# Patient Record
Sex: MALE | Race: WHITE | Employment: OTHER | URBAN - METROPOLITAN AREA
[De-identification: names, ages, dates, MRNs, and addresses within clinical notes are randomized per-mention and may not be internally consistent; named-entity substitution may affect disease eponyms.]

---

## 2017-08-06 PROBLEM — J96.11 CHRONIC RESPIRATORY FAILURE WITH HYPOXIA AND HYPERCAPNIA (HCC): Status: ACTIVE | Noted: 2017-08-06

## 2017-08-06 PROBLEM — J96.12 CHRONIC RESPIRATORY FAILURE WITH HYPOXIA AND HYPERCAPNIA (HCC): Status: ACTIVE | Noted: 2017-08-06

## 2018-04-03 ENCOUNTER — HOSPITAL ENCOUNTER (OUTPATIENT)
Dept: CARDIAC REHAB | Age: 83
Setting detail: THERAPIES SERIES
Discharge: HOME | End: 2018-04-03
Payer: MEDICARE

## 2018-04-03 PROCEDURE — G0424 PULMONARY REHAB W EXER: HCPCS

## 2018-04-05 ENCOUNTER — HOSPITAL ENCOUNTER (OUTPATIENT)
Dept: CARDIAC REHAB | Age: 83
Setting detail: THERAPIES SERIES
Discharge: HOME | End: 2018-04-05
Payer: MEDICARE

## 2018-04-05 ENCOUNTER — APPOINTMENT (OUTPATIENT)
Dept: CARDIAC REHAB | Age: 83
End: 2018-04-05
Payer: MEDICARE

## 2018-04-05 PROCEDURE — G0424 PULMONARY REHAB W EXER: HCPCS

## 2018-04-10 ENCOUNTER — HOSPITAL ENCOUNTER (OUTPATIENT)
Dept: CARDIAC REHAB | Age: 83
Setting detail: THERAPIES SERIES
Discharge: HOME | End: 2018-04-10
Payer: MEDICARE

## 2018-04-10 ENCOUNTER — APPOINTMENT (OUTPATIENT)
Dept: CARDIAC REHAB | Age: 83
End: 2018-04-10
Payer: MEDICARE

## 2018-04-10 PROCEDURE — G0424 PULMONARY REHAB W EXER: HCPCS

## 2018-04-12 ENCOUNTER — APPOINTMENT (OUTPATIENT)
Dept: CARDIAC REHAB | Age: 83
End: 2018-04-12
Payer: MEDICARE

## 2018-04-17 ENCOUNTER — APPOINTMENT (OUTPATIENT)
Dept: CARDIAC REHAB | Age: 83
End: 2018-04-17
Payer: MEDICARE

## 2018-04-17 ENCOUNTER — HOSPITAL ENCOUNTER (OUTPATIENT)
Dept: CARDIAC REHAB | Age: 83
Setting detail: THERAPIES SERIES
Discharge: HOME | End: 2018-04-17
Payer: MEDICARE

## 2018-04-17 PROCEDURE — G0424 PULMONARY REHAB W EXER: HCPCS

## 2018-04-19 ENCOUNTER — APPOINTMENT (OUTPATIENT)
Dept: CARDIAC REHAB | Age: 83
End: 2018-04-19
Payer: MEDICARE

## 2018-04-19 ENCOUNTER — HOSPITAL ENCOUNTER (OUTPATIENT)
Dept: CARDIAC REHAB | Age: 83
Setting detail: THERAPIES SERIES
Discharge: HOME | End: 2018-04-19
Payer: MEDICARE

## 2018-04-19 PROCEDURE — G0424 PULMONARY REHAB W EXER: HCPCS

## 2018-04-24 ENCOUNTER — APPOINTMENT (OUTPATIENT)
Dept: CARDIAC REHAB | Age: 83
End: 2018-04-24
Payer: MEDICARE

## 2018-04-24 ENCOUNTER — HOSPITAL ENCOUNTER (OUTPATIENT)
Dept: CARDIAC REHAB | Age: 83
Setting detail: THERAPIES SERIES
Discharge: HOME | End: 2018-04-24
Payer: MEDICARE

## 2018-04-24 PROCEDURE — G0424 PULMONARY REHAB W EXER: HCPCS

## 2018-04-26 ENCOUNTER — APPOINTMENT (OUTPATIENT)
Dept: CARDIAC REHAB | Age: 83
End: 2018-04-26
Payer: MEDICARE

## 2018-05-01 ENCOUNTER — HOSPITAL ENCOUNTER (OUTPATIENT)
Dept: CARDIAC REHAB | Age: 83
Setting detail: THERAPIES SERIES
Discharge: HOME | End: 2018-05-01
Payer: MEDICARE

## 2018-05-01 PROCEDURE — G0424 PULMONARY REHAB W EXER: HCPCS

## 2018-05-03 ENCOUNTER — HOSPITAL ENCOUNTER (OUTPATIENT)
Dept: CARDIAC REHAB | Age: 83
Setting detail: THERAPIES SERIES
Discharge: HOME | End: 2018-05-03
Payer: MEDICARE

## 2018-05-03 PROCEDURE — G0424 PULMONARY REHAB W EXER: HCPCS

## 2018-05-08 ENCOUNTER — HOSPITAL ENCOUNTER (OUTPATIENT)
Dept: CARDIAC REHAB | Age: 83
Setting detail: THERAPIES SERIES
Discharge: HOME | End: 2018-05-08
Payer: MEDICARE

## 2018-05-08 PROCEDURE — G0424 PULMONARY REHAB W EXER: HCPCS

## 2018-05-10 ENCOUNTER — HOSPITAL ENCOUNTER (OUTPATIENT)
Dept: CARDIAC REHAB | Age: 83
Setting detail: THERAPIES SERIES
Discharge: HOME | End: 2018-05-10
Payer: MEDICARE

## 2018-05-10 PROCEDURE — G0424 PULMONARY REHAB W EXER: HCPCS

## 2018-05-15 ENCOUNTER — HOSPITAL ENCOUNTER (OUTPATIENT)
Dept: CARDIAC REHAB | Age: 83
Setting detail: THERAPIES SERIES
Discharge: HOME | End: 2018-05-15
Payer: MEDICARE

## 2018-05-15 PROCEDURE — G0424 PULMONARY REHAB W EXER: HCPCS

## 2018-05-17 ENCOUNTER — HOSPITAL ENCOUNTER (OUTPATIENT)
Dept: CARDIAC REHAB | Age: 83
Setting detail: THERAPIES SERIES
Discharge: HOME | End: 2018-05-17
Payer: MEDICARE

## 2018-05-17 PROCEDURE — G0424 PULMONARY REHAB W EXER: HCPCS

## 2018-05-22 ENCOUNTER — HOSPITAL ENCOUNTER (OUTPATIENT)
Dept: CARDIAC REHAB | Age: 83
Setting detail: THERAPIES SERIES
Discharge: HOME | End: 2018-05-22
Payer: MEDICARE

## 2018-05-22 PROCEDURE — G0424 PULMONARY REHAB W EXER: HCPCS

## 2018-05-30 ENCOUNTER — HOSPITAL ENCOUNTER (OUTPATIENT)
Dept: CT IMAGING | Age: 83
Discharge: HOME | End: 2018-05-30
Payer: MEDICARE

## 2018-05-30 DIAGNOSIS — J43.8 OTHER EMPHYSEMA (HCC): ICD-10-CM

## 2018-05-30 PROCEDURE — 71250 CT THORAX DX C-: CPT

## 2018-06-05 ENCOUNTER — HOSPITAL ENCOUNTER (OUTPATIENT)
Dept: CARDIAC REHAB | Age: 83
Setting detail: THERAPIES SERIES
Discharge: HOME | End: 2018-06-05
Payer: MEDICARE

## 2018-06-05 PROCEDURE — G0424 PULMONARY REHAB W EXER: HCPCS

## 2018-06-07 ENCOUNTER — HOSPITAL ENCOUNTER (OUTPATIENT)
Dept: CARDIAC REHAB | Age: 83
Setting detail: THERAPIES SERIES
Discharge: HOME | End: 2018-06-07
Payer: MEDICARE

## 2018-06-07 PROCEDURE — G0424 PULMONARY REHAB W EXER: HCPCS

## 2018-06-21 ENCOUNTER — HOSPITAL ENCOUNTER (OUTPATIENT)
Dept: CARDIAC REHAB | Age: 83
Setting detail: THERAPIES SERIES
Discharge: HOME | End: 2018-06-21
Payer: MEDICARE

## 2018-06-21 PROCEDURE — G0424 PULMONARY REHAB W EXER: HCPCS

## 2018-07-03 ENCOUNTER — APPOINTMENT (OUTPATIENT)
Dept: CARDIAC REHAB | Age: 83
End: 2018-07-03
Payer: MEDICARE

## 2018-07-05 ENCOUNTER — APPOINTMENT (OUTPATIENT)
Dept: CARDIAC REHAB | Age: 83
End: 2018-07-05
Payer: MEDICARE

## 2018-07-10 ENCOUNTER — APPOINTMENT (OUTPATIENT)
Dept: CARDIAC REHAB | Age: 83
End: 2018-07-10
Payer: MEDICARE

## 2018-07-12 ENCOUNTER — APPOINTMENT (OUTPATIENT)
Dept: CARDIAC REHAB | Age: 83
End: 2018-07-12
Payer: MEDICARE

## 2018-07-17 ENCOUNTER — HOSPITAL ENCOUNTER (OUTPATIENT)
Dept: CARDIAC REHAB | Age: 83
Setting detail: THERAPIES SERIES
Discharge: HOME | End: 2018-07-17
Payer: MEDICARE

## 2018-07-17 ENCOUNTER — APPOINTMENT (OUTPATIENT)
Dept: CARDIAC REHAB | Age: 83
End: 2018-07-17
Payer: MEDICARE

## 2018-07-19 ENCOUNTER — APPOINTMENT (OUTPATIENT)
Dept: CARDIAC REHAB | Age: 83
End: 2018-07-19
Payer: MEDICARE

## 2018-07-20 ENCOUNTER — HOSPITAL ENCOUNTER (OUTPATIENT)
Dept: CT IMAGING | Age: 83
Discharge: HOME | End: 2018-07-20
Payer: MEDICARE

## 2018-07-20 DIAGNOSIS — R07.9 CHEST PAIN, UNSPECIFIED TYPE: ICD-10-CM

## 2018-07-20 DIAGNOSIS — J44.9 CHRONIC OBSTRUCTIVE PULMONARY DISEASE, UNSPECIFIED COPD TYPE (HCC): ICD-10-CM

## 2018-07-20 PROCEDURE — 71250 CT THORAX DX C-: CPT

## 2018-07-24 ENCOUNTER — APPOINTMENT (OUTPATIENT)
Dept: CARDIAC REHAB | Age: 83
End: 2018-07-24
Payer: MEDICARE

## 2018-07-26 ENCOUNTER — APPOINTMENT (OUTPATIENT)
Dept: CARDIAC REHAB | Age: 83
End: 2018-07-26
Payer: MEDICARE

## 2018-07-26 ENCOUNTER — HOSPITAL ENCOUNTER (OUTPATIENT)
Age: 83
Discharge: HOME | End: 2018-07-28
Payer: MEDICARE

## 2018-07-26 LAB
ALBUMIN SERPL-MCNC: 4.4 G/DL (ref 3.5–5.2)
ALP BLD-CCNC: 68 U/L (ref 40–129)
ALT SERPL-CCNC: 10 U/L (ref 0–40)
ANION GAP SERPL CALCULATED.3IONS-SCNC: 17 MMOL/L (ref 7–16)
AST SERPL-CCNC: 17 U/L (ref 0–39)
BILIRUB SERPL-MCNC: 0.5 MG/DL (ref 0–1.2)
BUN BLDV-MCNC: 25 MG/DL (ref 8–23)
CALCIUM SERPL-MCNC: 9.7 MG/DL (ref 8.6–10.2)
CHLORIDE BLD-SCNC: 99 MMOL/L (ref 98–107)
CHOLESTEROL, TOTAL: 239 MG/DL (ref 0–199)
CO2: 31 MMOL/L (ref 22–29)
CREAT SERPL-MCNC: 1.1 MG/DL (ref 0.7–1.2)
GFR AFRICAN AMERICAN: >60
GFR NON-AFRICAN AMERICAN: >60 ML/MIN/1.73
GLUCOSE BLD-MCNC: 108 MG/DL (ref 74–109)
HCT VFR BLD CALC: 46.2 % (ref 37–54)
HDLC SERPL-MCNC: 79 MG/DL
HEMOGLOBIN: 14.2 G/DL (ref 12.5–16.5)
LDL CHOLESTEROL CALCULATED: 147 MG/DL (ref 0–99)
MCH RBC QN AUTO: 32.3 PG (ref 26–35)
MCHC RBC AUTO-ENTMCNC: 30.7 % (ref 32–34.5)
MCV RBC AUTO: 105.2 FL (ref 80–99.9)
MICROALBUMIN UR-MCNC: <12 MG/L
PDW BLD-RTO: 14.2 FL (ref 11.5–15)
PLATELET # BLD: 190 E9/L (ref 130–450)
PMV BLD AUTO: 11.2 FL (ref 7–12)
POTASSIUM SERPL-SCNC: 4.1 MMOL/L (ref 3.5–5)
RBC # BLD: 4.39 E12/L (ref 3.8–5.8)
SODIUM BLD-SCNC: 147 MMOL/L (ref 132–146)
TOTAL PROTEIN: 7.1 G/DL (ref 6.4–8.3)
TRIGL SERPL-MCNC: 66 MG/DL (ref 0–149)
TSH SERPL DL<=0.05 MIU/L-ACNC: 0.86 UIU/ML (ref 0.27–4.2)
VITAMIN D 25-HYDROXY: >120 NG/ML (ref 30–100)
VLDLC SERPL CALC-MCNC: 13 MG/DL
WBC # BLD: 9.2 E9/L (ref 4.5–11.5)

## 2018-07-26 PROCEDURE — 80053 COMPREHEN METABOLIC PANEL: CPT

## 2018-07-26 PROCEDURE — 82306 VITAMIN D 25 HYDROXY: CPT

## 2018-07-26 PROCEDURE — 80061 LIPID PANEL: CPT

## 2018-07-26 PROCEDURE — 84443 ASSAY THYROID STIM HORMONE: CPT

## 2018-07-26 PROCEDURE — 82044 UR ALBUMIN SEMIQUANTITATIVE: CPT

## 2018-07-26 PROCEDURE — 85027 COMPLETE CBC AUTOMATED: CPT

## 2018-07-31 ENCOUNTER — APPOINTMENT (OUTPATIENT)
Dept: CARDIAC REHAB | Age: 83
End: 2018-07-31
Payer: MEDICARE

## 2018-08-02 ENCOUNTER — APPOINTMENT (OUTPATIENT)
Dept: CARDIAC REHAB | Age: 83
End: 2018-08-02
Payer: MEDICARE

## 2018-08-07 ENCOUNTER — HOSPITAL ENCOUNTER (OUTPATIENT)
Dept: CARDIAC REHAB | Age: 83
Setting detail: THERAPIES SERIES
Discharge: HOME | End: 2018-08-07
Payer: MEDICARE

## 2018-08-09 ENCOUNTER — HOSPITAL ENCOUNTER (OUTPATIENT)
Dept: CARDIAC REHAB | Age: 83
Setting detail: THERAPIES SERIES
Discharge: HOME | End: 2018-08-09
Payer: MEDICARE

## 2018-08-14 ENCOUNTER — HOSPITAL ENCOUNTER (OUTPATIENT)
Dept: CARDIAC REHAB | Age: 83
Setting detail: THERAPIES SERIES
Discharge: HOME | End: 2018-08-14
Payer: MEDICARE

## 2018-08-16 ENCOUNTER — HOSPITAL ENCOUNTER (OUTPATIENT)
Dept: CARDIAC REHAB | Age: 83
Setting detail: THERAPIES SERIES
Discharge: HOME | End: 2018-08-16
Payer: MEDICARE

## 2018-08-21 ENCOUNTER — HOSPITAL ENCOUNTER (OUTPATIENT)
Dept: CARDIAC REHAB | Age: 83
Setting detail: THERAPIES SERIES
Discharge: HOME | End: 2018-08-21
Payer: MEDICARE

## 2018-08-23 ENCOUNTER — HOSPITAL ENCOUNTER (OUTPATIENT)
Dept: CARDIAC REHAB | Age: 83
Setting detail: THERAPIES SERIES
Discharge: HOME | End: 2018-08-23
Payer: MEDICARE

## 2018-08-28 ENCOUNTER — HOSPITAL ENCOUNTER (OUTPATIENT)
Dept: CARDIAC REHAB | Age: 83
Setting detail: THERAPIES SERIES
Discharge: HOME | End: 2018-08-28
Payer: MEDICARE

## 2018-08-30 ENCOUNTER — HOSPITAL ENCOUNTER (OUTPATIENT)
Dept: CARDIAC REHAB | Age: 83
Setting detail: THERAPIES SERIES
Discharge: HOME | End: 2018-08-30
Payer: MEDICARE

## 2018-09-02 PROBLEM — R09.02 SEVERE HYPOXEMIA: Status: ACTIVE | Noted: 2018-09-02

## 2018-09-04 ENCOUNTER — HOSPITAL ENCOUNTER (OUTPATIENT)
Dept: CARDIAC REHAB | Age: 83
Setting detail: THERAPIES SERIES
Discharge: HOME | End: 2018-09-04
Payer: MEDICARE

## 2018-09-06 ENCOUNTER — HOSPITAL ENCOUNTER (OUTPATIENT)
Dept: CARDIAC REHAB | Age: 83
Setting detail: THERAPIES SERIES
Discharge: HOME | End: 2018-09-06
Payer: MEDICARE

## 2019-01-01 ENCOUNTER — HOSPITAL ENCOUNTER (INPATIENT)
Age: 84
LOS: 6 days | Discharge: HOME | DRG: 199 | End: 2019-12-19
Attending: EMERGENCY MEDICINE | Admitting: INTERNAL MEDICINE
Payer: MEDICARE

## 2019-01-01 ENCOUNTER — APPOINTMENT (OUTPATIENT)
Dept: GENERAL RADIOLOGY | Age: 84
DRG: 208 | End: 2019-01-01
Payer: MEDICARE

## 2019-01-01 ENCOUNTER — APPOINTMENT (OUTPATIENT)
Dept: GENERAL RADIOLOGY | Age: 84
DRG: 199 | End: 2019-01-01
Payer: MEDICARE

## 2019-01-01 ENCOUNTER — APPOINTMENT (OUTPATIENT)
Dept: GENERAL RADIOLOGY | Age: 84
DRG: 308 | End: 2019-01-01
Payer: MEDICARE

## 2019-01-01 ENCOUNTER — OFFICE VISIT (OUTPATIENT)
Dept: ORTHOPEDIC SURGERY | Age: 84
End: 2019-01-01
Payer: MEDICARE

## 2019-01-01 ENCOUNTER — HOSPITAL ENCOUNTER (OUTPATIENT)
Age: 84
Discharge: HOME | End: 2019-07-27
Payer: MEDICARE

## 2019-01-01 ENCOUNTER — APPOINTMENT (OUTPATIENT)
Dept: CT IMAGING | Age: 84
DRG: 199 | End: 2019-01-01
Payer: MEDICARE

## 2019-01-01 ENCOUNTER — HOSPITAL ENCOUNTER (INPATIENT)
Age: 84
LOS: 10 days | Discharge: NURSING, REHAB OR LONG-TERM CARE | DRG: 208 | End: 2019-10-10
Attending: EMERGENCY MEDICINE | Admitting: INTERNAL MEDICINE
Payer: MEDICARE

## 2019-01-01 ENCOUNTER — HOSPITAL ENCOUNTER (INPATIENT)
Age: 84
LOS: 1 days | Discharge: HOME | DRG: 308 | End: 2019-03-29
Attending: EMERGENCY MEDICINE | Admitting: INTERNAL MEDICINE
Payer: MEDICARE

## 2019-01-01 ENCOUNTER — OFFICE VISIT (OUTPATIENT)
Dept: PHYSICAL MEDICINE AND REHAB | Age: 84
End: 2019-01-01
Payer: MEDICARE

## 2019-01-01 ENCOUNTER — HOSPITAL ENCOUNTER (OUTPATIENT)
Age: 84
Discharge: HOME | End: 2019-02-27
Payer: MEDICARE

## 2019-01-01 ENCOUNTER — TELEPHONE (OUTPATIENT)
Dept: PHYSICAL MEDICINE AND REHAB | Age: 84
End: 2019-01-01

## 2019-01-01 ENCOUNTER — APPOINTMENT (OUTPATIENT)
Dept: ULTRASOUND IMAGING | Age: 84
DRG: 208 | End: 2019-01-01
Payer: MEDICARE

## 2019-01-01 ENCOUNTER — HOSPITAL ENCOUNTER (OUTPATIENT)
Age: 84
Discharge: HOME | End: 2019-11-08
Payer: MEDICARE

## 2019-01-01 ENCOUNTER — CARE COORDINATION (OUTPATIENT)
Dept: CASE MANAGEMENT | Age: 84
End: 2019-01-01

## 2019-01-01 VITALS
HEART RATE: 64 BPM | DIASTOLIC BLOOD PRESSURE: 69 MMHG | TEMPERATURE: 98.1 F | HEIGHT: 68 IN | RESPIRATION RATE: 22 BRPM | SYSTOLIC BLOOD PRESSURE: 128 MMHG | OXYGEN SATURATION: 97 % | BODY MASS INDEX: 19.2 KG/M2 | WEIGHT: 126.7 LBS

## 2019-01-01 VITALS — WEIGHT: 150.13 LBS | BODY MASS INDEX: 24.13 KG/M2 | HEIGHT: 66 IN

## 2019-01-01 VITALS
HEIGHT: 66 IN | BODY MASS INDEX: 24.12 KG/M2 | HEART RATE: 48 BPM | RESPIRATION RATE: 23 BRPM | SYSTOLIC BLOOD PRESSURE: 122 MMHG | DIASTOLIC BLOOD PRESSURE: 65 MMHG | TEMPERATURE: 98.1 F | WEIGHT: 150.1 LBS | OXYGEN SATURATION: 97 %

## 2019-01-01 VITALS
BODY MASS INDEX: 20.41 KG/M2 | WEIGHT: 127 LBS | HEIGHT: 66 IN | DIASTOLIC BLOOD PRESSURE: 53 MMHG | RESPIRATION RATE: 16 BRPM | SYSTOLIC BLOOD PRESSURE: 102 MMHG | HEART RATE: 58 BPM | OXYGEN SATURATION: 99 % | TEMPERATURE: 98.4 F

## 2019-01-01 DIAGNOSIS — I95.89 OTHER SPECIFIED HYPOTENSION: ICD-10-CM

## 2019-01-01 DIAGNOSIS — J84.10 PULMONARY FIBROSIS (HCC): ICD-10-CM

## 2019-01-01 DIAGNOSIS — M53.3 SACROILIAC DYSFUNCTION: Primary | ICD-10-CM

## 2019-01-01 DIAGNOSIS — I47.21 TORSADES DE POINTES: Primary | ICD-10-CM

## 2019-01-01 DIAGNOSIS — R06.02 SHORTNESS OF BREATH: ICD-10-CM

## 2019-01-01 DIAGNOSIS — R00.1 BRADYCARDIA: ICD-10-CM

## 2019-01-01 DIAGNOSIS — J44.1 COPD EXACERBATION (HCC): ICD-10-CM

## 2019-01-01 DIAGNOSIS — M53.3 SACROILIAC JOINT DYSFUNCTION OF RIGHT SIDE: Primary | ICD-10-CM

## 2019-01-01 DIAGNOSIS — J96.01 ACUTE RESPIRATORY FAILURE WITH HYPOXIA (HCC): Primary | ICD-10-CM

## 2019-01-01 LAB
ACANTHOCYTES: ABNORMAL
ALBUMIN SERPL-MCNC: 2.9 G/DL (ref 3.5–5.2)
ALBUMIN SERPL-MCNC: 3 G/DL (ref 3.5–5.2)
ALBUMIN SERPL-MCNC: 3.2 G/DL (ref 3.5–5.2)
ALBUMIN SERPL-MCNC: 3.3 G/DL (ref 3.5–5.2)
ALBUMIN SERPL-MCNC: 3.6 G/DL (ref 3.5–5.2)
ALBUMIN SERPL-MCNC: 3.7 G/DL (ref 3.5–5.2)
ALBUMIN SERPL-MCNC: 3.8 G/DL (ref 3.5–5.2)
ALBUMIN SERPL-MCNC: 4 G/DL (ref 3.5–5.2)
ALBUMIN SERPL-MCNC: 4.3 G/DL (ref 3.5–5.2)
ALP BLD-CCNC: 63 U/L (ref 40–129)
ALP BLD-CCNC: 64 U/L (ref 40–129)
ALP BLD-CCNC: 65 U/L (ref 40–129)
ALP BLD-CCNC: 66 U/L (ref 40–129)
ALP BLD-CCNC: 72 U/L (ref 40–129)
ALP BLD-CCNC: 73 U/L (ref 40–129)
ALP BLD-CCNC: 73 U/L (ref 40–129)
ALP BLD-CCNC: 78 U/L (ref 40–129)
ALP BLD-CCNC: 87 U/L (ref 40–129)
ALT SERPL-CCNC: 10 U/L (ref 0–40)
ALT SERPL-CCNC: 12 U/L (ref 0–40)
ALT SERPL-CCNC: 15 U/L (ref 0–40)
ALT SERPL-CCNC: 21 U/L (ref 0–40)
ALT SERPL-CCNC: 21 U/L (ref 0–40)
ALT SERPL-CCNC: 26 U/L (ref 0–40)
ALT SERPL-CCNC: 36 U/L (ref 0–40)
ALT SERPL-CCNC: 53 U/L (ref 0–40)
ALT SERPL-CCNC: 65 U/L (ref 0–40)
ANION GAP SERPL CALCULATED.3IONS-SCNC: 10 MMOL/L (ref 7–16)
ANION GAP SERPL CALCULATED.3IONS-SCNC: 11 MMOL/L (ref 7–16)
ANION GAP SERPL CALCULATED.3IONS-SCNC: 11 MMOL/L (ref 7–16)
ANION GAP SERPL CALCULATED.3IONS-SCNC: 12 MMOL/L (ref 7–16)
ANION GAP SERPL CALCULATED.3IONS-SCNC: 13 MMOL/L (ref 7–16)
ANION GAP SERPL CALCULATED.3IONS-SCNC: 14 MMOL/L (ref 7–16)
ANION GAP SERPL CALCULATED.3IONS-SCNC: 14 MMOL/L (ref 7–16)
ANION GAP SERPL CALCULATED.3IONS-SCNC: 18 MMOL/L (ref 7–16)
ANION GAP SERPL CALCULATED.3IONS-SCNC: 7 MMOL/L (ref 7–16)
ANION GAP SERPL CALCULATED.3IONS-SCNC: 8 MMOL/L (ref 7–16)
ANION GAP SERPL CALCULATED.3IONS-SCNC: 9 MMOL/L (ref 7–16)
ANISOCYTOSIS: ABNORMAL
APTT: 24.6 SEC (ref 24.5–35.1)
AST SERPL-CCNC: 15 U/L (ref 0–39)
AST SERPL-CCNC: 15 U/L (ref 0–39)
AST SERPL-CCNC: 17 U/L (ref 0–39)
AST SERPL-CCNC: 18 U/L (ref 0–39)
AST SERPL-CCNC: 22 U/L (ref 0–39)
AST SERPL-CCNC: 24 U/L (ref 0–39)
AST SERPL-CCNC: 28 U/L (ref 0–39)
AST SERPL-CCNC: 34 U/L (ref 0–39)
AST SERPL-CCNC: 47 U/L (ref 0–39)
B.E.: 10.3 MMOL/L (ref -3–3)
B.E.: 2.8 MMOL/L (ref -3–3)
B.E.: 3 MMOL/L (ref -3–3)
B.E.: 4.2 MMOL/L (ref -3–3)
B.E.: 7.2 MMOL/L (ref -3–3)
B.E.: 7.4 MMOL/L (ref -3–3)
B.E.: 7.9 MMOL/L (ref -3–3)
B.E.: 8 MMOL/L (ref -3–3)
B.E.: 8.1 MMOL/L (ref -3–3)
B.E.: 8.6 MMOL/L (ref -3–3)
BACTERIA: ABNORMAL /HPF
BACTERIA: ABNORMAL /HPF
BASOPHILS ABSOLUTE: 0 E9/L (ref 0–0.2)
BASOPHILS ABSOLUTE: 0.02 E9/L (ref 0–0.2)
BASOPHILS ABSOLUTE: 0.02 E9/L (ref 0–0.2)
BASOPHILS ABSOLUTE: 0.05 E9/L (ref 0–0.2)
BASOPHILS ABSOLUTE: 0.06 E9/L (ref 0–0.2)
BASOPHILS ABSOLUTE: 0.07 E9/L (ref 0–0.2)
BASOPHILS RELATIVE PERCENT: 0 % (ref 0–2)
BASOPHILS RELATIVE PERCENT: 0.1 % (ref 0–2)
BASOPHILS RELATIVE PERCENT: 0.2 % (ref 0–2)
BASOPHILS RELATIVE PERCENT: 0.5 % (ref 0–2)
BASOPHILS RELATIVE PERCENT: 0.6 % (ref 0–2)
BASOPHILS RELATIVE PERCENT: 0.7 % (ref 0–2)
BASOPHILS RELATIVE PERCENT: 0.9 % (ref 0–2)
BILIRUB SERPL-MCNC: 0.2 MG/DL (ref 0–1.2)
BILIRUB SERPL-MCNC: 0.3 MG/DL (ref 0–1.2)
BILIRUB SERPL-MCNC: 0.4 MG/DL (ref 0–1.2)
BILIRUB SERPL-MCNC: 0.5 MG/DL (ref 0–1.2)
BILIRUB SERPL-MCNC: <0.2 MG/DL (ref 0–1.2)
BILIRUBIN URINE: NEGATIVE
BILIRUBIN URINE: NEGATIVE
BLOOD CULTURE, ROUTINE: NORMAL
BLOOD, URINE: ABNORMAL
BLOOD, URINE: ABNORMAL
BUN BLDV-MCNC: 18 MG/DL (ref 8–23)
BUN BLDV-MCNC: 19 MG/DL (ref 8–23)
BUN BLDV-MCNC: 20 MG/DL (ref 8–23)
BUN BLDV-MCNC: 23 MG/DL (ref 8–23)
BUN BLDV-MCNC: 27 MG/DL (ref 8–23)
BUN BLDV-MCNC: 28 MG/DL (ref 8–23)
BUN BLDV-MCNC: 28 MG/DL (ref 8–23)
BUN BLDV-MCNC: 29 MG/DL (ref 8–23)
BUN BLDV-MCNC: 32 MG/DL (ref 8–23)
BUN BLDV-MCNC: 34 MG/DL (ref 8–23)
BUN BLDV-MCNC: 35 MG/DL (ref 8–23)
BUN BLDV-MCNC: 40 MG/DL (ref 8–23)
BUN BLDV-MCNC: 41 MG/DL (ref 8–23)
BUN BLDV-MCNC: 43 MG/DL (ref 8–23)
BUN BLDV-MCNC: 43 MG/DL (ref 8–23)
BUN BLDV-MCNC: 45 MG/DL (ref 8–23)
BURR CELLS: ABNORMAL
CALCIUM SERPL-MCNC: 8.1 MG/DL (ref 8.6–10.2)
CALCIUM SERPL-MCNC: 8.1 MG/DL (ref 8.6–10.2)
CALCIUM SERPL-MCNC: 8.3 MG/DL (ref 8.6–10.2)
CALCIUM SERPL-MCNC: 8.4 MG/DL (ref 8.6–10.2)
CALCIUM SERPL-MCNC: 8.5 MG/DL (ref 8.6–10.2)
CALCIUM SERPL-MCNC: 8.7 MG/DL (ref 8.6–10.2)
CALCIUM SERPL-MCNC: 8.8 MG/DL (ref 8.6–10.2)
CALCIUM SERPL-MCNC: 8.9 MG/DL (ref 8.6–10.2)
CALCIUM SERPL-MCNC: 9.1 MG/DL (ref 8.6–10.2)
CALCIUM SERPL-MCNC: 9.2 MG/DL (ref 8.6–10.2)
CALCIUM SERPL-MCNC: 9.6 MG/DL (ref 8.6–10.2)
CHLORIDE BLD-SCNC: 101 MMOL/L (ref 98–107)
CHLORIDE BLD-SCNC: 102 MMOL/L (ref 98–107)
CHLORIDE BLD-SCNC: 103 MMOL/L (ref 98–107)
CHLORIDE BLD-SCNC: 103 MMOL/L (ref 98–107)
CHLORIDE BLD-SCNC: 104 MMOL/L (ref 98–107)
CHLORIDE BLD-SCNC: 105 MMOL/L (ref 98–107)
CHLORIDE BLD-SCNC: 106 MMOL/L (ref 98–107)
CHLORIDE BLD-SCNC: 97 MMOL/L (ref 98–107)
CHLORIDE BLD-SCNC: 98 MMOL/L (ref 98–107)
CHLORIDE BLD-SCNC: 99 MMOL/L (ref 98–107)
CHLORIDE BLD-SCNC: 99 MMOL/L (ref 98–107)
CLARITY: ABNORMAL
CLARITY: CLEAR
CO2: 27 MMOL/L (ref 22–29)
CO2: 28 MMOL/L (ref 22–29)
CO2: 29 MMOL/L (ref 22–29)
CO2: 30 MMOL/L (ref 22–29)
CO2: 30 MMOL/L (ref 22–29)
CO2: 31 MMOL/L (ref 22–29)
CO2: 32 MMOL/L (ref 22–29)
CO2: 33 MMOL/L (ref 22–29)
CO2: 33 MMOL/L (ref 22–29)
CO2: 34 MMOL/L (ref 22–29)
CO2: 35 MMOL/L (ref 22–29)
CO2: 35 MMOL/L (ref 22–29)
CO2: 37 MMOL/L (ref 22–29)
CO2: 38 MMOL/L (ref 22–29)
CO2: 38 MMOL/L (ref 22–29)
COHB: 0.1 % (ref 0–1.5)
COHB: 0.1 % (ref 0–1.5)
COHB: 0.3 % (ref 0–1.5)
COLOR: YELLOW
COLOR: YELLOW
CREAT SERPL-MCNC: 0.8 MG/DL (ref 0.7–1.2)
CREAT SERPL-MCNC: 0.9 MG/DL (ref 0.7–1.2)
CREAT SERPL-MCNC: 1 MG/DL (ref 0.7–1.2)
CREAT SERPL-MCNC: 1 MG/DL (ref 0.7–1.2)
CREAT SERPL-MCNC: 1.1 MG/DL (ref 0.7–1.2)
CREAT SERPL-MCNC: 1.2 MG/DL (ref 0.7–1.2)
CRITICAL NOTIFICATION: YES
CRITICAL NOTIFICATION: YES
CRITICAL: ABNORMAL
CRYSTALS, UA: ABNORMAL
CULTURE, BLOOD 2: NORMAL
DATE ANALYZED: ABNORMAL
DATE OF COLLECTION: ABNORMAL
DELIVERY SYSTEMS: ABNORMAL
DEVICE: ABNORMAL
EKG ATRIAL RATE: 108 BPM
EKG ATRIAL RATE: 278 BPM
EKG ATRIAL RATE: 54 BPM
EKG ATRIAL RATE: 59 BPM
EKG P AXIS: 111 DEGREES
EKG P AXIS: 58 DEGREES
EKG P AXIS: 69 DEGREES
EKG P-R INTERVAL: 128 MS
EKG P-R INTERVAL: 158 MS
EKG P-R INTERVAL: 158 MS
EKG P-R INTERVAL: 160 MS
EKG Q-T INTERVAL: 288 MS
EKG Q-T INTERVAL: 344 MS
EKG Q-T INTERVAL: 454 MS
EKG Q-T INTERVAL: 512 MS
EKG QRS DURATION: 132 MS
EKG QRS DURATION: 80 MS
EKG QRS DURATION: 94 MS
EKG QRS DURATION: 96 MS
EKG QTC CALCULATION (BAZETT): 430 MS
EKG QTC CALCULATION (BAZETT): 463 MS
EKG QTC CALCULATION (BAZETT): 464 MS
EKG QTC CALCULATION (BAZETT): 506 MS
EKG R AXIS: -14 DEGREES
EKG R AXIS: -38 DEGREES
EKG R AXIS: -47 DEGREES
EKG R AXIS: 52 DEGREES
EKG T AXIS: -2 DEGREES
EKG T AXIS: 1 DEGREES
EKG T AXIS: 50 DEGREES
EKG T AXIS: 69 DEGREES
EKG VENTRICULAR RATE: 109 BPM
EKG VENTRICULAR RATE: 156 BPM
EKG VENTRICULAR RATE: 54 BPM
EKG VENTRICULAR RATE: 59 BPM
EOSINOPHILS ABSOLUTE: 0 E9/L (ref 0.05–0.5)
EOSINOPHILS ABSOLUTE: 0.01 E9/L (ref 0.05–0.5)
EOSINOPHILS ABSOLUTE: 0.05 E9/L (ref 0.05–0.5)
EOSINOPHILS ABSOLUTE: 0.06 E9/L (ref 0.05–0.5)
EOSINOPHILS ABSOLUTE: 0.09 E9/L (ref 0.05–0.5)
EOSINOPHILS ABSOLUTE: 0.16 E9/L (ref 0.05–0.5)
EOSINOPHILS ABSOLUTE: 0.19 E9/L (ref 0.05–0.5)
EOSINOPHILS ABSOLUTE: 0.74 E9/L (ref 0.05–0.5)
EOSINOPHILS RELATIVE PERCENT: 0 % (ref 0–6)
EOSINOPHILS RELATIVE PERCENT: 0 % (ref 0–6)
EOSINOPHILS RELATIVE PERCENT: 0.1 % (ref 0–6)
EOSINOPHILS RELATIVE PERCENT: 0.5 % (ref 0–6)
EOSINOPHILS RELATIVE PERCENT: 0.9 % (ref 0–6)
EOSINOPHILS RELATIVE PERCENT: 1 % (ref 0–6)
EOSINOPHILS RELATIVE PERCENT: 1.1 % (ref 0–6)
EOSINOPHILS RELATIVE PERCENT: 1.7 % (ref 0–6)
EOSINOPHILS RELATIVE PERCENT: 2 % (ref 0–6)
EOSINOPHILS RELATIVE PERCENT: 7.1 % (ref 0–6)
EPITHELIAL CELLS, UA: ABNORMAL /HPF
FILM ARRAY ADENOVIRUS: NORMAL
FILM ARRAY BORDETELLA PERTUSSIS: NORMAL
FILM ARRAY CHLAMYDOPHILIA PNEUMONIAE: NORMAL
FILM ARRAY CORONAVIRUS 229E: NORMAL
FILM ARRAY CORONAVIRUS HKU1: NORMAL
FILM ARRAY CORONAVIRUS NL63: NORMAL
FILM ARRAY CORONAVIRUS OC43: NORMAL
FILM ARRAY INFLUENZA A VIRUS 09H1: NORMAL
FILM ARRAY INFLUENZA A VIRUS H1: NORMAL
FILM ARRAY INFLUENZA A VIRUS H3: NORMAL
FILM ARRAY INFLUENZA A VIRUS: NORMAL
FILM ARRAY INFLUENZA B: NORMAL
FILM ARRAY METAPNEUMOVIRUS: NORMAL
FILM ARRAY MYCOPLASMA PNEUMONIAE: NORMAL
FILM ARRAY PARAINFLUENZA VIRUS 1: NORMAL
FILM ARRAY PARAINFLUENZA VIRUS 2: NORMAL
FILM ARRAY PARAINFLUENZA VIRUS 3: NORMAL
FILM ARRAY PARAINFLUENZA VIRUS 4: NORMAL
FILM ARRAY RESPIRATORY SYNCITIAL VIRUS: NORMAL
FILM ARRAY RHINOVIRUS/ENTEROVIRUS: NORMAL
FIO2 ARTERIAL: 100
FIO2 ARTERIAL: 50
FIO2: 40 %
FIO2: 50 %
FOLATE: 18.8 NG/ML (ref 4.8–24.2)
GFR AFRICAN AMERICAN: >60
GFR NON-AFRICAN AMERICAN: 58 ML/MIN/1.73
GFR NON-AFRICAN AMERICAN: 58 ML/MIN/1.73
GFR NON-AFRICAN AMERICAN: >60 ML/MIN/1.73
GLUCOSE BLD-MCNC: 102 MG/DL (ref 74–99)
GLUCOSE BLD-MCNC: 103 MG/DL (ref 74–99)
GLUCOSE BLD-MCNC: 105 MG/DL (ref 74–99)
GLUCOSE BLD-MCNC: 106 MG/DL (ref 74–99)
GLUCOSE BLD-MCNC: 111 MG/DL (ref 74–99)
GLUCOSE BLD-MCNC: 119 MG/DL (ref 74–99)
GLUCOSE BLD-MCNC: 121 MG/DL (ref 74–99)
GLUCOSE BLD-MCNC: 128 MG/DL (ref 74–99)
GLUCOSE BLD-MCNC: 129 MG/DL (ref 74–99)
GLUCOSE BLD-MCNC: 133 MG/DL (ref 74–99)
GLUCOSE BLD-MCNC: 169 MG/DL (ref 74–99)
GLUCOSE BLD-MCNC: 192 MG/DL (ref 74–99)
GLUCOSE BLD-MCNC: 198 MG/DL (ref 74–99)
GLUCOSE BLD-MCNC: 207 MG/DL (ref 74–99)
GLUCOSE BLD-MCNC: 92 MG/DL (ref 74–99)
GLUCOSE BLD-MCNC: 94 MG/DL (ref 74–99)
GLUCOSE BLD-MCNC: 95 MG/DL (ref 74–99)
GLUCOSE BLD-MCNC: 96 MG/DL (ref 74–99)
GLUCOSE BLD-MCNC: 96 MG/DL (ref 74–99)
GLUCOSE BLD-MCNC: 98 MG/DL (ref 74–99)
GLUCOSE URINE: NEGATIVE MG/DL
GLUCOSE URINE: NEGATIVE MG/DL
HCO3 ARTERIAL: 31.1 MMOL/L (ref 22–26)
HCO3 ARTERIAL: 32.1 MMOL/L (ref 22–26)
HCO3 ARTERIAL: 34.5 MMOL/L (ref 22–26)
HCO3 ARTERIAL: 35.2 MMOL/L (ref 22–26)
HCO3 ARTERIAL: 37.1 MMOL/L (ref 22–26)
HCO3: 28.4 MMOL/L (ref 22–26)
HCO3: 30.5 MMOL/L (ref 22–26)
HCO3: 33.6 MMOL/L (ref 22–26)
HCO3: 34.4 MMOL/L (ref 22–26)
HCO3: 34.7 MMOL/L (ref 22–26)
HCT VFR BLD CALC: 27.1 % (ref 37–54)
HCT VFR BLD CALC: 27.2 % (ref 37–54)
HCT VFR BLD CALC: 28 % (ref 37–54)
HCT VFR BLD CALC: 30.1 % (ref 37–54)
HCT VFR BLD CALC: 31.5 % (ref 37–54)
HCT VFR BLD CALC: 32 % (ref 37–54)
HCT VFR BLD CALC: 34.6 % (ref 37–54)
HCT VFR BLD CALC: 35 % (ref 37–54)
HCT VFR BLD CALC: 35.7 % (ref 37–54)
HCT VFR BLD CALC: 36.7 % (ref 37–54)
HCT VFR BLD CALC: 37.1 % (ref 37–54)
HCT VFR BLD CALC: 37.2 % (ref 37–54)
HCT VFR BLD CALC: 37.3 % (ref 37–54)
HCT VFR BLD CALC: 37.7 % (ref 37–54)
HCT VFR BLD CALC: 41 % (ref 37–54)
HCT VFR BLD CALC: 41.1 % (ref 37–54)
HCT VFR BLD CALC: 43.2 % (ref 37–54)
HEMOGLOBIN: 10.3 G/DL (ref 12.5–16.5)
HEMOGLOBIN: 10.5 G/DL (ref 12.5–16.5)
HEMOGLOBIN: 11 G/DL (ref 12.5–16.5)
HEMOGLOBIN: 11.4 G/DL (ref 12.5–16.5)
HEMOGLOBIN: 11.4 G/DL (ref 12.5–16.5)
HEMOGLOBIN: 11.6 G/DL (ref 12.5–16.5)
HEMOGLOBIN: 11.9 G/DL (ref 12.5–16.5)
HEMOGLOBIN: 12 G/DL (ref 12.5–16.5)
HEMOGLOBIN: 12.1 G/DL (ref 12.5–16.5)
HEMOGLOBIN: 12.2 G/DL (ref 12.5–16.5)
HEMOGLOBIN: 12.7 G/DL (ref 12.5–16.5)
HEMOGLOBIN: 12.9 G/DL (ref 12.5–16.5)
HEMOGLOBIN: 14.2 G/DL (ref 12.5–16.5)
HEMOGLOBIN: 8.6 G/DL (ref 12.5–16.5)
HEMOGLOBIN: 8.9 G/DL (ref 12.5–16.5)
HEMOGLOBIN: 9.1 G/DL (ref 12.5–16.5)
HEMOGLOBIN: 9.6 G/DL (ref 12.5–16.5)
HHB: 1.7 % (ref 0–5)
HHB: 2.3 % (ref 0–5)
HHB: 2.3 % (ref 0–5)
HHB: 4.8 % (ref 0–5)
HHB: 5.1 % (ref 0–5)
IMMATURE GRANULOCYTES #: 0.06 E9/L
IMMATURE GRANULOCYTES #: 0.07 E9/L
IMMATURE GRANULOCYTES #: 0.1 E9/L
IMMATURE GRANULOCYTES #: 0.1 E9/L
IMMATURE GRANULOCYTES #: 0.11 E9/L
IMMATURE GRANULOCYTES #: 0.13 E9/L
IMMATURE GRANULOCYTES %: 0.7 % (ref 0–5)
IMMATURE GRANULOCYTES %: 0.8 % (ref 0–5)
IMMATURE GRANULOCYTES %: 0.9 % (ref 0–5)
IMMATURE GRANULOCYTES %: 1.1 % (ref 0–5)
IMMATURE GRANULOCYTES %: 1.3 % (ref 0–5)
IMMATURE GRANULOCYTES %: 1.6 % (ref 0–5)
INR BLD: 1.1
IRON SATURATION: 25 % (ref 20–55)
IRON: 47 MCG/DL (ref 59–158)
KETONES, URINE: NEGATIVE MG/DL
KETONES, URINE: NEGATIVE MG/DL
LAB: ABNORMAL
LACTIC ACID, SEPSIS: 2 MMOL/L (ref 0.5–1.9)
LACTIC ACID: 1.9 MMOL/L (ref 0.5–2.2)
LACTIC ACID: 2.4 MMOL/L (ref 0.5–2.2)
LEUKOCYTE ESTERASE, URINE: ABNORMAL
LEUKOCYTE ESTERASE, URINE: ABNORMAL
LV EF: 48 %
LVEF MODALITY: NORMAL
LYMPHOCYTES ABSOLUTE: 0 E9/L (ref 1.5–4)
LYMPHOCYTES ABSOLUTE: 0.16 E9/L (ref 1.5–4)
LYMPHOCYTES ABSOLUTE: 0.34 E9/L (ref 1.5–4)
LYMPHOCYTES ABSOLUTE: 0.34 E9/L (ref 1.5–4)
LYMPHOCYTES ABSOLUTE: 0.39 E9/L (ref 1.5–4)
LYMPHOCYTES ABSOLUTE: 0.53 E9/L (ref 1.5–4)
LYMPHOCYTES ABSOLUTE: 0.61 E9/L (ref 1.5–4)
LYMPHOCYTES ABSOLUTE: 0.63 E9/L (ref 1.5–4)
LYMPHOCYTES ABSOLUTE: 0.71 E9/L (ref 1.5–4)
LYMPHOCYTES ABSOLUTE: 1.17 E9/L (ref 1.5–4)
LYMPHOCYTES ABSOLUTE: 1.18 E9/L (ref 1.5–4)
LYMPHOCYTES ABSOLUTE: 3.02 E9/L (ref 1.5–4)
LYMPHOCYTES RELATIVE PERCENT: 1.8 % (ref 20–42)
LYMPHOCYTES RELATIVE PERCENT: 14.8 % (ref 20–42)
LYMPHOCYTES RELATIVE PERCENT: 14.9 % (ref 20–42)
LYMPHOCYTES RELATIVE PERCENT: 29 % (ref 20–42)
LYMPHOCYTES RELATIVE PERCENT: 3.5 % (ref 20–42)
LYMPHOCYTES RELATIVE PERCENT: 4.7 % (ref 20–42)
LYMPHOCYTES RELATIVE PERCENT: 5.2 % (ref 20–42)
LYMPHOCYTES RELATIVE PERCENT: 5.3 % (ref 20–42)
LYMPHOCYTES RELATIVE PERCENT: 5.6 % (ref 20–42)
LYMPHOCYTES RELATIVE PERCENT: 6 % (ref 20–42)
LYMPHOCYTES RELATIVE PERCENT: 7.6 % (ref 20–42)
LYMPHOCYTES RELATIVE PERCENT: 7.7 % (ref 20–42)
Lab: ABNORMAL
MAGNESIUM: 1.7 MG/DL (ref 1.6–2.6)
MAGNESIUM: 1.8 MG/DL (ref 1.6–2.6)
MAGNESIUM: 1.8 MG/DL (ref 1.6–2.6)
MAGNESIUM: 1.9 MG/DL (ref 1.6–2.6)
MAGNESIUM: 1.9 MG/DL (ref 1.6–2.6)
MAGNESIUM: 2 MG/DL (ref 1.6–2.6)
MAGNESIUM: 2.5 MG/DL (ref 1.6–2.6)
MCH RBC QN AUTO: 32.1 PG (ref 26–35)
MCH RBC QN AUTO: 32.2 PG (ref 26–35)
MCH RBC QN AUTO: 32.2 PG (ref 26–35)
MCH RBC QN AUTO: 32.3 PG (ref 26–35)
MCH RBC QN AUTO: 32.3 PG (ref 26–35)
MCH RBC QN AUTO: 32.4 PG (ref 26–35)
MCH RBC QN AUTO: 32.7 PG (ref 26–35)
MCH RBC QN AUTO: 32.7 PG (ref 26–35)
MCH RBC QN AUTO: 32.8 PG (ref 26–35)
MCH RBC QN AUTO: 32.9 PG (ref 26–35)
MCH RBC QN AUTO: 32.9 PG (ref 26–35)
MCH RBC QN AUTO: 33.1 PG (ref 26–35)
MCH RBC QN AUTO: 33.2 PG (ref 26–35)
MCH RBC QN AUTO: 34.1 PG (ref 26–35)
MCHC RBC AUTO-ENTMCNC: 30.6 % (ref 32–34.5)
MCHC RBC AUTO-ENTMCNC: 31 % (ref 32–34.5)
MCHC RBC AUTO-ENTMCNC: 31.4 % (ref 32–34.5)
MCHC RBC AUTO-ENTMCNC: 31.4 % (ref 32–34.5)
MCHC RBC AUTO-ENTMCNC: 31.7 % (ref 32–34.5)
MCHC RBC AUTO-ENTMCNC: 31.8 % (ref 32–34.5)
MCHC RBC AUTO-ENTMCNC: 31.9 % (ref 32–34.5)
MCHC RBC AUTO-ENTMCNC: 32.4 % (ref 32–34.5)
MCHC RBC AUTO-ENTMCNC: 32.5 % (ref 32–34.5)
MCHC RBC AUTO-ENTMCNC: 32.7 % (ref 32–34.5)
MCHC RBC AUTO-ENTMCNC: 32.7 % (ref 32–34.5)
MCHC RBC AUTO-ENTMCNC: 32.9 % (ref 32–34.5)
MCV RBC AUTO: 100 FL (ref 80–99.9)
MCV RBC AUTO: 100.8 FL (ref 80–99.9)
MCV RBC AUTO: 100.8 FL (ref 80–99.9)
MCV RBC AUTO: 100.9 FL (ref 80–99.9)
MCV RBC AUTO: 101.1 FL (ref 80–99.9)
MCV RBC AUTO: 102.2 FL (ref 80–99.9)
MCV RBC AUTO: 102.4 FL (ref 80–99.9)
MCV RBC AUTO: 103 FL (ref 80–99.9)
MCV RBC AUTO: 103.6 FL (ref 80–99.9)
MCV RBC AUTO: 104.3 FL (ref 80–99.9)
MCV RBC AUTO: 104.5 FL (ref 80–99.9)
MCV RBC AUTO: 105.4 FL (ref 80–99.9)
MCV RBC AUTO: 99.1 FL (ref 80–99.9)
MCV RBC AUTO: 99.7 FL (ref 80–99.9)
METHB: 0.1 % (ref 0–1.5)
METHB: 0.2 % (ref 0–1.5)
METHB: 0.4 % (ref 0–1.5)
METHB: 0.5 % (ref 0–1.5)
METHB: 0.5 % (ref 0–1.5)
MODE: ABNORMAL
MODE: AC
MONOCYTES ABSOLUTE: 0.14 E9/L (ref 0.1–0.95)
MONOCYTES ABSOLUTE: 0.18 E9/L (ref 0.1–0.95)
MONOCYTES ABSOLUTE: 0.27 E9/L (ref 0.1–0.95)
MONOCYTES ABSOLUTE: 0.48 E9/L (ref 0.1–0.95)
MONOCYTES ABSOLUTE: 0.5 E9/L (ref 0.1–0.95)
MONOCYTES ABSOLUTE: 0.53 E9/L (ref 0.1–0.95)
MONOCYTES ABSOLUTE: 0.66 E9/L (ref 0.1–0.95)
MONOCYTES ABSOLUTE: 0.78 E9/L (ref 0.1–0.95)
MONOCYTES ABSOLUTE: 0.84 E9/L (ref 0.1–0.95)
MONOCYTES ABSOLUTE: 0.96 E9/L (ref 0.1–0.95)
MONOCYTES ABSOLUTE: 1.11 E9/L (ref 0.1–0.95)
MONOCYTES ABSOLUTE: 1.11 E9/L (ref 0.1–0.95)
MONOCYTES RELATIVE PERCENT: 1.7 % (ref 2–12)
MONOCYTES RELATIVE PERCENT: 1.8 % (ref 2–12)
MONOCYTES RELATIVE PERCENT: 10.1 % (ref 2–12)
MONOCYTES RELATIVE PERCENT: 10.7 % (ref 2–12)
MONOCYTES RELATIVE PERCENT: 12.1 % (ref 2–12)
MONOCYTES RELATIVE PERCENT: 14 % (ref 2–12)
MONOCYTES RELATIVE PERCENT: 3.5 % (ref 2–12)
MONOCYTES RELATIVE PERCENT: 4.6 % (ref 2–12)
MONOCYTES RELATIVE PERCENT: 5.2 % (ref 2–12)
MONOCYTES RELATIVE PERCENT: 6 % (ref 2–12)
MONOCYTES RELATIVE PERCENT: 7.9 % (ref 2–12)
MONOCYTES RELATIVE PERCENT: 8.5 % (ref 2–12)
MRSA CULTURE ONLY: NORMAL
NEUTROPHILS ABSOLUTE: 5.35 E9/L (ref 1.8–7.3)
NEUTROPHILS ABSOLUTE: 5.41 E9/L (ref 1.8–7.3)
NEUTROPHILS ABSOLUTE: 5.46 E9/L (ref 1.8–7.3)
NEUTROPHILS ABSOLUTE: 6.12 E9/L (ref 1.8–7.3)
NEUTROPHILS ABSOLUTE: 6.32 E9/L (ref 1.8–7.3)
NEUTROPHILS ABSOLUTE: 6.77 E9/L (ref 1.8–7.3)
NEUTROPHILS ABSOLUTE: 7.38 E9/L (ref 1.8–7.3)
NEUTROPHILS ABSOLUTE: 7.61 E9/L (ref 1.8–7.3)
NEUTROPHILS ABSOLUTE: 7.74 E9/L (ref 1.8–7.3)
NEUTROPHILS ABSOLUTE: 8.83 E9/L (ref 1.8–7.3)
NEUTROPHILS ABSOLUTE: 9.02 E9/L (ref 1.8–7.3)
NEUTROPHILS ABSOLUTE: 9.41 E9/L (ref 1.8–7.3)
NEUTROPHILS RELATIVE PERCENT: 51.4 % (ref 43–80)
NEUTROPHILS RELATIVE PERCENT: 68.1 % (ref 43–80)
NEUTROPHILS RELATIVE PERCENT: 68.9 % (ref 43–80)
NEUTROPHILS RELATIVE PERCENT: 81.2 % (ref 43–80)
NEUTROPHILS RELATIVE PERCENT: 82.4 % (ref 43–80)
NEUTROPHILS RELATIVE PERCENT: 86.7 % (ref 43–80)
NEUTROPHILS RELATIVE PERCENT: 88 % (ref 43–80)
NEUTROPHILS RELATIVE PERCENT: 89.5 % (ref 43–80)
NEUTROPHILS RELATIVE PERCENT: 90.4 % (ref 43–80)
NEUTROPHILS RELATIVE PERCENT: 91.3 % (ref 43–80)
NEUTROPHILS RELATIVE PERCENT: 93 % (ref 43–80)
NEUTROPHILS RELATIVE PERCENT: 98.2 % (ref 43–80)
NITRITE, URINE: NEGATIVE
NITRITE, URINE: NEGATIVE
NUCLEATED RED BLOOD CELLS: 0.9 /100 WBC
O2 CONTENT: 13.6 ML/DL
O2 CONTENT: 17 ML/DL
O2 CONTENT: 17.4 ML/DL
O2 CONTENT: 17.6 ML/DL
O2 CONTENT: 18 ML/DL
O2 SATURATION: 100 % (ref 92–98.5)
O2 SATURATION: 74.4 % (ref 92–98.5)
O2 SATURATION: 94.9 % (ref 92–98.5)
O2 SATURATION: 95.2 % (ref 92–98.5)
O2 SATURATION: 97.2 % (ref 92–98.5)
O2 SATURATION: 97.7 % (ref 92–98.5)
O2 SATURATION: 97.7 % (ref 92–98.5)
O2 SATURATION: 98.3 % (ref 92–98.5)
O2 SATURATION: 98.3 % (ref 92–98.5)
O2 SATURATION: 98.6 % (ref 92–98.5)
O2HB: 94.5 % (ref 94–97)
O2HB: 94.9 % (ref 94–97)
O2HB: 96.9 % (ref 94–97)
O2HB: 97.2 % (ref 94–97)
O2HB: 97.5 % (ref 94–97)
OPERATOR ID: 208
OPERATOR ID: 30
OPERATOR ID: 40
OPERATOR ID: 901
OPERATOR ID: ABNORMAL
OPERATOR ID: ABNORMAL
OVALOCYTES: ABNORMAL
PATIENT TEMP: 37
PATIENT TEMP: 37 C
PATIENT TEMP: 37 C
PCO2 ARTERIAL: 134.5 MMHG (ref 35–45)
PCO2 ARTERIAL: 39.1 MMHG (ref 35–45)
PCO2 ARTERIAL: 39.2 MMHG (ref 35–45)
PCO2 ARTERIAL: 56.1 MMHG (ref 35–45)
PCO2 ARTERIAL: 68.2 MMHG (ref 35–45)
PCO2: 45.4 MMHG (ref 35–45)
PCO2: 47.8 MMHG (ref 35–45)
PCO2: 52.9 MMHG (ref 35–45)
PCO2: 53.2 MMHG (ref 35–45)
PCO2: 56.1 MMHG (ref 35–45)
PDW BLD-RTO: 13 FL (ref 11.5–15)
PDW BLD-RTO: 13 FL (ref 11.5–15)
PDW BLD-RTO: 13.1 FL (ref 11.5–15)
PDW BLD-RTO: 13.1 FL (ref 11.5–15)
PDW BLD-RTO: 13.2 FL (ref 11.5–15)
PDW BLD-RTO: 13.3 FL (ref 11.5–15)
PDW BLD-RTO: 13.4 FL (ref 11.5–15)
PDW BLD-RTO: 13.4 FL (ref 11.5–15)
PDW BLD-RTO: 13.5 FL (ref 11.5–15)
PDW BLD-RTO: 13.6 FL (ref 11.5–15)
PDW BLD-RTO: 13.6 FL (ref 11.5–15)
PDW BLD-RTO: 13.8 FL (ref 11.5–15)
PDW BLD-RTO: 13.8 FL (ref 11.5–15)
PDW BLD-RTO: 14.5 FL (ref 11.5–15)
PEEP/CPAP: 5 CMH2O
PEEP/CPAP: 5 CMH2O
PEEP/CPAP: 6 CMH2O
PFO2: 1.92 MMHG/%
PFO2: 2.06 MMHG/%
PFO2: 2.24 MMHG/%
PFO2: 2.83 MMHG/%
PH BLOOD GAS: 7.05 (ref 7.35–7.45)
PH BLOOD GAS: 7.32 (ref 7.35–7.45)
PH BLOOD GAS: 7.38 (ref 7.35–7.45)
PH BLOOD GAS: 7.39 (ref 7.35–7.45)
PH BLOOD GAS: 7.4 (ref 7.35–7.45)
PH BLOOD GAS: 7.41 (ref 7.35–7.45)
PH BLOOD GAS: 7.42 (ref 7.35–7.45)
PH BLOOD GAS: 7.5 (ref 7.35–7.45)
PH BLOOD GAS: 7.51 (ref 7.35–7.45)
PH BLOOD GAS: 7.52 (ref 7.35–7.45)
PH UA: 5 (ref 5–9)
PH UA: 5.5 (ref 5–9)
PHOSPHORUS: 2.5 MG/DL (ref 2.5–4.5)
PHOSPHORUS: 2.8 MG/DL (ref 2.5–4.5)
PHOSPHORUS: 3.7 MG/DL (ref 2.5–4.5)
PHOSPHORUS: 4.1 MG/DL (ref 2.5–4.5)
PHOSPHORUS: 4.1 MG/DL (ref 2.5–4.5)
PIP: 12 CMH2O
PLATELET # BLD: 112 E9/L (ref 130–450)
PLATELET # BLD: 126 E9/L (ref 130–450)
PLATELET # BLD: 132 E9/L (ref 130–450)
PLATELET # BLD: 132 E9/L (ref 130–450)
PLATELET # BLD: 136 E9/L (ref 130–450)
PLATELET # BLD: 138 E9/L (ref 130–450)
PLATELET # BLD: 146 E9/L (ref 130–450)
PLATELET # BLD: 150 E9/L (ref 130–450)
PLATELET # BLD: 157 E9/L (ref 130–450)
PLATELET # BLD: 157 E9/L (ref 130–450)
PLATELET # BLD: 158 E9/L (ref 130–450)
PLATELET # BLD: 179 E9/L (ref 130–450)
PLATELET # BLD: 189 E9/L (ref 130–450)
PLATELET # BLD: 194 E9/L (ref 130–450)
PLATELET # BLD: 196 E9/L (ref 130–450)
PLATELET # BLD: 201 E9/L (ref 130–450)
PMV BLD AUTO: 10.3 FL (ref 7–12)
PMV BLD AUTO: 10.3 FL (ref 7–12)
PMV BLD AUTO: 10.5 FL (ref 7–12)
PMV BLD AUTO: 10.6 FL (ref 7–12)
PMV BLD AUTO: 10.7 FL (ref 7–12)
PMV BLD AUTO: 10.7 FL (ref 7–12)
PMV BLD AUTO: 10.8 FL (ref 7–12)
PMV BLD AUTO: 10.9 FL (ref 7–12)
PMV BLD AUTO: 10.9 FL (ref 7–12)
PMV BLD AUTO: 11 FL (ref 7–12)
PMV BLD AUTO: 11.2 FL (ref 7–12)
PMV BLD AUTO: 11.4 FL (ref 7–12)
PMV BLD AUTO: 11.5 FL (ref 7–12)
PMV BLD AUTO: 11.8 FL (ref 7–12)
PO2 ARTERIAL: 105.4 MMHG (ref 60–80)
PO2 ARTERIAL: 503.1 MMHG (ref 60–80)
PO2 ARTERIAL: 60.5 MMHG (ref 60–80)
PO2 ARTERIAL: 96.1 MMHG (ref 60–80)
PO2 ARTERIAL: 98.5 MMHG (ref 60–80)
PO2: 103.2 MMHG (ref 60–100)
PO2: 112.2 MMHG (ref 60–100)
PO2: 141.3 MMHG (ref 60–100)
PO2: 76.9 MMHG (ref 60–100)
PO2: 85.5 MMHG (ref 60–100)
POC ANION GAP: 8 MMOL/L (ref 7–16)
POC BUN: 29 MG/DL (ref 8–23)
POC CHLORIDE: 96 MMOL/L (ref 100–108)
POC CREATININE: 1.2 MG/DL (ref 0.7–1.2)
POC POTASSIUM: 3.7 MMOL/L (ref 3.5–5)
POC SODIUM: 142 MMOL/L (ref 132–146)
POIKILOCYTES: ABNORMAL
POLYCHROMASIA: ABNORMAL
POSITIVE END EXP PRESS: 5 CMH2O
POSITIVE END EXP PRESS: 5 CMH2O
POSITIVE END EXP PRESS: 6 CMH2O
POTASSIUM REFLEX MAGNESIUM: 3.9 MMOL/L (ref 3.5–5)
POTASSIUM SERPL-SCNC: 3.6 MMOL/L (ref 3.5–5)
POTASSIUM SERPL-SCNC: 3.6 MMOL/L (ref 3.5–5)
POTASSIUM SERPL-SCNC: 3.8 MMOL/L (ref 3.5–5)
POTASSIUM SERPL-SCNC: 3.9 MMOL/L (ref 3.5–5)
POTASSIUM SERPL-SCNC: 4 MMOL/L (ref 3.5–5)
POTASSIUM SERPL-SCNC: 4 MMOL/L (ref 3.5–5)
POTASSIUM SERPL-SCNC: 4.1 MMOL/L (ref 3.5–5)
POTASSIUM SERPL-SCNC: 4.2 MMOL/L (ref 3.5–5)
POTASSIUM SERPL-SCNC: 4.2 MMOL/L (ref 3.5–5)
POTASSIUM SERPL-SCNC: 4.3 MMOL/L (ref 3.5–5)
POTASSIUM SERPL-SCNC: 4.4 MMOL/L (ref 3.5–5)
POTASSIUM SERPL-SCNC: 4.5 MMOL/L (ref 3.5–5)
POTASSIUM SERPL-SCNC: 4.7 MMOL/L (ref 3.5–5)
POTASSIUM SERPL-SCNC: 4.7 MMOL/L (ref 3.5–5)
POTASSIUM SERPL-SCNC: 4.8 MMOL/L (ref 3.5–5)
POTASSIUM SERPL-SCNC: 5 MMOL/L (ref 3.5–5)
PRESSURE SUPPORT: 18 CMH2O
PRESSURE SUPPORT: 22 CMH2O
PRESSURE SUPPORT: 22 CMH2O
PRO-BNP: 3751 PG/ML (ref 0–450)
PRO-BNP: 467 PG/ML (ref 0–450)
PRO-BNP: 550 PG/ML (ref 0–450)
PRO-BNP: 710 PG/ML (ref 0–450)
PROSTATE SPECIFIC ANTIGEN: 1.7 NG/ML (ref 0–4)
PROSTATE SPECIFIC ANTIGEN: 2.57 NG/ML (ref 0–4)
PROTEIN UA: NEGATIVE MG/DL
PROTEIN UA: NEGATIVE MG/DL
PROTHROMBIN TIME: 12.3 SEC (ref 9.3–12.4)
PS: 10 CMH20
PS: 6 CMH20
RBC # BLD: 2.63 E12/L (ref 3.8–5.8)
RBC # BLD: 2.69 E12/L (ref 3.8–5.8)
RBC # BLD: 2.77 E12/L (ref 3.8–5.8)
RBC # BLD: 2.94 E12/L (ref 3.8–5.8)
RBC # BLD: 3.18 E12/L (ref 3.8–5.8)
RBC # BLD: 3.35 E12/L (ref 3.8–5.8)
RBC # BLD: 3.43 E12/L (ref 3.8–5.8)
RBC # BLD: 3.54 E12/L (ref 3.8–5.8)
RBC # BLD: 3.54 E12/L (ref 3.8–5.8)
RBC # BLD: 3.68 E12/L (ref 3.8–5.8)
RBC # BLD: 3.69 E12/L (ref 3.8–5.8)
RBC # BLD: 3.71 E12/L (ref 3.8–5.8)
RBC # BLD: 3.73 E12/L (ref 3.8–5.8)
RBC # BLD: 3.93 E12/L (ref 3.8–5.8)
RBC # BLD: 4.02 E12/L (ref 3.8–5.8)
RBC # BLD: 4.17 E12/L (ref 3.8–5.8)
RBC UA: ABNORMAL /HPF (ref 0–2)
RBC UA: ABNORMAL /HPF (ref 0–2)
REASON FOR REJECTION: NORMAL
REJECTED TEST: NORMAL
RESPIRATORY RATE: 16 B/MIN
RESPIRATORY RATE: 18 B/MIN
RI(T): 1.54
RI(T): 1.84
RR MECHANICAL: 10 B/MIN
RR MECHANICAL: 20 B/MIN
SCHISTOCYTES: ABNORMAL
SCHISTOCYTES: ABNORMAL
SODIUM BLD-SCNC: 138 MMOL/L (ref 132–146)
SODIUM BLD-SCNC: 139 MMOL/L (ref 132–146)
SODIUM BLD-SCNC: 141 MMOL/L (ref 132–146)
SODIUM BLD-SCNC: 142 MMOL/L (ref 132–146)
SODIUM BLD-SCNC: 142 MMOL/L (ref 132–146)
SODIUM BLD-SCNC: 143 MMOL/L (ref 132–146)
SODIUM BLD-SCNC: 144 MMOL/L (ref 132–146)
SODIUM BLD-SCNC: 144 MMOL/L (ref 132–146)
SODIUM BLD-SCNC: 145 MMOL/L (ref 132–146)
SODIUM BLD-SCNC: 146 MMOL/L (ref 132–146)
SODIUM BLD-SCNC: 146 MMOL/L (ref 132–146)
SODIUM BLD-SCNC: 147 MMOL/L (ref 132–146)
SODIUM BLD-SCNC: 147 MMOL/L (ref 132–146)
SOURCE, BLOOD GAS: ABNORMAL
SPECIFIC GRAVITY UA: 1.01 (ref 1–1.03)
SPECIFIC GRAVITY UA: 1.02 (ref 1–1.03)
TARGET CELLS: ABNORMAL
TARGET CELLS: ABNORMAL
THB: 10.2 G/DL (ref 11.5–16.5)
THB: 12.5 G/DL (ref 11.5–16.5)
THB: 12.7 G/DL (ref 11.5–16.5)
THB: 12.8 G/DL (ref 11.5–16.5)
THB: 13.1 G/DL (ref 11.5–16.5)
THEOPHYLLINE LEVEL: 5 MCG/ML (ref 10–20)
THEOPHYLLINE LEVEL: 5.6 MCG/ML (ref 10–20)
THEOPHYLLINE LEVEL: 5.8 MCG/ML (ref 10–20)
THEOPHYLLINE LEVEL: 6.2 MCG/ML (ref 10–20)
THEOPHYLLINE LEVEL: <0.8 MCG/ML (ref 10–20)
TIDAL VOLUME: 400 ML
TIDAL VOLUME: 450 ML
TIME ANALYZED: 1235
TIME ANALYZED: 2143
TIME ANALYZED: 2307
TIME ANALYZED: 524
TIME ANALYZED: 542
TOTAL IRON BINDING CAPACITY: 190 MCG/DL (ref 250–450)
TOTAL PROTEIN: 5 G/DL (ref 6.4–8.3)
TOTAL PROTEIN: 5.1 G/DL (ref 6.4–8.3)
TOTAL PROTEIN: 5.3 G/DL (ref 6.4–8.3)
TOTAL PROTEIN: 5.6 G/DL (ref 6.4–8.3)
TOTAL PROTEIN: 5.7 G/DL (ref 6.4–8.3)
TOTAL PROTEIN: 5.9 G/DL (ref 6.4–8.3)
TOTAL PROTEIN: 6.1 G/DL (ref 6.4–8.3)
TOTAL PROTEIN: 6.3 G/DL (ref 6.4–8.3)
TOTAL PROTEIN: 6.6 G/DL (ref 6.4–8.3)
TROPONIN: 0.02 NG/ML (ref 0–0.03)
TROPONIN: <0.01 NG/ML (ref 0–0.03)
TSH SERPL DL<=0.05 MIU/L-ACNC: 0.33 UIU/ML (ref 0.27–4.2)
TSH SERPL DL<=0.05 MIU/L-ACNC: 0.53 UIU/ML (ref 0.27–4.2)
TSH SERPL DL<=0.05 MIU/L-ACNC: 1.06 UIU/ML (ref 0.27–4.2)
URINE CULTURE, ROUTINE: NORMAL
UROBILINOGEN, URINE: 0.2 E.U./DL
UROBILINOGEN, URINE: 0.2 E.U./DL
VITAMIN B-12: 856 PG/ML (ref 211–946)
VITAMIN D 25-HYDROXY: 93 NG/ML (ref 30–100)
VITAMIN D 25-HYDROXY: 94 NG/ML (ref 30–100)
VT MECHANICAL: 450 ML
WBC # BLD: 10.4 E9/L (ref 4.5–11.5)
WBC # BLD: 10.9 E9/L (ref 4.5–11.5)
WBC # BLD: 6.5 E9/L (ref 4.5–11.5)
WBC # BLD: 6.8 E9/L (ref 4.5–11.5)
WBC # BLD: 6.8 E9/L (ref 4.5–11.5)
WBC # BLD: 7.2 E9/L (ref 4.5–11.5)
WBC # BLD: 7.9 E9/L (ref 4.5–11.5)
WBC # BLD: 7.9 E9/L (ref 4.5–11.5)
WBC # BLD: 8.1 E9/L (ref 4.5–11.5)
WBC # BLD: 8.2 E9/L (ref 4.5–11.5)
WBC # BLD: 8.3 E9/L (ref 4.5–11.5)
WBC # BLD: 8.8 E9/L (ref 4.5–11.5)
WBC # BLD: 9.2 E9/L (ref 4.5–11.5)
WBC # BLD: 9.7 E9/L (ref 4.5–11.5)
WBC UA: >20 /HPF (ref 0–5)
WBC UA: ABNORMAL /HPF (ref 0–5)

## 2019-01-01 PROCEDURE — 6370000000 HC RX 637 (ALT 250 FOR IP): Performed by: INTERNAL MEDICINE

## 2019-01-01 PROCEDURE — 84484 ASSAY OF TROPONIN QUANT: CPT

## 2019-01-01 PROCEDURE — 6370000000 HC RX 637 (ALT 250 FOR IP): Performed by: FAMILY MEDICINE

## 2019-01-01 PROCEDURE — 51798 US URINE CAPACITY MEASURE: CPT

## 2019-01-01 PROCEDURE — 6360000002 HC RX W HCPCS: Performed by: INTERNAL MEDICINE

## 2019-01-01 PROCEDURE — 97530 THERAPEUTIC ACTIVITIES: CPT

## 2019-01-01 PROCEDURE — 84443 ASSAY THYROID STIM HORMONE: CPT

## 2019-01-01 PROCEDURE — 2580000003 HC RX 258: Performed by: INTERNAL MEDICINE

## 2019-01-01 PROCEDURE — 36415 COLL VENOUS BLD VENIPUNCTURE: CPT

## 2019-01-01 PROCEDURE — 2580000003 HC RX 258

## 2019-01-01 PROCEDURE — 85025 COMPLETE CBC W/AUTO DIFF WBC: CPT

## 2019-01-01 PROCEDURE — 93005 ELECTROCARDIOGRAM TRACING: CPT

## 2019-01-01 PROCEDURE — 2700000000 HC OXYGEN THERAPY PER DAY

## 2019-01-01 PROCEDURE — 2500000003 HC RX 250 WO HCPCS: Performed by: INTERNAL MEDICINE

## 2019-01-01 PROCEDURE — 94640 AIRWAY INHALATION TREATMENT: CPT

## 2019-01-01 PROCEDURE — 32557 INSERT CATH PLEURA W/ IMAGE: CPT | Performed by: RADIOLOGY

## 2019-01-01 PROCEDURE — 84153 ASSAY OF PSA TOTAL: CPT

## 2019-01-01 PROCEDURE — 71045 X-RAY EXAM CHEST 1 VIEW: CPT

## 2019-01-01 PROCEDURE — 6370000000 HC RX 637 (ALT 250 FOR IP): Performed by: EMERGENCY MEDICINE

## 2019-01-01 PROCEDURE — 2060000000 HC ICU INTERMEDIATE R&B

## 2019-01-01 PROCEDURE — 82803 BLOOD GASES ANY COMBINATION: CPT

## 2019-01-01 PROCEDURE — 0W9930Z DRAINAGE OF RIGHT PLEURAL CAVITY WITH DRAINAGE DEVICE, PERCUTANEOUS APPROACH: ICD-10-PCS | Performed by: EMERGENCY MEDICINE

## 2019-01-01 PROCEDURE — 80048 BASIC METABOLIC PNL TOTAL CA: CPT

## 2019-01-01 PROCEDURE — 94660 CPAP INITIATION&MGMT: CPT

## 2019-01-01 PROCEDURE — 36592 COLLECT BLOOD FROM PICC: CPT

## 2019-01-01 PROCEDURE — 5A1935Z RESPIRATORY VENTILATION, LESS THAN 24 CONSECUTIVE HOURS: ICD-10-PCS | Performed by: INTERNAL MEDICINE

## 2019-01-01 PROCEDURE — 94669 MECHANICAL CHEST WALL OSCILL: CPT

## 2019-01-01 PROCEDURE — G0103 PSA SCREENING: HCPCS

## 2019-01-01 PROCEDURE — 02HV33Z INSERTION OF INFUSION DEVICE INTO SUPERIOR VENA CAVA, PERCUTANEOUS APPROACH: ICD-10-PCS | Performed by: GENERAL PRACTICE

## 2019-01-01 PROCEDURE — 83735 ASSAY OF MAGNESIUM: CPT

## 2019-01-01 PROCEDURE — 97110 THERAPEUTIC EXERCISES: CPT

## 2019-01-01 PROCEDURE — 83605 ASSAY OF LACTIC ACID: CPT

## 2019-01-01 PROCEDURE — 51702 INSERT TEMP BLADDER CATH: CPT

## 2019-01-01 PROCEDURE — 88112 CYTOPATH CELL ENHANCE TECH: CPT

## 2019-01-01 PROCEDURE — 2500000003 HC RX 250 WO HCPCS: Performed by: EMERGENCY MEDICINE

## 2019-01-01 PROCEDURE — 85018 HEMOGLOBIN: CPT

## 2019-01-01 PROCEDURE — 80053 COMPREHEN METABOLIC PANEL: CPT

## 2019-01-01 PROCEDURE — 6360000002 HC RX W HCPCS: Performed by: EMERGENCY MEDICINE

## 2019-01-01 PROCEDURE — 99285 EMERGENCY DEPT VISIT HI MDM: CPT

## 2019-01-01 PROCEDURE — 94003 VENT MGMT INPAT SUBQ DAY: CPT

## 2019-01-01 PROCEDURE — 87088 URINE BACTERIA CULTURE: CPT

## 2019-01-01 PROCEDURE — 2709999900 CT GUIDED CHEST TUBE

## 2019-01-01 PROCEDURE — 82805 BLOOD GASES W/O2 SATURATION: CPT

## 2019-01-01 PROCEDURE — 71250 CT THORAX DX C-: CPT

## 2019-01-01 PROCEDURE — 83880 ASSAY OF NATRIURETIC PEPTIDE: CPT

## 2019-01-01 PROCEDURE — 81001 URINALYSIS AUTO W/SCOPE: CPT

## 2019-01-01 PROCEDURE — G0008 ADMIN INFLUENZA VIRUS VAC: HCPCS | Performed by: INTERNAL MEDICINE

## 2019-01-01 PROCEDURE — 97530 THERAPEUTIC ACTIVITIES: CPT | Performed by: PHYSICAL THERAPIST

## 2019-01-01 PROCEDURE — 96375 TX/PRO/DX INJ NEW DRUG ADDON: CPT

## 2019-01-01 PROCEDURE — 2000000000 HC ICU R&B

## 2019-01-01 PROCEDURE — 74018 RADEX ABDOMEN 1 VIEW: CPT

## 2019-01-01 PROCEDURE — 84100 ASSAY OF PHOSPHORUS: CPT

## 2019-01-01 PROCEDURE — 83550 IRON BINDING TEST: CPT

## 2019-01-01 PROCEDURE — 6360000004 HC RX CONTRAST MEDICATION: Performed by: INTERNAL MEDICINE

## 2019-01-01 PROCEDURE — 97116 GAIT TRAINING THERAPY: CPT

## 2019-01-01 PROCEDURE — 2580000003 HC RX 258: Performed by: GENERAL PRACTICE

## 2019-01-01 PROCEDURE — 6370000000 HC RX 637 (ALT 250 FOR IP): Performed by: NURSE PRACTITIONER

## 2019-01-01 PROCEDURE — 87040 BLOOD CULTURE FOR BACTERIA: CPT

## 2019-01-01 PROCEDURE — 80198 ASSAY OF THEOPHYLLINE: CPT

## 2019-01-01 PROCEDURE — 87081 CULTURE SCREEN ONLY: CPT

## 2019-01-01 PROCEDURE — 90653 IIV ADJUVANT VACCINE IM: CPT | Performed by: INTERNAL MEDICINE

## 2019-01-01 PROCEDURE — 99291 CRITICAL CARE FIRST HOUR: CPT

## 2019-01-01 PROCEDURE — 85027 COMPLETE CBC AUTOMATED: CPT

## 2019-01-01 PROCEDURE — C9113 INJ PANTOPRAZOLE SODIUM, VIA: HCPCS | Performed by: INTERNAL MEDICINE

## 2019-01-01 PROCEDURE — 83540 ASSAY OF IRON: CPT

## 2019-01-01 PROCEDURE — 93005 ELECTROCARDIOGRAM TRACING: CPT | Performed by: GENERAL PRACTICE

## 2019-01-01 PROCEDURE — 6370000000 HC RX 637 (ALT 250 FOR IP): Performed by: STUDENT IN AN ORGANIZED HEALTH CARE EDUCATION/TRAINING PROGRAM

## 2019-01-01 PROCEDURE — 82947 ASSAY GLUCOSE BLOOD QUANT: CPT

## 2019-01-01 PROCEDURE — 36556 INSERT NON-TUNNEL CV CATH: CPT

## 2019-01-01 PROCEDURE — 36600 WITHDRAWAL OF ARTERIAL BLOOD: CPT

## 2019-01-01 PROCEDURE — 85610 PROTHROMBIN TIME: CPT

## 2019-01-01 PROCEDURE — C8929 TTE W OR WO FOL WCON,DOPPLER: HCPCS

## 2019-01-01 PROCEDURE — 87633 RESP VIRUS 12-25 TARGETS: CPT

## 2019-01-01 PROCEDURE — 97165 OT EVAL LOW COMPLEX 30 MIN: CPT

## 2019-01-01 PROCEDURE — 77012 CT SCAN FOR NEEDLE BIOPSY: CPT

## 2019-01-01 PROCEDURE — 97166 OT EVAL MOD COMPLEX 45 MIN: CPT

## 2019-01-01 PROCEDURE — 82607 VITAMIN B-12: CPT

## 2019-01-01 PROCEDURE — 6360000002 HC RX W HCPCS

## 2019-01-01 PROCEDURE — 82306 VITAMIN D 25 HYDROXY: CPT

## 2019-01-01 PROCEDURE — 87581 M.PNEUMON DNA AMP PROBE: CPT

## 2019-01-01 PROCEDURE — 97162 PT EVAL MOD COMPLEX 30 MIN: CPT | Performed by: PHYSICAL THERAPIST

## 2019-01-01 PROCEDURE — 97535 SELF CARE MNGMENT TRAINING: CPT

## 2019-01-01 PROCEDURE — 94002 VENT MGMT INPAT INIT DAY: CPT

## 2019-01-01 PROCEDURE — 82565 ASSAY OF CREATININE: CPT

## 2019-01-01 PROCEDURE — 0BH17EZ INSERTION OF ENDOTRACHEAL AIRWAY INTO TRACHEA, VIA NATURAL OR ARTIFICIAL OPENING: ICD-10-PCS | Performed by: INTERNAL MEDICINE

## 2019-01-01 PROCEDURE — 76770 US EXAM ABDO BACK WALL COMP: CPT

## 2019-01-01 PROCEDURE — 80051 ELECTROLYTE PANEL: CPT

## 2019-01-01 PROCEDURE — 20552 NJX 1/MLT TRIGGER POINT 1/2: CPT | Performed by: PHYSICAL MEDICINE & REHABILITATION

## 2019-01-01 PROCEDURE — 99203 OFFICE O/P NEW LOW 30 MIN: CPT | Performed by: ORTHOPAEDIC SURGERY

## 2019-01-01 PROCEDURE — 2580000003 HC RX 258: Performed by: EMERGENCY MEDICINE

## 2019-01-01 PROCEDURE — 87798 DETECT AGENT NOS DNA AMP: CPT

## 2019-01-01 PROCEDURE — 32551 INSERTION OF CHEST TUBE: CPT

## 2019-01-01 PROCEDURE — 85730 THROMBOPLASTIN TIME PARTIAL: CPT

## 2019-01-01 PROCEDURE — 94644 CONT INHLJ TX 1ST HOUR: CPT

## 2019-01-01 PROCEDURE — 97161 PT EVAL LOW COMPLEX 20 MIN: CPT | Performed by: PHYSICAL THERAPIST

## 2019-01-01 PROCEDURE — 93010 ELECTROCARDIOGRAM REPORT: CPT | Performed by: INTERNAL MEDICINE

## 2019-01-01 PROCEDURE — 6360000002 HC RX W HCPCS: Performed by: STUDENT IN AN ORGANIZED HEALTH CARE EDUCATION/TRAINING PROGRAM

## 2019-01-01 PROCEDURE — 85014 HEMATOCRIT: CPT

## 2019-01-01 PROCEDURE — 97116 GAIT TRAINING THERAPY: CPT | Performed by: PHYSICAL THERAPIST

## 2019-01-01 PROCEDURE — 87486 CHLMYD PNEUM DNA AMP PROBE: CPT

## 2019-01-01 PROCEDURE — 93306 TTE W/DOPPLER COMPLETE: CPT

## 2019-01-01 PROCEDURE — 84520 ASSAY OF UREA NITROGEN: CPT

## 2019-01-01 PROCEDURE — 76942 ECHO GUIDE FOR BIOPSY: CPT | Performed by: PHYSICAL MEDICINE & REHABILITATION

## 2019-01-01 PROCEDURE — 93005 ELECTROCARDIOGRAM TRACING: CPT | Performed by: STUDENT IN AN ORGANIZED HEALTH CARE EDUCATION/TRAINING PROGRAM

## 2019-01-01 PROCEDURE — 96374 THER/PROPH/DIAG INJ IV PUSH: CPT

## 2019-01-01 PROCEDURE — 96365 THER/PROPH/DIAG IV INF INIT: CPT

## 2019-01-01 PROCEDURE — 82746 ASSAY OF FOLIC ACID SERUM: CPT

## 2019-01-01 RX ORDER — MONTELUKAST SODIUM 10 MG/1
10 TABLET ORAL DAILY
Status: DISCONTINUED | OUTPATIENT
Start: 2019-01-01 | End: 2019-01-01

## 2019-01-01 RX ORDER — KETOROLAC TROMETHAMINE 15 MG/ML
15 INJECTION, SOLUTION INTRAMUSCULAR; INTRAVENOUS EVERY 6 HOURS PRN
Status: DISCONTINUED | OUTPATIENT
Start: 2019-01-01 | End: 2019-01-01

## 2019-01-01 RX ORDER — BUDESONIDE 0.5 MG/2ML
0.5 INHALANT ORAL 2 TIMES DAILY
Status: DISCONTINUED | OUTPATIENT
Start: 2019-01-01 | End: 2019-01-01 | Stop reason: HOSPADM

## 2019-01-01 RX ORDER — ASPIRIN 81 MG/1
81 TABLET ORAL WEEKLY
Status: DISCONTINUED | OUTPATIENT
Start: 2019-01-01 | End: 2019-01-01

## 2019-01-01 RX ORDER — ATROPA BELLADONNA AND OPIUM 16.2; 6 MG/1; MG/1
60 SUPPOSITORY RECTAL EVERY 8 HOURS PRN
Status: DISCONTINUED | OUTPATIENT
Start: 2019-01-01 | End: 2019-01-01 | Stop reason: HOSPADM

## 2019-01-01 RX ORDER — ATROPINE SULFATE 0.1 MG/ML
INJECTION INTRAVENOUS DAILY PRN
Status: DISCONTINUED | OUTPATIENT
Start: 2019-01-01 | End: 2019-01-01 | Stop reason: ALTCHOICE

## 2019-01-01 RX ORDER — BRIMONIDINE TARTRATE 2 MG/ML
1 SOLUTION/ DROPS OPHTHALMIC 2 TIMES DAILY
Status: DISCONTINUED | OUTPATIENT
Start: 2019-01-01 | End: 2019-01-01 | Stop reason: HOSPADM

## 2019-01-01 RX ORDER — DOCUSATE SODIUM 100 MG/1
100 CAPSULE, LIQUID FILLED ORAL 2 TIMES DAILY
Status: DISCONTINUED | OUTPATIENT
Start: 2019-01-01 | End: 2019-01-01 | Stop reason: HOSPADM

## 2019-01-01 RX ORDER — LUTEIN 6 MG
1 TABLET ORAL DAILY
Status: DISCONTINUED | OUTPATIENT
Start: 2019-01-01 | End: 2019-01-01 | Stop reason: ALTCHOICE

## 2019-01-01 RX ORDER — PRIMIDONE 50 MG/1
200 TABLET ORAL 2 TIMES DAILY
Status: ON HOLD | COMMUNITY
End: 2019-01-01 | Stop reason: SDUPTHER

## 2019-01-01 RX ORDER — PRIMIDONE 50 MG/1
100 TABLET ORAL NIGHTLY
Status: DISCONTINUED | OUTPATIENT
Start: 2019-01-01 | End: 2019-01-01 | Stop reason: HOSPADM

## 2019-01-01 RX ORDER — MAGNESIUM SULFATE IN WATER 40 MG/ML
2 INJECTION, SOLUTION INTRAVENOUS ONCE
Status: COMPLETED | OUTPATIENT
Start: 2019-01-01 | End: 2019-01-01

## 2019-01-01 RX ORDER — ACETAMINOPHEN 650 MG/1
650 SUPPOSITORY RECTAL EVERY 4 HOURS PRN
Status: DISCONTINUED | OUTPATIENT
Start: 2019-01-01 | End: 2019-01-01

## 2019-01-01 RX ORDER — FUROSEMIDE 20 MG/1
20 TABLET ORAL DAILY
Status: DISCONTINUED | OUTPATIENT
Start: 2019-01-01 | End: 2019-01-01 | Stop reason: HOSPADM

## 2019-01-01 RX ORDER — MIDAZOLAM HYDROCHLORIDE 1 MG/ML
INJECTION INTRAMUSCULAR; INTRAVENOUS
Status: DISPENSED
Start: 2019-01-01 | End: 2019-01-01

## 2019-01-01 RX ORDER — SODIUM CHLORIDE 0.9 % (FLUSH) 0.9 %
10 SYRINGE (ML) INJECTION PRN
Status: DISCONTINUED | OUTPATIENT
Start: 2019-01-01 | End: 2019-01-01 | Stop reason: HOSPADM

## 2019-01-01 RX ORDER — FUROSEMIDE 10 MG/ML
40 INJECTION INTRAMUSCULAR; INTRAVENOUS 2 TIMES DAILY
Status: DISCONTINUED | OUTPATIENT
Start: 2019-01-01 | End: 2019-01-01

## 2019-01-01 RX ORDER — POLYETHYLENE GLYCOL 3350 17 G/17G
17 POWDER, FOR SOLUTION ORAL ONCE
Status: COMPLETED | OUTPATIENT
Start: 2019-01-01 | End: 2019-01-01

## 2019-01-01 RX ORDER — PREDNISONE 20 MG/1
20 TABLET ORAL ONCE
Status: COMPLETED | OUTPATIENT
Start: 2019-01-01 | End: 2019-01-01

## 2019-01-01 RX ORDER — FENTANYL CITRATE 50 UG/ML
25 INJECTION, SOLUTION INTRAMUSCULAR; INTRAVENOUS
Status: DISCONTINUED | OUTPATIENT
Start: 2019-01-01 | End: 2019-01-01

## 2019-01-01 RX ORDER — ONDANSETRON 2 MG/ML
4 INJECTION INTRAMUSCULAR; INTRAVENOUS EVERY 6 HOURS PRN
Status: DISCONTINUED | OUTPATIENT
Start: 2019-01-01 | End: 2019-01-01

## 2019-01-01 RX ORDER — METHYLPREDNISOLONE SODIUM SUCCINATE 40 MG/ML
40 INJECTION, POWDER, LYOPHILIZED, FOR SOLUTION INTRAMUSCULAR; INTRAVENOUS EVERY 8 HOURS
Status: DISCONTINUED | OUTPATIENT
Start: 2019-01-01 | End: 2019-01-01

## 2019-01-01 RX ORDER — METOPROLOL SUCCINATE 25 MG/1
12.5 TABLET, EXTENDED RELEASE ORAL DAILY
Status: DISCONTINUED | OUTPATIENT
Start: 2019-01-01 | End: 2019-01-01 | Stop reason: CLARIF

## 2019-01-01 RX ORDER — FUROSEMIDE 10 MG/ML
20 INJECTION INTRAMUSCULAR; INTRAVENOUS DAILY
Status: DISCONTINUED | OUTPATIENT
Start: 2019-01-01 | End: 2019-01-01

## 2019-01-01 RX ORDER — OXYCODONE HYDROCHLORIDE AND ACETAMINOPHEN 5; 325 MG/1; MG/1
1 TABLET ORAL EVERY 8 HOURS PRN
Status: DISCONTINUED | OUTPATIENT
Start: 2019-01-01 | End: 2019-01-01 | Stop reason: HOSPADM

## 2019-01-01 RX ORDER — PREDNISONE 20 MG/1
40 TABLET ORAL DAILY
Status: DISCONTINUED | OUTPATIENT
Start: 2019-01-01 | End: 2019-01-01

## 2019-01-01 RX ORDER — PREDNISONE 20 MG/1
40 TABLET ORAL DAILY
Status: DISCONTINUED | OUTPATIENT
Start: 2019-01-01 | End: 2019-01-01 | Stop reason: HOSPADM

## 2019-01-01 RX ORDER — PRIMIDONE 50 MG/1
75 TABLET ORAL DAILY
Status: DISCONTINUED | OUTPATIENT
Start: 2019-01-01 | End: 2019-01-01 | Stop reason: HOSPADM

## 2019-01-01 RX ORDER — 0.9 % SODIUM CHLORIDE 0.9 %
1000 INTRAVENOUS SOLUTION INTRAVENOUS ONCE
Status: COMPLETED | OUTPATIENT
Start: 2019-01-01 | End: 2019-01-01

## 2019-01-01 RX ORDER — DIGOXIN 0.25 MG/ML
125 INJECTION INTRAMUSCULAR; INTRAVENOUS ONCE
Status: COMPLETED | OUTPATIENT
Start: 2019-01-01 | End: 2019-01-01

## 2019-01-01 RX ORDER — BISACODYL 10 MG
10 SUPPOSITORY, RECTAL RECTAL DAILY
Status: DISCONTINUED | OUTPATIENT
Start: 2019-01-01 | End: 2019-01-01 | Stop reason: HOSPADM

## 2019-01-01 RX ORDER — NADOLOL 20 MG/1
10 TABLET ORAL ONCE
Status: DISCONTINUED | OUTPATIENT
Start: 2019-01-01 | End: 2019-01-01

## 2019-01-01 RX ORDER — TRAMADOL HYDROCHLORIDE 50 MG/1
50 TABLET ORAL EVERY 6 HOURS PRN
Status: DISCONTINUED | OUTPATIENT
Start: 2019-01-01 | End: 2019-01-01

## 2019-01-01 RX ORDER — FLUTICASONE PROPIONATE 110 UG/1
1 AEROSOL, METERED RESPIRATORY (INHALATION) 2 TIMES DAILY
Status: DISCONTINUED | OUTPATIENT
Start: 2019-01-01 | End: 2019-01-01 | Stop reason: HOSPADM

## 2019-01-01 RX ORDER — IPRATROPIUM BROMIDE AND ALBUTEROL SULFATE 2.5; .5 MG/3ML; MG/3ML
1 SOLUTION RESPIRATORY (INHALATION)
Status: DISCONTINUED | OUTPATIENT
Start: 2019-01-01 | End: 2019-01-01 | Stop reason: HOSPADM

## 2019-01-01 RX ORDER — BISACODYL 10 MG
10 SUPPOSITORY, RECTAL RECTAL DAILY
Qty: 30 SUPPOSITORY | Refills: 0 | COMMUNITY
Start: 2019-01-01 | End: 2019-01-01

## 2019-01-01 RX ORDER — PRIMIDONE 50 MG/1
50 TABLET ORAL 3 TIMES DAILY
Status: DISCONTINUED | OUTPATIENT
Start: 2019-01-01 | End: 2019-01-01

## 2019-01-01 RX ORDER — SODIUM CHLORIDE 0.9 % (FLUSH) 0.9 %
SYRINGE (ML) INJECTION
Status: DISPENSED
Start: 2019-01-01 | End: 2019-01-01

## 2019-01-01 RX ORDER — ALBUTEROL SULFATE 90 UG/1
2 AEROSOL, METERED RESPIRATORY (INHALATION) EVERY 4 HOURS PRN
Status: DISCONTINUED | OUTPATIENT
Start: 2019-01-01 | End: 2019-01-01 | Stop reason: HOSPADM

## 2019-01-01 RX ORDER — ACETAMINOPHEN 325 MG/1
650 TABLET ORAL EVERY 4 HOURS PRN
Status: DISCONTINUED | OUTPATIENT
Start: 2019-01-01 | End: 2019-01-01 | Stop reason: HOSPADM

## 2019-01-01 RX ORDER — DILTIAZEM HYDROCHLORIDE 120 MG/1
120 CAPSULE, COATED, EXTENDED RELEASE ORAL DAILY
Status: DISCONTINUED | OUTPATIENT
Start: 2019-01-01 | End: 2019-01-01 | Stop reason: HOSPADM

## 2019-01-01 RX ORDER — LIDOCAINE HYDROCHLORIDE ANHYDROUS AND DEXTROSE MONOHYDRATE .4; 5 G/100ML; G/100ML
1 INJECTION, SOLUTION INTRAVENOUS CONTINUOUS
Status: DISCONTINUED | OUTPATIENT
Start: 2019-01-01 | End: 2019-01-01 | Stop reason: ALTCHOICE

## 2019-01-01 RX ORDER — MIDAZOLAM HYDROCHLORIDE 1 MG/ML
4 INJECTION INTRAMUSCULAR; INTRAVENOUS ONCE
Status: COMPLETED | OUTPATIENT
Start: 2019-01-01 | End: 2019-01-01

## 2019-01-01 RX ORDER — OXYCODONE HYDROCHLORIDE 5 MG/1
10 TABLET ORAL EVERY 4 HOURS PRN
Status: DISCONTINUED | OUTPATIENT
Start: 2019-01-01 | End: 2019-01-01 | Stop reason: HOSPADM

## 2019-01-01 RX ORDER — FUROSEMIDE 10 MG/ML
40 INJECTION INTRAMUSCULAR; INTRAVENOUS ONCE
Status: COMPLETED | OUTPATIENT
Start: 2019-01-01 | End: 2019-01-01

## 2019-01-01 RX ORDER — SODIUM CHLORIDE 0.9 % (FLUSH) 0.9 %
SYRINGE (ML) INJECTION
Status: COMPLETED
Start: 2019-01-01 | End: 2019-01-01

## 2019-01-01 RX ORDER — DIGOXIN 0.25 MG/ML
250 INJECTION INTRAMUSCULAR; INTRAVENOUS ONCE
Status: COMPLETED | OUTPATIENT
Start: 2019-01-01 | End: 2019-01-01

## 2019-01-01 RX ORDER — IPRATROPIUM BROMIDE AND ALBUTEROL SULFATE 2.5; .5 MG/3ML; MG/3ML
3 SOLUTION RESPIRATORY (INHALATION) 4 TIMES DAILY
Qty: 360 ML | DISCHARGE
Start: 2019-01-01 | End: 2019-01-01 | Stop reason: SDUPTHER

## 2019-01-01 RX ORDER — THEOPHYLLINE 300 MG/1
300 TABLET, EXTENDED RELEASE ORAL DAILY
Status: DISCONTINUED | OUTPATIENT
Start: 2019-01-01 | End: 2019-01-01 | Stop reason: SDUPTHER

## 2019-01-01 RX ORDER — LATANOPROST 50 UG/ML
1 SOLUTION/ DROPS OPHTHALMIC NIGHTLY
Status: DISCONTINUED | OUTPATIENT
Start: 2019-01-01 | End: 2019-01-01 | Stop reason: HOSPADM

## 2019-01-01 RX ORDER — MONTELUKAST SODIUM 10 MG/1
10 TABLET ORAL DAILY
Status: DISCONTINUED | OUTPATIENT
Start: 2019-01-01 | End: 2019-01-01 | Stop reason: HOSPADM

## 2019-01-01 RX ORDER — NADOLOL 20 MG/1
10 TABLET ORAL ONCE
Status: COMPLETED | OUTPATIENT
Start: 2019-01-01 | End: 2019-01-01

## 2019-01-01 RX ORDER — SUCRALFATE 1 G/1
1 TABLET ORAL EVERY 6 HOURS SCHEDULED
Status: DISCONTINUED | OUTPATIENT
Start: 2019-01-01 | End: 2019-01-01 | Stop reason: HOSPADM

## 2019-01-01 RX ORDER — DILTIAZEM HYDROCHLORIDE 120 MG/1
120 CAPSULE, COATED, EXTENDED RELEASE ORAL DAILY
Status: DISCONTINUED | OUTPATIENT
Start: 2019-01-01 | End: 2019-01-01

## 2019-01-01 RX ORDER — PSEUDOEPHEDRINE HCL 30 MG
100 TABLET ORAL 2 TIMES DAILY
COMMUNITY
Start: 2019-01-01 | End: 2020-01-01 | Stop reason: ALTCHOICE

## 2019-01-01 RX ORDER — IPRATROPIUM BROMIDE AND ALBUTEROL SULFATE 2.5; .5 MG/3ML; MG/3ML
3 SOLUTION RESPIRATORY (INHALATION) ONCE
Status: COMPLETED | OUTPATIENT
Start: 2019-01-01 | End: 2019-01-01

## 2019-01-01 RX ORDER — VALACYCLOVIR HYDROCHLORIDE 500 MG/1
500 TABLET, FILM COATED ORAL DAILY
Status: DISCONTINUED | OUTPATIENT
Start: 2019-01-01 | End: 2019-01-01 | Stop reason: HOSPADM

## 2019-01-01 RX ORDER — POTASSIUM CHLORIDE AND SODIUM CHLORIDE 450; 150 MG/100ML; MG/100ML
INJECTION, SOLUTION INTRAVENOUS CONTINUOUS
Status: DISCONTINUED | OUTPATIENT
Start: 2019-01-01 | End: 2019-01-01

## 2019-01-01 RX ORDER — LIDOCAINE 4 G/G
1 PATCH TOPICAL DAILY
Status: DISCONTINUED | OUTPATIENT
Start: 2019-01-01 | End: 2019-01-01 | Stop reason: HOSPADM

## 2019-01-01 RX ORDER — POTASSIUM CHLORIDE 20 MEQ/1
20 TABLET, EXTENDED RELEASE ORAL 2 TIMES DAILY
Status: DISCONTINUED | OUTPATIENT
Start: 2019-01-01 | End: 2019-01-01 | Stop reason: HOSPADM

## 2019-01-01 RX ORDER — FORMOTEROL FUMARATE 20 UG/2ML
20 SOLUTION RESPIRATORY (INHALATION) 2 TIMES DAILY
Status: DISCONTINUED | OUTPATIENT
Start: 2019-01-01 | End: 2019-01-01 | Stop reason: HOSPADM

## 2019-01-01 RX ORDER — IBUPROFEN 400 MG/1
400 TABLET ORAL EVERY 6 HOURS PRN
Status: DISCONTINUED | OUTPATIENT
Start: 2019-01-01 | End: 2019-01-01 | Stop reason: HOSPADM

## 2019-01-01 RX ORDER — PREDNISONE 10 MG/1
40 TABLET ORAL DAILY
Status: ON HOLD | COMMUNITY
Start: 2019-01-01 | End: 2020-01-01 | Stop reason: SDUPTHER

## 2019-01-01 RX ORDER — ASCORBIC ACID 500 MG
500 TABLET ORAL DAILY
Status: DISCONTINUED | OUTPATIENT
Start: 2019-01-01 | End: 2019-01-01 | Stop reason: HOSPADM

## 2019-01-01 RX ORDER — FUROSEMIDE 10 MG/ML
40 INJECTION INTRAMUSCULAR; INTRAVENOUS 2 TIMES DAILY
Status: COMPLETED | OUTPATIENT
Start: 2019-01-01 | End: 2019-01-01

## 2019-01-01 RX ORDER — METOPROLOL SUCCINATE 25 MG/1
12.5 TABLET, EXTENDED RELEASE ORAL DAILY
Qty: 30 TABLET | Refills: 3 | Status: SHIPPED | OUTPATIENT
Start: 2019-01-01

## 2019-01-01 RX ORDER — PREDNISONE 10 MG/1
10 TABLET ORAL DAILY
Status: DISCONTINUED | OUTPATIENT
Start: 2019-01-01 | End: 2019-01-01 | Stop reason: HOSPADM

## 2019-01-01 RX ORDER — OXYCODONE HYDROCHLORIDE 5 MG/1
5 TABLET ORAL EVERY 4 HOURS PRN
Status: DISCONTINUED | OUTPATIENT
Start: 2019-01-01 | End: 2019-01-01 | Stop reason: HOSPADM

## 2019-01-01 RX ORDER — FINASTERIDE 5 MG/1
5 TABLET, FILM COATED ORAL DAILY
Status: DISCONTINUED | OUTPATIENT
Start: 2019-01-01 | End: 2019-01-01 | Stop reason: HOSPADM

## 2019-01-01 RX ORDER — ACETAMINOPHEN 500 MG
500 TABLET ORAL EVERY 6 HOURS PRN
Status: DISCONTINUED | OUTPATIENT
Start: 2019-01-01 | End: 2019-01-01 | Stop reason: HOSPADM

## 2019-01-01 RX ORDER — GUAIFENESIN 100 MG/5ML
200 SOLUTION ORAL 3 TIMES DAILY PRN
Status: DISCONTINUED | OUTPATIENT
Start: 2019-01-01 | End: 2019-01-01 | Stop reason: HOSPADM

## 2019-01-01 RX ORDER — ACETYLCYSTEINE 100 MG/ML
2 SOLUTION ORAL; RESPIRATORY (INHALATION) 2 TIMES DAILY
Status: DISCONTINUED | OUTPATIENT
Start: 2019-01-01 | End: 2019-01-01 | Stop reason: HOSPADM

## 2019-01-01 RX ORDER — SUCRALFATE 1 G/1
1 TABLET ORAL 4 TIMES DAILY
Qty: 120 TABLET | Refills: 3 | Status: ON HOLD | DISCHARGE
Start: 2019-01-01 | End: 2020-01-01 | Stop reason: ALTCHOICE

## 2019-01-01 RX ORDER — PRIMIDONE 250 MG/1
250 TABLET ORAL 2 TIMES DAILY
Status: DISCONTINUED | OUTPATIENT
Start: 2019-01-01 | End: 2019-01-01

## 2019-01-01 RX ORDER — POTASSIUM CHLORIDE 20 MEQ/1
20 TABLET, EXTENDED RELEASE ORAL DAILY
Status: DISCONTINUED | OUTPATIENT
Start: 2019-01-01 | End: 2019-01-01 | Stop reason: HOSPADM

## 2019-01-01 RX ORDER — PROPRANOLOL HYDROCHLORIDE 10 MG/1
10 TABLET ORAL 3 TIMES DAILY
Status: DISCONTINUED | OUTPATIENT
Start: 2019-01-01 | End: 2019-01-01 | Stop reason: HOSPADM

## 2019-01-01 RX ORDER — ACYCLOVIR 200 MG/1
400 CAPSULE ORAL 3 TIMES DAILY
Status: DISCONTINUED | OUTPATIENT
Start: 2019-01-01 | End: 2019-01-01 | Stop reason: HOSPADM

## 2019-01-01 RX ORDER — SUCRALFATE 1 G/1
1 TABLET ORAL 4 TIMES DAILY
Status: DISCONTINUED | OUTPATIENT
Start: 2019-01-01 | End: 2019-01-01

## 2019-01-01 RX ORDER — VALACYCLOVIR HYDROCHLORIDE 500 MG/1
500 TABLET, FILM COATED ORAL DAILY
Status: DISCONTINUED | OUTPATIENT
Start: 2019-01-01 | End: 2019-01-01

## 2019-01-01 RX ORDER — FINASTERIDE 5 MG/1
5 TABLET, FILM COATED ORAL DAILY
Qty: 30 TABLET | Refills: 3 | DISCHARGE
Start: 2019-01-01

## 2019-01-01 RX ORDER — ALBUTEROL SULFATE 90 UG/1
2 AEROSOL, METERED RESPIRATORY (INHALATION) EVERY 4 HOURS PRN
Qty: 1 INHALER | Refills: 3 | DISCHARGE
Start: 2019-01-01 | End: 2019-01-01 | Stop reason: ALTCHOICE

## 2019-01-01 RX ORDER — IPRATROPIUM BROMIDE AND ALBUTEROL SULFATE 2.5; .5 MG/3ML; MG/3ML
1 SOLUTION RESPIRATORY (INHALATION) 4 TIMES DAILY
Status: DISCONTINUED | OUTPATIENT
Start: 2019-01-01 | End: 2019-01-01

## 2019-01-01 RX ORDER — ASPIRIN 81 MG/1
81 TABLET ORAL DAILY
Status: DISCONTINUED | OUTPATIENT
Start: 2019-01-01 | End: 2019-01-01 | Stop reason: HOSPADM

## 2019-01-01 RX ORDER — LIDOCAINE HYDROCHLORIDE 20 MG/ML
INJECTION, SOLUTION INTRAVENOUS
Status: COMPLETED
Start: 2019-01-01 | End: 2019-01-01

## 2019-01-01 RX ORDER — DOCUSATE SODIUM 100 MG/1
100 CAPSULE, LIQUID FILLED ORAL DAILY
Status: DISCONTINUED | OUTPATIENT
Start: 2019-01-01 | End: 2019-01-01

## 2019-01-01 RX ORDER — PANTOPRAZOLE SODIUM 40 MG/1
40 TABLET, DELAYED RELEASE ORAL
Status: DISCONTINUED | OUTPATIENT
Start: 2019-01-01 | End: 2019-01-01 | Stop reason: HOSPADM

## 2019-01-01 RX ORDER — PREDNISONE 10 MG/1
10 TABLET ORAL DAILY
Status: DISCONTINUED | OUTPATIENT
Start: 2019-01-01 | End: 2019-01-01

## 2019-01-01 RX ORDER — ALBUTEROL SULFATE 2.5 MG/3ML
2.5 SOLUTION RESPIRATORY (INHALATION) EVERY 4 HOURS PRN
Status: DISCONTINUED | OUTPATIENT
Start: 2019-01-01 | End: 2019-01-01 | Stop reason: HOSPADM

## 2019-01-01 RX ORDER — TRIAMCINOLONE ACETONIDE 40 MG/ML
40 INJECTION, SUSPENSION INTRA-ARTICULAR; INTRAMUSCULAR ONCE
Status: COMPLETED | OUTPATIENT
Start: 2019-01-01 | End: 2019-01-01

## 2019-01-01 RX ORDER — DOXYCYCLINE HYCLATE 100 MG/1
100 CAPSULE ORAL EVERY 12 HOURS SCHEDULED
Qty: 20 CAPSULE | Refills: 0 | DISCHARGE
Start: 2019-01-01 | End: 2019-01-01

## 2019-01-01 RX ORDER — PRIMIDONE 50 MG/1
250 TABLET ORAL 2 TIMES DAILY
Qty: 90 TABLET | Refills: 3 | COMMUNITY
Start: 2019-01-01

## 2019-01-01 RX ORDER — SODIUM CHLORIDE 0.9 % (FLUSH) 0.9 %
10 SYRINGE (ML) INJECTION EVERY 12 HOURS SCHEDULED
Status: DISCONTINUED | OUTPATIENT
Start: 2019-01-01 | End: 2019-01-01 | Stop reason: HOSPADM

## 2019-01-01 RX ORDER — PRIMIDONE 250 MG/1
250 TABLET ORAL 2 TIMES DAILY
Status: DISCONTINUED | OUTPATIENT
Start: 2019-01-01 | End: 2019-01-01 | Stop reason: HOSPADM

## 2019-01-01 RX ORDER — PRIMIDONE 250 MG/1
250 TABLET ORAL 2 TIMES DAILY
Status: DISCONTINUED | OUTPATIENT
Start: 2019-01-01 | End: 2019-01-01 | Stop reason: SDUPTHER

## 2019-01-01 RX ORDER — FUROSEMIDE 40 MG/1
80 TABLET ORAL DAILY
Status: DISCONTINUED | OUTPATIENT
Start: 2019-01-01 | End: 2019-01-01

## 2019-01-01 RX ORDER — LIDOCAINE HYDROCHLORIDE 20 MG/ML
JELLY TOPICAL ONCE
Status: COMPLETED | OUTPATIENT
Start: 2019-01-01 | End: 2019-01-01

## 2019-01-01 RX ORDER — CHLORHEXIDINE GLUCONATE 0.12 MG/ML
15 RINSE ORAL 2 TIMES DAILY
Status: DISCONTINUED | OUTPATIENT
Start: 2019-01-01 | End: 2019-01-01

## 2019-01-01 RX ORDER — AZITHROMYCIN 250 MG/1
250 TABLET, FILM COATED ORAL DAILY
Status: DISCONTINUED | OUTPATIENT
Start: 2019-01-01 | End: 2019-01-01 | Stop reason: HOSPADM

## 2019-01-01 RX ORDER — PANTOPRAZOLE SODIUM 40 MG/1
40 TABLET, DELAYED RELEASE ORAL
Qty: 30 TABLET | Refills: 3 | DISCHARGE
Start: 2019-01-01

## 2019-01-01 RX ORDER — DILTIAZEM HYDROCHLORIDE 5 MG/ML
10 INJECTION INTRAVENOUS ONCE
Status: COMPLETED | OUTPATIENT
Start: 2019-01-01 | End: 2019-01-01

## 2019-01-01 RX ORDER — POTASSIUM CHLORIDE 20 MEQ/1
20 TABLET, EXTENDED RELEASE ORAL 2 TIMES DAILY WITH MEALS
Status: DISCONTINUED | OUTPATIENT
Start: 2019-01-01 | End: 2019-01-01

## 2019-01-01 RX ORDER — METHYLPREDNISOLONE SODIUM SUCCINATE 40 MG/ML
30 INJECTION, POWDER, LYOPHILIZED, FOR SOLUTION INTRAMUSCULAR; INTRAVENOUS EVERY 12 HOURS
Status: DISCONTINUED | OUTPATIENT
Start: 2019-01-01 | End: 2019-01-01

## 2019-01-01 RX ORDER — POTASSIUM CHLORIDE 20 MEQ/1
20 TABLET, EXTENDED RELEASE ORAL
Status: DISCONTINUED | OUTPATIENT
Start: 2019-01-01 | End: 2019-01-01 | Stop reason: HOSPADM

## 2019-01-01 RX ORDER — ALBUTEROL SULFATE 90 UG/1
2 AEROSOL, METERED RESPIRATORY (INHALATION) EVERY 6 HOURS PRN
Status: DISCONTINUED | OUTPATIENT
Start: 2019-01-01 | End: 2019-01-01 | Stop reason: HOSPADM

## 2019-01-01 RX ORDER — CALCIUM CARBONATE 200(500)MG
1000 TABLET,CHEWABLE ORAL 3 TIMES DAILY PRN
Status: DISCONTINUED | OUTPATIENT
Start: 2019-01-01 | End: 2019-01-01 | Stop reason: HOSPADM

## 2019-01-01 RX ORDER — PREDNISONE 20 MG/1
30 TABLET ORAL DAILY
Qty: 15 TABLET | Refills: 0 | DISCHARGE
Start: 2019-01-01 | End: 2019-01-01

## 2019-01-01 RX ORDER — ASPIRIN 81 MG/1
81 TABLET ORAL DAILY
Status: DISCONTINUED | OUTPATIENT
Start: 2019-01-01 | End: 2019-01-01

## 2019-01-01 RX ORDER — DOXYCYCLINE HYCLATE 100 MG/1
100 CAPSULE ORAL EVERY 12 HOURS SCHEDULED
Status: DISCONTINUED | OUTPATIENT
Start: 2019-01-01 | End: 2019-01-01 | Stop reason: HOSPADM

## 2019-01-01 RX ORDER — IPRATROPIUM BROMIDE AND ALBUTEROL SULFATE 2.5; .5 MG/3ML; MG/3ML
1 SOLUTION RESPIRATORY (INHALATION) EVERY 4 HOURS
Status: DISCONTINUED | OUTPATIENT
Start: 2019-01-01 | End: 2019-01-01 | Stop reason: HOSPADM

## 2019-01-01 RX ORDER — CALCIUM CARBONATE 200(500)MG
500 TABLET,CHEWABLE ORAL 3 TIMES DAILY PRN
Status: DISCONTINUED | OUTPATIENT
Start: 2019-01-01 | End: 2019-01-01

## 2019-01-01 RX ORDER — PANTOPRAZOLE SODIUM 40 MG/10ML
40 INJECTION, POWDER, LYOPHILIZED, FOR SOLUTION INTRAVENOUS DAILY
Status: DISCONTINUED | OUTPATIENT
Start: 2019-01-01 | End: 2019-01-01

## 2019-01-01 RX ORDER — LIDOCAINE HYDROCHLORIDE 10 MG/ML
1 INJECTION, SOLUTION INFILTRATION; PERINEURAL ONCE
Status: COMPLETED | OUTPATIENT
Start: 2019-01-01 | End: 2019-01-01

## 2019-01-01 RX ORDER — IBUPROFEN 800 MG/1
400 TABLET ORAL EVERY 6 HOURS PRN
Status: DISCONTINUED | OUTPATIENT
Start: 2019-01-01 | End: 2019-01-01

## 2019-01-01 RX ORDER — AMOXICILLIN 500 MG/1
1000 CAPSULE ORAL 2 TIMES DAILY
Status: DISCONTINUED | OUTPATIENT
Start: 2019-01-01 | End: 2019-01-01 | Stop reason: HOSPADM

## 2019-01-01 RX ORDER — METHYLPREDNISOLONE SODIUM SUCCINATE 40 MG/ML
40 INJECTION, POWDER, LYOPHILIZED, FOR SOLUTION INTRAMUSCULAR; INTRAVENOUS EVERY 12 HOURS
Status: COMPLETED | OUTPATIENT
Start: 2019-01-01 | End: 2019-01-01

## 2019-01-01 RX ORDER — PROMETHAZINE HYDROCHLORIDE 25 MG/ML
25 INJECTION, SOLUTION INTRAMUSCULAR; INTRAVENOUS EVERY 6 HOURS PRN
Status: DISCONTINUED | OUTPATIENT
Start: 2019-01-01 | End: 2019-01-01 | Stop reason: HOSPADM

## 2019-01-01 RX ORDER — IPRATROPIUM BROMIDE AND ALBUTEROL SULFATE 2.5; .5 MG/3ML; MG/3ML
3 SOLUTION RESPIRATORY (INHALATION) 4 TIMES DAILY
Qty: 360 ML | Refills: 1 | Status: SHIPPED | OUTPATIENT
Start: 2019-01-01 | End: 2019-01-01 | Stop reason: ALTCHOICE

## 2019-01-01 RX ORDER — METOPROLOL SUCCINATE 25 MG/1
25 TABLET, EXTENDED RELEASE ORAL DAILY
Status: DISCONTINUED | OUTPATIENT
Start: 2019-01-01 | End: 2019-01-01

## 2019-01-01 RX ORDER — KETOROLAC TROMETHAMINE 15 MG/ML
15 INJECTION, SOLUTION INTRAMUSCULAR; INTRAVENOUS ONCE
Status: COMPLETED | OUTPATIENT
Start: 2019-01-01 | End: 2019-01-01

## 2019-01-01 RX ORDER — METOPROLOL SUCCINATE 25 MG/1
12.5 TABLET, EXTENDED RELEASE ORAL DAILY
Status: DISCONTINUED | OUTPATIENT
Start: 2019-01-01 | End: 2019-01-01 | Stop reason: HOSPADM

## 2019-01-01 RX ORDER — POTASSIUM CHLORIDE 20 MEQ/1
40 TABLET, EXTENDED RELEASE ORAL ONCE
Status: COMPLETED | OUTPATIENT
Start: 2019-01-01 | End: 2019-01-01

## 2019-01-01 RX ORDER — FENTANYL CITRATE 50 UG/ML
25 INJECTION, SOLUTION INTRAMUSCULAR; INTRAVENOUS ONCE
Status: DISCONTINUED | OUTPATIENT
Start: 2019-01-01 | End: 2019-01-01

## 2019-01-01 RX ORDER — FUROSEMIDE 40 MG/1
40 TABLET ORAL DAILY
Status: DISCONTINUED | OUTPATIENT
Start: 2019-01-01 | End: 2019-01-01 | Stop reason: HOSPADM

## 2019-01-01 RX ORDER — AZITHROMYCIN 250 MG/1
250 TABLET, FILM COATED ORAL DAILY
Status: DISCONTINUED | OUTPATIENT
Start: 2019-01-01 | End: 2019-01-01

## 2019-01-01 RX ADMIN — IPRATROPIUM BROMIDE AND ALBUTEROL SULFATE 1 AMPULE: 2.5; .5 SOLUTION RESPIRATORY (INHALATION) at 15:11

## 2019-01-01 RX ADMIN — CEFEPIME HYDROCHLORIDE 1 G: 1 INJECTION, POWDER, FOR SOLUTION INTRAMUSCULAR; INTRAVENOUS at 06:21

## 2019-01-01 RX ADMIN — IPRATROPIUM BROMIDE AND ALBUTEROL SULFATE 1 AMPULE: 2.5; .5 SOLUTION RESPIRATORY (INHALATION) at 17:33

## 2019-01-01 RX ADMIN — METHYLPREDNISOLONE SODIUM SUCCINATE 30 MG: 40 INJECTION, POWDER, FOR SOLUTION INTRAMUSCULAR; INTRAVENOUS at 12:21

## 2019-01-01 RX ADMIN — FINASTERIDE 5 MG: 5 TABLET, FILM COATED ORAL at 08:04

## 2019-01-01 RX ADMIN — THEOPHYLLINE ANHYDROUS 200 MG: 200 CAPSULE, EXTENDED RELEASE ORAL at 08:03

## 2019-01-01 RX ADMIN — PANTOPRAZOLE SODIUM 40 MG: 40 TABLET, DELAYED RELEASE ORAL at 07:17

## 2019-01-01 RX ADMIN — BUDESONIDE 500 MCG: 0.5 INHALANT RESPIRATORY (INHALATION) at 06:05

## 2019-01-01 RX ADMIN — MONTELUKAST 10 MG: 10 TABLET, FILM COATED ORAL at 18:19

## 2019-01-01 RX ADMIN — METOPROLOL SUCCINATE 25 MG: 25 TABLET, EXTENDED RELEASE ORAL at 09:14

## 2019-01-01 RX ADMIN — Medication 10 ML: at 00:31

## 2019-01-01 RX ADMIN — THEOPHYLLINE ANHYDROUS 200 MG: 200 CAPSULE, EXTENDED RELEASE ORAL at 21:49

## 2019-01-01 RX ADMIN — FINASTERIDE 5 MG: 5 TABLET, FILM COATED ORAL at 09:18

## 2019-01-01 RX ADMIN — OXYCODONE HYDROCHLORIDE AND ACETAMINOPHEN 1 TABLET: 5; 325 TABLET ORAL at 18:33

## 2019-01-01 RX ADMIN — FUROSEMIDE 20 MG: 10 INJECTION, SOLUTION INTRAMUSCULAR; INTRAVENOUS at 08:25

## 2019-01-01 RX ADMIN — IPRATROPIUM BROMIDE AND ALBUTEROL SULFATE 1 AMPULE: .5; 3 SOLUTION RESPIRATORY (INHALATION) at 13:48

## 2019-01-01 RX ADMIN — POTASSIUM CHLORIDE AND SODIUM CHLORIDE: 450; 150 INJECTION, SOLUTION INTRAVENOUS at 08:48

## 2019-01-01 RX ADMIN — OXYCODONE HYDROCHLORIDE AND ACETAMINOPHEN 500 MG: 500 TABLET ORAL at 09:33

## 2019-01-01 RX ADMIN — ACYCLOVIR 400 MG: 200 CAPSULE ORAL at 16:36

## 2019-01-01 RX ADMIN — FUROSEMIDE 20 MG: 10 INJECTION, SOLUTION INTRAMUSCULAR; INTRAVENOUS at 09:17

## 2019-01-01 RX ADMIN — BUDESONIDE 500 MCG: 0.5 INHALANT RESPIRATORY (INHALATION) at 21:37

## 2019-01-01 RX ADMIN — FINASTERIDE 5 MG: 5 TABLET, FILM COATED ORAL at 09:03

## 2019-01-01 RX ADMIN — PANTOPRAZOLE SODIUM 40 MG: 40 TABLET, DELAYED RELEASE ORAL at 06:12

## 2019-01-01 RX ADMIN — IBUPROFEN 400 MG: 400 TABLET, FILM COATED ORAL at 21:07

## 2019-01-01 RX ADMIN — AZITHROMYCIN MONOHYDRATE 250 MG: 250 TABLET ORAL at 08:56

## 2019-01-01 RX ADMIN — ACYCLOVIR 400 MG: 200 CAPSULE ORAL at 20:25

## 2019-01-01 RX ADMIN — FINASTERIDE 5 MG: 5 TABLET, FILM COATED ORAL at 09:17

## 2019-01-01 RX ADMIN — FUROSEMIDE 20 MG: 10 INJECTION, SOLUTION INTRAMUSCULAR; INTRAVENOUS at 09:02

## 2019-01-01 RX ADMIN — ACYCLOVIR 400 MG: 200 CAPSULE ORAL at 20:38

## 2019-01-01 RX ADMIN — DILTIAZEM HYDROCHLORIDE 120 MG: 120 CAPSULE, COATED, EXTENDED RELEASE ORAL at 09:30

## 2019-01-01 RX ADMIN — METOPROLOL TARTRATE 6.25 MG: 25 TABLET ORAL at 20:51

## 2019-01-01 RX ADMIN — NADOLOL 10 MG: 20 TABLET ORAL at 17:55

## 2019-01-01 RX ADMIN — Medication 5000 UNITS: at 09:33

## 2019-01-01 RX ADMIN — SUCRALFATE 1 G: 1 TABLET ORAL at 18:19

## 2019-01-01 RX ADMIN — MONTELUKAST 10 MG: 10 TABLET, FILM COATED ORAL at 18:06

## 2019-01-01 RX ADMIN — IPRATROPIUM BROMIDE AND ALBUTEROL SULFATE 1 AMPULE: 2.5; .5 SOLUTION RESPIRATORY (INHALATION) at 22:37

## 2019-01-01 RX ADMIN — PANTOPRAZOLE SODIUM 40 MG: 40 TABLET, DELAYED RELEASE ORAL at 06:00

## 2019-01-01 RX ADMIN — IPRATROPIUM BROMIDE AND ALBUTEROL SULFATE 1 AMPULE: 2.5; .5 SOLUTION RESPIRATORY (INHALATION) at 18:00

## 2019-01-01 RX ADMIN — POTASSIUM CHLORIDE 20 MEQ: 20 TABLET, EXTENDED RELEASE ORAL at 08:26

## 2019-01-01 RX ADMIN — BISACODYL 10 MG: 10 SUPPOSITORY RECTAL at 14:02

## 2019-01-01 RX ADMIN — FUROSEMIDE 40 MG: 10 INJECTION, SOLUTION INTRAMUSCULAR; INTRAVENOUS at 18:06

## 2019-01-01 RX ADMIN — PANTOPRAZOLE SODIUM 40 MG: 40 TABLET, DELAYED RELEASE ORAL at 06:28

## 2019-01-01 RX ADMIN — METHYLPREDNISOLONE SODIUM SUCCINATE 40 MG: 40 INJECTION, POWDER, FOR SOLUTION INTRAMUSCULAR; INTRAVENOUS at 16:36

## 2019-01-01 RX ADMIN — POTASSIUM CHLORIDE 20 MEQ: 20 TABLET, EXTENDED RELEASE ORAL at 20:52

## 2019-01-01 RX ADMIN — IPRATROPIUM BROMIDE AND ALBUTEROL SULFATE 1 AMPULE: 2.5; .5 SOLUTION RESPIRATORY (INHALATION) at 06:55

## 2019-01-01 RX ADMIN — IPRATROPIUM BROMIDE AND ALBUTEROL SULFATE 1 AMPULE: 2.5; .5 SOLUTION RESPIRATORY (INHALATION) at 13:44

## 2019-01-01 RX ADMIN — Medication 10 ML: at 20:12

## 2019-01-01 RX ADMIN — APIXABAN 5 MG: 5 TABLET, FILM COATED ORAL at 22:30

## 2019-01-01 RX ADMIN — BUDESONIDE 500 MCG: 0.5 INHALANT RESPIRATORY (INHALATION) at 20:07

## 2019-01-01 RX ADMIN — WATER 2 G: 1 INJECTION INTRAMUSCULAR; INTRAVENOUS; SUBCUTANEOUS at 18:18

## 2019-01-01 RX ADMIN — POTASSIUM CHLORIDE 20 MEQ: 20 TABLET, EXTENDED RELEASE ORAL at 17:52

## 2019-01-01 RX ADMIN — DOCUSATE SODIUM 100 MG: 100 CAPSULE, LIQUID FILLED ORAL at 09:07

## 2019-01-01 RX ADMIN — METHYLPREDNISOLONE SODIUM SUCCINATE 40 MG: 40 INJECTION, POWDER, FOR SOLUTION INTRAMUSCULAR; INTRAVENOUS at 04:47

## 2019-01-01 RX ADMIN — DOCUSATE SODIUM 100 MG: 100 CAPSULE, LIQUID FILLED ORAL at 10:55

## 2019-01-01 RX ADMIN — FINASTERIDE 5 MG: 5 TABLET, FILM COATED ORAL at 10:20

## 2019-01-01 RX ADMIN — POTASSIUM CHLORIDE 20 MEQ: 20 TABLET, EXTENDED RELEASE ORAL at 20:37

## 2019-01-01 RX ADMIN — GUAIFENESIN 200 MG: 200 SOLUTION ORAL at 08:44

## 2019-01-01 RX ADMIN — THEOPHYLLINE ANHYDROUS 200 MG: 200 CAPSULE, EXTENDED RELEASE ORAL at 10:43

## 2019-01-01 RX ADMIN — CEFEPIME HYDROCHLORIDE 1 G: 1 INJECTION, POWDER, FOR SOLUTION INTRAMUSCULAR; INTRAVENOUS at 14:48

## 2019-01-01 RX ADMIN — MONTELUKAST 10 MG: 10 TABLET, FILM COATED ORAL at 17:56

## 2019-01-01 RX ADMIN — POTASSIUM CHLORIDE AND SODIUM CHLORIDE: 450; 150 INJECTION, SOLUTION INTRAVENOUS at 00:01

## 2019-01-01 RX ADMIN — METOPROLOL TARTRATE 6.25 MG: 25 TABLET ORAL at 20:30

## 2019-01-01 RX ADMIN — SUCRALFATE 1 G: 1 TABLET ORAL at 17:18

## 2019-01-01 RX ADMIN — IPRATROPIUM BROMIDE AND ALBUTEROL SULFATE 1 AMPULE: 2.5; .5 SOLUTION RESPIRATORY (INHALATION) at 18:14

## 2019-01-01 RX ADMIN — DOCUSATE SODIUM 100 MG: 100 CAPSULE, LIQUID FILLED ORAL at 08:26

## 2019-01-01 RX ADMIN — IPRATROPIUM BROMIDE AND ALBUTEROL SULFATE 1 AMPULE: .5; 3 SOLUTION RESPIRATORY (INHALATION) at 17:22

## 2019-01-01 RX ADMIN — SUCRALFATE 1 G: 1 TABLET ORAL at 06:36

## 2019-01-01 RX ADMIN — PRIMIDONE 100 MG: 50 TABLET ORAL at 20:10

## 2019-01-01 RX ADMIN — SUCRALFATE 1 G: 1 TABLET ORAL at 11:40

## 2019-01-01 RX ADMIN — DOCUSATE SODIUM 100 MG: 100 CAPSULE, LIQUID FILLED ORAL at 21:07

## 2019-01-01 RX ADMIN — Medication 10 ML: at 23:46

## 2019-01-01 RX ADMIN — DOXYCYCLINE HYCLATE 100 MG: 100 CAPSULE ORAL at 07:53

## 2019-01-01 RX ADMIN — APIXABAN 5 MG: 5 TABLET, FILM COATED ORAL at 08:04

## 2019-01-01 RX ADMIN — APIXABAN 2.5 MG: 5 TABLET, FILM COATED ORAL at 08:26

## 2019-01-01 RX ADMIN — Medication 10 ML: at 20:28

## 2019-01-01 RX ADMIN — ATROPINE SULFATE 0.5 MG: 0.1 INJECTION PARENTERAL at 15:15

## 2019-01-01 RX ADMIN — APIXABAN 2.5 MG: 2.5 TABLET, FILM COATED ORAL at 20:26

## 2019-01-01 RX ADMIN — DOXYCYCLINE HYCLATE 100 MG: 100 CAPSULE ORAL at 18:06

## 2019-01-01 RX ADMIN — CEFEPIME HYDROCHLORIDE 1 G: 1 INJECTION, POWDER, FOR SOLUTION INTRAMUSCULAR; INTRAVENOUS at 06:31

## 2019-01-01 RX ADMIN — PREDNISONE 10 MG: 10 TABLET ORAL at 09:00

## 2019-01-01 RX ADMIN — METOPROLOL TARTRATE 6.25 MG: 25 TABLET ORAL at 09:03

## 2019-01-01 RX ADMIN — APIXABAN 2.5 MG: 5 TABLET, FILM COATED ORAL at 20:52

## 2019-01-01 RX ADMIN — METHYLPREDNISOLONE SODIUM SUCCINATE 40 MG: 40 INJECTION, POWDER, FOR SOLUTION INTRAMUSCULAR; INTRAVENOUS at 09:03

## 2019-01-01 RX ADMIN — THEOPHYLLINE ANHYDROUS 200 MG: 200 CAPSULE, EXTENDED RELEASE ORAL at 10:20

## 2019-01-01 RX ADMIN — WATER 2 G: 1 INJECTION INTRAMUSCULAR; INTRAVENOUS; SUBCUTANEOUS at 18:06

## 2019-01-01 RX ADMIN — AMOXICILLIN 1000 MG: 500 CAPSULE ORAL at 22:04

## 2019-01-01 RX ADMIN — DILTIAZEM HYDROCHLORIDE 120 MG: 120 CAPSULE, COATED, EXTENDED RELEASE ORAL at 16:46

## 2019-01-01 RX ADMIN — IPRATROPIUM BROMIDE AND ALBUTEROL SULFATE 1 AMPULE: .5; 3 SOLUTION RESPIRATORY (INHALATION) at 06:14

## 2019-01-01 RX ADMIN — DOCUSATE SODIUM 100 MG: 100 CAPSULE, LIQUID FILLED ORAL at 09:19

## 2019-01-01 RX ADMIN — ACYCLOVIR 400 MG: 200 CAPSULE ORAL at 14:26

## 2019-01-01 RX ADMIN — IPRATROPIUM BROMIDE AND ALBUTEROL SULFATE 1 AMPULE: .5; 3 SOLUTION RESPIRATORY (INHALATION) at 05:11

## 2019-01-01 RX ADMIN — IPRATROPIUM BROMIDE AND ALBUTEROL SULFATE 1 AMPULE: 2.5; .5 SOLUTION RESPIRATORY (INHALATION) at 07:04

## 2019-01-01 RX ADMIN — DILTIAZEM HYDROCHLORIDE 120 MG: 120 CAPSULE, COATED, EXTENDED RELEASE ORAL at 09:15

## 2019-01-01 RX ADMIN — MAGNESIUM SULFATE HEPTAHYDRATE 2 G: 40 INJECTION, SOLUTION INTRAVENOUS at 12:24

## 2019-01-01 RX ADMIN — SUCRALFATE 1 G: 1 TABLET ORAL at 23:45

## 2019-01-01 RX ADMIN — THEOPHYLLINE ANHYDROUS 200 MG: 200 CAPSULE, EXTENDED RELEASE ORAL at 20:48

## 2019-01-01 RX ADMIN — FINASTERIDE 5 MG: 5 TABLET, FILM COATED ORAL at 16:36

## 2019-01-01 RX ADMIN — TRIAMCINOLONE ACETONIDE 40 MG: 40 INJECTION, SUSPENSION INTRA-ARTICULAR; INTRAMUSCULAR at 10:50

## 2019-01-01 RX ADMIN — SUCRALFATE 1 G: 1 TABLET ORAL at 06:00

## 2019-01-01 RX ADMIN — IPRATROPIUM BROMIDE AND ALBUTEROL SULFATE 1 AMPULE: .5; 3 SOLUTION RESPIRATORY (INHALATION) at 06:37

## 2019-01-01 RX ADMIN — ACYCLOVIR 400 MG: 200 CAPSULE ORAL at 15:03

## 2019-01-01 RX ADMIN — ALBUTEROL SULFATE 2 PUFF: 90 AEROSOL, METERED RESPIRATORY (INHALATION) at 08:02

## 2019-01-01 RX ADMIN — DOCUSATE SODIUM 100 MG: 100 CAPSULE, LIQUID FILLED ORAL at 21:02

## 2019-01-01 RX ADMIN — POTASSIUM CHLORIDE 20 MEQ: 20 TABLET, EXTENDED RELEASE ORAL at 08:12

## 2019-01-01 RX ADMIN — DOCUSATE SODIUM 100 MG: 100 CAPSULE, LIQUID FILLED ORAL at 14:53

## 2019-01-01 RX ADMIN — WATER 2 G: 1 INJECTION INTRAMUSCULAR; INTRAVENOUS; SUBCUTANEOUS at 18:42

## 2019-01-01 RX ADMIN — IPRATROPIUM BROMIDE AND ALBUTEROL SULFATE 1 AMPULE: 2.5; .5 SOLUTION RESPIRATORY (INHALATION) at 18:58

## 2019-01-01 RX ADMIN — ACYCLOVIR 400 MG: 200 CAPSULE ORAL at 14:48

## 2019-01-01 RX ADMIN — SUCRALFATE 1 G: 1 TABLET ORAL at 11:35

## 2019-01-01 RX ADMIN — FORMOTEROL FUMARATE DIHYDRATE 20 MCG: 20 SOLUTION RESPIRATORY (INHALATION) at 06:24

## 2019-01-01 RX ADMIN — Medication 10 ML: at 09:06

## 2019-01-01 RX ADMIN — APIXABAN 2.5 MG: 2.5 TABLET, FILM COATED ORAL at 10:20

## 2019-01-01 RX ADMIN — IPRATROPIUM BROMIDE AND ALBUTEROL SULFATE 1 AMPULE: 2.5; .5 SOLUTION RESPIRATORY (INHALATION) at 12:52

## 2019-01-01 RX ADMIN — POTASSIUM CHLORIDE 20 MEQ: 20 TABLET, EXTENDED RELEASE ORAL at 10:06

## 2019-01-01 RX ADMIN — PANTOPRAZOLE SODIUM 40 MG: 40 INJECTION, POWDER, LYOPHILIZED, FOR SOLUTION INTRAVENOUS at 10:25

## 2019-01-01 RX ADMIN — SUCRALFATE 1 G: 1 TABLET ORAL at 18:07

## 2019-01-01 RX ADMIN — VALACYCLOVIR HYDROCHLORIDE 500 MG: 500 TABLET, FILM COATED ORAL at 09:23

## 2019-01-01 RX ADMIN — PRIMIDONE 100 MG: 50 TABLET ORAL at 22:08

## 2019-01-01 RX ADMIN — APIXABAN 2.5 MG: 2.5 TABLET, FILM COATED ORAL at 21:34

## 2019-01-01 RX ADMIN — DILTIAZEM HYDROCHLORIDE 120 MG: 120 CAPSULE, COATED, EXTENDED RELEASE ORAL at 16:37

## 2019-01-01 RX ADMIN — MAGNESIUM SULFATE HEPTAHYDRATE 2 G: 40 INJECTION, SOLUTION INTRAVENOUS at 16:00

## 2019-01-01 RX ADMIN — APIXABAN 2.5 MG: 5 TABLET, FILM COATED ORAL at 09:29

## 2019-01-01 RX ADMIN — FORMOTEROL FUMARATE DIHYDRATE 20 MCG: 20 SOLUTION RESPIRATORY (INHALATION) at 18:39

## 2019-01-01 RX ADMIN — DOCUSATE SODIUM 100 MG: 100 CAPSULE, LIQUID FILLED ORAL at 10:21

## 2019-01-01 RX ADMIN — FUROSEMIDE 40 MG: 10 INJECTION, SOLUTION INTRAMUSCULAR; INTRAVENOUS at 14:02

## 2019-01-01 RX ADMIN — IPRATROPIUM BROMIDE AND ALBUTEROL SULFATE 1 AMPULE: 2.5; .5 SOLUTION RESPIRATORY (INHALATION) at 19:04

## 2019-01-01 RX ADMIN — METHYLPREDNISOLONE SODIUM SUCCINATE 40 MG: 40 INJECTION, POWDER, FOR SOLUTION INTRAMUSCULAR; INTRAVENOUS at 08:25

## 2019-01-01 RX ADMIN — THEOPHYLLINE ANHYDROUS 200 MG: 200 CAPSULE, EXTENDED RELEASE ORAL at 10:06

## 2019-01-01 RX ADMIN — OXYCODONE HYDROCHLORIDE AND ACETAMINOPHEN 1 TABLET: 5; 325 TABLET ORAL at 19:51

## 2019-01-01 RX ADMIN — ACYCLOVIR 400 MG: 200 CAPSULE ORAL at 20:31

## 2019-01-01 RX ADMIN — FUROSEMIDE 20 MG: 10 INJECTION, SOLUTION INTRAMUSCULAR; INTRAVENOUS at 09:05

## 2019-01-01 RX ADMIN — ACYCLOVIR 400 MG: 200 CAPSULE ORAL at 09:29

## 2019-01-01 RX ADMIN — CHLORHEXIDINE GLUCONATE 15 ML: 1.2 RINSE ORAL at 10:25

## 2019-01-01 RX ADMIN — IPRATROPIUM BROMIDE AND ALBUTEROL SULFATE 1 AMPULE: 2.5; .5 SOLUTION RESPIRATORY (INHALATION) at 13:12

## 2019-01-01 RX ADMIN — APIXABAN 2.5 MG: 2.5 TABLET, FILM COATED ORAL at 09:18

## 2019-01-01 RX ADMIN — VALACYCLOVIR HYDROCHLORIDE 500 MG: 500 TABLET, FILM COATED ORAL at 10:20

## 2019-01-01 RX ADMIN — Medication 10 ML: at 08:54

## 2019-01-01 RX ADMIN — IPRATROPIUM BROMIDE AND ALBUTEROL SULFATE 1 AMPULE: 2.5; .5 SOLUTION RESPIRATORY (INHALATION) at 09:09

## 2019-01-01 RX ADMIN — AMOXICILLIN 1000 MG: 500 CAPSULE ORAL at 08:55

## 2019-01-01 RX ADMIN — POTASSIUM CHLORIDE 20 MEQ: 20 TABLET, EXTENDED RELEASE ORAL at 20:20

## 2019-01-01 RX ADMIN — AZITHROMYCIN MONOHYDRATE 250 MG: 250 TABLET ORAL at 09:03

## 2019-01-01 RX ADMIN — IPRATROPIUM BROMIDE AND ALBUTEROL SULFATE 1 AMPULE: 2.5; .5 SOLUTION RESPIRATORY (INHALATION) at 06:30

## 2019-01-01 RX ADMIN — LATANOPROST 1 DROP: 50 SOLUTION OPHTHALMIC at 22:05

## 2019-01-01 RX ADMIN — METHYLPREDNISOLONE SODIUM SUCCINATE 40 MG: 40 INJECTION, POWDER, FOR SOLUTION INTRAMUSCULAR; INTRAVENOUS at 09:02

## 2019-01-01 RX ADMIN — POTASSIUM CHLORIDE 20 MEQ: 20 TABLET, EXTENDED RELEASE ORAL at 18:34

## 2019-01-01 RX ADMIN — IBUPROFEN 400 MG: 800 TABLET, FILM COATED ORAL at 20:51

## 2019-01-01 RX ADMIN — PANTOPRAZOLE SODIUM 40 MG: 40 TABLET, DELAYED RELEASE ORAL at 06:36

## 2019-01-01 RX ADMIN — DOCUSATE SODIUM 100 MG: 100 CAPSULE, LIQUID FILLED ORAL at 20:30

## 2019-01-01 RX ADMIN — DILTIAZEM HYDROCHLORIDE 120 MG: 120 CAPSULE, COATED, EXTENDED RELEASE ORAL at 09:14

## 2019-01-01 RX ADMIN — MONTELUKAST 10 MG: 10 TABLET, FILM COATED ORAL at 18:15

## 2019-01-01 RX ADMIN — SUCRALFATE 1 G: 1 TABLET ORAL at 07:53

## 2019-01-01 RX ADMIN — CALCIUM CARBONATE 1000 MG: 500 TABLET, CHEWABLE ORAL at 09:06

## 2019-01-01 RX ADMIN — PROPRANOLOL HYDROCHLORIDE 10 MG: 10 TABLET ORAL at 09:00

## 2019-01-01 RX ADMIN — IPRATROPIUM BROMIDE AND ALBUTEROL SULFATE 1 AMPULE: 2.5; .5 SOLUTION RESPIRATORY (INHALATION) at 16:51

## 2019-01-01 RX ADMIN — IPRATROPIUM BROMIDE AND ALBUTEROL SULFATE 1 AMPULE: .5; 3 SOLUTION RESPIRATORY (INHALATION) at 12:36

## 2019-01-01 RX ADMIN — PREDNISONE 40 MG: 20 TABLET ORAL at 09:18

## 2019-01-01 RX ADMIN — APIXABAN 2.5 MG: 5 TABLET, FILM COATED ORAL at 20:23

## 2019-01-01 RX ADMIN — CEFEPIME HYDROCHLORIDE 1 G: 1 INJECTION, POWDER, FOR SOLUTION INTRAMUSCULAR; INTRAVENOUS at 06:37

## 2019-01-01 RX ADMIN — MONTELUKAST 10 MG: 10 TABLET, FILM COATED ORAL at 09:18

## 2019-01-01 RX ADMIN — OXYCODONE HYDROCHLORIDE AND ACETAMINOPHEN 1 TABLET: 5; 325 TABLET ORAL at 14:38

## 2019-01-01 RX ADMIN — Medication 10 ML: at 20:51

## 2019-01-01 RX ADMIN — SUCRALFATE 1 G: 1 TABLET ORAL at 05:45

## 2019-01-01 RX ADMIN — IPRATROPIUM BROMIDE AND ALBUTEROL SULFATE 1 AMPULE: .5; 3 SOLUTION RESPIRATORY (INHALATION) at 06:05

## 2019-01-01 RX ADMIN — FUROSEMIDE 40 MG: 10 INJECTION, SOLUTION INTRAMUSCULAR; INTRAVENOUS at 17:55

## 2019-01-01 RX ADMIN — METHYLPREDNISOLONE SODIUM SUCCINATE 40 MG: 40 INJECTION, POWDER, FOR SOLUTION INTRAMUSCULAR; INTRAVENOUS at 13:48

## 2019-01-01 RX ADMIN — METHYLPREDNISOLONE SODIUM SUCCINATE 40 MG: 40 INJECTION, POWDER, FOR SOLUTION INTRAMUSCULAR; INTRAVENOUS at 15:30

## 2019-01-01 RX ADMIN — PRIMIDONE 100 MG: 50 TABLET ORAL at 20:27

## 2019-01-01 RX ADMIN — IPRATROPIUM BROMIDE AND ALBUTEROL SULFATE 1 AMPULE: 2.5; .5 SOLUTION RESPIRATORY (INHALATION) at 19:35

## 2019-01-01 RX ADMIN — FORMOTEROL FUMARATE DIHYDRATE 20 MCG: 20 SOLUTION RESPIRATORY (INHALATION) at 05:11

## 2019-01-01 RX ADMIN — CEFEPIME HYDROCHLORIDE 1 G: 1 INJECTION, POWDER, FOR SOLUTION INTRAMUSCULAR; INTRAVENOUS at 15:35

## 2019-01-01 RX ADMIN — IPRATROPIUM BROMIDE AND ALBUTEROL SULFATE 1 AMPULE: 2.5; .5 SOLUTION RESPIRATORY (INHALATION) at 02:30

## 2019-01-01 RX ADMIN — METHYLPREDNISOLONE SODIUM SUCCINATE 30 MG: 40 INJECTION, POWDER, FOR SOLUTION INTRAMUSCULAR; INTRAVENOUS at 00:53

## 2019-01-01 RX ADMIN — IPRATROPIUM BROMIDE AND ALBUTEROL SULFATE 1 AMPULE: 2.5; .5 SOLUTION RESPIRATORY (INHALATION) at 21:34

## 2019-01-01 RX ADMIN — IPRATROPIUM BROMIDE AND ALBUTEROL SULFATE 1 AMPULE: .5; 3 SOLUTION RESPIRATORY (INHALATION) at 20:07

## 2019-01-01 RX ADMIN — IBUPROFEN 400 MG: 400 TABLET, FILM COATED ORAL at 08:30

## 2019-01-01 RX ADMIN — IPRATROPIUM BROMIDE AND ALBUTEROL SULFATE 1 AMPULE: 2.5; .5 SOLUTION RESPIRATORY (INHALATION) at 22:54

## 2019-01-01 RX ADMIN — IPRATROPIUM BROMIDE AND ALBUTEROL SULFATE 1 AMPULE: 2.5; .5 SOLUTION RESPIRATORY (INHALATION) at 10:08

## 2019-01-01 RX ADMIN — PRIMIDONE 100 MG: 50 TABLET ORAL at 21:49

## 2019-01-01 RX ADMIN — PANTOPRAZOLE SODIUM 40 MG: 40 TABLET, DELAYED RELEASE ORAL at 06:21

## 2019-01-01 RX ADMIN — THEOPHYLLINE ANHYDROUS 200 MG: 200 CAPSULE, EXTENDED RELEASE ORAL at 10:55

## 2019-01-01 RX ADMIN — PRIMIDONE 100 MG: 50 TABLET ORAL at 22:30

## 2019-01-01 RX ADMIN — THEOPHYLLINE ANHYDROUS 200 MG: 200 CAPSULE, EXTENDED RELEASE ORAL at 20:27

## 2019-01-01 RX ADMIN — TRIMETHOBENZAMIDE HYDROCHLORIDE 200 MG: 100 INJECTION INTRAMUSCULAR at 05:49

## 2019-01-01 RX ADMIN — ASPIRIN 81 MG: 81 TABLET, COATED ORAL at 08:55

## 2019-01-01 RX ADMIN — METHYLPREDNISOLONE SODIUM SUCCINATE 30 MG: 40 INJECTION, POWDER, FOR SOLUTION INTRAMUSCULAR; INTRAVENOUS at 01:12

## 2019-01-01 RX ADMIN — IPRATROPIUM BROMIDE AND ALBUTEROL SULFATE 1 AMPULE: 2.5; .5 SOLUTION RESPIRATORY (INHALATION) at 09:37

## 2019-01-01 RX ADMIN — POTASSIUM CHLORIDE 20 MEQ: 20 TABLET, EXTENDED RELEASE ORAL at 17:55

## 2019-01-01 RX ADMIN — THEOPHYLLINE ANHYDROUS 200 MG: 200 CAPSULE, EXTENDED RELEASE ORAL at 20:11

## 2019-01-01 RX ADMIN — OXYCODONE HYDROCHLORIDE AND ACETAMINOPHEN 1 TABLET: 5; 325 TABLET ORAL at 21:48

## 2019-01-01 RX ADMIN — IPRATROPIUM BROMIDE AND ALBUTEROL SULFATE 1 AMPULE: .5; 3 SOLUTION RESPIRATORY (INHALATION) at 10:23

## 2019-01-01 RX ADMIN — SUCRALFATE 1 G: 1 TABLET ORAL at 00:50

## 2019-01-01 RX ADMIN — IPRATROPIUM BROMIDE AND ALBUTEROL SULFATE 1 AMPULE: .5; 3 SOLUTION RESPIRATORY (INHALATION) at 18:39

## 2019-01-01 RX ADMIN — CALCIUM CARBONATE 1000 MG: 500 TABLET, CHEWABLE ORAL at 12:51

## 2019-01-01 RX ADMIN — Medication 10 ML: at 10:25

## 2019-01-01 RX ADMIN — METHYLPREDNISOLONE SODIUM SUCCINATE 30 MG: 40 INJECTION, POWDER, FOR SOLUTION INTRAMUSCULAR; INTRAVENOUS at 13:32

## 2019-01-01 RX ADMIN — METHYLPREDNISOLONE SODIUM SUCCINATE 30 MG: 40 INJECTION, POWDER, FOR SOLUTION INTRAMUSCULAR; INTRAVENOUS at 13:14

## 2019-01-01 RX ADMIN — FORMOTEROL FUMARATE DIHYDRATE 20 MCG: 20 SOLUTION RESPIRATORY (INHALATION) at 19:22

## 2019-01-01 RX ADMIN — METOPROLOL TARTRATE 6.25 MG: 25 TABLET ORAL at 09:31

## 2019-01-01 RX ADMIN — POTASSIUM CHLORIDE 20 MEQ: 20 TABLET, EXTENDED RELEASE ORAL at 09:20

## 2019-01-01 RX ADMIN — IPRATROPIUM BROMIDE AND ALBUTEROL SULFATE 1 AMPULE: 2.5; .5 SOLUTION RESPIRATORY (INHALATION) at 02:11

## 2019-01-01 RX ADMIN — DOCUSATE SODIUM 100 MG: 100 CAPSULE, LIQUID FILLED ORAL at 09:16

## 2019-01-01 RX ADMIN — POTASSIUM CHLORIDE 20 MEQ: 20 TABLET, EXTENDED RELEASE ORAL at 08:03

## 2019-01-01 RX ADMIN — SUCRALFATE 1 G: 1 TABLET ORAL at 17:52

## 2019-01-01 RX ADMIN — VALACYCLOVIR HYDROCHLORIDE 500 MG: 500 TABLET, FILM COATED ORAL at 09:14

## 2019-01-01 RX ADMIN — KETOROLAC TROMETHAMINE 15 MG: 15 INJECTION, SOLUTION INTRAMUSCULAR; INTRAVENOUS at 14:21

## 2019-01-01 RX ADMIN — POTASSIUM CHLORIDE AND SODIUM CHLORIDE: 450; 150 INJECTION, SOLUTION INTRAVENOUS at 21:35

## 2019-01-01 RX ADMIN — SUCRALFATE 1 G: 1 TABLET ORAL at 21:03

## 2019-01-01 RX ADMIN — PANTOPRAZOLE SODIUM 40 MG: 40 TABLET, DELAYED RELEASE ORAL at 06:37

## 2019-01-01 RX ADMIN — ALBUTEROL SULFATE 2 PUFF: 90 AEROSOL, METERED RESPIRATORY (INHALATION) at 08:03

## 2019-01-01 RX ADMIN — FINASTERIDE 5 MG: 5 TABLET, FILM COATED ORAL at 09:14

## 2019-01-01 RX ADMIN — PREDNISONE 30 MG: 20 TABLET ORAL at 08:44

## 2019-01-01 RX ADMIN — SUCRALFATE 1 G: 1 TABLET ORAL at 06:12

## 2019-01-01 RX ADMIN — DILTIAZEM HYDROCHLORIDE 120 MG: 120 CAPSULE, COATED, EXTENDED RELEASE ORAL at 09:03

## 2019-01-01 RX ADMIN — ACYCLOVIR 400 MG: 200 CAPSULE ORAL at 15:35

## 2019-01-01 RX ADMIN — IPRATROPIUM BROMIDE AND ALBUTEROL SULFATE 1 AMPULE: 2.5; .5 SOLUTION RESPIRATORY (INHALATION) at 14:17

## 2019-01-01 RX ADMIN — OXYCODONE HYDROCHLORIDE AND ACETAMINOPHEN 500 MG: 500 TABLET ORAL at 16:36

## 2019-01-01 RX ADMIN — DOCUSATE SODIUM 100 MG: 100 CAPSULE, LIQUID FILLED ORAL at 08:44

## 2019-01-01 RX ADMIN — METHYLPREDNISOLONE SODIUM SUCCINATE 40 MG: 40 INJECTION, POWDER, FOR SOLUTION INTRAMUSCULAR; INTRAVENOUS at 20:31

## 2019-01-01 RX ADMIN — POTASSIUM CHLORIDE 20 MEQ: 20 TABLET, EXTENDED RELEASE ORAL at 18:06

## 2019-01-01 RX ADMIN — PRIMIDONE 250 MG: 250 TABLET ORAL at 09:32

## 2019-01-01 RX ADMIN — DOCUSATE SODIUM 100 MG: 100 CAPSULE, LIQUID FILLED ORAL at 09:14

## 2019-01-01 RX ADMIN — Medication 10 ML: at 21:51

## 2019-01-01 RX ADMIN — IPRATROPIUM BROMIDE AND ALBUTEROL SULFATE 1 AMPULE: 2.5; .5 SOLUTION RESPIRATORY (INHALATION) at 01:05

## 2019-01-01 RX ADMIN — THEOPHYLLINE ANHYDROUS 200 MG: 200 CAPSULE, EXTENDED RELEASE ORAL at 08:56

## 2019-01-01 RX ADMIN — MONTELUKAST 10 MG: 10 TABLET, FILM COATED ORAL at 08:27

## 2019-01-01 RX ADMIN — FORMOTEROL FUMARATE DIHYDRATE 20 MCG: 20 SOLUTION RESPIRATORY (INHALATION) at 06:37

## 2019-01-01 RX ADMIN — CEFEPIME HYDROCHLORIDE 1 G: 1 INJECTION, POWDER, FOR SOLUTION INTRAMUSCULAR; INTRAVENOUS at 15:04

## 2019-01-01 RX ADMIN — SODIUM CHLORIDE 1000 ML: 9 INJECTION, SOLUTION INTRAVENOUS at 15:07

## 2019-01-01 RX ADMIN — METHYLPREDNISOLONE SODIUM SUCCINATE 40 MG: 40 INJECTION, POWDER, FOR SOLUTION INTRAMUSCULAR; INTRAVENOUS at 12:47

## 2019-01-01 RX ADMIN — MONTELUKAST 10 MG: 10 TABLET, FILM COATED ORAL at 17:17

## 2019-01-01 RX ADMIN — Medication 100 MG: at 12:19

## 2019-01-01 RX ADMIN — POTASSIUM CHLORIDE 20 MEQ: 20 TABLET, EXTENDED RELEASE ORAL at 21:08

## 2019-01-01 RX ADMIN — METOPROLOL SUCCINATE 12.5 MG: 25 TABLET, EXTENDED RELEASE ORAL at 16:37

## 2019-01-01 RX ADMIN — CEFEPIME HYDROCHLORIDE 1 G: 1 INJECTION, POWDER, FOR SOLUTION INTRAMUSCULAR; INTRAVENOUS at 00:03

## 2019-01-01 RX ADMIN — ENOXAPARIN SODIUM 40 MG: 40 INJECTION SUBCUTANEOUS at 10:55

## 2019-01-01 RX ADMIN — METHYLPREDNISOLONE SODIUM SUCCINATE 40 MG: 40 INJECTION, POWDER, FOR SOLUTION INTRAMUSCULAR; INTRAVENOUS at 21:32

## 2019-01-01 RX ADMIN — METHYLPREDNISOLONE SODIUM SUCCINATE 40 MG: 40 INJECTION, POWDER, FOR SOLUTION INTRAMUSCULAR; INTRAVENOUS at 23:37

## 2019-01-01 RX ADMIN — METOPROLOL TARTRATE 6.25 MG: 25 TABLET ORAL at 08:45

## 2019-01-01 RX ADMIN — APIXABAN 2.5 MG: 2.5 TABLET, FILM COATED ORAL at 10:06

## 2019-01-01 RX ADMIN — DOCUSATE SODIUM 100 MG: 100 CAPSULE, LIQUID FILLED ORAL at 08:03

## 2019-01-01 RX ADMIN — FINASTERIDE 5 MG: 5 TABLET, FILM COATED ORAL at 10:07

## 2019-01-01 RX ADMIN — ACYCLOVIR 400 MG: 200 CAPSULE ORAL at 09:18

## 2019-01-01 RX ADMIN — SUCRALFATE 1 G: 1 TABLET ORAL at 02:11

## 2019-01-01 RX ADMIN — FUROSEMIDE 40 MG: 10 INJECTION, SOLUTION INTRAMUSCULAR; INTRAVENOUS at 09:07

## 2019-01-01 RX ADMIN — DOCUSATE SODIUM 100 MG: 100 CAPSULE, LIQUID FILLED ORAL at 09:30

## 2019-01-01 RX ADMIN — IPRATROPIUM BROMIDE AND ALBUTEROL SULFATE 1 AMPULE: .5; 3 SOLUTION RESPIRATORY (INHALATION) at 14:45

## 2019-01-01 RX ADMIN — PRIMIDONE 250 MG: 250 TABLET ORAL at 15:35

## 2019-01-01 RX ADMIN — INFLUENZA VACCINE, ADJUVANTED 0.5 ML: 15; 15; 15 INJECTION, SUSPENSION INTRAMUSCULAR at 08:50

## 2019-01-01 RX ADMIN — POTASSIUM CHLORIDE 20 MEQ: 20 TABLET, EXTENDED RELEASE ORAL at 20:31

## 2019-01-01 RX ADMIN — IPRATROPIUM BROMIDE AND ALBUTEROL SULFATE 1 AMPULE: .5; 3 SOLUTION RESPIRATORY (INHALATION) at 21:56

## 2019-01-01 RX ADMIN — MONTELUKAST 10 MG: 10 TABLET, FILM COATED ORAL at 09:32

## 2019-01-01 RX ADMIN — ACYCLOVIR 400 MG: 200 CAPSULE ORAL at 09:03

## 2019-01-01 RX ADMIN — APIXABAN 2.5 MG: 5 TABLET, FILM COATED ORAL at 21:03

## 2019-01-01 RX ADMIN — POTASSIUM CHLORIDE 20 MEQ: 20 TABLET, EXTENDED RELEASE ORAL at 09:51

## 2019-01-01 RX ADMIN — SUCRALFATE 1 G: 1 TABLET ORAL at 17:55

## 2019-01-01 RX ADMIN — IBUPROFEN 400 MG: 400 TABLET, FILM COATED ORAL at 20:36

## 2019-01-01 RX ADMIN — IPRATROPIUM BROMIDE AND ALBUTEROL SULFATE 1 AMPULE: .5; 3 SOLUTION RESPIRATORY (INHALATION) at 09:42

## 2019-01-01 RX ADMIN — IPRATROPIUM BROMIDE AND ALBUTEROL SULFATE 1 AMPULE: .5; 3 SOLUTION RESPIRATORY (INHALATION) at 21:28

## 2019-01-01 RX ADMIN — FUROSEMIDE 40 MG: 40 TABLET ORAL at 08:58

## 2019-01-01 RX ADMIN — DILTIAZEM HYDROCHLORIDE 120 MG: 120 CAPSULE, COATED, EXTENDED RELEASE ORAL at 09:19

## 2019-01-01 RX ADMIN — FORMOTEROL FUMARATE DIHYDRATE 20 MCG: 20 SOLUTION RESPIRATORY (INHALATION) at 21:36

## 2019-01-01 RX ADMIN — DOXYCYCLINE HYCLATE 100 MG: 100 CAPSULE ORAL at 18:18

## 2019-01-01 RX ADMIN — IPRATROPIUM BROMIDE AND ALBUTEROL SULFATE 1 AMPULE: 2.5; .5 SOLUTION RESPIRATORY (INHALATION) at 09:22

## 2019-01-01 RX ADMIN — BUDESONIDE 500 MCG: 0.5 INHALANT RESPIRATORY (INHALATION) at 06:24

## 2019-01-01 RX ADMIN — APIXABAN 2.5 MG: 2.5 TABLET, FILM COATED ORAL at 09:14

## 2019-01-01 RX ADMIN — OXYCODONE HYDROCHLORIDE 5 MG: 5 TABLET ORAL at 23:29

## 2019-01-01 RX ADMIN — BUDESONIDE 500 MCG: 0.5 INHALANT RESPIRATORY (INHALATION) at 18:39

## 2019-01-01 RX ADMIN — IPRATROPIUM BROMIDE AND ALBUTEROL SULFATE 1 AMPULE: 2.5; .5 SOLUTION RESPIRATORY (INHALATION) at 01:32

## 2019-01-01 RX ADMIN — OXYCODONE HYDROCHLORIDE AND ACETAMINOPHEN 1 TABLET: 5; 325 TABLET ORAL at 18:07

## 2019-01-01 RX ADMIN — APIXABAN 2.5 MG: 5 TABLET, FILM COATED ORAL at 09:03

## 2019-01-01 RX ADMIN — THEOPHYLLINE ANHYDROUS 200 MG: 200 CAPSULE, EXTENDED RELEASE ORAL at 22:30

## 2019-01-01 RX ADMIN — DOXYCYCLINE HYCLATE 100 MG: 100 CAPSULE ORAL at 06:12

## 2019-01-01 RX ADMIN — IPRATROPIUM BROMIDE AND ALBUTEROL SULFATE 1 AMPULE: .5; 3 SOLUTION RESPIRATORY (INHALATION) at 20:19

## 2019-01-01 RX ADMIN — DILTIAZEM HYDROCHLORIDE 120 MG: 120 CAPSULE, COATED, EXTENDED RELEASE ORAL at 08:44

## 2019-01-01 RX ADMIN — MONTELUKAST 10 MG: 10 TABLET, FILM COATED ORAL at 17:52

## 2019-01-01 RX ADMIN — PANTOPRAZOLE SODIUM 40 MG: 40 TABLET, DELAYED RELEASE ORAL at 05:56

## 2019-01-01 RX ADMIN — DILTIAZEM HYDROCHLORIDE 120 MG: 120 CAPSULE, COATED, EXTENDED RELEASE ORAL at 08:59

## 2019-01-01 RX ADMIN — PANTOPRAZOLE SODIUM 40 MG: 40 TABLET, DELAYED RELEASE ORAL at 06:53

## 2019-01-01 RX ADMIN — IPRATROPIUM BROMIDE AND ALBUTEROL SULFATE 1 AMPULE: 2.5; .5 SOLUTION RESPIRATORY (INHALATION) at 12:45

## 2019-01-01 RX ADMIN — MONTELUKAST SODIUM 10 MG: 10 TABLET, FILM COATED ORAL at 08:58

## 2019-01-01 RX ADMIN — IPRATROPIUM BROMIDE AND ALBUTEROL SULFATE 1 AMPULE: 2.5; .5 SOLUTION RESPIRATORY (INHALATION) at 09:23

## 2019-01-01 RX ADMIN — PERFLUTREN 0.32 MG: 6.52 INJECTION, SUSPENSION INTRAVENOUS at 12:03

## 2019-01-01 RX ADMIN — LIDOCAINE HYDROCHLORIDE: 20 JELLY TOPICAL at 14:53

## 2019-01-01 RX ADMIN — DOXYCYCLINE HYCLATE 100 MG: 100 CAPSULE ORAL at 17:58

## 2019-01-01 RX ADMIN — FORMOTEROL FUMARATE DIHYDRATE 20 MCG: 20 SOLUTION RESPIRATORY (INHALATION) at 20:07

## 2019-01-01 RX ADMIN — MONTELUKAST 10 MG: 10 TABLET, FILM COATED ORAL at 09:50

## 2019-01-01 RX ADMIN — POTASSIUM CHLORIDE 20 MEQ: 20 TABLET, EXTENDED RELEASE ORAL at 17:19

## 2019-01-01 RX ADMIN — SUCRALFATE 1 G: 1 TABLET ORAL at 13:32

## 2019-01-01 RX ADMIN — SUCRALFATE 1 G: 1 TABLET ORAL at 00:53

## 2019-01-01 RX ADMIN — DILTIAZEM HYDROCHLORIDE 5 MG/HR: 5 INJECTION INTRAVENOUS at 15:58

## 2019-01-01 RX ADMIN — CEFEPIME HYDROCHLORIDE 1 G: 1 INJECTION, POWDER, FOR SOLUTION INTRAMUSCULAR; INTRAVENOUS at 07:00

## 2019-01-01 RX ADMIN — METHYLPREDNISOLONE SODIUM SUCCINATE 40 MG: 40 INJECTION, POWDER, FOR SOLUTION INTRAMUSCULAR; INTRAVENOUS at 23:23

## 2019-01-01 RX ADMIN — FUROSEMIDE 40 MG: 10 INJECTION, SOLUTION INTRAMUSCULAR; INTRAVENOUS at 09:24

## 2019-01-01 RX ADMIN — OXYCODONE HYDROCHLORIDE AND ACETAMINOPHEN 1 TABLET: 5; 325 TABLET ORAL at 22:08

## 2019-01-01 RX ADMIN — METHYLPREDNISOLONE SODIUM SUCCINATE 40 MG: 40 INJECTION, POWDER, FOR SOLUTION INTRAMUSCULAR; INTRAVENOUS at 20:37

## 2019-01-01 RX ADMIN — OXYCODONE HYDROCHLORIDE 5 MG: 5 TABLET ORAL at 23:15

## 2019-01-01 RX ADMIN — FUROSEMIDE 20 MG: 20 TABLET ORAL at 08:44

## 2019-01-01 RX ADMIN — DOXYCYCLINE HYCLATE 100 MG: 100 CAPSULE ORAL at 17:55

## 2019-01-01 RX ADMIN — ACYCLOVIR 400 MG: 200 CAPSULE ORAL at 08:44

## 2019-01-01 RX ADMIN — IPRATROPIUM BROMIDE AND ALBUTEROL SULFATE 1 AMPULE: 2.5; .5 SOLUTION RESPIRATORY (INHALATION) at 17:40

## 2019-01-01 RX ADMIN — THEOPHYLLINE ANHYDROUS 200 MG: 200 CAPSULE, EXTENDED RELEASE ORAL at 09:06

## 2019-01-01 RX ADMIN — TRAMADOL HYDROCHLORIDE 50 MG: 50 TABLET, FILM COATED ORAL at 13:34

## 2019-01-01 RX ADMIN — METHYLPREDNISOLONE SODIUM SUCCINATE 40 MG: 40 INJECTION, POWDER, FOR SOLUTION INTRAMUSCULAR; INTRAVENOUS at 22:12

## 2019-01-01 RX ADMIN — APIXABAN 2.5 MG: 5 TABLET, FILM COATED ORAL at 20:31

## 2019-01-01 RX ADMIN — DOCUSATE SODIUM 100 MG: 100 CAPSULE, LIQUID FILLED ORAL at 09:18

## 2019-01-01 RX ADMIN — DOXYCYCLINE HYCLATE 100 MG: 100 CAPSULE ORAL at 06:36

## 2019-01-01 RX ADMIN — DOXYCYCLINE 100 MG: 100 INJECTION, POWDER, LYOPHILIZED, FOR SOLUTION INTRAVENOUS at 06:46

## 2019-01-01 RX ADMIN — OXYCODONE HYDROCHLORIDE AND ACETAMINOPHEN 500 MG: 500 TABLET ORAL at 09:19

## 2019-01-01 RX ADMIN — POTASSIUM CHLORIDE AND SODIUM CHLORIDE: 450; 150 INJECTION, SOLUTION INTRAVENOUS at 10:02

## 2019-01-01 RX ADMIN — IPRATROPIUM BROMIDE AND ALBUTEROL SULFATE 1 AMPULE: .5; 3 SOLUTION RESPIRATORY (INHALATION) at 06:27

## 2019-01-01 RX ADMIN — APIXABAN 2.5 MG: 2.5 TABLET, FILM COATED ORAL at 21:48

## 2019-01-01 RX ADMIN — IPRATROPIUM BROMIDE AND ALBUTEROL SULFATE 1 AMPULE: 2.5; .5 SOLUTION RESPIRATORY (INHALATION) at 05:38

## 2019-01-01 RX ADMIN — DOXYCYCLINE HYCLATE 100 MG: 100 CAPSULE ORAL at 18:48

## 2019-01-01 RX ADMIN — CEFEPIME HYDROCHLORIDE 1 G: 1 INJECTION, POWDER, FOR SOLUTION INTRAMUSCULAR; INTRAVENOUS at 05:56

## 2019-01-01 RX ADMIN — POTASSIUM CHLORIDE 40 MEQ: 20 TABLET, EXTENDED RELEASE ORAL at 17:12

## 2019-01-01 RX ADMIN — IPRATROPIUM BROMIDE AND ALBUTEROL SULFATE 1 AMPULE: .5; 3 SOLUTION RESPIRATORY (INHALATION) at 06:24

## 2019-01-01 RX ADMIN — MONTELUKAST 10 MG: 10 TABLET, FILM COATED ORAL at 17:58

## 2019-01-01 RX ADMIN — THEOPHYLLINE ANHYDROUS 200 MG: 200 CAPSULE, EXTENDED RELEASE ORAL at 21:22

## 2019-01-01 RX ADMIN — THEOPHYLLINE ANHYDROUS 200 MG: 200 CAPSULE, EXTENDED RELEASE ORAL at 22:09

## 2019-01-01 RX ADMIN — FENTANYL CITRATE 25 MCG/HR: 50 INJECTION INTRAVENOUS at 19:03

## 2019-01-01 RX ADMIN — METOPROLOL TARTRATE 6.25 MG: 25 TABLET ORAL at 21:08

## 2019-01-01 RX ADMIN — VALACYCLOVIR HYDROCHLORIDE 500 MG: 500 TABLET, FILM COATED ORAL at 09:06

## 2019-01-01 RX ADMIN — IPRATROPIUM BROMIDE AND ALBUTEROL SULFATE 1 AMPULE: .5; 3 SOLUTION RESPIRATORY (INHALATION) at 16:52

## 2019-01-01 RX ADMIN — APIXABAN 2.5 MG: 5 TABLET, FILM COATED ORAL at 21:08

## 2019-01-01 RX ADMIN — ACETYLCYSTEINE 200 MG: 100 SOLUTION ORAL; RESPIRATORY (INHALATION) at 18:41

## 2019-01-01 RX ADMIN — WATER 2 G: 1 INJECTION INTRAMUSCULAR; INTRAVENOUS; SUBCUTANEOUS at 18:00

## 2019-01-01 RX ADMIN — DOCUSATE SODIUM 100 MG: 100 CAPSULE, LIQUID FILLED ORAL at 09:22

## 2019-01-01 RX ADMIN — SUCRALFATE 1 G: 1 TABLET ORAL at 12:22

## 2019-01-01 RX ADMIN — METHYLPREDNISOLONE SODIUM SUCCINATE 30 MG: 40 INJECTION, POWDER, FOR SOLUTION INTRAMUSCULAR; INTRAVENOUS at 01:16

## 2019-01-01 RX ADMIN — IPRATROPIUM BROMIDE AND ALBUTEROL SULFATE 1 AMPULE: 2.5; .5 SOLUTION RESPIRATORY (INHALATION) at 22:25

## 2019-01-01 RX ADMIN — SUCRALFATE 1 G: 1 TABLET ORAL at 18:34

## 2019-01-01 RX ADMIN — POTASSIUM CHLORIDE AND SODIUM CHLORIDE: 450; 150 INJECTION, SOLUTION INTRAVENOUS at 21:20

## 2019-01-01 RX ADMIN — VALACYCLOVIR HYDROCHLORIDE 500 MG: 500 TABLET, FILM COATED ORAL at 08:03

## 2019-01-01 RX ADMIN — IPRATROPIUM BROMIDE AND ALBUTEROL SULFATE 1 AMPULE: 2.5; .5 SOLUTION RESPIRATORY (INHALATION) at 05:24

## 2019-01-01 RX ADMIN — IBUPROFEN 400 MG: 800 TABLET, FILM COATED ORAL at 16:37

## 2019-01-01 RX ADMIN — IPRATROPIUM BROMIDE AND ALBUTEROL SULFATE 1 AMPULE: 2.5; .5 SOLUTION RESPIRATORY (INHALATION) at 13:29

## 2019-01-01 RX ADMIN — CHLORHEXIDINE GLUCONATE 15 ML: 1.2 RINSE ORAL at 22:12

## 2019-01-01 RX ADMIN — SUCRALFATE 1 G: 1 TABLET ORAL at 16:37

## 2019-01-01 RX ADMIN — IPRATROPIUM BROMIDE AND ALBUTEROL SULFATE 1 AMPULE: 2.5; .5 SOLUTION RESPIRATORY (INHALATION) at 18:20

## 2019-01-01 RX ADMIN — IPRATROPIUM BROMIDE AND ALBUTEROL SULFATE 1 AMPULE: 2.5; .5 SOLUTION RESPIRATORY (INHALATION) at 06:02

## 2019-01-01 RX ADMIN — OXYCODONE HYDROCHLORIDE AND ACETAMINOPHEN 1 TABLET: 5; 325 TABLET ORAL at 22:42

## 2019-01-01 RX ADMIN — PRIMIDONE 250 MG: 250 TABLET ORAL at 08:45

## 2019-01-01 RX ADMIN — IPRATROPIUM BROMIDE AND ALBUTEROL SULFATE 1 AMPULE: 2.5; .5 SOLUTION RESPIRATORY (INHALATION) at 06:08

## 2019-01-01 RX ADMIN — MONTELUKAST 10 MG: 10 TABLET, FILM COATED ORAL at 18:34

## 2019-01-01 RX ADMIN — MONTELUKAST 10 MG: 10 TABLET, FILM COATED ORAL at 18:39

## 2019-01-01 RX ADMIN — Medication 10 ML: at 21:37

## 2019-01-01 RX ADMIN — METHYLPREDNISOLONE SODIUM SUCCINATE 40 MG: 40 INJECTION, POWDER, FOR SOLUTION INTRAMUSCULAR; INTRAVENOUS at 23:46

## 2019-01-01 RX ADMIN — Medication 10 ML: at 10:07

## 2019-01-01 RX ADMIN — SUCRALFATE 1 G: 1 TABLET ORAL at 14:02

## 2019-01-01 RX ADMIN — ACYCLOVIR 400 MG: 200 CAPSULE ORAL at 21:03

## 2019-01-01 RX ADMIN — BUDESONIDE 500 MCG: 0.5 INHALANT RESPIRATORY (INHALATION) at 19:22

## 2019-01-01 RX ADMIN — METHYLPREDNISOLONE SODIUM SUCCINATE 30 MG: 40 INJECTION, POWDER, FOR SOLUTION INTRAMUSCULAR; INTRAVENOUS at 00:00

## 2019-01-01 RX ADMIN — FORMOTEROL FUMARATE DIHYDRATE 20 MCG: 20 SOLUTION RESPIRATORY (INHALATION) at 20:18

## 2019-01-01 RX ADMIN — PRIMIDONE 250 MG: 250 TABLET ORAL at 09:50

## 2019-01-01 RX ADMIN — IPRATROPIUM BROMIDE AND ALBUTEROL SULFATE 1 AMPULE: 2.5; .5 SOLUTION RESPIRATORY (INHALATION) at 02:28

## 2019-01-01 RX ADMIN — FINASTERIDE 5 MG: 5 TABLET, FILM COATED ORAL at 14:13

## 2019-01-01 RX ADMIN — DILTIAZEM HYDROCHLORIDE 10 MG: 5 INJECTION INTRAVENOUS at 16:05

## 2019-01-01 RX ADMIN — TRAMADOL HYDROCHLORIDE 50 MG: 50 TABLET, FILM COATED ORAL at 22:17

## 2019-01-01 RX ADMIN — BUDESONIDE 500 MCG: 0.5 INHALANT RESPIRATORY (INHALATION) at 06:14

## 2019-01-01 RX ADMIN — BUDESONIDE 500 MCG: 0.5 INHALANT RESPIRATORY (INHALATION) at 06:37

## 2019-01-01 RX ADMIN — DOXYCYCLINE HYCLATE 100 MG: 100 CAPSULE ORAL at 05:45

## 2019-01-01 RX ADMIN — AZITHROMYCIN DIHYDRATE 250 MG: 500 INJECTION, POWDER, LYOPHILIZED, FOR SOLUTION INTRAVENOUS at 17:46

## 2019-01-01 RX ADMIN — MIDAZOLAM 4 MG: 1 INJECTION INTRAMUSCULAR; INTRAVENOUS at 15:24

## 2019-01-01 RX ADMIN — DOCUSATE SODIUM 100 MG: 100 CAPSULE, LIQUID FILLED ORAL at 20:52

## 2019-01-01 RX ADMIN — DOXYCYCLINE HYCLATE 100 MG: 100 CAPSULE ORAL at 07:17

## 2019-01-01 RX ADMIN — IPRATROPIUM BROMIDE AND ALBUTEROL SULFATE 1 AMPULE: 2.5; .5 SOLUTION RESPIRATORY (INHALATION) at 14:26

## 2019-01-01 RX ADMIN — IPRATROPIUM BROMIDE AND ALBUTEROL SULFATE 1 AMPULE: .5; 3 SOLUTION RESPIRATORY (INHALATION) at 13:19

## 2019-01-01 RX ADMIN — ENOXAPARIN SODIUM 40 MG: 40 INJECTION SUBCUTANEOUS at 10:25

## 2019-01-01 RX ADMIN — PROPRANOLOL HYDROCHLORIDE 10 MG: 10 TABLET ORAL at 13:09

## 2019-01-01 RX ADMIN — METHYLPREDNISOLONE SODIUM SUCCINATE 30 MG: 40 INJECTION, POWDER, FOR SOLUTION INTRAMUSCULAR; INTRAVENOUS at 14:02

## 2019-01-01 RX ADMIN — CALCIUM CARBONATE 1000 MG: 500 TABLET, CHEWABLE ORAL at 04:23

## 2019-01-01 RX ADMIN — BRIMONIDINE TARTRATE 1 DROP: 2 SOLUTION OPHTHALMIC at 22:05

## 2019-01-01 RX ADMIN — KETOROLAC TROMETHAMINE 15 MG: 15 INJECTION, SOLUTION INTRAMUSCULAR; INTRAVENOUS at 12:00

## 2019-01-01 RX ADMIN — DIGOXIN 250 MCG: 250 INJECTION, SOLUTION INTRAMUSCULAR; INTRAVENOUS; PARENTERAL at 17:13

## 2019-01-01 RX ADMIN — AZITHROMYCIN DIHYDRATE 250 MG: 500 INJECTION, POWDER, LYOPHILIZED, FOR SOLUTION INTRAVENOUS at 18:42

## 2019-01-01 RX ADMIN — Medication 10 ML: at 09:25

## 2019-01-01 RX ADMIN — IPRATROPIUM BROMIDE AND ALBUTEROL SULFATE 1 AMPULE: .5; 3 SOLUTION RESPIRATORY (INHALATION) at 09:53

## 2019-01-01 RX ADMIN — OXYCODONE HYDROCHLORIDE AND ACETAMINOPHEN 500 MG: 500 TABLET ORAL at 08:27

## 2019-01-01 RX ADMIN — APIXABAN 2.5 MG: 5 TABLET, FILM COATED ORAL at 09:19

## 2019-01-01 RX ADMIN — SUCRALFATE 1 G: 1 TABLET ORAL at 06:28

## 2019-01-01 RX ADMIN — ACYCLOVIR 400 MG: 200 CAPSULE ORAL at 20:54

## 2019-01-01 RX ADMIN — Medication 10 ML: at 18:18

## 2019-01-01 RX ADMIN — METOPROLOL TARTRATE 6.25 MG: 25 TABLET ORAL at 09:18

## 2019-01-01 RX ADMIN — LIDOCAINE HYDROCHLORIDE 100 MG: 20 INJECTION, SOLUTION INTRAVENOUS at 12:19

## 2019-01-01 RX ADMIN — IPRATROPIUM BROMIDE AND ALBUTEROL SULFATE 1 AMPULE: 2.5; .5 SOLUTION RESPIRATORY (INHALATION) at 13:10

## 2019-01-01 RX ADMIN — POTASSIUM CHLORIDE AND SODIUM CHLORIDE: 450; 150 INJECTION, SOLUTION INTRAVENOUS at 22:22

## 2019-01-01 RX ADMIN — IPRATROPIUM BROMIDE AND ALBUTEROL SULFATE 1 AMPULE: 2.5; .5 SOLUTION RESPIRATORY (INHALATION) at 21:52

## 2019-01-01 RX ADMIN — APIXABAN 2.5 MG: 5 TABLET, FILM COATED ORAL at 20:46

## 2019-01-01 RX ADMIN — THEOPHYLLINE ANHYDROUS 200 MG: 200 CAPSULE, EXTENDED RELEASE ORAL at 09:22

## 2019-01-01 RX ADMIN — FUROSEMIDE 20 MG: 10 INJECTION, SOLUTION INTRAMUSCULAR; INTRAVENOUS at 16:36

## 2019-01-01 RX ADMIN — PANTOPRAZOLE SODIUM 40 MG: 40 TABLET, DELAYED RELEASE ORAL at 06:31

## 2019-01-01 RX ADMIN — METHYLPREDNISOLONE SODIUM SUCCINATE 40 MG: 40 INJECTION, POWDER, FOR SOLUTION INTRAMUSCULAR; INTRAVENOUS at 09:17

## 2019-01-01 RX ADMIN — DOXYCYCLINE HYCLATE 100 MG: 100 CAPSULE ORAL at 18:34

## 2019-01-01 RX ADMIN — PRIMIDONE 75 MG: 50 TABLET ORAL at 09:51

## 2019-01-01 RX ADMIN — FINASTERIDE 5 MG: 5 TABLET, FILM COATED ORAL at 09:30

## 2019-01-01 RX ADMIN — METHYLPREDNISOLONE SODIUM SUCCINATE 30 MG: 40 INJECTION, POWDER, FOR SOLUTION INTRAMUSCULAR; INTRAVENOUS at 14:34

## 2019-01-01 RX ADMIN — OXYCODONE HYDROCHLORIDE AND ACETAMINOPHEN 500 MG: 500 TABLET ORAL at 08:44

## 2019-01-01 RX ADMIN — METHYLPREDNISOLONE SODIUM SUCCINATE 40 MG: 40 INJECTION, POWDER, FOR SOLUTION INTRAMUSCULAR; INTRAVENOUS at 21:07

## 2019-01-01 RX ADMIN — METHYLPREDNISOLONE SODIUM SUCCINATE 40 MG: 40 INJECTION, POWDER, FOR SOLUTION INTRAMUSCULAR; INTRAVENOUS at 16:12

## 2019-01-01 RX ADMIN — FLUTICASONE PROPIONATE 1 PUFF: 110 AEROSOL, METERED RESPIRATORY (INHALATION) at 06:27

## 2019-01-01 RX ADMIN — POTASSIUM CHLORIDE 20 MEQ: 20 TABLET, EXTENDED RELEASE ORAL at 09:32

## 2019-01-01 RX ADMIN — IPRATROPIUM BROMIDE AND ALBUTEROL SULFATE 1 AMPULE: 2.5; .5 SOLUTION RESPIRATORY (INHALATION) at 16:49

## 2019-01-01 RX ADMIN — PANTOPRAZOLE SODIUM 40 MG: 40 INJECTION, POWDER, LYOPHILIZED, FOR SOLUTION INTRAVENOUS at 08:53

## 2019-01-01 RX ADMIN — METOPROLOL TARTRATE 6.25 MG: 25 TABLET ORAL at 20:46

## 2019-01-01 RX ADMIN — METOPROLOL TARTRATE 6.25 MG: 25 TABLET ORAL at 20:23

## 2019-01-01 RX ADMIN — METHYLPREDNISOLONE SODIUM SUCCINATE 30 MG: 40 INJECTION, POWDER, FOR SOLUTION INTRAMUSCULAR; INTRAVENOUS at 00:30

## 2019-01-01 RX ADMIN — DOXYCYCLINE HYCLATE 100 MG: 100 CAPSULE ORAL at 18:03

## 2019-01-01 RX ADMIN — OXYCODONE HYDROCHLORIDE AND ACETAMINOPHEN 500 MG: 500 TABLET ORAL at 09:22

## 2019-01-01 RX ADMIN — BUDESONIDE 500 MCG: 0.5 INHALANT RESPIRATORY (INHALATION) at 17:22

## 2019-01-01 RX ADMIN — DOXYCYCLINE HYCLATE 100 MG: 100 CAPSULE ORAL at 06:28

## 2019-01-01 RX ADMIN — IPRATROPIUM BROMIDE AND ALBUTEROL SULFATE 1 AMPULE: 2.5; .5 SOLUTION RESPIRATORY (INHALATION) at 22:09

## 2019-01-01 RX ADMIN — DOXYCYCLINE HYCLATE 100 MG: 100 CAPSULE ORAL at 17:52

## 2019-01-01 RX ADMIN — FUROSEMIDE 40 MG: 10 INJECTION, SOLUTION INTRAMUSCULAR; INTRAVENOUS at 10:19

## 2019-01-01 RX ADMIN — IPRATROPIUM BROMIDE AND ALBUTEROL SULFATE 1 AMPULE: 2.5; .5 SOLUTION RESPIRATORY (INHALATION) at 22:22

## 2019-01-01 RX ADMIN — AZITHROMYCIN MONOHYDRATE 250 MG: 250 TABLET ORAL at 08:44

## 2019-01-01 RX ADMIN — POTASSIUM CHLORIDE 20 MEQ: 20 TABLET, EXTENDED RELEASE ORAL at 09:19

## 2019-01-01 RX ADMIN — SUCRALFATE 1 G: 1 TABLET ORAL at 00:30

## 2019-01-01 RX ADMIN — IPRATROPIUM BROMIDE AND ALBUTEROL SULFATE 1 AMPULE: .5; 3 SOLUTION RESPIRATORY (INHALATION) at 18:18

## 2019-01-01 RX ADMIN — PANTOPRAZOLE SODIUM 40 MG: 40 TABLET, DELAYED RELEASE ORAL at 07:00

## 2019-01-01 RX ADMIN — OXYCODONE HYDROCHLORIDE AND ACETAMINOPHEN 1 TABLET: 5; 325 TABLET ORAL at 22:06

## 2019-01-01 RX ADMIN — BUDESONIDE 500 MCG: 0.5 INHALANT RESPIRATORY (INHALATION) at 06:20

## 2019-01-01 RX ADMIN — PANTOPRAZOLE SODIUM 40 MG: 40 TABLET, DELAYED RELEASE ORAL at 07:53

## 2019-01-01 RX ADMIN — FORMOTEROL FUMARATE DIHYDRATE 20 MCG: 20 SOLUTION RESPIRATORY (INHALATION) at 06:26

## 2019-01-01 RX ADMIN — IPRATROPIUM BROMIDE AND ALBUTEROL SULFATE 1 AMPULE: 2.5; .5 SOLUTION RESPIRATORY (INHALATION) at 10:39

## 2019-01-01 RX ADMIN — FUROSEMIDE 40 MG: 10 INJECTION, SOLUTION INTRAMUSCULAR; INTRAVENOUS at 08:03

## 2019-01-01 RX ADMIN — DILTIAZEM HYDROCHLORIDE 5 MG/HR: 5 INJECTION INTRAVENOUS at 16:05

## 2019-01-01 RX ADMIN — POTASSIUM CHLORIDE 20 MEQ: 20 TABLET, EXTENDED RELEASE ORAL at 10:22

## 2019-01-01 RX ADMIN — IPRATROPIUM BROMIDE AND ALBUTEROL SULFATE 3 AMPULE: .5; 3 SOLUTION RESPIRATORY (INHALATION) at 15:10

## 2019-01-01 RX ADMIN — BUDESONIDE 500 MCG: 0.5 INHALANT RESPIRATORY (INHALATION) at 20:18

## 2019-01-01 RX ADMIN — THEOPHYLLINE ANHYDROUS 200 MG: 200 CAPSULE, EXTENDED RELEASE ORAL at 21:35

## 2019-01-01 RX ADMIN — THEOPHYLLINE ANHYDROUS 200 MG: 200 CAPSULE, EXTENDED RELEASE ORAL at 09:14

## 2019-01-01 RX ADMIN — SODIUM CHLORIDE 0.5 MG/KG/HR: 9 INJECTION, SOLUTION INTRAVENOUS at 16:15

## 2019-01-01 RX ADMIN — FINASTERIDE 5 MG: 5 TABLET, FILM COATED ORAL at 08:44

## 2019-01-01 RX ADMIN — PRIMIDONE 250 MG: 250 TABLET ORAL at 08:26

## 2019-01-01 RX ADMIN — METHYLPREDNISOLONE SODIUM SUCCINATE 30 MG: 40 INJECTION, POWDER, FOR SOLUTION INTRAMUSCULAR; INTRAVENOUS at 00:50

## 2019-01-01 RX ADMIN — CEFEPIME HYDROCHLORIDE 1 G: 1 INJECTION, POWDER, FOR SOLUTION INTRAMUSCULAR; INTRAVENOUS at 23:23

## 2019-01-01 RX ADMIN — PREDNISONE 20 MG: 20 TABLET ORAL at 17:18

## 2019-01-01 RX ADMIN — IPRATROPIUM BROMIDE AND ALBUTEROL SULFATE 1 AMPULE: .5; 3 SOLUTION RESPIRATORY (INHALATION) at 09:40

## 2019-01-01 RX ADMIN — SUCRALFATE 1 G: 1 TABLET ORAL at 17:58

## 2019-01-01 RX ADMIN — METHYLPREDNISOLONE SODIUM SUCCINATE 30 MG: 40 INJECTION, POWDER, FOR SOLUTION INTRAMUSCULAR; INTRAVENOUS at 12:22

## 2019-01-01 RX ADMIN — Medication 10 ML: at 17:56

## 2019-01-01 RX ADMIN — DIGOXIN 125 MCG: 250 INJECTION, SOLUTION INTRAMUSCULAR; INTRAVENOUS; PARENTERAL at 06:30

## 2019-01-01 RX ADMIN — OXYCODONE HYDROCHLORIDE AND ACETAMINOPHEN 1 TABLET: 5; 325 TABLET ORAL at 10:59

## 2019-01-01 RX ADMIN — IPRATROPIUM BROMIDE AND ALBUTEROL SULFATE 1 AMPULE: 2.5; .5 SOLUTION RESPIRATORY (INHALATION) at 05:47

## 2019-01-01 RX ADMIN — APIXABAN 2.5 MG: 5 TABLET, FILM COATED ORAL at 09:14

## 2019-01-01 RX ADMIN — POTASSIUM CHLORIDE 20 MEQ: 20 TABLET, EXTENDED RELEASE ORAL at 09:03

## 2019-01-01 RX ADMIN — Medication 10 ML: at 18:47

## 2019-01-01 RX ADMIN — METOPROLOL TARTRATE 6.25 MG: 25 TABLET ORAL at 09:45

## 2019-01-01 RX ADMIN — IBUPROFEN 400 MG: 400 TABLET, FILM COATED ORAL at 21:25

## 2019-01-01 RX ADMIN — IPRATROPIUM BROMIDE AND ALBUTEROL SULFATE 1 AMPULE: .5; 3 SOLUTION RESPIRATORY (INHALATION) at 13:22

## 2019-01-01 RX ADMIN — ACYCLOVIR 400 MG: 200 CAPSULE ORAL at 08:26

## 2019-01-01 RX ADMIN — APIXABAN 2.5 MG: 5 TABLET, FILM COATED ORAL at 08:44

## 2019-01-01 RX ADMIN — DOXYCYCLINE 100 MG: 100 INJECTION, POWDER, LYOPHILIZED, FOR SOLUTION INTRAVENOUS at 18:58

## 2019-01-01 RX ADMIN — PANTOPRAZOLE SODIUM 40 MG: 40 TABLET, DELAYED RELEASE ORAL at 05:45

## 2019-01-01 RX ADMIN — CEFEPIME HYDROCHLORIDE 1 G: 1 INJECTION, POWDER, FOR SOLUTION INTRAMUSCULAR; INTRAVENOUS at 23:36

## 2019-01-01 RX ADMIN — CEFEPIME HYDROCHLORIDE 1 G: 1 INJECTION, POWDER, FOR SOLUTION INTRAMUSCULAR; INTRAVENOUS at 15:28

## 2019-01-01 RX ADMIN — FINASTERIDE 5 MG: 5 TABLET, FILM COATED ORAL at 08:26

## 2019-01-01 RX ADMIN — METHYLPREDNISOLONE SODIUM SUCCINATE 40 MG: 40 INJECTION, POWDER, FOR SOLUTION INTRAMUSCULAR; INTRAVENOUS at 09:28

## 2019-01-01 RX ADMIN — POLYETHYLENE GLYCOL 3350 17 G: 17 POWDER, FOR SOLUTION ORAL at 14:02

## 2019-01-01 RX ADMIN — SUCRALFATE 1 G: 1 TABLET ORAL at 10:56

## 2019-01-01 RX ADMIN — ENOXAPARIN SODIUM 40 MG: 40 INJECTION SUBCUTANEOUS at 09:23

## 2019-01-01 RX ADMIN — MONTELUKAST 10 MG: 10 TABLET, FILM COATED ORAL at 18:08

## 2019-01-01 RX ADMIN — DOXYCYCLINE HYCLATE 100 MG: 100 CAPSULE ORAL at 06:00

## 2019-01-01 RX ADMIN — ENOXAPARIN SODIUM 40 MG: 40 INJECTION SUBCUTANEOUS at 09:06

## 2019-01-01 RX ADMIN — ACYCLOVIR 400 MG: 200 CAPSULE ORAL at 09:11

## 2019-01-01 RX ADMIN — Medication 10 ML: at 08:03

## 2019-01-01 RX ADMIN — IPRATROPIUM BROMIDE AND ALBUTEROL SULFATE 1 AMPULE: 2.5; .5 SOLUTION RESPIRATORY (INHALATION) at 07:14

## 2019-01-01 RX ADMIN — BRIMONIDINE TARTRATE 1 DROP: 2 SOLUTION OPHTHALMIC at 09:09

## 2019-01-01 RX ADMIN — WATER 2 G: 1 INJECTION INTRAMUSCULAR; INTRAVENOUS; SUBCUTANEOUS at 17:58

## 2019-01-01 RX ADMIN — Medication 10 ML: at 22:23

## 2019-01-01 RX ADMIN — IPRATROPIUM BROMIDE AND ALBUTEROL SULFATE 1 AMPULE: 2.5; .5 SOLUTION RESPIRATORY (INHALATION) at 18:52

## 2019-01-01 RX ADMIN — PRIMIDONE 250 MG: 250 TABLET ORAL at 09:19

## 2019-01-01 RX ADMIN — CEFEPIME HYDROCHLORIDE 1 G: 1 INJECTION, POWDER, FOR SOLUTION INTRAMUSCULAR; INTRAVENOUS at 16:12

## 2019-01-01 RX ADMIN — CALCIUM CARBONATE 500 MG: 500 TABLET, CHEWABLE ORAL at 19:55

## 2019-01-01 RX ADMIN — DOCUSATE SODIUM 100 MG: 100 CAPSULE, LIQUID FILLED ORAL at 20:46

## 2019-01-01 RX ADMIN — OXYCODONE HYDROCHLORIDE AND ACETAMINOPHEN 1 TABLET: 5; 325 TABLET ORAL at 11:36

## 2019-01-01 RX ADMIN — POTASSIUM CHLORIDE AND SODIUM CHLORIDE: 450; 150 INJECTION, SOLUTION INTRAVENOUS at 10:35

## 2019-01-01 RX ADMIN — PRIMIDONE 250 MG: 250 TABLET ORAL at 14:17

## 2019-01-01 RX ADMIN — FORMOTEROL FUMARATE DIHYDRATE 20 MCG: 20 SOLUTION RESPIRATORY (INHALATION) at 06:05

## 2019-01-01 RX ADMIN — POTASSIUM CHLORIDE AND SODIUM CHLORIDE: 450; 150 INJECTION, SOLUTION INTRAVENOUS at 11:46

## 2019-01-01 RX ADMIN — WATER 1 G: 1 INJECTION, SOLUTION INTRAVENOUS at 16:36

## 2019-01-01 RX ADMIN — DOCUSATE SODIUM 100 MG: 100 CAPSULE, LIQUID FILLED ORAL at 20:23

## 2019-01-01 RX ADMIN — CEFEPIME HYDROCHLORIDE 1 G: 1 INJECTION, POWDER, FOR SOLUTION INTRAMUSCULAR; INTRAVENOUS at 23:15

## 2019-01-01 RX ADMIN — Medication 10 ML: at 09:23

## 2019-01-01 RX ADMIN — MONTELUKAST 10 MG: 10 TABLET, FILM COATED ORAL at 16:42

## 2019-01-01 RX ADMIN — AZITHROMYCIN DIHYDRATE 250 MG: 500 INJECTION, POWDER, LYOPHILIZED, FOR SOLUTION INTRAVENOUS at 16:29

## 2019-01-01 RX ADMIN — IPRATROPIUM BROMIDE AND ALBUTEROL SULFATE 1 AMPULE: 2.5; .5 SOLUTION RESPIRATORY (INHALATION) at 22:53

## 2019-01-01 RX ADMIN — IPRATROPIUM BROMIDE AND ALBUTEROL SULFATE 1 AMPULE: 2.5; .5 SOLUTION RESPIRATORY (INHALATION) at 02:27

## 2019-01-01 RX ADMIN — AZITHROMYCIN DIHYDRATE 250 MG: 500 INJECTION, POWDER, LYOPHILIZED, FOR SOLUTION INTRAVENOUS at 16:12

## 2019-01-01 RX ADMIN — IPRATROPIUM BROMIDE AND ALBUTEROL SULFATE 1 AMPULE: .5; 3 SOLUTION RESPIRATORY (INHALATION) at 18:41

## 2019-01-01 RX ADMIN — IPRATROPIUM BROMIDE AND ALBUTEROL SULFATE 1 AMPULE: 2.5; .5 SOLUTION RESPIRATORY (INHALATION) at 21:32

## 2019-01-01 RX ADMIN — MAGNESIUM SULFATE HEPTAHYDRATE 2 G: 40 INJECTION, SOLUTION INTRAVENOUS at 22:05

## 2019-01-01 RX ADMIN — IPRATROPIUM BROMIDE AND ALBUTEROL SULFATE 1 AMPULE: .5; 3 SOLUTION RESPIRATORY (INHALATION) at 10:10

## 2019-01-01 RX ADMIN — WATER 2 G: 1 INJECTION INTRAMUSCULAR; INTRAVENOUS; SUBCUTANEOUS at 18:39

## 2019-01-01 RX ADMIN — OXYCODONE HYDROCHLORIDE AND ACETAMINOPHEN 1 TABLET: 5; 325 TABLET ORAL at 10:26

## 2019-01-01 RX ADMIN — DOXYCYCLINE 100 MG: 100 INJECTION, POWDER, LYOPHILIZED, FOR SOLUTION INTRAVENOUS at 06:43

## 2019-01-01 RX ADMIN — VALACYCLOVIR HYDROCHLORIDE 500 MG: 500 TABLET, FILM COATED ORAL at 10:05

## 2019-01-01 RX ADMIN — IPRATROPIUM BROMIDE AND ALBUTEROL SULFATE 1 AMPULE: 2.5; .5 SOLUTION RESPIRATORY (INHALATION) at 02:20

## 2019-01-01 RX ADMIN — PRIMIDONE 100 MG: 50 TABLET ORAL at 21:34

## 2019-01-01 RX ADMIN — FUROSEMIDE 40 MG: 10 INJECTION, SOLUTION INTRAMUSCULAR; INTRAVENOUS at 18:47

## 2019-01-01 RX ADMIN — FORMOTEROL FUMARATE DIHYDRATE 20 MCG: 20 SOLUTION RESPIRATORY (INHALATION) at 17:22

## 2019-01-01 RX ADMIN — METHYLPREDNISOLONE SODIUM SUCCINATE 40 MG: 40 INJECTION, POWDER, FOR SOLUTION INTRAMUSCULAR; INTRAVENOUS at 05:25

## 2019-01-01 RX ADMIN — FUROSEMIDE 20 MG: 10 INJECTION, SOLUTION INTRAMUSCULAR; INTRAVENOUS at 09:31

## 2019-01-01 RX ADMIN — Medication 10 ML: at 10:21

## 2019-01-01 RX ADMIN — ACYCLOVIR 400 MG: 200 CAPSULE ORAL at 21:08

## 2019-01-01 RX ADMIN — POTASSIUM CHLORIDE AND SODIUM CHLORIDE: 450; 150 INJECTION, SOLUTION INTRAVENOUS at 21:32

## 2019-01-01 RX ADMIN — SUCRALFATE 1 G: 1 TABLET ORAL at 12:17

## 2019-01-01 RX ADMIN — SODIUM CHLORIDE, PRESERVATIVE FREE 10 ML: 5 INJECTION INTRAVENOUS at 16:41

## 2019-01-01 RX ADMIN — IPRATROPIUM BROMIDE AND ALBUTEROL SULFATE 1 AMPULE: .5; 3 SOLUTION RESPIRATORY (INHALATION) at 10:41

## 2019-01-01 RX ADMIN — POTASSIUM CHLORIDE 20 MEQ: 20 TABLET, EXTENDED RELEASE ORAL at 09:14

## 2019-01-01 RX ADMIN — Medication 10 ML: at 21:34

## 2019-01-01 RX ADMIN — PRIMIDONE 250 MG: 250 TABLET ORAL at 15:27

## 2019-01-01 RX ADMIN — PRIMIDONE 250 MG: 250 TABLET ORAL at 16:37

## 2019-01-01 RX ADMIN — ACETYLCYSTEINE 200 MG: 100 SOLUTION ORAL; RESPIRATORY (INHALATION) at 06:24

## 2019-01-01 RX ADMIN — IPRATROPIUM BROMIDE AND ALBUTEROL SULFATE 1 AMPULE: 2.5; .5 SOLUTION RESPIRATORY (INHALATION) at 02:24

## 2019-01-01 RX ADMIN — THEOPHYLLINE ANHYDROUS 200 MG: 200 CAPSULE, EXTENDED RELEASE ORAL at 21:34

## 2019-01-01 RX ADMIN — IPRATROPIUM BROMIDE AND ALBUTEROL SULFATE 3 AMPULE: .5; 3 SOLUTION RESPIRATORY (INHALATION) at 10:14

## 2019-01-01 RX ADMIN — VALACYCLOVIR HYDROCHLORIDE 500 MG: 500 TABLET, FILM COATED ORAL at 08:56

## 2019-01-01 RX ADMIN — FUROSEMIDE 40 MG: 10 INJECTION, SOLUTION INTRAMUSCULAR; INTRAVENOUS at 15:22

## 2019-01-01 RX ADMIN — FORMOTEROL FUMARATE DIHYDRATE 20 MCG: 20 SOLUTION RESPIRATORY (INHALATION) at 06:15

## 2019-01-01 RX ADMIN — VALACYCLOVIR HYDROCHLORIDE 500 MG: 500 TABLET, FILM COATED ORAL at 10:55

## 2019-01-01 RX ADMIN — ENOXAPARIN SODIUM 40 MG: 40 INJECTION SUBCUTANEOUS at 22:12

## 2019-01-01 RX ADMIN — OXYCODONE HYDROCHLORIDE AND ACETAMINOPHEN 500 MG: 500 TABLET ORAL at 09:03

## 2019-01-01 RX ADMIN — FINASTERIDE 5 MG: 5 TABLET, FILM COATED ORAL at 09:19

## 2019-01-01 RX ADMIN — IPRATROPIUM BROMIDE AND ALBUTEROL SULFATE 1 AMPULE: 2.5; .5 SOLUTION RESPIRATORY (INHALATION) at 13:50

## 2019-01-01 RX ADMIN — METHYLPREDNISOLONE SODIUM SUCCINATE 30 MG: 40 INJECTION, POWDER, FOR SOLUTION INTRAMUSCULAR; INTRAVENOUS at 13:17

## 2019-01-01 RX ADMIN — POTASSIUM CHLORIDE 20 MEQ: 20 TABLET, EXTENDED RELEASE ORAL at 21:03

## 2019-01-01 RX ADMIN — LIDOCAINE HYDROCHLORIDE 1 ML: 10 INJECTION, SOLUTION INFILTRATION; PERINEURAL at 10:50

## 2019-01-01 RX ADMIN — IPRATROPIUM BROMIDE AND ALBUTEROL SULFATE 1 AMPULE: .5; 3 SOLUTION RESPIRATORY (INHALATION) at 06:25

## 2019-01-01 RX ADMIN — IPRATROPIUM BROMIDE AND ALBUTEROL SULFATE 1 AMPULE: 2.5; .5 SOLUTION RESPIRATORY (INHALATION) at 01:31

## 2019-01-01 RX ADMIN — ENOXAPARIN SODIUM 40 MG: 40 INJECTION SUBCUTANEOUS at 08:54

## 2019-01-01 RX ADMIN — IPRATROPIUM BROMIDE AND ALBUTEROL SULFATE 1 AMPULE: 2.5; .5 SOLUTION RESPIRATORY (INHALATION) at 09:43

## 2019-01-01 RX ADMIN — DOXYCYCLINE HYCLATE 100 MG: 100 CAPSULE ORAL at 18:19

## 2019-01-01 RX ADMIN — CEFEPIME HYDROCHLORIDE 1 G: 1 INJECTION, POWDER, FOR SOLUTION INTRAMUSCULAR; INTRAVENOUS at 23:29

## 2019-01-01 RX ADMIN — LIDOCAINE HYDROCHLORIDE 1 MG/MIN: 4 INJECTION, SOLUTION INTRAVENOUS at 12:36

## 2019-01-01 RX ADMIN — MONTELUKAST 10 MG: 10 TABLET, FILM COATED ORAL at 22:12

## 2019-01-01 RX ADMIN — BUDESONIDE 500 MCG: 0.5 INHALANT RESPIRATORY (INHALATION) at 05:11

## 2019-01-01 RX ADMIN — PRIMIDONE 250 MG: 250 TABLET ORAL at 14:48

## 2019-01-01 RX ADMIN — IPRATROPIUM BROMIDE AND ALBUTEROL SULFATE 1 AMPULE: 2.5; .5 SOLUTION RESPIRATORY (INHALATION) at 22:03

## 2019-01-01 RX ADMIN — ACYCLOVIR 400 MG: 200 CAPSULE ORAL at 14:16

## 2019-01-01 RX ADMIN — VALACYCLOVIR HYDROCHLORIDE 500 MG: 500 TABLET, FILM COATED ORAL at 09:18

## 2019-01-01 RX ADMIN — DOCUSATE SODIUM 100 MG: 100 CAPSULE, LIQUID FILLED ORAL at 10:06

## 2019-01-01 RX ADMIN — Medication 10 ML: at 22:09

## 2019-01-01 RX ADMIN — METHYLPREDNISOLONE SODIUM SUCCINATE 30 MG: 40 INJECTION, POWDER, FOR SOLUTION INTRAMUSCULAR; INTRAVENOUS at 23:46

## 2019-01-01 RX ADMIN — IPRATROPIUM BROMIDE AND ALBUTEROL SULFATE 1 AMPULE: 2.5; .5 SOLUTION RESPIRATORY (INHALATION) at 10:26

## 2019-01-01 RX ADMIN — METOPROLOL SUCCINATE 25 MG: 25 TABLET, EXTENDED RELEASE ORAL at 17:52

## 2019-01-01 RX ADMIN — PANTOPRAZOLE SODIUM 40 MG: 40 INJECTION, POWDER, LYOPHILIZED, FOR SOLUTION INTRAVENOUS at 22:12

## 2019-01-01 RX ADMIN — IPRATROPIUM BROMIDE AND ALBUTEROL SULFATE 1 AMPULE: 2.5; .5 SOLUTION RESPIRATORY (INHALATION) at 05:08

## 2019-01-01 RX ADMIN — DILTIAZEM HYDROCHLORIDE 120 MG: 120 CAPSULE, COATED, EXTENDED RELEASE ORAL at 08:26

## 2019-01-01 RX ADMIN — POTASSIUM CHLORIDE 20 MEQ: 20 TABLET, EXTENDED RELEASE ORAL at 08:44

## 2019-01-01 RX ADMIN — DILTIAZEM HYDROCHLORIDE 2.5 MG/HR: 5 INJECTION INTRAVENOUS at 21:03

## 2019-01-01 RX ADMIN — APIXABAN 2.5 MG: 2.5 TABLET, FILM COATED ORAL at 20:11

## 2019-01-01 RX ADMIN — PRIMIDONE 250 MG: 250 TABLET ORAL at 15:03

## 2019-01-01 RX ADMIN — WATER 2 G: 1 INJECTION INTRAMUSCULAR; INTRAVENOUS; SUBCUTANEOUS at 18:08

## 2019-01-01 RX ADMIN — VALACYCLOVIR HYDROCHLORIDE 500 MG: 500 TABLET, FILM COATED ORAL at 09:01

## 2019-01-01 RX ADMIN — DOXYCYCLINE 100 MG: 100 INJECTION, POWDER, LYOPHILIZED, FOR SOLUTION INTRAVENOUS at 18:08

## 2019-01-01 RX ADMIN — IPRATROPIUM BROMIDE AND ALBUTEROL SULFATE 1 AMPULE: 2.5; .5 SOLUTION RESPIRATORY (INHALATION) at 17:23

## 2019-01-01 RX ADMIN — Medication 5000 UNITS: at 11:23

## 2019-01-01 RX ADMIN — IPRATROPIUM BROMIDE AND ALBUTEROL SULFATE 1 AMPULE: 2.5; .5 SOLUTION RESPIRATORY (INHALATION) at 09:12

## 2019-01-01 RX ADMIN — SUCRALFATE 1 G: 1 TABLET ORAL at 10:59

## 2019-01-01 ASSESSMENT — ENCOUNTER SYMPTOMS
SHORTNESS OF BREATH: 1
COUGH: 1
VOMITING: 0
EYES NEGATIVE: 1
ALLERGIC/IMMUNOLOGIC NEGATIVE: 1
SHORTNESS OF BREATH: 1
SHORTNESS OF BREATH: 1
GASTROINTESTINAL NEGATIVE: 1
BACK PAIN: 0
ABDOMINAL PAIN: 0
CHEST TIGHTNESS: 1
CHEST TIGHTNESS: 0
VOMITING: 0
SHORTNESS OF BREATH: 1
DIARRHEA: 0
COUGH: 1
RHINORRHEA: 0
CONSTIPATION: 1
NAUSEA: 0
EYES NEGATIVE: 1
SINUS PRESSURE: 0
ALLERGIC/IMMUNOLOGIC NEGATIVE: 1
EYES NEGATIVE: 1
STRIDOR: 0
COUGH: 1
ALLERGIC/IMMUNOLOGIC NEGATIVE: 1
NAUSEA: 0
PHOTOPHOBIA: 0
VOMITING: 0
NAUSEA: 0
CHEST TIGHTNESS: 1
ALLERGIC/IMMUNOLOGIC NEGATIVE: 1
DIARRHEA: 0
GASTROINTESTINAL NEGATIVE: 1
WHEEZING: 0
EYES NEGATIVE: 1
SHORTNESS OF BREATH: 1
ABDOMINAL PAIN: 0
ABDOMINAL PAIN: 0
GASTROINTESTINAL NEGATIVE: 1
SHORTNESS OF BREATH: 1
SHORTNESS OF BREATH: 1
CONSTIPATION: 0
COUGH: 0
SINUS PAIN: 0
GASTROINTESTINAL NEGATIVE: 1
SORE THROAT: 0
CONSTIPATION: 0

## 2019-01-01 ASSESSMENT — PAIN SCALES - GENERAL
PAINLEVEL_OUTOF10: 0
PAINLEVEL_OUTOF10: 0
PAINLEVEL_OUTOF10: 6
PAINLEVEL_OUTOF10: 0
PAINLEVEL_OUTOF10: 3
PAINLEVEL_OUTOF10: 0
PAINLEVEL_OUTOF10: 0
PAINLEVEL_OUTOF10: 4
PAINLEVEL_OUTOF10: 0
PAINLEVEL_OUTOF10: 8
PAINLEVEL_OUTOF10: 4
PAINLEVEL_OUTOF10: 4
PAINLEVEL_OUTOF10: 0
PAINLEVEL_OUTOF10: 6
PAINLEVEL_OUTOF10: 6
PAINLEVEL_OUTOF10: 0
PAINLEVEL_OUTOF10: 3
PAINLEVEL_OUTOF10: 0
PAINLEVEL_OUTOF10: 3
PAINLEVEL_OUTOF10: 3
PAINLEVEL_OUTOF10: 0
PAINLEVEL_OUTOF10: 8
PAINLEVEL_OUTOF10: 3
PAINLEVEL_OUTOF10: 0
PAINLEVEL_OUTOF10: 0
PAINLEVEL_OUTOF10: 7
PAINLEVEL_OUTOF10: 0
PAINLEVEL_OUTOF10: 0
PAINLEVEL_OUTOF10: 6
PAINLEVEL_OUTOF10: 0
PAINLEVEL_OUTOF10: 0
PAINLEVEL_OUTOF10: 7
PAINLEVEL_OUTOF10: 4
PAINLEVEL_OUTOF10: 0
PAINLEVEL_OUTOF10: 7
PAINLEVEL_OUTOF10: 0
PAINLEVEL_OUTOF10: 8
PAINLEVEL_OUTOF10: 2
PAINLEVEL_OUTOF10: 0
PAINLEVEL_OUTOF10: 8
PAINLEVEL_OUTOF10: 3
PAINLEVEL_OUTOF10: 4
PAINLEVEL_OUTOF10: 7
PAINLEVEL_OUTOF10: 0
PAINLEVEL_OUTOF10: 8
PAINLEVEL_OUTOF10: 0
PAINLEVEL_OUTOF10: 0
PAINLEVEL_OUTOF10: 5
PAINLEVEL_OUTOF10: 0
PAINLEVEL_OUTOF10: 7
PAINLEVEL_OUTOF10: 0
PAINLEVEL_OUTOF10: 8
PAINLEVEL_OUTOF10: 4
PAINLEVEL_OUTOF10: 4
PAINLEVEL_OUTOF10: 0
PAINLEVEL_OUTOF10: 0
PAINLEVEL_OUTOF10: 7
PAINLEVEL_OUTOF10: 8
PAINLEVEL_OUTOF10: 7
PAINLEVEL_OUTOF10: 0
PAINLEVEL_OUTOF10: 7
PAINLEVEL_OUTOF10: 5
PAINLEVEL_OUTOF10: 6
PAINLEVEL_OUTOF10: 0
PAINLEVEL_OUTOF10: 4

## 2019-01-01 ASSESSMENT — PAIN DESCRIPTION - FREQUENCY
FREQUENCY: CONTINUOUS

## 2019-01-01 ASSESSMENT — PAIN DESCRIPTION - PAIN TYPE
TYPE: CHRONIC PAIN
TYPE: ACUTE PAIN
TYPE: CHRONIC PAIN
TYPE: ACUTE PAIN;CHRONIC PAIN
TYPE: CHRONIC PAIN
TYPE: ACUTE PAIN
TYPE: CHRONIC PAIN

## 2019-01-01 ASSESSMENT — PAIN DESCRIPTION - PROGRESSION
CLINICAL_PROGRESSION: GRADUALLY IMPROVING
CLINICAL_PROGRESSION: NOT CHANGED
CLINICAL_PROGRESSION: GRADUALLY IMPROVING
CLINICAL_PROGRESSION: GRADUALLY IMPROVING
CLINICAL_PROGRESSION: NOT CHANGED

## 2019-01-01 ASSESSMENT — PULMONARY FUNCTION TESTS
PIF_VALUE: 24
PIF_VALUE: 23
PIF_VALUE: 0
PIF_VALUE: 30
PIF_VALUE: 28
PIF_VALUE: 15
PEFR_L/MIN: 15

## 2019-01-01 ASSESSMENT — PAIN DESCRIPTION - DESCRIPTORS
DESCRIPTORS: ACHING;DISCOMFORT;DULL
DESCRIPTORS: ACHING
DESCRIPTORS: ACHING;CONSTANT;DISCOMFORT
DESCRIPTORS: ACHING;DISCOMFORT;SORE
DESCRIPTORS: ACHING;DISCOMFORT
DESCRIPTORS: ACHING
DESCRIPTORS: SHARP
DESCRIPTORS: SHARP
DESCRIPTORS: DISCOMFORT
DESCRIPTORS: ACHING;CONSTANT;DISCOMFORT
DESCRIPTORS: ACHING;CONSTANT;DISCOMFORT
DESCRIPTORS: DISCOMFORT
DESCRIPTORS: ACHING
DESCRIPTORS: ACHING;DISCOMFORT

## 2019-01-01 ASSESSMENT — PAIN DESCRIPTION - ORIENTATION
ORIENTATION: OTHER (COMMENT)
ORIENTATION: LEFT;MID
ORIENTATION: MID
ORIENTATION: LEFT
ORIENTATION: MID
ORIENTATION: LEFT
ORIENTATION: MID
ORIENTATION: LEFT;MID
ORIENTATION: UPPER;LOWER

## 2019-01-01 ASSESSMENT — PAIN DESCRIPTION - LOCATION
LOCATION: BACK
LOCATION: BACK
LOCATION: BACK;RIB CAGE
LOCATION: BACK

## 2019-01-01 ASSESSMENT — PAIN - FUNCTIONAL ASSESSMENT
PAIN_FUNCTIONAL_ASSESSMENT: ACTIVITIES ARE NOT PREVENTED
PAIN_FUNCTIONAL_ASSESSMENT: ACTIVITIES ARE NOT PREVENTED
PAIN_FUNCTIONAL_ASSESSMENT: PREVENTS OR INTERFERES SOME ACTIVE ACTIVITIES AND ADLS
PAIN_FUNCTIONAL_ASSESSMENT: ACTIVITIES ARE NOT PREVENTED
PAIN_FUNCTIONAL_ASSESSMENT: PREVENTS OR INTERFERES SOME ACTIVE ACTIVITIES AND ADLS
PAIN_FUNCTIONAL_ASSESSMENT: ACTIVITIES ARE NOT PREVENTED

## 2019-01-01 ASSESSMENT — PAIN DESCRIPTION - ONSET
ONSET: ON-GOING

## 2019-03-28 PROBLEM — I47.20 VENTRICULAR TACHYARRHYTHMIA: Status: ACTIVE | Noted: 2019-01-01

## 2019-03-28 NOTE — ED PROVIDER NOTES
Patient is an 81-year-old male who presents via EMS from home with a chief complaint of shortness of breath. The patient states that he has a history of COPD, and he wears between 5-7 liters of nasal cannula oxygen. The patient states that just prior to arrival he was eating and then became short of breath. The patient states that he felt as if he was going to pass out. He had his wife called the ambulance. The patient states that in route to the hospital he had resolution of his symptoms. He is currently asymptomatic. He denies any recent illnesses. He denies any fevers, chills, chest pain, abdominal pain, nausea, vomiting. No other recent changes in medications. The history is provided by the patient. No  was used. Review of Systems   Constitutional: Negative for chills, fatigue and fever. HENT: Negative for congestion, rhinorrhea, sinus pressure, sinus pain, sneezing and sore throat. Eyes: Negative for photophobia and visual disturbance. Respiratory: Positive for shortness of breath. Negative for cough and wheezing. Cardiovascular: Negative for chest pain and palpitations. Gastrointestinal: Negative for abdominal pain, constipation, diarrhea, nausea and vomiting. Genitourinary: Negative for dysuria, frequency and hematuria. Musculoskeletal: Negative for back pain, neck pain and neck stiffness. Skin: Negative for rash and wound. Neurological: Negative for dizziness, light-headedness and headaches. Psychiatric/Behavioral: Negative for agitation, behavioral problems and confusion. Physical Exam   Constitutional: He is oriented to person, place, and time. He appears well-developed and well-nourished. No distress. HENT:   Head: Normocephalic and atraumatic. Eyes: Conjunctivae are normal. Right eye exhibits no discharge. Left eye exhibits no discharge. No scleral icterus. Neck: Neck supple. Cardiovascular: Normal rate and regular rhythm.    No murmur heard.  Pulmonary/Chest: Effort normal and breath sounds normal. No stridor. No respiratory distress. Clear breath sounds in all lung fields. No acute respiratory distress. Pulse ox 97% on 5 L. The patient is wearing nasal cannula oxygen. Abdominal: Soft. Bowel sounds are normal. He exhibits no distension. There is no tenderness. Musculoskeletal: Normal range of motion. He exhibits no edema or tenderness. Lymphadenopathy:     He has no cervical adenopathy. Neurological: He is alert and oriented to person, place, and time. No cranial nerve deficit. Coordination normal.   Patient is awake, alert, and oriented ×3. No focal neurological deficits. Skin: Skin is warm. Capillary refill takes less than 2 seconds. He is not diaphoretic. No erythema. No pallor. Psychiatric: He has a normal mood and affect. His behavior is normal.       Procedures    MDM         --------------------------------------------- PAST HISTORY ---------------------------------------------  Past Medical History:  has a past medical history of COPD (chronic obstructive pulmonary disease) ([facility] [address] History of cardiovascular stress test, Hyperlipidemia, Hypertension, Kidney stone, and Thyroid disease. Past Surgical History:  has a past surgical history that includes Inguinal hernia repair (1970s bilateral); Inguinal hernia repair (1990s bilateral); Cystocopy (nov 2012); Cataract removal with implant; eye surgery; Inguinal hernia repair (Right, sept 2014); Abdominal aortic aneurysm repair (09/26/2016); and Balloon angioplasty, artery (Left). Social History:  reports that he quit smoking about 16 years ago. His smoking use included cigarettes. He has a 84.00 pack-year smoking history. He has never used smokeless tobacco. He reports that he drinks about 0.6 oz of alcohol per week. He reports that he does not use drugs. Family History: family history includes Heart Disease in his father; Kidney Disease in his mother.      The patients home medications have been reviewed.     Allergies: Flomax [tamsulosin hcl]    -------------------------------------------------- RESULTS -------------------------------------------------    LABS:  Results for orders placed or performed during the hospital encounter of 03/28/19   CBC auto differential   Result Value Ref Range    WBC 6.8 4.5 - 11.5 E9/L    RBC 3.73 (L) 3.80 - 5.80 E12/L    Hemoglobin 12.0 (L) 12.5 - 16.5 g/dL    Hematocrit 37.7 37.0 - 54.0 %    .1 (H) 80.0 - 99.9 fL    MCH 32.2 26.0 - 35.0 pg    MCHC 31.8 (L) 32.0 - 34.5 %    RDW 13.0 11.5 - 15.0 fL    Platelets 974 200 - 986 E9/L    MPV 10.5 7.0 - 12.0 fL    Neutrophils % 93.0 (H) 43.0 - 80.0 %    Lymphocytes % 5.2 (L) 20.0 - 42.0 %    Monocytes % 1.7 (L) 2.0 - 12.0 %    Eosinophils % 1.0 0.0 - 6.0 %    Basophils % 0.6 0.0 - 2.0 %    Neutrophils # 6.32 1.80 - 7.30 E9/L    Lymphocytes # 0.34 (L) 1.50 - 4.00 E9/L    Monocytes # 0.14 0.10 - 0.95 E9/L    Eosinophils # 0.00 (L) 0.05 - 0.50 E9/L    Basophils # 0.00 0.00 - 0.20 E9/L    Poikilocytes 1+     Acanthocytes 1+     [place] Cells 1+     Ovalocytes 1+     Target Cells 1+    Comprehensive Metabolic Panel   Result Value Ref Range    Sodium 145 132 - 146 mmol/L    Potassium 3.8 3.5 - 5.0 mmol/L    Chloride 98 98 - 107 mmol/L    CO2 37 (H) 22 - 29 mmol/L    Anion Gap 10 7 - 16 mmol/L    Glucose 192 (H) 74 - 99 mg/dL    BUN 28 (H) 8 - 23 mg/dL    CREATININE 1.1 0.7 - 1.2 mg/dL    GFR Non-African American >60 >=60 mL/min/1.73    GFR African American >60     Calcium 8.9 8.6 - 10.2 mg/dL    Total Protein 6.1 (L) 6.4 - 8.3 g/dL    Alb 3.7 3.5 - 5.2 g/dL    Total Bilirubin 0.3 0.0 - 1.2 mg/dL    Alkaline Phosphatase 66 40 - 129 U/L    ALT 10 0 - 40 U/L    AST 15 0 - 39 U/L   Troponin   Result Value Ref Range    Troponin <0.01 0.00 - 0.03 ng/mL   Brain Natriuretic Peptide   Result Value Ref Range    Pro- (H) 0 - 450 pg/mL   Magnesium   Result Value Ref Range    Magnesium 1.9 1.6 - 2.6 mg/dL   EKG 12 Lead   Result Value Ref Range    Ventricular Rate 54 BPM    Atrial Rate 54 BPM    P-R Interval 160 ms    QRS Duration 94 ms    Q-T Interval 454 ms    QTc Calculation (Bazett) 430 ms    P Axis 58 degrees    R Axis -14 degrees    T Axis -2 degrees       RADIOLOGY:  Xr Chest Portable    Result Date: 3/28/2019  Reading location: 200 INDICATION: Dyspnea FINDINGS: Portal AP upright view the chest compared 5/2/2017. Stable heart and mediastinum with prominent pulmonary arteries. Atherosclerosis. Emphysema. Normal caliber pulmonary vessels without large pulmonary parenchymal consolidation. Chronic blunting left costophrenic angle. No acute finding      EKG: This EKG is signed and interpreted by me. Rate: 56  Rhythm: Sinus  Interpretation: Sinus bradycardia, no acute ischemic changes. Comparison: No acute ischemic changes from comparison EKG on 5/2/17.      ------------------------- NURSING NOTES AND VITALS REVIEWED ---------------------------  The nursing notes within the ED encounter and vital signs as below have been reviewed. Patient Vitals for the past 24 hrs:   BP Temp Temp src Pulse Resp SpO2 Height Weight   03/28/19 1903 (!) 157/90 -- -- 69 25 91 % -- --   03/28/19 1804 (!) 155/75 -- -- 62 19 94 % 5' 6\" (1.676 m) 150 lb 1.6 oz (68.1 kg)   03/28/19 1714 -- -- -- 67 24 -- -- --   03/28/19 1615 -- -- -- 147 25 -- -- --   03/28/19 1530 -- -- -- (!) 47 19 -- -- --   03/28/19 1527 -- -- -- 50 22 -- -- --   03/28/19 1523 -- -- -- 53 25 -- -- --   03/28/19 1421 118/61 98.5 °F (36.9 °C) Oral 57 18 95 % 5' 6\" (1.676 m) 157 lb (71.2 kg)       Oxygen Saturation Interpretation: Normal    ------------------------------------------ PROGRESS NOTES ------------------------------------------  Re-evaluation(s):  Time: 15:56. Patient had 2 episodes of Torsades. Magnesium ordered, patient is awake and alert. No complaints at this time.      Counseling:  I have spoken with the patient and discussed todays results, in addition

## 2019-03-28 NOTE — H&P
Department of Internal Medicine        CHIEF COMPLAINT:  Paroxysmal atrial flutter along with torsades which is asymptomatic    Reason for Admission:  As above    HISTORY OF PRESENT ILLNESS:      The patient is a 81 y.o. male who presents with increased shortness of breath but it improved and the ER nurses noticing intermittent runs of atrial flutter and also apparently runs of nonsustained torsades. The patient was having frequent episodes and Dr. Justice Butler his cardiologist wanted the patient to be admitted. Stat magnesium level was ordered. The patient was basically asymptomatic with the above problem. Past Medical History:    Past Medical History:   Diagnosis Date    COPD (chronic obstructive pulmonary disease) ([facility] [address]     stable per pt    History of cardiovascular stress test 4/11/2012    Lexiscan    Hyperlipidemia     Hypertension 9-8-2014    lexiscan stress test    Kidney stone     Thyroid disease      Past Surgical History:    Past Surgical History:   Procedure Laterality Date    ABDOMINAL AORTIC ANEURYSM REPAIR  09/26/2016    BALLOON ANGIOPLASTY, ARTERY Left     Cerebral artery stenosis with angioplasty and stent    CATARACT REMOVAL WITH IMPLANT      right, left    CYSTOSCOPY  nov 2012    retro ureteroscopy stone manipulation and insertion of j stent    EYE SURGERY      cataracts-bilat    INGUINAL HERNIA REPAIR  1970s bilateral    INGUINAL HERNIA REPAIR  1990s bilateral    INGUINAL HERNIA REPAIR Right sept 2014    laparoscopic       Medications Prior to Admission:    @  Prior to Admission medications    Medication Sig Start Date End Date Taking?  Authorizing Provider   glycopyrrolate-formoterol (Vernard Meals) 9-4.8 MCG/ACT AERO Inhale 2 puffs into the lungs 2 times daily 3/21/19   Danisha Gage, DO   NONFORMULARY Portable NIV to assist Chronic Ventilatory Failure    Historical Provider, MD   azithromycin (ZITHROMAX) 250 MG tablet Take once a day 8/29/18   Danisha Gage, DO   Multiple Vitamins-Minerals (ICAPS AREDS 2 PO) Take by mouth    Historical Provider, MD   amoxicillin (AMOXIL) 500 MG capsule Take 1,000 mg by mouth 2 times daily     Historical Provider, MD   predniSONE (DELTASONE) 10 MG tablet Take 10 mg by mouth daily Goal is to reduce by 2.5 mg until 5 mg daily established    Historical Provider, MD   NONFORMULARY Chest Vest 2x/day    Historical Provider, MD   valACYclovir (VALTREX) 500 MG tablet Take 500 mg by mouth daily    Historical Provider, MD   montelukast (SINGULAIR) 10 MG tablet Take 10 mg by mouth daily    Historical Provider, MD   propranolol (INDERAL) 10 MG tablet Take 10 mg by mouth 3 times daily    Historical Provider, MD   furosemide (LASIX) 40 MG tablet Take 40 mg by mouth daily    Historical Provider, MD   fluticasone (ARNUITY ELLIPTA) 100 MCG/ACT AEPB Inhale 100 mg into the lungs daily    Historical Provider, MD   aspirin 81 MG EC tablet Take 81 mg by mouth daily    Historical Provider, MD   diltiazem (CARDIZEM CD) 120 MG ER capsule Take 1 capsule by mouth daily 3/25/16   Ulanda Days, DO   theophylline CR, 12 hour, (THEODUR) 200 MG CR tablet Take 300 mg by mouth daily     Historical Provider, MD   NONFORMULARY Neo40, nitric oxide lozenge twice a day. Historical Provider, MD   NONFORMULARY Oxygen 5 lpm to maintain saturations above 90%. Provided by [facility]. Historical Provider, MD   albuterol (PROVENTIL HFA;VENTOLIN HFA) 108 (90 BASE) MCG/ACT inhaler Inhale 2 puffs into the lungs every 6 hours as needed. For relief of shortness of breath. Historical Provider, MD   Cholecalciferol (VITAMIN D-3) 5000 UNITS TABS Take 1 tablet by mouth once a week     Historical Provider, MD   Lutein 20 MG TABS Take 1 tablet by mouth daily. Historical Provider, MD   Nutritional Supplements (JUICE PLUS FIBRE PO) Take 1 capsule by mouth 2 times daily.       Historical Provider, MD       Allergies:  Flomax [tamsulosin hcl]    Social History:   Social History     Socioeconomic palpitations. GI: No nausea, vomiting, diarrhea or constipation. No melena or hematochezia. : No urinary complaints, dysuria, hematuria or frequency. MSK: No extremity weakness, paralysis or paresthesias. PHYSICAL EXAM:    Vitals:  /61   Pulse 67   Temp 98.5 °F (36.9 °C) (Oral)   Resp 24   Ht 5' 6\" (1.676 m)   Wt 157 lb (71.2 kg)   SpO2 95%   BMI 25.34 kg/m²     General:  This is a 81 y.o. yo male who is alert and oriented in NAD  HEENT:  Head is normocephalic and atraumatic, PERRLA, EOMI, mucus membranes moist with no pharyngeal erythema or exudate. Neck:  Supple with no carotid bruits, JVD or thyromegaly.   No cervical adenopathy  CV:  Regular rate and rhythm, no murmurs  Lungs:  Coarse decreased breath sounds to auscultation bilaterally with no wheezes, rales or rhonchi  Abdomen:  Soft, nontender, nondistended, bowel sounds present  Extremities:  + trace Lower leg edema, peripheral pulses intact bilaterally  Neuro:  Cranial nerves II-XII grossly intact; motor and sensory function intact with no focal deficits  Skin:  No rashes, lesions or wounds    DATA:  CBC with Differential:    Lab Results   Component Value Date    WBC 6.8 03/28/2019    RBC 3.73 03/28/2019    HGB 12.0 03/28/2019    HCT 37.7 03/28/2019     03/28/2019    .1 03/28/2019    MCH 32.2 03/28/2019    MCHC 31.8 03/28/2019    RDW 13.0 03/28/2019    SEGSPCT 59 12/30/2013    LYMPHOPCT 5.2 03/28/2019    MONOPCT 1.7 03/28/2019    BASOPCT 0.6 03/28/2019    MONOSABS 0.14 03/28/2019    LYMPHSABS 0.34 03/28/2019    EOSABS 0.00 03/28/2019    BASOSABS 0.00 03/28/2019     CMP:    Lab Results   Component Value Date     03/28/2019    K 3.8 03/28/2019    CL 98 03/28/2019    CO2 37 03/28/2019    BUN 28 03/28/2019    CREATININE 1.1 03/28/2019    GFRAA >60 03/28/2019    LABGLOM >60 03/28/2019    GLUCOSE 192 03/28/2019    GLUCOSE 110 02/15/2012    PROT 6.1 03/28/2019    LABALBU 3.7 03/28/2019    LABALBU 4.1 02/15/2012 CALCIUM 8.9 03/28/2019    BILITOT 0.3 03/28/2019    ALKPHOS 66 03/28/2019    AST 15 03/28/2019    ALT 10 03/28/2019     Magnesium:    Lab Results   Component Value Date    MG 1.9 03/28/2019     Phosphorus:    Lab Results   Component Value Date    PHOS 3.8 05/03/2017     PT/INR:    Lab Results   Component Value Date    PROTIME 10.8 05/02/2017    INR 1.0 05/02/2017     Troponin:    Lab Results   Component Value Date    TROPONINI <0.01 03/28/2019     U/A:    Lab Results   Component Value Date    COLORU YELLOW 11/28/2012    PROTEINU NEGATIVE 11/28/2012    PHUR 5.5 11/28/2012    WBCUA 2-5 11/28/2012    RBCUA 10-20 11/28/2012    BACTERIA FEW 11/28/2012    CLARITYU SLCLOUDY 11/28/2012    SPECGRAV 1.025 11/28/2012    LEUKOCYTESUR NEGATIVE 11/28/2012    UROBILINOGEN 0.2 11/28/2012    BILIRUBINUR NEGATIVE 11/28/2012    BLOODU MODERATE 11/28/2012    GLUCOSEU NEGATIVE 11/28/2012     ABG:    Lab Results   Component Value Date    PH 7.386 05/02/2017    PCO2 51.0 05/02/2017    PO2 90.4 05/02/2017    HCO3 29.9 05/02/2017    BE 3.7 05/02/2017    O2SAT 96.5 05/02/2017     HgBA1c:    Lab Results   Component Value Date    LABA1C 6.2 03/25/2016     FLP:    Lab Results   Component Value Date    TRIG 66 07/26/2018    HDL 79 07/26/2018    LDLCALC 147 07/26/2018    LABVLDL 13 07/26/2018     TSH:    Lab Results   Component Value Date    TSH 1.060 02/25/2019     IRON:  No results found for: IRON  LIPASE:  No results found for: LIPASE    ASSESSMENT AND PLAN:      Patient Active Problem List    Diagnosis Date Noted    Severe hypoxemia 09/02/2018     Priority: High     Class: Chronic    COPD, severe ([facility] [address] 07/29/2016     Priority: High     Class: Chronic    Acquired bronchiectasis ([facility] [address] 07/29/2016     Priority: Medium     Class: Chronic    Secondary adrenal insufficiency ([facility] [address] 03/11/2016     Priority: Medium     Class: Chronic    Hyperlipidemia      Priority: Medium     Class: Chronic    Multiple thyroid nodules 07/06/2014     Priority: Low

## 2019-03-28 NOTE — PLAN OF CARE
Problem: Risk for Impaired Skin Integrity  Goal: Tissue integrity - skin and mucous membranes  Description  Structural intactness and normal physiological function of skin and  mucous membranes.   Outcome: Met This Shift     Problem: Falls - Risk of:  Goal: Will remain free from falls  Description  Will remain free from falls  Outcome: Met This Shift     Problem: Falls - Risk of:  Goal: Absence of physical injury  Description  Absence of physical injury  Outcome: Met This Shift     Problem: HEMODYNAMIC STATUS  Goal: Hemodynamic status meets discharge criteria  Outcome: Met This Shift

## 2019-03-28 NOTE — LETTER
suppliers that help patients recover after discharge from the hospital, including skilled nursing facilities, home health agencies, inpatient rehabilitation facilities, and long term care hospitals. [employer] is working closely with the doctors and other health care providers that care for you during and following your hospital stay and for a period of time after you leave the hospital. By working together, the health care providers are trying to more efficiently provide well-managed, high quality, patient-centered care as you undergo treatment. Hospitals, doctors, and other health care providers that care for you following a hospital stay may receive an additional payment for providing better, more coordinated health care. Medicare will monitor your care to make sure you and others get high quality care. Your feedback is important     Medicare may also ask you to answer a survey about the services and care you received from [facility] will be mailed to you. Your feedback will improve care for all people with Medicare who receive care from [facility]. Completion of this survey is optional.     Get more information     For more information about the Bundled Payments for [address] [place] [address], you can:    · Visit the CMS BPCI Advanced Website at [email or web address]-[email or web address]/ initiatives/bpci-advanced   · Call the [facility] BPCI-A team at [phone]. · Call 1-800-MEDICARE (4-158.491.8533). TTY users can call 1-957.267.6439     If you have concerns or complaints about your care, talk to your health care provider, or contact your Beneficiary and Family Centered Quality Improvement Organization [facility]). To get your CC-QIO's phone number, visit Medicare.gov/contacts or call 1-800-MEDICARE. · To find a different hospital, visit www. hospitalcompare.[facility].gov or call 1-800- MEDICARE (7-688.669.5511). TTY users should call [phone]. · To find a different doctor, visit Medicare's Physician Compare website, HDTapes.co.nz, or call 1-800-MEDICARE (409 3244). TTY users should call [phone]. · To find a different skilled nursing facility, visit [employer] website, https://www.Ads-Fi/, or call 1-800-MEDICARE (1- 596.197.1714). TTY users should call [phone]. · To find a different long term care hospital, visit [facility] [address] Compare website, [employer]flick[employer]arDish.[employer], or call 1-800- MEDICARE (635 5661). TTY users should call [phone]. · To find a different inpatient rehabilitation facility, visit [address] [facility] [address] Compare website, www.medicare.gov/ inpatientrehabilitation facilitycompare, or call 1-800-MEDICARE (9-686.201.9039). TTY users should call 9- [phone]. · To find a different home health agency, visit [address] website, www.medicare.gov/homehealthcompare, or call 1-800-MEDICARE (5-730- 180-2442). TTY users should call [phone].

## 2019-03-28 NOTE — PROGRESS NOTES
Pharmacy Note    Cruz Aguilar was ordered lutein 20 mg tablet. As per the [address] [place] [address], herbals and certain dietary supplements will be discontinued.   The herbal or dietary supplement may be continued after discharge from the hospital.
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
## 2019-03-28 NOTE — ED NOTES
Spoke with Dr Kat Amaya regarding upgrade to ICU. Transfer order to be placed d/t patients cardiac rhythms.       Daja Abreu RN  03/28/19 3440

## 2019-03-29 NOTE — CONSULTS
14.2 12.5 - 16.5 g/dL Final     Hematocrit   Date Value Ref Range Status   03/28/2019 37.7 37.0 - 54.0 % Final   02/25/2019 41.1 37.0 - 54.0 % Final   07/26/2018 46.2 37.0 - 54.0 % Final     MCV   Date Value Ref Range Status   03/28/2019 101.1 (H) 80.0 - 99.9 fL Final   02/25/2019 102.2 (H) 80.0 - 99.9 fL Final   07/26/2018 105.2 (H) 80.0 - 99.9 fL Final     Platelets   Date Value Ref Range Status   03/28/2019 150 130 - 450 E9/L Final   02/25/2019 179 130 - 450 E9/L Final   07/26/2018 190 130 - 450 E9/L Final     Sodium   Date Value Ref Range Status   03/29/2019 145 132 - 146 mmol/L Final   03/28/2019 145 132 - 146 mmol/L Final   02/25/2019 146 132 - 146 mmol/L Final     Potassium   Date Value Ref Range Status   03/29/2019 3.6 3.5 - 5.0 mmol/L Final   03/28/2019 3.8 3.5 - 5.0 mmol/L Final   02/25/2019 4.2 3.5 - 5.0 mmol/L Final     Chloride   Date Value Ref Range Status   03/29/2019 102 98 - 107 mmol/L Final   03/28/2019 98 98 - 107 mmol/L Final   02/25/2019 101 98 - 107 mmol/L Final     CO2   Date Value Ref Range Status   03/29/2019 35 (H) 22 - 29 mmol/L Final   03/28/2019 37 (H) 22 - 29 mmol/L Final   02/25/2019 32 (H) 22 - 29 mmol/L Final     BUN   Date Value Ref Range Status   03/29/2019 23 8 - 23 mg/dL Final   03/28/2019 28 (H) 8 - 23 mg/dL Final   02/25/2019 19 8 - 23 mg/dL Final     CREATININE   Date Value Ref Range Status   03/29/2019 0.9 0.7 - 1.2 mg/dL Final   03/28/2019 1.1 0.7 - 1.2 mg/dL Final   02/25/2019 0.9 0.7 - 1.2 mg/dL Final     Glucose   Date Value Ref Range Status   03/29/2019 133 (H) 74 - 99 mg/dL Final   03/28/2019 192 (H) 74 - 99 mg/dL Final   02/25/2019 102 (H) 74 - 99 mg/dL Final   02/15/2012 110 70 - 110 mg/dL Final   08/31/2011 102 70 - 110 mg/dL Final   11/13/2010 110 70 - 110 mg/dL Final     Calcium   Date Value Ref Range Status   03/29/2019 8.8 8.6 - 10.2 mg/dL Final   03/28/2019 8.9 8.6 - 10.2 mg/dL Final   02/25/2019 8.8 8.6 - 10.2 mg/dL Final     Total Protein   Date Value Ref Range Status   03/28/2019 6.1 (L) 6.4 - 8.3 g/dL Final   02/25/2019 6.3 (L) 6.4 - 8.3 g/dL Final   07/26/2018 7.1 6.4 - 8.3 g/dL Final     Albumin   Date Value Ref Range Status   02/15/2012 4.1 3.2 - 4.8 g/dL Final   08/31/2011 4.1 3.2 - 4.8 g/dL Final   11/13/2010 4.1 3.2 - 4.8 g/dL Final     Alb   Date Value Ref Range Status   03/28/2019 3.7 3.5 - 5.2 g/dL Final   02/25/2019 4.0 3.5 - 5.2 g/dL Final   07/26/2018 4.4 3.5 - 5.2 g/dL Final     Total Bilirubin   Date Value Ref Range Status   03/28/2019 0.3 0.0 - 1.2 mg/dL Final   02/25/2019 0.5 0.0 - 1.2 mg/dL Final   07/26/2018 0.5 0.0 - 1.2 mg/dL Final     Alkaline Phosphatase   Date Value Ref Range Status   03/28/2019 66 40 - 129 U/L Final   02/25/2019 64 40 - 129 U/L Final   07/26/2018 68 40 - 129 U/L Final     AST   Date Value Ref Range Status   03/28/2019 15 0 - 39 U/L Final   02/25/2019 15 0 - 39 U/L Final   07/26/2018 17 0 - 39 U/L Final     ALT   Date Value Ref Range Status   03/28/2019 10 0 - 40 U/L Final   02/25/2019 12 0 - 40 U/L Final   07/26/2018 10 0 - 40 U/L Final     GFR Non-   Date Value Ref Range Status   03/29/2019 >60 >=60 mL/min/1.73 Final     Comment:     Chronic Kidney Disease: less than 60 ml/min/1.73 sq.m. Kidney Failure: less than 15 ml/min/1.73 sq.m. Results valid for patients 18 years and older. 03/28/2019 >60 >=60 mL/min/1.73 Final     Comment:     Chronic Kidney Disease: less than 60 ml/min/1.73 sq.m. Kidney Failure: less than 15 ml/min/1.73 sq.m. Results valid for patients 18 years and older. 02/25/2019 >60 >=60 mL/min/1.73 Final     Comment:     Chronic Kidney Disease: less than 60 ml/min/1.73 sq.m. Kidney Failure: less than 15 ml/min/1.73 sq.m. Results valid for patients 18 years and older.        GFR    Date Value Ref Range Status   03/29/2019 >60  Final   03/28/2019 >60  Final   02/25/2019 >60  Final     Magnesium   Date Value Ref Range Status   03/29/2019 2.5 1.6

## 2019-03-29 NOTE — CONSULTS
assist Chronic Ventilatory Failure       Historical Provider, MD   azithromycin (ZITHROMAX) 250 MG tablet Take once a day 8/29/18     Erich Mills, DO   Multiple Vitamins-Minerals (ICAPS AREDS 2 PO) Take by mouth       Historical Provider, MD   amoxicillin (AMOXIL) 500 MG capsule Take 1,000 mg by mouth 2 times daily        Historical Provider, MD   predniSONE (DELTASONE) 10 MG tablet Take 10 mg by mouth daily Goal is to reduce by 2.5 mg until 5 mg daily established       Historical Provider, MD   NONFORMULARY Chest Vest 2x/day       Historical Provider, MD   valACYclovir (VALTREX) 500 MG tablet Take 500 mg by mouth daily       Historical Provider, MD   montelukast (SINGULAIR) 10 MG tablet Take 10 mg by mouth daily       Historical Provider, MD   propranolol (INDERAL) 10 MG tablet Take 10 mg by mouth 3 times daily       Historical Provider, MD   furosemide (LASIX) 40 MG tablet Take 40 mg by mouth daily       Historical Provider, MD   fluticasone (ARNUITY ELLIPTA) 100 MCG/ACT AEPB Inhale 100 mg into the lungs daily       Historical Provider, MD   aspirin 81 MG EC tablet Take 81 mg by mouth daily       Historical Provider, MD   diltiazem (CARDIZEM CD) 120 MG ER capsule Take 1 capsule by mouth daily 3/25/16     Almeta Kim, DO   theophylline CR, 12 hour, (THEODUR) 200 MG CR tablet Take 300 mg by mouth daily        Historical Provider, MD   NONFORMULARY Neo40, nitric oxide lozenge twice a day.       Historical Provider, MD   NONFORMULARY Oxygen 5 lpm to maintain saturations above 90%. Provided by [facility].       Historical Provider, MD   albuterol (PROVENTIL HFA;VENTOLIN HFA) 108 (90 BASE) MCG/ACT inhaler Inhale 2 puffs into the lungs every 6 hours as needed.  For relief of shortness of breath.       Historical Provider, MD   Cholecalciferol (VITAMIN D-3) 5000 UNITS TABS Take 1 tablet by mouth once a week        Historical Provider, MD   Lutein 20 MG TABS Take 1 tablet by mouth daily.         Historical Provider, MD Nutritional Supplements (JUICE PLUS FIBRE PO) Take 1 capsule by mouth 2 times daily.         Historical Provider, MD            Allergies:  Flomax [tamsulosin hcl]     Social History:   Social History               Socioeconomic History    Marital status:        Spouse name: Not on file    Number of children: Not on file    Years of education: Not on file    Highest education level: Not on file   Occupational History    Not on file   Social Needs    Financial resource strain: Not on file    Food insecurity:       Worry: Not on file       Inability: Not on file    Transportation needs:       Medical: Not on file       Non-medical: Not on file   Tobacco Use    Smoking status: Former Smoker       Packs/day: 2.00       Years: 42.00       Pack years: 84.00       Types: Cigarettes       Last attempt to quit: 2003       Years since quittin.1    Smokeless tobacco: Never Used   Substance and Sexual Activity    Alcohol use:  Yes       Alcohol/week: 0.6 oz       Types: 1 Shots of liquor per week       Comment: daily    Drug use: No    Sexual activity: Not on file   Lifestyle    Physical activity:       Days per week: Not on file       Minutes per session: Not on file    Stress: Not on file   Relationships    Social connections:       Talks on phone: Not on file       Gets together: Not on file       Attends Orthodox service: Not on file       Active member of club or organization: Not on file       Attends meetings of clubs or organizations: Not on file       Relationship status: Not on file    Intimate partner violence:       Fear of current or ex partner: Not on file       Emotionally abused: Not on file       Physically abused: Not on file       Forced sexual activity: Not on file   Other Topics Concern    Not on file   Social History Narrative    Not on file            Family History:   Family History         Family History   Problem Relation Age of Onset    Heart Disease Father     Brittany Waddell Kidney Disease Mother              REVIEW OF SYSTEMS:     Gen: Patient denies any lightheadedness or dizziness. No LOC or syncope. No fevers or chills. HEENT: No earache, sore throat or nasal congestion. Resp: Denies cough, hemoptysis or sputum production. Cardiac: Denies chest pain, + SOB, diaphoresis or palpitations. GI: No nausea, vomiting, diarrhea or constipation. No melena or hematochezia. : No urinary complaints, dysuria, hematuria or frequency. MSK: No extremity weakness, paralysis or paresthesias.      PHYSICAL EXAM:     Vitals:  /61   Pulse 67   Temp 98.5 °F (36.9 °C) (Oral)   Resp 24   Ht 5' 6\" (1.676 m)   Wt 157 lb (71.2 kg)   SpO2 95%   BMI 25.34 kg/m²      General:  This is a 81 y.o. yo male who is alert and oriented in NAD  HEENT:  Head is normocephalic and atraumatic, PERRLA, EOMI, mucus membranes moist with no pharyngeal erythema or exudate. Neck:  Supple with no carotid bruits, JVD or thyromegaly.   No cervical adenopathy  CV:  Regular rate and rhythm, no murmurs  Lungs:  Coarse decreased breath sounds to auscultation bilaterally with no wheezes, rales or rhonchi  Abdomen:  Soft, nontender, nondistended, bowel sounds present  Extremities:  + trace Lower leg edema, peripheral pulses intact bilaterally  Neuro:  Cranial nerves II-XII grossly intact; motor and sensory function intact with no focal deficits  Skin:  No rashes, lesions or wounds     DATA:  CBC with Differential:          Lab Results   Component Value Date     WBC 6.8 03/28/2019     RBC 3.73 03/28/2019     HGB 12.0 03/28/2019     HCT 37.7 03/28/2019      03/28/2019     .1 03/28/2019     MCH 32.2 03/28/2019     MCHC 31.8 03/28/2019     RDW 13.0 03/28/2019     SEGSPCT 59 12/30/2013     LYMPHOPCT 5.2 03/28/2019     MONOPCT 1.7 03/28/2019     BASOPCT 0.6 03/28/2019     MONOSABS 0.14 03/28/2019     LYMPHSABS 0.34 03/28/2019     EOSABS 0.00 03/28/2019     BASOSABS 0.00 03/28/2019      CMP: Problem List     Diagnosis Date Noted    Severe hypoxemia 09/02/2018       Priority: High       Class: Chronic    COPD, severe ([facility] 07/29/2016       Priority: High       Class: Chronic    Acquired bronchiectasis (HCC) 07/29/2016       Priority: Medium       Class: Chronic    Secondary adrenal insufficiency (HCC) 03/11/2016       Priority: Medium       Class: Chronic    Hyperlipidemia         Priority: Medium       Class: Chronic    Multiple thyroid nodules 07/06/2014       Priority: Low       Class: Chronic    Bradycardia, sinus 01/11/2014       Priority: Low       Class: Chronic    Hypertension         Priority: Low    [facility] 03/28/2019    Chronic respiratory failure with hypoxia and hypercapnia (HCC) 08/06/2017       Class: Chronic    Status post repair of abdominal aortic aneurysm (AAA) using straight graft 11/30/2016       Class: Acute    Nephrolithiasis 11/29/2012      Imp/Plan:    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 Monitor closely  Echocardiogram in a.m.   Currently no further arrythmia Rx indicated  Discuss with Dr Mesa Has

## 2019-03-29 NOTE — CARE COORDINATION
[address] Transitions Interview     3/29/2019    Patient: Eddie Gusman Patient : 1935   MRN: [id number]  Reason for Admission: Cardiac Arrythmia  RARS: Readmission Risk Score: 15         Spoke with: Dr. Maryan Bay) Sneha Face      Readmission Risk  Patient Active Problem List   Diagnosis    Hypertension    Hyperlipidemia    Nephrolithiasis    Bradycardia, sinus    Multiple thyroid nodules    Secondary adrenal insufficiency (HCC)    [facility]    COPD, severe ([facility]    Status post repair of abdominal aortic aneurysm (AAA) using straight graft    Chronic respiratory failure with hypoxia and hypercapnia (HCC)    Severe hypoxemia    Ventricular tachyarrhythmia [facility])       Inpatient Assessment  Care Transitions Summary    Care Transitions Inpatient Review  Medication Review  Are you able to afford your medications?:  Yes  How often do you have difficulty taking your medications?:  I always take them as prescribed. Housing Review  Who do you live with?:  Partner/Spouse/SO  Are you an active caregiver in your home?:  No  Social Support  Do you have a ?:  No  Do you have a [address] [place] [address]?:  No  Durable Medical Equipment  Patient Home Equipment:  Nebulizer, Oxygen  Functional Review  Ability to seek help/take action for Emergent/Urgent situations i.e. fire, crime, inclement weather or health crisis. :  Independent  Ability handle personal hygiene needs (bathing/dressing/grooming): Independent  Ability to manage medications: Independent  Ability to prepare food:  Needs Assistance  Ability to maintain home (clean home, laundry):  Needs Assistance  Ability to drive and/or has transportation:  Dependent  Ability to do shopping:  Needs Assistance  Ability to manage finances:   Independent  Is patient able to live independently?:  Yes  Hearing and Vision  Visual Impairment:  Visual impairment (Glasses/contacts)  Hearing Impairment:  Hard of hearing  Care Transitions Interventions  No Identified Needs       Met with patient today 3/29/19 at bedside with no family present. 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 Explained role and reason for visit. Provided and reviewed BPCI-A letter and [facility] brochure to patient. Patient is receptive to be followed by [facility] for BPCI bundle program if applicable. Confirmed patient is followed by Dr. Athena Hatchet for primary care, Dr. Blossom Sims for pulmonology and Dr. Jimbo Alba for cardiology. Patient reports becoming short of breath yesterday around lunch time causing him to seek treatment in ED which lead to this admission. Patient admitted to ICU-8 for Torsades de pointes. Patient states he lives with wife Judah Estevez) in two level home. States having 13 inside steps with handrail and 2 outside steps without handrail. [facility] bathroom is equipped with walk-in shower. States having a nebulizer and home oxygen as he is oxygen dependent. States he wears 5 lpm per nc continuous and oxygen supplier is [employer]. States he is independent in managing home medications and in ADL's but does not drive as wife provides transportation. States having family support including son/dil, daughter/amado and wife. Denies any HHC, [facility] or SNF in past. States he is active with  pulmonary rehab. States plan is to return home after discharge. Denies any discharge needs at this time. [facility] will remain available for any needed assistance. Follow Up  Future Appointments   Date Time Provider Robert Jo   5/22/2019  1:45 PM Jose A Halsted, DO [facility] None   9/18/2019  1:45 PM Jose A Halsted, DO [facility] None   1/29/2020  1:45 PM Jose A Halsted, DO [facility] None       Health Maintenance  There are no preventive care reminders to display for this patient.     MAYRA Jolley

## 2019-03-29 NOTE — PLAN OF CARE
Problem: Risk for Impaired Skin Integrity  Goal: Tissue integrity - skin and mucous membranes  Description  Structural intactness and normal physiological function of skin and  mucous membranes.   3/29/2019 1629 by Lisa Maier RN  Outcome: Completed     Problem: Falls - Risk of:  Goal: Will remain free from falls  Description  Will remain free from falls  3/29/2019 1629 by Lisa Maier RN  Outcome: Completed     Problem: Falls - Risk of:  Goal: Absence of physical injury  Description  Absence of physical injury  3/29/2019 1629 by Lisa Maier RN  Outcome: Completed     Problem: HEMODYNAMIC STATUS  Goal: Hemodynamic status meets discharge criteria  3/29/2019 1629 by Lisa Maier RN  Outcome: Completed     Problem: Discharge Planning:  Goal: Participates in care planning  Description  Participates in care planning  Outcome: Completed     Problem: Discharge Planning:  Goal: Discharged to appropriate level of care  Description  Discharged to appropriate level of care  Outcome: Completed

## 2019-03-29 NOTE — PROGRESS NOTES
Pulmonary note     BP (!) 148/65   Pulse 75   Temp 98.7 °F (37.1 °C) (Oral)   Resp 24   Ht 5' 6\" (1.676 m)   Wt 150 lb 1.6 oz (68.1 kg)   SpO2 98%   BMI 24.23 kg/m²     Reviewed presentation and presently stable but appreciated A/C Hypercapnea as etiology of Respiratory Failure and will check ABG in am.Baseline PaCO2 55 mmHg. Agree with present care and will see tomorrow.

## 2019-03-29 NOTE — PROGRESS NOTES
Patient seen and examined. Patient states he feels little bit better today. Palate is been no more evidence of any possible [place] do point V. tach. Again most likely be atrial flutter that was noted on the rhythm strips and the questionable torsade de pointes was most likely his tremor activity from Parkinson's. No complaints of unusual SOB, nausea, vomiting, chest pain,abd. pain, dizziness, diarrhea or constipation. /89   Pulse 60   Temp 97.4 °F (36.3 °C) (Oral)   Resp 17   Ht 5' 6\" (1.676 m)   Wt 150 lb 1.6 oz (68.1 kg)   SpO2 (!) 83% Comment: pt tremors - pt is A&O no S&S of distress  BMI 24.23 kg/m²     HEENT: negative to gross visual examination  Heart: regular rate and rhythm  Lungs: Coarse decreased breath sounds with mild scattered crackle  Abdomen: soft, positive bowel sounds, no hepatosplenomegaly, no guarding, rebound, rigidity  Ext: no clubbing, cyanosis, erythema, edema  Neuro: alert and oriented.  No gross deficits    CBC with Differential:    Lab Results   Component Value Date    WBC 6.8 03/28/2019    RBC 3.73 03/28/2019    HGB 12.0 03/28/2019    HCT 37.7 03/28/2019     03/28/2019    .1 03/28/2019    MCH 32.2 03/28/2019    MCHC 31.8 03/28/2019    RDW 13.0 03/28/2019    SEGSPCT 59 12/30/2013    LYMPHOPCT 5.2 03/28/2019    MONOPCT 1.7 03/28/2019    BASOPCT 0.6 03/28/2019    MONOSABS 0.14 03/28/2019    LYMPHSABS 0.34 03/28/2019    EOSABS 0.00 03/28/2019    BASOSABS 0.00 03/28/2019     CMP:    Lab Results   Component Value Date     03/29/2019    K 3.6 03/29/2019     03/29/2019    CO2 35 03/29/2019    BUN 23 03/29/2019    CREATININE 0.9 03/29/2019    GFRAA >60 03/29/2019    LABGLOM >60 03/29/2019    GLUCOSE 133 03/29/2019    GLUCOSE 110 02/15/2012    PROT 6.1 03/28/2019    LABALBU 3.7 03/28/2019    LABALBU 4.1 02/15/2012    CALCIUM 8.8 03/29/2019    BILITOT 0.3 03/28/2019    ALKPHOS 66 03/28/2019    AST 15 03/28/2019    ALT 10 03/28/2019     Magnesium:    Lab Results   Component Value Date    MG 2.5 03/29/2019     Phosphorus:    Lab Results   Component Value Date    PHOS 2.8 03/29/2019     PT/INR:    Lab Results   Component Value Date    PROTIME 10.8 05/02/2017    INR 1.0 05/02/2017     Troponin:    Lab Results   Component Value Date    TROPONINI <0.01 03/28/2019     U/A:    Lab Results   Component Value Date    COLORU YELLOW 11/28/2012    PROTEINU NEGATIVE 11/28/2012    PHUR 5.5 11/28/2012    WBCUA 2-5 11/28/2012    RBCUA 10-20 11/28/2012    BACTERIA FEW 11/28/2012    CLARITYU SLCLOUDY 11/28/2012    SPECGRAV 1.025 11/28/2012    LEUKOCYTESUR NEGATIVE 11/28/2012    UROBILINOGEN 0.2 11/28/2012    BILIRUBINUR NEGATIVE 11/28/2012    BLOODU MODERATE 11/28/2012    GLUCOSEU NEGATIVE 11/28/2012     HgBA1c:    Lab Results   Component Value Date    LABA1C 6.2 03/25/2016     FLP:    Lab Results   Component Value Date    TRIG 66 07/26/2018    HDL 79 07/26/2018    LDLCALC 147 07/26/2018    LABVLDL 13 07/26/2018     TSH:    Lab Results   Component Value Date    TSH 0.333 03/29/2019     LIPASE:  No results found for: LIPASE    No results for input(s): GLUMET in the last 72 hours. XR CHEST PORTABLE   Final Result   No acute finding           potassium chloride  20 mEq Oral Daily    primidone  75 mg Oral Daily    [START ON 3/31/2019] vitamin D3  5,000 Units Oral Once per day on Sun Wed    diltiazem  120 mg Oral Daily    aspirin  81 mg Oral Daily    furosemide  40 mg Oral Daily    valACYclovir  500 mg Oral Daily    montelukast  10 mg Oral Daily    propranolol  10 mg Oral TID    predniSONE  10 mg Oral Daily    amoxicillin  1,000 mg Oral BID    azithromycin  250 mg Oral Daily    glycopyrrolate-formoterol  2 puff Inhalation BID    fluticasone  1 puff Inhalation BID    brimonidine  1 drop Right Eye BID    latanoprost  1 drop Left Eye Nightly        Assessment; Active Problems:    Acute on chronic hypoxic respiratory failure    ? ? Ventricular tachyarrhythmia with atrial flutter-probably related to artifact    Thyroid disease    Hypertension    Hyperlipidemia    History of severe COPD       Plan:   Cardiology notes reviewed and agree  Case discussed with pulmonology and from his standpoint okay to be discharged home  Possible discharge home today after discussion with Dr. Bro Goodwin  See orders.         Constanza Bennett DO  12:10 PM  3/29/2019

## 2019-03-29 NOTE — DISCHARGE SUMMARY
Results   Component Value Date    PROTIME 10.8 05/02/2017    INR 1.0 05/02/2017     Troponin:    Lab Results   Component Value Date    TROPONINI <0.01 03/28/2019     U/A:    Lab Results   Component Value Date    COLORU YELLOW 11/28/2012    PROTEINU NEGATIVE 11/28/2012    PHUR 5.5 11/28/2012    WBCUA 2-5 11/28/2012    RBCUA 10-20 11/28/2012    BACTERIA FEW 11/28/2012    CLARITYU SLCLOUDY 11/28/2012    SPECGRAV 1.025 11/28/2012    LEUKOCYTESUR NEGATIVE 11/28/2012    UROBILINOGEN 0.2 11/28/2012    BILIRUBINUR NEGATIVE 11/28/2012    BLOODU MODERATE 11/28/2012    GLUCOSEU NEGATIVE 11/28/2012     HgBA1c:    Lab Results   Component Value Date    LABA1C 6.2 03/25/2016     FLP:    Lab Results   Component Value Date    TRIG 66 07/26/2018    HDL 79 07/26/2018    LDLCALC 147 07/26/2018    LABVLDL 13 07/26/2018     TSH:    Lab Results   Component Value Date    TSH 0.333 03/29/2019     LIPASE:  No results found for: LIPASE    No results for input(s): GLUMET in the last 72 hours. XR CHEST PORTABLE   Final Result   No acute finding           potassium chloride  20 mEq Oral Daily    primidone  75 mg Oral Daily    [START ON 3/31/2019] vitamin D3  5,000 Units Oral Once per day on Sun Wed    diltiazem  120 mg Oral Daily    aspirin  81 mg Oral Daily    furosemide  40 mg Oral Daily    valACYclovir  500 mg Oral Daily    montelukast  10 mg Oral Daily    propranolol  10 mg Oral TID    predniSONE  10 mg Oral Daily    amoxicillin  1,000 mg Oral BID    azithromycin  250 mg Oral Daily    glycopyrrolate-formoterol  2 puff Inhalation BID    fluticasone  1 puff Inhalation BID    brimonidine  1 drop Right Eye BID    latanoprost  1 drop Left Eye Nightly        Assessment;   Active Problems:    Acute on chronic hypoxic respiratory failure    Suspected Ventricular tachyarrhythmia with atrial flutter-probably related to artifact    Sinus bradycardia    Thyroid disease    Hypertension    Hyperlipidemia    History of severe COPD PLAN:          Constanza Bennett DO  3:29 PM  3/29/2019

## 2019-03-29 NOTE — PROGRESS NOTES
Pulmonary Visit:    /65   Pulse 64   Temp 97.5 °F (36.4 °C) (Oral)   Resp 30   Ht 5' 6\" (1.676 m)   Wt 150 lb 1.6 oz (68.1 kg)   SpO2 (!) 89%   BMI 24.23 kg/m²     S:Dillon overseen in ICU has improved with ABG at baseline when performed this am on 4 LPM flow via NC. Reviewed NIV at [facility] and with nasal plungets low pressure alarm was alarming indicating leak and my suspicion and discussion with Kevin Coulter is that he may require different interface at nite with FFM. O:This may have aggravated his pulmonary hypertension and predisposed to subtle RVH strain and admission with BNP mildly elevated. Echo performed by Dr. De Lucero may shed better lite on this is RVSP elevated with RVH. Lungs clear but diminished and noting subtle pedal edema nonpitting. Dr. Saima Campos noted asymptomatic bradycardia with rates as low as mid 30's. A: Exacerbation of Hypercapnia associated Respiratory failure with recovery to baseline   P: Maybe discharged at discretion of Dr. De Lucero        [facility] to consider modifying interface for control of hypercapnia at .

## 2019-03-29 NOTE — PLAN OF CARE
Intensive Care Daily Quality Rounding Checklist      ICU Team Members: Bedside Nurse, Nursing Unit Leadership, Respiratory Therapy and Pharmacist    ICU Day #: NUMBER: 1    Intubation Date: NA    Ventilator Day #: NA    Central Line Insertion Date: NA        Day #: NA     Arterial Line Insertion Date: NA    Day #: NA    DVT Prophylaxis: Aspirin    GI Prophylaxis: none    [place]aines Catheter Insertion Date: NA       Day #: NA      Continued need (if yes, reason documented and discussed with physician): NA    Skin Issues/ Wounds and ordered treatment discussed on rounds: N    Goals/ Plans for the Day: Echo. Consider transfer out of ICU or discharge after Echo.

## 2019-03-29 NOTE — PROGRESS NOTES
Patient discharged wearing clothes that were inventoried - coats, undershirt, shirt, underwear, pants, socks, shoes, coat and cell phone. Transferred to [address] via wheelchair on 4L NC. Pt's wife brought vehicle to entrance and pt ambulated to vehicle. Pt switched to his home O2 tank.     King Jackson  3/29/2019

## 2019-03-29 NOTE — PROGRESS NOTES
Physician aware of bradycardia. VS stable. No s/s of distress. Patient has been up and ambulated several times. No needs voiced at this time.

## 2019-03-29 NOTE — PROGRESS NOTES
P/c to Dr. Rola Bella office, spoke with Brissa Price - Updated that echocardiogram is being done at this time and should be available shortly for him to read.     Ginna Mustafa  3/29/2019

## 2019-03-29 NOTE — PROGRESS NOTES
P/c from Dr. Chey Zepeda - spoke to Dr. Basilio Rodriguez and pt may be discharged. See new order.     Sara Chi  3/29/2019

## 2019-03-30 NOTE — CONSULTS
experiencing  worsening hypercapnia. It then brings us up to today. He apparently was admitted to [facility] with acute-on-chronic hypercapnia. That was really unexplained. There was no evidence of infection. There  was no evidence of any decomposition in his cardiovascular disposition  from a left side nature, but two major factors are seen to be important  in possibly predisposing him to this disruption and hospital admission. 1.  His alarm at night. The low pressure alarm was going off when he  fell asleep and my suggestion on that is that he was leaking because he  had nasal pillows or pledgets and these secured his nasal passage, but  with him sound sleepy, opened his mouth and his pressures are lost,  resulting in a low pressure alarm awakening him. Now the respiratory  therapist from [address] did lower the alarm  pressure, but I told the patient today that he may require a full face  mask at night and still used the pledgets during the day with  activities, and so he is aware and accepting the full face mask at night  with his noninvasive ventilation as trouble shooting that low pressure  alarm and ensuring that his noninvasive ventilatory assist is actually  helping him control his hypercapnia with his respiratory failure at  night, and I think that should lower and control his pulmonary pressures  and right heart function much better.   2.  Now the second issue was with regards to bradycardia and we knew  that the patient had bradycardia, but what I also noted was Dr. Bowen Araiza  mentioning that during this 24-hour period of stay here, his heart rate  dropped to as low as the mid 30s and so Dr. Savannah Nix is on board with an  echo and his suggestions on what we should do with regards to that  bradycardia, and I am interested in the echocardiogram with regards to  right ventricular systolic pressures and right ventricular chamber  enlargement, so that is pending at this time. So, the patient is improved today with his examination. I evaluated him  today and I noted on his visit that the patient's blood pressure was  113/65. Now his pulse was 64 and nice, sinus rhythm. He was afebrile  at 97.5 degrees Fahrenheit. Now his respirations were reported as 30,  but he was only breathing 14 to 16 breaths per minute. He is 5 feet 6  and weight of 150 pounds. His BMI of 24 is ideal and his oxygen  saturation was 97% on 4 liters. 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 His hair was well groomed. His eyes, he had focused  with his corrective lenses and able to see me immediately. Nasal  airways were accommodated by the prongs of the nasal cannula at 4 liters  of flow with his oxygen saturation at 97%. His speech is clear. His  lungs were diminished, but clear, diminished at +1/4 in the upper lung  fields and +2/4 in the bases. No wheezes. No end-inspiratory crackles. His heart was a sinus rhythm, borderline bradycardic, but asymptomatic. I noted his belly is soft. He ate well, but we recommended that he eat  small meals more frequently because he does get breathless while eating  and I believe that is related to his vasculature supporting his  respiratory muscles of his chest cage capacity to ventilate, stealing  that vascular supply temporarily while eating, and we see that a lot  with COPD patients.   The legs had a little pedal edema, but that was not  pitting and he felt extremely good and back to his baseline, but I told  the patient that he can be discharged back to home and he needs to  monitor his alarm if it goes off with the low alarm being reestablished  by [facility] and it is still going off at the  lower alarm pressure setting that he needs to transition from the nasal  pledgets to a full face mask because I believe he is leaking by sound  sleep, opening his mouth, and pressure alarms are alarming low pressure  because he is leaking out of his mouth. So with regards to today, I see that there is no real concerning issues  with regards to an infection. No signs of any decompensating issues  with regards to his heart or other end organs. He seems to be stable  with his arterial blood gases at baseline and he feels good, but the two  areas that we need to trouble shoot are pulmonary wise, the interface  during the hour sleep with his noninvasive ventilation and with  cardiology, whether or not the patient will require pacemaker. So those  are the decisions we need to discuss with the patient, but I told the  patient that from my standpoint, [facility] will be  monitoring him and if the low pressure alarms continued to alarm with  the adjustments made, that he will require an interface with a full face  mask and he is to tell the respiratory therapist at [facility] that Dr. Georgi Rainey wishes to transition from nasal pledgets to  the full face mask at the hour sleep. I thank you doctor for allowing me to co-participate with the patient  here today and as I mentioned pending on Dr. Alice Quiñones intentions, from  the pulmonary standpoint, he is stable and may be discharged.         Ryley Hathaway DO    D: 03/29/2019 15:01:06       T: 03/29/2019 17:20:33     [email or web address]  Job#: [id number]     Doc#: [id number]    CC:

## 2019-03-30 NOTE — PROCEDURES
[address]                                 ECHOCARDIOGRAM    PATIENT NAME: Washington Norton                      :        1935  MED REC NO:   [id number]                            ROOM:       [facility]  ACCOUNT NO:   [de-identified]                           ADMIT DATE: 2019  PROVIDER:     Akin Rangel MD    DATE OF STUDY:  2019    ECHOCARDIOGRAPHER:  Jenaro Lopez. INDICATIONS:  Worsening dyspnea, ischemic cardiomyopathy, coronary  artery disease, mitral regurgitation, tricuspid regurgitation, pulmonary  hypertension. 2-D MEASUREMENTS:  Left ventricular outflow tract 2.0 cm. Right  ventricle diastole 2.9. Left ventricle diastole 5.5, systole 4.5. Septal and posterior wall thickness of the left ventricle 1.2 cm, left  atrial dimension 5.2 cm, left atrial area 25 sq.cm. Right atrial area  25 sq.cm. Aortic root diameter 3.1 cm. Aortic valve cusp separation  2.0 cm. IMPRESSION:  1. The left atrium is dilated at 5.2 cm and 25 sq.cm. Right atrium  dilated at 25 sq.cm. Left ventricle and right ventricle top normal.   Aortic root within normal limits. 2.  Left ventricle shows mild concentric left ventricular hypertrophy at  1.2 cm. There is evidence of ischemic cardiomyopathy with akinesia  involving distal interventricular septum, apex and a portion of the  anterior wall. This represents a nonsurgical left ventricular aneurysm. Overall ejection fraction is still estimated at 42%. There is diastolic  filling dysfunction stage I.  3.  The mitral valve shows mild mitral annular calcification with a  maximal gradient of 3.1 mm. Mitral valve area 1.8 sq.cm. This appears  to be an under measurement. There is no convincing evidence of  significant mitral stenosis. There is 1+ mitral regurgitation. 4.  The aortic valve is sclerotic but the leaflets open well. Maximal  gradient 8.2 mm.

## 2019-04-05 NOTE — PROGRESS NOTES
Tonja Hernández D.O. [facility]  [address] [facility]  Phone: [phone]  Fax: [phone]    4/5/2019    Chief Complaint   Patient presents with    Back Pain       Last injection: n/a  Taking anticoagulants/antiplatelets: Yes ASA  Diabetic: No  Febrile/active infection: No    After explaining the indications, risks, benefits and alternatives of a right sacroiliac joint injection, the patient agreed to proceed. A permit was signed and scanned into the chart. The patient was placed in the prone position and draped for modesty. The skin on the Right upper buttock was prepared with Chloraprep. Using aseptic no touch technique, a 22 gauge, 3\" needle with 1 cc of Kenalog 40mg/cc and 1 cc of 1% lidocaine was directed to the joint using ultrasound guidance. After negative aspiration, the medication was injected. Adequate hemostasis was achieved and a bandage applied. The patient tolerated the procedure well and was educated in post injection care. The patient was clinically monitored after the injection and left the office without incident. There was post injection reduction in pain. Ultrasound images are scanned in the electronic medical record separtely. Tonja Hernández D.O., P.T.   Board Certified Physical Medicine and Rehabilitation  Board Certified Electrodiagnostic Medicine    Administrations This Visit     lidocaine 1 % injection 1 mL     Admin Date  04/05/2019  10:50 Action  Given Dose  1 mL Route  Other Site   Administered By  Ella Little MA    Ordering Provider:  Claude Kennedy DO NDC:  4046-4577-53    Lot#:  [id number]    :  Dave Myrick    Patient Supplied?:  No    Comments:  EXP: 08/2020          triamcinolone acetonide (KENALOG-40) injection 40 mg     Admin Date  04/05/2019  10:50 Action  Given Dose  40 mg Route  Intra-articular Site   Administered By  Ella Little MA    Ordering Provider:  Claude Kennedy DO    NDC: 5803-4348-98    Lot#:  [id number]    :  [facility] (PRIMARY [facility])    Patient Supplied?:  No    Comments:  EXP: APR 2020

## 2019-04-08 NOTE — TELEPHONE ENCOUNTER
Patient's wife Clarence Saritha called and stated that on Friday you did an SI joint injection on the patient. The patient has not has much relief yet from this injection. They would like to know if you do osteopathic manipulation such as muscle energy and counter strain. Please advise.

## 2019-04-19 NOTE — PROGRESS NOTES
Eddie Gusman is a 81 y.o. male, who presents   Chief Complaint   Patient presents with    New Patient     New patient for Right hip pain . Patient states this has been over a week, he can not stand. HPI[de-identified] Patient has been present for some time and has been getting somewhat worse. It has been unrelenting lately. It is described as hip pain but on further questioning seems to be located in the root right buttock and sacroiliac area. There's been no reported numbness of the lower extremity. There is ambulatory impairment requiring ambulatory aids over short distances. Longer distances are prohibited. Motion is decreased as well. Dr. Melissa Pizarro requesting some OMT    Allergies; medications; past medical, surgical, family, and social history; and problem list have been reviewed today and updated as indicated in this encounter - see below following Ortho specifics. Musculoskeletal: Exam of the lower extremities revealed that little discomfort with mobilization of the ankles and feet. His knees move well with some crepitus. There is no significant instability and no pain to suggest that this is an etiology for his low back pain. Hip motion is good with points and some discomfort referred once again to the posterior low back with range of motion of the right hip. There are no signs of focal neurologic deficits. Stance and gait were not checked because of the pain and difficulty with arising and standing. Examination was done in a seated position and there was distinct pain on palpation in the posterior aspect of the right sacroiliac joint. Radiologic Studies: Old imaging CT of the pelvis and low back area some degenerative disc disease, some compression of the L3 vertebral body. There is a slight variation in the right S1 vertebra breasts perhaps suggesting a variant of transverse process. Aortic calcification was noted as reported. The central canal was surprisingly patent.     ASSESSMENT:  Maryan Riley was seen today for new patient. Diagnoses and all orders for this visit:    Sacroiliac joint dysfunction of right side     Treatment alternatives were reviewed including medical and physical therapies, injections, and surgical options, expected risks benefits and likely outcome of each were discussed in detail, questions asked and answered and understood. PLAN: Recommendation is for injection of the sacroiliac joint in an attempt to alleviate the acute pain. Manual manipulative treatment muscle energy was not attempted. I did contact Dr. Angelique Barron who has skills in the ultrasound-guided injection who was kind enough to come to the department and perform the service for Dr. Kylie Hansen.         Patient Active Problem List   Diagnosis    Hypertension    Hyperlipidemia    Nephrolithiasis    Bradycardia, sinus    Multiple thyroid nodules    Secondary adrenal insufficiency (HCC)    Acquired bronchiectasis (HCC)    COPD, severe (HCC)    Status post repair of abdominal aortic aneurysm (AAA) using straight graft    Chronic respiratory failure with hypoxia and hypercapnia (HCC)    Severe hypoxemia    Ventricular tachyarrhythmia (HCC)    Thyroid disease    Hypertension    Hyperlipidemia    COPD (chronic obstructive pulmonary disease) (HCC)       Past Medical History:   Diagnosis Date    COPD (chronic obstructive pulmonary disease) (HCC)     stable per pt    History of cardiovascular stress test 4/11/2012    Lexiscan    Hyperlipidemia     Hypertension 9-8-2014    lexiscan stress test    Kidney stone     Thyroid disease        Past Surgical History:   Procedure Laterality Date    ABDOMINAL AORTIC ANEURYSM REPAIR  09/26/2016    BALLOON ANGIOPLASTY, ARTERY Left     Cerebral artery stenosis with angioplasty and stent    CATARACT REMOVAL WITH IMPLANT      right, left    CYSTOSCOPY  nov 2012    retro ureteroscopy stone manipulation and insertion of j stent    EYE SURGERY      cataracts-bilat    INGUINAL HERNIA REPAIR 1970s bilateral    INGUINAL HERNIA REPAIR  1990s bilateral    INGUINAL HERNIA REPAIR Right sept 2014    laparoscopic       Current Outpatient Medications   Medication Sig Dispense Refill    glycopyrrolate-formoterol (BEVESPI AEROSPHERE) 9-4.8 MCG/ACT AERO Inhale 2 puffs into the lungs 2 times daily 1 Inhaler 6    NONFORMULARY Portable NIV to assist Chronic Ventilatory Failure      azithromycin (ZITHROMAX) 250 MG tablet Take once a day 90 tablet 3    Multiple Vitamins-Minerals (ICAPS AREDS 2 PO) Take by mouth      amoxicillin (AMOXIL) 500 MG capsule Take 1,000 mg by mouth 2 times daily       predniSONE (DELTASONE) 10 MG tablet Take 10 mg by mouth daily Goal is to reduce by 2.5 mg until 5 mg daily established      NONFORMULARY Chest Vest 2x/day      valACYclovir (VALTREX) 500 MG tablet Take 500 mg by mouth daily      montelukast (SINGULAIR) 10 MG tablet Take 10 mg by mouth daily      propranolol (INDERAL) 10 MG tablet Take 10 mg by mouth 3 times daily      furosemide (LASIX) 40 MG tablet Take 40 mg by mouth daily      fluticasone (ARNUITY ELLIPTA) 100 MCG/ACT AEPB Inhale 100 mg into the lungs daily      aspirin 81 MG EC tablet Take 81 mg by mouth daily      diltiazem (CARDIZEM CD) 120 MG ER capsule Take 1 capsule by mouth daily 30 capsule 3    theophylline CR, 12 hour, (THEODUR) 200 MG CR tablet Take 300 mg by mouth daily       NONFORMULARY Neo40, nitric oxide lozenge twice a day.  NONFORMULARY Oxygen 5 lpm to maintain saturations above 90%. Provided by [id number].  albuterol (PROVENTIL HFA;VENTOLIN HFA) 108 (90 BASE) MCG/ACT inhaler Inhale 2 puffs into the lungs every 6 hours as needed. For relief of shortness of breath.  Cholecalciferol (VITAMIN D-3) 5000 UNITS TABS Take 1 tablet by mouth once a week       Lutein 20 MG TABS Take 1 tablet by mouth daily.  Nutritional Supplements (JUICE PLUS FIBRE PO) Take 1 capsule by mouth 2 times daily.          No current facility-administered medications for this visit. Allergies   Allergen Reactions    Flomax [Tamsulosin Hcl] Other (See Comments)     Low blood pressure       Social History     Socioeconomic History    Marital status:      Spouse name: None    Number of children: None    Years of education: None    Highest education level: None   Occupational History    None   Social Needs    Financial resource strain: None    Food insecurity:     Worry: None     Inability: None    Transportation needs:     Medical: None     Non-medical: None   Tobacco Use    Smoking status: Former Smoker     Packs/day: 2.00     Years: 42.00     Pack years: 84.00     Types: Cigarettes     Last attempt to quit: 2003     Years since quittin.1    Smokeless tobacco: Never Used   Substance and Sexual Activity    Alcohol use: Yes     Alcohol/week: 0.6 oz     Types: 1 Shots of liquor per week     Comment: daily    Drug use: No    Sexual activity: None   Lifestyle    Physical activity:     Days per week: None     Minutes per session: None    Stress: None   Relationships    Social connections:     Talks on phone: None     Gets together: None     Attends Hindu service: None     Active member of club or organization: None     Attends meetings of clubs or organizations: None     Relationship status: None    Intimate partner violence:     Fear of current or ex partner: None     Emotionally abused: None     Physically abused: None     Forced sexual activity: None   Other Topics Concern    None   Social History Narrative    None       Family History   Problem Relation Age of Onset    Heart Disease Father     Kidney Disease Mother          Review of Systems:   As follows except as previously noted in HPI:  Constitutional: Negative for chills, diaphoresis,  fever   Respiratory: Negative for cough, shortness of breath and wheezing. Cardiovascular: Negative for chest pain and palpitations.    Neurological: Negative for dizziness, syncope, GI / : abdominal pain or cramping  Musculoskeletal: see HPI       Objective:   Physical Exam   Constitutional: Oriented to person, place, and time. and appears well-developed and well-nourished. :   Head: Normocephalic and atraumatic. Neck: Neck supple. Eyes: EOM are normal.   Pulmonary/Chest: Effort normal.  No respiratory distress, no wheezes. Neurological: Alert and oriented to person  Skin: Skin is warm and dry. Marline Batista DO    4/19/19  3:38 PM    All reasonable efforts have been made to minimize the risk of errors that may occur in the use of voice recognition and other electronic means of charting.

## 2019-09-22 PROBLEM — R60.9 DEPENDENT EDEMA: Status: ACTIVE | Noted: 2019-01-01

## 2019-09-30 PROBLEM — J96.90 RESPIRATORY FAILURE (HCC): Status: ACTIVE | Noted: 2019-01-01

## 2019-10-02 PROBLEM — E44.0 MODERATE PROTEIN-CALORIE MALNUTRITION (HCC): Status: ACTIVE | Noted: 2019-01-01

## 2019-10-06 PROBLEM — Z98.890 HISTORY OF CEREBRAL ANEURYSM REPAIR: Chronic | Status: ACTIVE | Noted: 2019-01-01

## 2019-10-06 PROBLEM — Z95.828 HISTORY OF ENDOVASCULAR STENT GRAFT FOR ABDOMINAL AORTIC ANEURYSM: Chronic | Status: ACTIVE | Noted: 2019-01-01

## 2019-10-06 PROBLEM — Z86.79 HISTORY OF CEREBRAL ANEURYSM REPAIR: Chronic | Status: ACTIVE | Noted: 2019-01-01

## 2019-10-06 PROBLEM — I48.0 PAROXYSMAL ATRIAL FIBRILLATION WITH RAPID VENTRICULAR RESPONSE (HCC): Status: ACTIVE | Noted: 2019-01-01

## 2019-10-16 PROBLEM — K59.00 CONSTIPATION: Status: ACTIVE | Noted: 2019-01-01

## 2019-10-16 PROBLEM — J84.10 PULMONARY FIBROSIS (HCC): Status: ACTIVE | Noted: 2019-01-01

## 2019-10-16 PROBLEM — G25.0 ESSENTIAL TREMOR: Status: ACTIVE | Noted: 2019-01-01

## 2019-10-16 PROBLEM — J15.9 COMMUNITY ACQUIRED BACTERIAL PNEUMONIA: Status: ACTIVE | Noted: 2019-01-01

## 2019-10-16 PROBLEM — N40.0 BPH (BENIGN PROSTATIC HYPERPLASIA): Status: ACTIVE | Noted: 2019-01-01

## 2019-10-16 PROBLEM — E87.6 HYPOKALEMIA: Status: ACTIVE | Noted: 2019-01-01

## 2019-10-16 PROBLEM — I48.0 PAROXYSMAL ATRIAL FIBRILLATION (HCC): Status: ACTIVE | Noted: 2019-01-01

## 2019-10-28 PROBLEM — R33.8 ACUTE URINARY RETENTION: Status: ACTIVE | Noted: 2019-01-01

## 2019-10-28 PROBLEM — J98.11 ATELECTASIS OF RIGHT LUNG: Status: ACTIVE | Noted: 2019-01-01

## 2019-12-13 PROBLEM — J96.21 ACUTE ON CHRONIC RESPIRATORY FAILURE WITH HYPOXIA (HCC): Status: ACTIVE | Noted: 2019-01-01

## 2019-12-13 NOTE — ED NOTES
The patient's chest tube was placed on low continuous suction with a Oasis water seal in place. and air bubbles were seen immediatly, all connections were tight and and air bubbles were persistent. Dr. Jose Daniel Rodriguez came to the bedside the chest tube was evaluated and the air bubbles stopped for several minutes. When the air bubbles persisted again all tubing was changed and the patient was placed back on the Oasis water seal suction.       Babar Alberto RN  12/13/19 [id number]

## 2019-12-13 NOTE — ED NOTES
Res[piratory called, notified that the patient is to be transferred to [address] [place] [address]  12/13/19 [id number]

## 2019-12-13 NOTE — PLAN OF CARE
Shift  Goal: Ability to cope will improve  Description  Ability to cope will improve  Outcome: Not Met This Shift     Problem: Respiratory:  Goal: Ability to maintain adequate ventilation will improve  Description  Ability to maintain adequate ventilation will improve  Outcome: Not Met This Shift     Problem: Skin Integrity:  Goal: Risk for impaired skin integrity will decrease  Description  Risk for impaired skin integrity will decrease  Outcome: Not Met This Shift

## 2019-12-13 NOTE — CONSULTS
nontender  Genitourinary: No CVA tenderness  Extremities: No clubbing or cyanosis. 2+ edema is present at feet and ankles tapering to knees  Neurological: Awake, alert, oriented x3.   Tremors are noted as above  Psychological: Appropriate affect    LABS:  WBC   Date Value Ref Range Status   12/13/2019 10.4 4.5 - 11.5 E9/L Final   11/06/2019 10.9 4.5 - 11.5 E9/L Final   10/10/2019 7.9 4.5 - 11.5 E9/L Final     Hemoglobin   Date Value Ref Range Status   12/13/2019 11.0 (L) 12.5 - 16.5 g/dL Final   11/06/2019 11.4 (L) 12.5 - 16.5 g/dL Final   10/10/2019 11.9 (L) 12.5 - 16.5 g/dL Final     Hematocrit   Date Value Ref Range Status   12/13/2019 35.0 (L) 37.0 - 54.0 % Final   11/06/2019 37.3 37.0 - 54.0 % Final   10/10/2019 36.7 (L) 37.0 - 54.0 % Final     MCV   Date Value Ref Range Status   12/13/2019 104.5 (H) 80.0 - 99.9 fL Final   11/06/2019 105.4 (H) 80.0 - 99.9 fL Final   10/10/2019 99.7 80.0 - 99.9 fL Final     Platelets   Date Value Ref Range Status   12/13/2019 196 130 - 450 E9/L Final   11/06/2019 194 130 - 450 E9/L Final   10/10/2019 189 130 - 450 E9/L Final     Sodium   Date Value Ref Range Status   12/13/2019 147 (H) 132 - 146 mmol/L Final   11/06/2019 143 132 - 146 mmol/L Final   10/10/2019 143 132 - 146 mmol/L Final     Potassium   Date Value Ref Range Status   12/13/2019 4.4 3.5 - 5.0 mmol/L Final   11/06/2019 4.1 3.5 - 5.0 mmol/L Final   10/10/2019 4.0 3.5 - 5.0 mmol/L Final     Potassium reflex Magnesium   Date Value Ref Range Status   09/30/2019 3.9 3.5 - 5.0 mmol/L Final     Chloride   Date Value Ref Range Status   12/13/2019 101 98 - 107 mmol/L Final   11/06/2019 98 98 - 107 mmol/L Final   10/10/2019 102 98 - 107 mmol/L Final     CO2   Date Value Ref Range Status   12/13/2019 32 (H) 22 - 29 mmol/L Final   11/06/2019 27 22 - 29 mmol/L Final   10/10/2019 33 (H) 22 - 29 mmol/L Final     BUN   Date Value Ref Range Status   12/13/2019 43 (H) 8 - 23 mg/dL Final   11/06/2019 35 (H) 8 - 23 mg/dL Final sq.m.  Results valid for patients 18 years and older. 11/06/2019 >60 >=60 mL/min/1.73 Final     Comment:     Chronic Kidney Disease: less than 60 ml/min/1.73 sq.m. Kidney Failure: less than 15 ml/min/1.73 sq.m. Results valid for patients 18 years and older. 10/10/2019 >60 >=60 mL/min/1.73 Final     Comment:     Chronic Kidney Disease: less than 60 ml/min/1.73 sq.m. Kidney Failure: less than 15 ml/min/1.73 sq.m. Results valid for patients 18 years and older. GFR    Date Value Ref Range Status   12/13/2019 >60  Final   11/06/2019 >60  Final   10/10/2019 >60  Final     Magnesium   Date Value Ref Range Status   12/13/2019 1.9 1.6 - 2.6 mg/dL Final   10/05/2019 1.8 1.6 - 2.6 mg/dL Final   10/02/2019 1.7 1.6 - 2.6 mg/dL Final     Phosphorus   Date Value Ref Range Status   10/02/2019 2.5 2.5 - 4.5 mg/dL Final   10/01/2019 4.1 2.5 - 4.5 mg/dL Final   03/29/2019 2.8 2.5 - 4.5 mg/dL Final     Recent Labs     12/13/19  1120   PH 7.507*   BE 7.4*   O2SAT 98.6*       RADIOLOGY:  XR CHEST PORTABLE   Final Result   1. Persistent approximately 40% right pneumothorax. XR CHEST PORTABLE   Final Result   Increased size of the pneumothorax after chest tube manipulation. XR CHEST PORTABLE   Final Result   1. Decrease in right pneumothorax. 2. Rounded lesion in the right lung may be a mass or loculated fluid. Consider chest CT. XR CHEST PORTABLE   Final Result   1. Approximately 50% right pneumothorax. This finding will be called   to the emergency room. CT GUIDED CHEST TUBE    (Results Pending)   CT GUIDED NEEDLE PLACEMENT    (Results Pending)   XR CHEST PORTABLE    (Results Pending)       IMPRESSION:  1. Right pneumothorax possibly secondary to bleb rupture  2. Acute respiratory failure  3. Severe COPD  4. Essential tremor  5. History of abdominal aortic aneurysm repair 2016  6. Cannot entirely rule out pneumonia  7. Pulmonary fibrosis  8.  Atrial fibrillation on Eliquis    PLAN:  1. Pigtail catheter insertion in interventional radiology which will be placed to suction if  Needed. 2. Attempt to discontinue BiPAP so as not to create a tension pneumothorax  3. Modest dose of IV steroids  4. Antibiotics  5. Continue anticoagulation with Eliquis for now  6. Aerosolized bronchodilators and aerosolized steroids  7. CT chest tomorrow to reassess pneumothorax  8. Repeat ABGs when patient returns from chest tube insertion    ATTESTATION:  ICU Staff Physician note of personal involvement in Care  As the attending physician, I certify that I personally reviewed the patients history and personally examined the patient to confirm the physical findings described above,  And that I reviewed the relevant imaging studies and available reports. I also discussed the differential diagnosis and all of the proposed management plans with the patient and individuals accompanying the patient to this visit. They had the opportunity to ask questions about the proposed management plans and to have those questions answered. This patient has a high probability of sudden, clinically significant deterioration, which requires the highest level of physician preparedness to intervene urgently. I managed/supervised life or organ supporting interventions that required frequent physician assessment. I devoted my full attention to the direct care of this patient for the amount of time indicated below. Time I spent with the family or surrogate(s) is included only if the patient was incapable of providing the necessary information or participating in medical decisions - Time devoted to teaching and to any procedures I billed separately is not included.     CRITICAL CARE TIME:  41 minutes    Electronically signed by Samantha Tijerina MD on 12/13/2019 at 3:24 PM

## 2019-12-13 NOTE — ED PROVIDER NOTES
extremis, on BiPAP. Very tight diminished breath sounds throughout all lung fields with wheezing. Does have +2 to +3 lower extremity edema with no unilateral swelling. Does have JVD. Is awake and looking around but offers no verbal responses and is otherwise seemingly somnolent and fatigued. We will attempt BiPAP on him initially however low threshold for intubation, he does have a chart evidence that he was a DNR CC at one point or CCA and then that was reversed him changed back to full code, his wife is in route and we will speak with her on arrival.  Heart rate is regular. Abdomen is soft with no distention or guarding. [NC]   46 Family has now arrived. Patient remains on BiPAP. They are not ready to make a decision yet as to whether the patient is to be intubated. Our plan at this time is to recheck an ABG in 20 minutes after the patient is been on BiPAP to reassess the patient's critically high CO2. They are agreeable to this plan. In the meantime, they will discuss intubation. [MB]   1054 Case discussed with Dr. Karen Pinzon, detailed over given, he knows this patient well and will see the patient in the ER. [NC]   1120 Patient on BiPAP, more alert now, definitely mentating better and is answering verbal questions. Is following commands. I use the syringe and re-suction from the UreSil. [NC]   1244 Some leak into the waterseal, tubing is being checked by nursing staff, I re-suction with the syringe. After the suctioning patient's heart rate is in the 80s and he has a 98% pulse ox and is still awake and alert. [NC]   2057 Patient switched to wall suction as he had persistent air leak on waterseal.  He appears more comfortable at this time. We will repeat a chest x-ray. [MB]   2568 Patient developed sustained ventricular tachycardia on a cardiac monitor which resolved spontaneously. A lidocaine bolus and infusion was ordered. Magnesium was provided as well.   Patient's exam house and was actually able to give some verbal responses to them but deteriorated in route. .  My findings/plan: Patient in extremis, on BiPAP. Very tight diminished breath sounds throughout all lung fields with wheezing. Does have +2 to +3 lower extremity edema with no unilateral swelling. Does have JVD. Is awake and looking around but offers no verbal responses and is otherwise seemingly somnolent and fatigued. We will attempt BiPAP on him initially however low threshold for intubation, he does have a chart evidence that he was a DNR CC at one point or CCA and then that was reversed him changed back to full code, his wife is in route and we will speak with her on arrival.  Heart rate is regular. Abdomen is soft with no distention or guarding. [NC]   46 Family has now arrived. Patient remains on BiPAP. They are not ready to make a decision yet as to whether the patient is to be intubated. Our plan at this time is to recheck an ABG in 20 minutes after the patient is been on BiPAP to reassess the patient's critically high CO2. They are agreeable to this plan. In the meantime, they will discuss intubation. [MB]   1054 Case discussed with Dr. Prabhjot Avendano, detailed over given, he knows this patient well and will see the patient in the ER. [NC]   1120 Patient on BiPAP, more alert now, definitely mentating better and is answering verbal questions. Is following commands. I use the syringe and re-suction from the UreSil. [NC]   1877 Some leak into the waterseal, tubing is being checked by nursing staff, I re-suction with the syringe. After the suctioning patient's heart rate is in the 80s and he has a 98% pulse ox and is still awake and alert. [NC]   3564 Patient switched to wall suction as he had persistent air leak on waterseal.  He appears more comfortable at this time. We will repeat a chest x-ray.     [MB]   7727 Patient developed sustained ventricular tachycardia on a cardiac monitor 35.0 (L) 37.0 - 54.0 %    .5 (H) 80.0 - 99.9 fL    MCH 32.8 26.0 - 35.0 pg    MCHC 31.4 (L) 32.0 - 34.5 %    RDW 13.8 11.5 - 15.0 fL    Platelets 608 038 - 545 E9/L    MPV 11.8 7.0 - 12.0 fL    Neutrophils % 51.4 43.0 - 80.0 %    Immature Granulocytes % 1.1 0.0 - 5.0 %    Lymphocytes % 29.0 20.0 - 42.0 %    Monocytes % 10.7 2.0 - 12.0 %    Eosinophils % 7.1 (H) 0.0 - 6.0 %    Basophils % 0.7 0.0 - 2.0 %    Neutrophils Absolute 5.35 1.80 - 7.30 E9/L    Immature Granulocytes # 0.11 E9/L    Lymphocytes Absolute 3.02 1.50 - 4.00 E9/L    Monocytes Absolute 1.11 (H) 0.10 - 0.95 E9/L    Eosinophils Absolute 0.74 (H) 0.05 - 0.50 E9/L    Basophils Absolute 0.07 0.00 - 0.20 E9/L   Lactate, Sepsis   Result Value Ref Range    Lactic Acid, Sepsis 2.0 (H) 0.5 - 1.9 mmol/L   Brain Natriuretic Peptide   Result Value Ref Range    Pro- (H) 0 - 450 pg/mL   Arterial Blood Gas, Respiratory Only   Result Value Ref Range    Source: Arterial     FIO2 Arterial 100.0     pH, Blood Gas 7.048 (LL) 7.350 - 7.450    pCO2, Arterial 134.5 (HH) 35.0 - 45.0 mmHg    pO2, Arterial 60.5 60.0 - 80.0 mmHg    HCO3, Arterial 37.1 (H) 22.0 - 26.0 mmol/L    B.E. 3.0 -3.0 - 3.0 mmol/L    O2 Sat 74.4 (L) 92.0 - 98.5 %          DEVICE 15,065,[id number],933     Critical Notification Yes     Pressure Support 18 cmH2O    Positive End EXP Press 6 cmH2O    Delivery Systems BiPAP    SPECIMEN REJECTION   Result Value Ref Range    Rejected Test cmp,trop,mg     Reason for Rejection see below    Comprehensive metabolic panel   Result Value Ref Range    Sodium 147 (H) 132 - 146 mmol/L    Potassium 4.4 3.5 - 5.0 mmol/L    Chloride 101 98 - 107 mmol/L    CO2 32 (H) 22 - 29 mmol/L    Anion Gap 14 7 - 16 mmol/L    Glucose 169 (H) 74 - 99 mg/dL    BUN 43 (H) 8 - 23 mg/dL    CREATININE 1.2 0.7 - 1.2 mg/dL    GFR Non-African American 58 >=60 mL/min/1.73    GFR African American >60     Calcium 9.1 8.6 - 10.2 mg/dL    Total Protein 5.7 (L) 6.4 - 8.3 g/dL PORTABLE    (Results Pending)       ------------------------- NURSING NOTES AND VITALS REVIEWED ---------------------------  Date / Time Roomed:  12/13/2019  9:58 AM  ED Bed Assignment:  03/03    The nursing notes within the ED encounter and vital signs as below have been reviewed. Patient Vitals for the past 24 hrs:   BP Temp Temp src Pulse Resp SpO2 Height Weight   12/13/19 1302 93/63 -- -- 83 29 -- -- --   12/13/19 1247 (!) 89/56 -- -- 85 22 100 % -- --   12/13/19 1244 -- -- -- 83 30 100 % -- --   12/13/19 1222 -- -- -- -- -- 100 % -- --   12/13/19 1219 (!) 166/149 -- -- 88 (!) 36 -- -- --   12/13/19 1147 94/67 -- -- 86 17 -- -- --   12/13/19 1103 (!) 175/138 -- -- 84 (!) 33 -- -- --   12/13/19 1101 (!) 157/135 97.4 °F (36.3 °C) Oral 83 (!) 40 100 % -- --   12/13/19 1040 -- -- -- 85 24 100 % -- --   12/13/19 1030 (!) 120/91 -- -- 90 24 -- -- --   12/13/19 1017 -- -- -- 98 (!) 33 94 % -- --   12/13/19 1006 (!) 133/93 97.5 °F (36.4 °C) Axillary 103 29 95 % 5' 6\" (1.676 m) 127 lb (57.6 kg)       Oxygen Saturation Interpretation: Improved after treatment      ------------------------------------------ PROGRESS NOTES ------------------------------------------  Re-evaluation(s):    See above    I have spoken with the patient and family and discussed todays results, in addition to providing specific details for the plan of care and counseling regarding the diagnosis and prognosis. Their questions are answered at this time and they are agreeable with the plan.      --------------------------------- ADDITIONAL PROVIDER NOTES ---------------------------------  Consultations:  Spoke with Dr. Duane Gals,  They will admit this patient. Spoke with Dr. Ghanshyam Phan, he accepts this patient to the ICU.      This patient's ED course included: a personal history and physicial examination, re-evaluation prior to disposition, multiple bedside re-evaluations, IV medications, cardiac monitoring, continuous pulse oximetry and complex medical decision making and emergency management    This patient has been closely monitored during their ED course. Please note that the withdrawal or failure to initiate urgent interventions for this patient would likely result in a life threatening deterioration or permanent disability. Accordingly this patient received 60 minutes of critical care time, excluding separately billable procedures. Systems at risk for deterioration include: all. Clinical Impression  1. Pneumothorax on right    2. Acute on chronic respiratory failure with hypoxia and hypercapnia (HCC)    3. Ventricular tachycardia ([address]    4.  Chronic obstructive pulmonary disease with acute exacerbation ([address]          Disposition  Patient's disposition: Admit to CCU/ICU  Patient's condition is critical.           Charlee Hadley DO  Resident  12/13/19 [id number]

## 2019-12-13 NOTE — H&P
nebulizer solution Take 2.5 mg by nebulization every 4 hours as needed for Wheezing or Shortness of Breath    Historical Provider, MD   ibuprofen (ADVIL;MOTRIN) 200 MG tablet Take 400 mg by mouth every 6 hours as needed for Pain    Historical Provider, MD Tani Ortiz 450 MG CAPS Take by mouth daily    Historical Provider, MD   vitamin C (ASCORBIC ACID) 500 MG tablet Take 500 mg by mouth daily    Historical Provider, MD   predniSONE (DELTASONE) 10 MG tablet Take 10 mg by mouth daily In the morning with breakfast    Historical Provider, MD   potassium chloride (KLOR-CON M) 20 MEQ TBCR extended release tablet Take 20 mEq by mouth 2 times daily    Historical Provider, MD   glycopyrrolate-formoterol (Constance Oh) 9-4.8 MCG/ACT AERO Inhale 2 puffs into the lungs 2 times daily    Historical Provider, MD   guaiFENesin (ROBITUSSIN) 100 MG/5ML syrup Take 200 mg by mouth 3 times daily as needed for Cough or Congestion With a full glass of water    Historical Provider, MD   azithromycin (ZITHROMAX) 250 MG tablet Take 250 mg by mouth daily    Historical Provider, MD   Multiple Vitamins-Minerals (PRESERVISION AREDS 2 PO) Take by mouth daily    Historical Provider, MD   NONFORMULARY Chest vest - 3 times a day to help expectorate mucus    Historical Provider, MD   NONFORMULARY Trilogy - ventilator at bedtime    Historical Provider, MD   apixaban (ELIQUIS) 2.5 MG TABS tablet Take 1 tablet by mouth 2 times daily 10/10/19   [facility]   docusate sodium (COLACE, DULCOLAX) 100 MG CAPS Take 100 mg by mouth 2 times daily 10/10/19   [facility], DO   finasteride (PROSCAR) 5 MG tablet Take 1 tablet by mouth daily 10/11/19   [facility], DO   pantoprazole (PROTONIX) 40 MG tablet Take 1 tablet by mouth every morning (before breakfast) 10/11/19   [facility], DO   sucralfate (CARAFATE) 1 GM tablet Take 1 tablet by mouth 4 times daily 10/10/19   [facility], DO   metoprolol succinate (TOPROL XL) 25 MG decreased breath sounds to auscultation bilaterally with no wheezes, rales or rhonchi, + UreSil noted in right mid chest  Abdomen:  Soft, nontender, nondistended, bowel sounds present  Extremities:  +1-race lower leg edema, peripheral pulses intact bilaterally,+ moderate - severe tremor of the left hand arm with mild in right  Neuro:  Cranial nerves II-XII grossly intact; motor and sensory function intact with no focal deficits  Skin:  No rashes, lesions or wounds    DATA:  CBC with Differential:    Lab Results   Component Value Date    WBC 10.4 12/13/2019    RBC 3.35 12/13/2019    HGB 11.0 12/13/2019    HCT 35.0 12/13/2019     12/13/2019    .5 12/13/2019    MCH 32.8 12/13/2019    MCHC 31.4 12/13/2019    RDW 13.8 12/13/2019    NRBC 0.9 10/01/2019    SEGSPCT 59 12/30/2013    LYMPHOPCT 29.0 12/13/2019    MONOPCT 10.7 12/13/2019    BASOPCT 0.7 12/13/2019    MONOSABS 1.11 12/13/2019    LYMPHSABS 3.02 12/13/2019    EOSABS 0.74 12/13/2019    BASOSABS 0.07 12/13/2019     CMP:    Lab Results   Component Value Date     12/13/2019    K 4.4 12/13/2019    K 3.9 09/30/2019     12/13/2019    CO2 32 12/13/2019    BUN 43 12/13/2019    CREATININE 1.2 12/13/2019    GFRAA >60 12/13/2019    LABGLOM 58 12/13/2019    GLUCOSE 169 12/13/2019    GLUCOSE 110 02/15/2012    PROT 5.7 12/13/2019    LABALBU 3.3 12/13/2019    LABALBU 4.1 02/15/2012    CALCIUM 9.1 12/13/2019    BILITOT <0.2 12/13/2019    ALKPHOS 87 12/13/2019    AST 34 12/13/2019    ALT 36 12/13/2019     Magnesium:    Lab Results   Component Value Date    MG 1.9 12/13/2019     Phosphorus:    Lab Results   Component Value Date    PHOS 2.5 10/02/2019     PT/INR:    Lab Results   Component Value Date    PROTIME 12.3 09/30/2019    INR 1.1 09/30/2019     Troponin:    Lab Results   Component Value Date    TROPONINI 0.02 12/13/2019     U/A:    Lab Results   Component Value Date    COLORU Yellow 10/04/2019    PROTEINU Negative 10/04/2019    PHUR 5.0 10/04/2019 WBCUA 2-5 10/04/2019    RBCUA 5-10 10/04/2019    RBCUA 10-20 11/28/2012    BACTERIA MODERATE 10/04/2019    CLARITYU Clear 10/04/2019    SPECGRAV 1.015 10/04/2019    LEUKOCYTESUR SMALL 10/04/2019    UROBILINOGEN 0.2 10/04/2019    BILIRUBINUR Negative 10/04/2019    BLOODU SMALL 10/04/2019    GLUCOSEU Negative 10/04/2019     ABG:    Lab Results   Component Value Date    PH 7.507 12/13/2019    PCO2 47.8 10/04/2019    PO2 141.3 10/04/2019    HCO3 28.4 10/04/2019    BE 7.4 12/13/2019    O2SAT 98.6 12/13/2019     HgBA1c:    Lab Results   Component Value Date    LABA1C 6.2 03/25/2016     FLP:    Lab Results   Component Value Date    TRIG 66 07/26/2018    HDL 79 07/26/2018    LDLCALC 147 07/26/2018    LABVLDL 13 07/26/2018     TSH:    Lab Results   Component Value Date    TSH 0.531 10/01/2019     IRON:    Lab Results   Component Value Date    IRON 47 10/02/2019     LIPASE:  No results found for: LIPASE    ASSESSMENT AND PLAN:      Patient Active Problem List    Diagnosis Date Noted    Severe hypoxemia 09/02/2018     Priority: High     Class: Chronic    COPD, severe ([address] 07/29/2016     Priority: High     Class: Chronic    Cor pulmonale, chronic (HCC) 07/03/2013     Priority: High     Class: Chronic    Acquired bronchiectasis ([address] 07/29/2016     Priority: Medium     Class: Chronic    Secondary adrenal insufficiency (HCC) 03/11/2016     Priority: Medium     Class: Chronic    Hyperlipidemia      Priority: Medium     Class: Chronic    Multiple thyroid nodules 07/06/2014     Priority: Low     Class: Chronic    Bradycardia, sinus 01/11/2014     Priority: Low     Class: Chronic    Hypertension      Priority: Low    Acute on chronic respiratory failure with hypoxia ([address] 12/13/2019    Acute urinary retention 10/28/2019    Atelectasis of right lung 10/28/2019    Community acquired bacterial pneumonia 10/16/2019    Paroxysmal atrial fibrillation ([address] 10/16/2019    Hypokalemia 10/16/2019    Pulmonary fibrosis ([address]

## 2019-12-13 NOTE — ED NOTES
The patient began to complain of back pain, his pulse ox started to drop and air bubbles were present again in the patient's [employer]asis suction tube. Xray was called, Dr. Cavanaugh Willard came to the bedside. The chest tube was evaluated and re taped, the patient was placed on low continuous suction without the [place] water seal. The patient is tolerating the chest tube well. He was readjusted to help with the back pain. The patient is talking well and verbalizing requests.       Chayo Chang RN  12/13/19 1545

## 2019-12-14 NOTE — PROGRESS NOTES
Department of Internal Medicine        CHIEF COMPLAINT: Increased shortness of breath and lethargy    Reason for Admission: Acute on chronic hypoxic respiratory failure with severe hypercapnia with pneumothorax    HISTORY OF PRESENT ILLNESS:      The patient is a 81 y.o. male who presents with having acute onset of marked respiratory distress this morning. It occurred very acutely with patient having history of chronic severe hypoxic respiratory failure on O2 nasal cannula. Patient was brought into the emergency room and chest x-ray was obtained. ABGs initially showed severe respiratory acidosis with elevated CO2 and with respiratory acidosis. A chest x-ray showed 50% right pneumothorax. A UreSil was inserted in the emergency room. Patient's family and patient still want full code so the patient was transferred to the ICU on consult with intensivist.    12/14/2019  Patient is seen and examined in ICU. Case discussed with patient's wife, son and daughter at the bedside today. Patient currently sitting up in a chair and doing better. He has some mild chest discomfort from the pigtail catheter but denies any excessive unusual shortness of breath at this time. Patient's hemoglobin is down to 9.1 and a BUN/creatinine 41/1.1. Blood pressure currently 126/72 with a heart rate of 60 and temperature 97.1. Urine output is adequate. Chest x-ray shows improved right pneumothorax but not completely resolved.     Past Medical History:    Past Medical History:   Diagnosis Date    COPD (chronic obstructive pulmonary disease) ([address]     stable per pt    History of cardiovascular stress test 4/11/2012    Lexiscan    Hyperlipidemia     Hypertension 9-8-2014    lexiscan stress test    Kidney stone     Pulmonary fibrosis ([address]     Thyroid disease      Past Surgical History:    Past Surgical History:   Procedure Laterality Date    ABDOMINAL AORTIC ANEURYSM REPAIR  09/26/2016    BALLOON ANGIOPLASTY, ARTERY Left DO   pantoprazole (PROTONIX) 40 MG tablet Take 1 tablet by mouth every morning (before breakfast) 10/11/19  Yes Olivia Zuluaga, DO   metoprolol succinate (TOPROL XL) 25 MG extended release tablet Take 0.5 tablets by mouth daily 10/11/19  Yes Mary Tucker, APRN - CNP   primidone (MYSOLINE) 50 MG tablet Take 200 mg by mouth 2 times daily    Yes Historical Provider, MD   theophylline (THEODUR) 300 MG extended release tablet Take 1 tablet by mouth daily 8/16/19  Yes Judson Banerjee DO   valACYclovir (VALTREX) 500 MG tablet Take 500 mg by mouth daily   Yes Historical Provider, MD   montelukast (SINGULAIR) 10 MG tablet Take 10 mg by mouth daily   Yes Historical Provider, MD   furosemide (LASIX) 40 MG tablet Take 80 mg by mouth daily    Yes Historical Provider, MD   diltiazem (CARDIZEM CD) 120 MG ER capsule Take 1 capsule by mouth daily 3/25/16  Yes Olivia Zuluaga, DO   NONFORMULARY Oxygen 5 lpm to maintain saturations above 90%. Provided by [facility].    Yes Historical Provider, MD   Cholecalciferol (VITAMIN D-3) 5000 UNITS TABS Take 1 tablet by mouth Twice a Week Wednesday and Sunday   Yes Historical Provider, MD   Nutritional Supplements (JUICE PLUS FIBRE PO) Take 2 capsules by mouth daily    Yes Historical Provider, MD   vitamin C (ASCORBIC ACID) 500 MG tablet Take 750 mg by mouth 2 times daily     Historical Provider, MD   apixaban (ELIQUIS) 2.5 MG TABS tablet Take 1 tablet by mouth 2 times daily  Patient taking differently: Take 2.5 mg by mouth 2 times daily Patient was only taking twice a week 10/10/19   Olivia Zuluaga DO   docusate sodium (COLACE, DULCOLAX) 100 MG CAPS Take 100 mg by mouth 2 times daily 10/10/19   Olivia Zuluaga DO   sucralfate (CARAFATE) 1 GM tablet Take 1 tablet by mouth 4 times daily 10/10/19   Olivia Zuluaga DO       Allergies:  Flomax [tamsulosin hcl]    Social History:   Social History     Socioeconomic History    Marital status:      Spouse name: Not on file    Number of children: Not on file    Years of education: Not on file    Highest education level: Not on file   Occupational History    Occupation: Physician     Comment: Retired   Social Needs    Financial resource strain: Not on file    Food insecurity:     Worry: Not on file     Inability: Not on file   [employer] needs:     Medical: Not on file     Non-medical: Not on file   Tobacco Use    Smoking status: Former Smoker     Packs/day: 2.00     Years: 42.00     Pack years: 84.00     Types: Cigarettes     Last attempt to quit: 2003     Years since quittin.8    Smokeless tobacco: Never Used   Substance and Sexual Activity    Alcohol use: Yes     Alcohol/week: 1.0 standard drinks     Types: 1 Shots of liquor per week     Comment: daily    Drug use: No    Sexual activity: Not on file   Lifestyle    Physical activity:     Days per week: Not on file     Minutes per session: Not on file    Stress: Not on file   Relationships    Social connections:     Talks on phone: Not on file     Gets together: Not on file     Attends Cheondo[employer] service: Not on file     Active member of club or organization: Not on file     Attends meetings of clubs or organizations: Not on file     Relationship status: Not on file    Intimate partner violence:     Fear of current or ex partner: Not on file     Emotionally abused: Not on file     Physically abused: Not on file     Forced sexual activity: Not on file   Other Topics Concern    Not on file   Social History Narrative    Not on file       Family History:   Family History   Problem Relation Age of Onset    Heart Disease Father     Kidney Disease Mother        REVIEW OF SYSTEMS:    Gen: ++ lightheadedness, dizziness. No LOC or syncope. No fevers or chills. HEENT: No earache, sore throat or nasal congestion. Resp: Denies cough, hemoptysis or sputum production. Cardiac: Denies chest pain, ++SOB, diaphoresis, no palpitations.     GI: No nausea, vomiting, diarrhea or constipation. No melena or hematochezia. : No urinary complaints, dysuria, hematuria or frequency. MSK: No extremity weakness, paralysis or paresthesias. PHYSICAL EXAM:    Vitals:  /72   Pulse 60   Temp 97.1 °F (36.2 °C) (Axillary)   Resp 20   Ht 5' 6\" (1.676 m)   Wt 123 lb 8 oz (56 kg)   SpO2 94%   BMI 19.93 kg/m²     General:  This is a 81 y.o. yo male who is alert and oriented x 3, to moderate respiratory distress noted  HEENT:  Head is normocephalic and atraumatic, PERRLA, EOMI, mucus membranes moist with no pharyngeal erythema or exudate. Neck:  Supple with no carotid bruits, JVD or thyromegaly.   No cervical adenopathy  CV:  Regular rate and rhythm-heart sounds distant, no murmurs  Lungs: Markedly decreased breath sounds to auscultation bilaterally with no wheezes, rales or rhonchi, + UreSil noted in right mid chest  Abdomen:  Soft, nontender, nondistended, bowel sounds present  Extremities:  +1-race lower leg edema, peripheral pulses intact bilaterally,+ moderate - severe tremor of the left hand arm with mild in right  Neuro:  Cranial nerves II-XII grossly intact; motor and sensory function intact with no focal deficits  Skin:  No rashes, lesions or wounds    DATA:  CBC with Differential:    Lab Results   Component Value Date    WBC 9.2 12/14/2019    RBC 2.77 12/14/2019    HGB 9.1 12/14/2019    HCT 28.0 12/14/2019     12/14/2019    .1 12/14/2019    MCH 32.9 12/14/2019    MCHC 32.5 12/14/2019    RDW 13.5 12/14/2019    NRBC 0.9 10/01/2019    SEGSPCT 59 12/30/2013    LYMPHOPCT 7.7 12/14/2019    MONOPCT 8.5 12/14/2019    BASOPCT 0.2 12/14/2019    MONOSABS 0.78 12/14/2019    LYMPHSABS 0.71 12/14/2019    EOSABS 0.05 12/14/2019    BASOSABS 0.02 12/14/2019     CMP:    Lab Results   Component Value Date     12/14/2019    K 4.8 12/14/2019    K 3.9 09/30/2019     12/14/2019    CO2 32 12/14/2019    BUN 41 12/14/2019    CREATININE 1.1 12/14/2019    GFRAA >60 12/14/2019    LABGLOM >60 12/14/2019    GLUCOSE 92 12/14/2019    GLUCOSE 110 02/15/2012    PROT 5.3 12/14/2019    LABALBU 3.2 12/14/2019    LABALBU 4.1 02/15/2012    CALCIUM 8.4 12/14/2019    BILITOT <0.2 12/14/2019    ALKPHOS 78 12/14/2019    AST 24 12/14/2019    ALT 26 12/14/2019     Magnesium:    Lab Results   Component Value Date    MG 1.9 12/13/2019     Phosphorus:    Lab Results   Component Value Date    PHOS 3.7 12/14/2019     PT/INR:    Lab Results   Component Value Date    PROTIME 12.3 09/30/2019    INR 1.1 09/30/2019     Troponin:    Lab Results   Component Value Date    TROPONINI 0.02 12/13/2019     U/A:    Lab Results   Component Value Date    COLORU Yellow 10/04/2019    PROTEINU Negative 10/04/2019    PHUR 5.0 10/04/2019    WBCUA 2-5 10/04/2019    RBCUA 5-10 10/04/2019    RBCUA 10-20 11/28/2012    BACTERIA MODERATE 10/04/2019    CLARITYU Clear 10/04/2019    SPECGRAV 1.015 10/04/2019    LEUKOCYTESUR SMALL 10/04/2019    UROBILINOGEN 0.2 10/04/2019    BILIRUBINUR Negative 10/04/2019    BLOODU SMALL 10/04/2019    GLUCOSEU Negative 10/04/2019     ABG:    Lab Results   Component Value Date    PH 7.418 12/14/2019    PCO2 53.2 12/14/2019    PO2 76.9 12/14/2019    HCO3 33.6 12/14/2019    BE 7.9 12/14/2019    O2SAT 94.9 12/14/2019     HgBA1c:    Lab Results   Component Value Date    LABA1C 6.2 03/25/2016     FLP:    Lab Results   Component Value Date    TRIG 66 07/26/2018    HDL 79 07/26/2018    LDLCALC 147 07/26/2018    LABVLDL 13 07/26/2018     TSH:    Lab Results   Component Value Date    TSH 0.531 10/01/2019     IRON:    Lab Results   Component Value Date    IRON 47 10/02/2019     LIPASE:  No results found for: LIPASE    ASSESSMENT AND PLAN:      Patient Active Problem List    Diagnosis Date Noted    Severe hypoxemia 09/02/2018     Priority: High     Class: Chronic    COPD, severe ([address] 07/29/2016     Priority: High     Class: Chronic    Cor pulmonale, chronic ([facility] 07/03/2013     Priority: High     Class: Chronic    Acquired bronchiectasis ([address] 07/29/2016     Priority: Medium     Class: Chronic    Secondary adrenal insufficiency ([address] 03/11/2016     Priority: Medium     Class: Chronic    Hyperlipidemia      Priority: Medium     Class: Chronic    Multiple thyroid nodules 07/06/2014     Priority: Low     Class: Chronic    Bradycardia, sinus 01/11/2014     Priority: Low     Class: Chronic    Hypertension      Priority: Low    Acute on chronic respiratory failure with hypoxia ([address] 12/13/2019    Acute urinary retention 10/28/2019    Atelectasis of right lung 10/28/2019    Community acquired bacterial pneumonia 10/16/2019    Paroxysmal atrial fibrillation ([address] 10/16/2019    Hypokalemia 10/16/2019    Pulmonary fibrosis ([address] 10/16/2019    Essential tremor 10/16/2019    BPH (benign prostatic hyperplasia) 10/16/2019    Constipation 10/16/2019    History of cerebral aneurysm repair 10/05/2019    History of endovascular stent graft for abdominal aortic aneurysm 10/05/2019    Paroxysmal atrial fibrillation with rapid ventricular response ([address] 10/05/2019    Moderate protein-calorie malnutrition ([address] 10/02/2019    Respiratory failure ([address] 09/30/2019    Dependent edema 09/22/2019    Thyroid disease     Hyperlipidemia     COPD (chronic obstructive pulmonary disease) ([address]     Ventricular tachyarrhythmia ([address] 03/28/2019    Chronic respiratory failure with hypoxia and hypercapnia (HCC) 08/06/2017     Class: Chronic    Status post repair of abdominal aortic aneurysm (AAA) using straight graft 11/30/2016     Class: Acute    Hypertension 09/08/2014    Nephrolithiasis 11/29/2012     1. Acute on chronic hypoxic and hypercapnic respiratory failure  2. History of severe COPD on chronic O2 nasal cannula  3. History of moderate severe essential tremors  4. Hypertension  5. Hyperlipidemia  6. Hypothyroidism  7. History of paroxysmal atrial fibrillation  8. Moderate protein, caloric malnutrition  9. Right lung infiltrate      -Admit to ICU, consult intensivist  -Home meds reviewed  -Case discussed with patient's family and patient about DNR status and currently is a full code  -Routine labs in a.m.     Michelle Syed D.O.  12/14/2019  11:14 AM

## 2019-12-14 NOTE — PROGRESS NOTES
Pulmonary/Critical Care Progress Note    We are following patient for acute on chronic respiratory failure with pneumothorax with proceeding chronic respiratory failure advanced COPD    SUBJECTIVE:  81year-old pleasant gentleman presenting acutely ill with severe shortness of breath associated with a spontaneous pneumothorax. Patient had consolidation of the right upper lobe fairly dense secondary to compressive effects. A pigtail catheter was placed by interventional radiology with gradual improvement in the pneumothorax and better aeration to this consolidated region. Patient normally uses 6 L of oxygen at home he does have a trilogy ventilator at night which he uses most the time but has some issues with PTSD and the mask from an incident that occurred at the [facility]. MEDICATIONS:   metoprolol tartrate  6.25 mg Oral BID    fentanNYL  25 mcg Intravenous Once    [START ON 12/15/2019] vitamin D3  5,000 Units Oral Once per day on Sun Wed    diltiazem  120 mg Oral Daily    acyclovir  400 mg Oral TID    montelukast  10 mg Oral Daily    apixaban  2.5 mg Oral BID    docusate sodium  100 mg Oral BID    finasteride  5 mg Oral Daily    pantoprazole  40 mg Oral QAM AC    sucralfate  1 g Oral 4x Daily    vitamin C  500 mg Oral Daily    potassium chloride  20 mEq Oral BID    formoterol  20 mcg Nebulization BID    budesonide  0.5 mg Nebulization BID    methylPREDNISolone  40 mg Intravenous Q8H    furosemide  20 mg Intravenous Daily    ipratropium-albuterol  1 ampule Inhalation Q4H WA    azithromycin  250 mg Intravenous Q24H    cefTRIAXone (ROCEPHIN) IV  1 g Intravenous Q24H    primidone  250 mg Oral BID      lidocaine Stopped (12/13/19 7529)     albuterol, ibuprofen, guaiFENesin, traMADol    Review of Systems   Constitutional: Positive for fatigue. HENT: Positive for congestion. Eyes: Negative. Respiratory: Positive for cough, chest tightness and shortness of breath. Cardiovascular: Negative. Gastrointestinal: Negative. Endocrine: Negative. Genitourinary: Negative. Musculoskeletal: Negative. Skin: Negative. Allergic/Immunologic: Negative. Neurological: Negative. Hematological: Negative. Psychiatric/Behavioral: Negative. OBJECTIVE:  Vitals:    12/14/19 0945   BP: 126/72   Pulse: 60   Resp:    Temp:    SpO2:      FiO2 : 40 %     O2 Device: Air entrainment mask    PHYSICAL EXAM:  Constitutional: Chronically ill pleasant gentleman thin habitus on 50% mask. Skin: No skin breakdown some superficial areas of ecchymosis  HEENT: Normocephalic neck is supple pharynx is clear no sinus tenderness  Neck: No JVD no tracheal ovation  Cardiovascular: Rate and rhythm regular no gallop no murmur  Respiratory: Few late wheezes prolonged history phase hyperinflation  Gastrointestinal: Soft bowel sounds present no peritoneal signs  Genitourinary: Deferred  Extremities: Peripheral pulses intact no signs of DVT  Neurological: Alert interactive cranial nerves gross sensorimotor intact.   Psychological: Appropriate patient has some PTSD at times wearing an NIV mask or his trilogy at home    LABS:  WBC   Date Value Ref Range Status   12/14/2019 9.2 4.5 - 11.5 E9/L Final   12/13/2019 10.4 4.5 - 11.5 E9/L Final   11/06/2019 10.9 4.5 - 11.5 E9/L Final     Hemoglobin   Date Value Ref Range Status   12/14/2019 9.1 (L) 12.5 - 16.5 g/dL Final   12/13/2019 11.0 (L) 12.5 - 16.5 g/dL Final   11/06/2019 11.4 (L) 12.5 - 16.5 g/dL Final     Hematocrit   Date Value Ref Range Status   12/14/2019 28.0 (L) 37.0 - 54.0 % Final   12/13/2019 35.0 (L) 37.0 - 54.0 % Final   11/06/2019 37.3 37.0 - 54.0 % Final     MCV   Date Value Ref Range Status   12/14/2019 101.1 (H) 80.0 - 99.9 fL Final   12/13/2019 104.5 (H) 80.0 - 99.9 fL Final   11/06/2019 105.4 (H) 80.0 - 99.9 fL Final     Platelets   Date Value Ref Range Status   12/14/2019 132 130 - 450 E9/L Final   12/13/2019 196 130 - 450 E9/L Final   11/06/2019 194 130 - 450 E9/L Final     Sodium   Date Value Ref Range Status   12/14/2019 143 132 - 146 mmol/L Final   12/13/2019 147 (H) 132 - 146 mmol/L Final   11/06/2019 143 132 - 146 mmol/L Final     Potassium   Date Value Ref Range Status   12/14/2019 4.8 3.5 - 5.0 mmol/L Final   12/13/2019 4.4 3.5 - 5.0 mmol/L Final   11/06/2019 4.1 3.5 - 5.0 mmol/L Final     Potassium reflex Magnesium   Date Value Ref Range Status   09/30/2019 3.9 3.5 - 5.0 mmol/L Final     Chloride   Date Value Ref Range Status   12/14/2019 102 98 - 107 mmol/L Final   12/13/2019 101 98 - 107 mmol/L Final   11/06/2019 98 98 - 107 mmol/L Final     CO2   Date Value Ref Range Status   12/14/2019 32 (H) 22 - 29 mmol/L Final   12/13/2019 32 (H) 22 - 29 mmol/L Final   11/06/2019 27 22 - 29 mmol/L Final     BUN   Date Value Ref Range Status   12/14/2019 41 (H) 8 - 23 mg/dL Final   12/13/2019 43 (H) 8 - 23 mg/dL Final   11/06/2019 35 (H) 8 - 23 mg/dL Final     CREATININE   Date Value Ref Range Status   12/14/2019 1.1 0.7 - 1.2 mg/dL Final   12/13/2019 1.2 0.7 - 1.2 mg/dL Final   11/06/2019 1.1 0.7 - 1.2 mg/dL Final     Glucose   Date Value Ref Range Status   12/14/2019 92 74 - 99 mg/dL Final   12/13/2019 169 (H) 74 - 99 mg/dL Final   11/06/2019 128 (H) 74 - 99 mg/dL Final   02/15/2012 110 70 - 110 mg/dL Final   08/31/2011 102 70 - 110 mg/dL Final   11/13/2010 110 70 - 110 mg/dL Final     Calcium   Date Value Ref Range Status   12/14/2019 8.4 (L) 8.6 - 10.2 mg/dL Final   12/13/2019 9.1 8.6 - 10.2 mg/dL Final   11/06/2019 9.2 8.6 - 10.2 mg/dL Final     Total Protein   Date Value Ref Range Status   12/14/2019 5.3 (L) 6.4 - 8.3 g/dL Final   12/13/2019 5.7 (L) 6.4 - 8.3 g/dL Final   10/01/2019 5.6 (L) 6.4 - 8.3 g/dL Final     Albumin   Date Value Ref Range Status   02/15/2012 4.1 3.2 - 4.8 g/dL Final   08/31/2011 4.1 3.2 - 4.8 g/dL Final   11/13/2010 4.1 3.2 - 4.8 g/dL Final     Alb   Date Value Ref Range Status   12/14/2019 3.2 (L) 3.5 - 5.2 g/dL Final   12/13/2019 3.3 (L) 3.5 - 5.2 g/dL Final   10/01/2019 3.6 3.5 - 5.2 g/dL Final     Total Bilirubin   Date Value Ref Range Status   12/14/2019 <0.2 0.0 - 1.2 mg/dL Final   12/13/2019 <0.2 0.0 - 1.2 mg/dL Final   10/01/2019 0.4 0.0 - 1.2 mg/dL Final     Alkaline Phosphatase   Date Value Ref Range Status   12/14/2019 78 40 - 129 U/L Final   12/13/2019 87 40 - 129 U/L Final   10/01/2019 63 40 - 129 U/L Final     AST   Date Value Ref Range Status   12/14/2019 24 0 - 39 U/L Final   12/13/2019 34 0 - 39 U/L Final   10/01/2019 18 0 - 39 U/L Final     ALT   Date Value Ref Range Status   12/14/2019 26 0 - 40 U/L Final   12/13/2019 36 0 - 40 U/L Final   10/01/2019 21 0 - 40 U/L Final     GFR Non-   Date Value Ref Range Status   12/14/2019 >60 >=60 mL/min/1.73 Final     Comment:     Chronic Kidney Disease: less than 60 ml/min/1.73 sq.m. Kidney Failure: less than 15 ml/min/1.73 sq.m. Results valid for patients 18 years and older. 12/13/2019 58 >=60 mL/min/1.73 Final     Comment:     Chronic Kidney Disease: less than 60 ml/min/1.73 sq.m. Kidney Failure: less than 15 ml/min/1.73 sq.m. Results valid for patients 18 years and older. 11/06/2019 >60 >=60 mL/min/1.73 Final     Comment:     Chronic Kidney Disease: less than 60 ml/min/1.73 sq.m. Kidney Failure: less than 15 ml/min/1.73 sq.m. Results valid for patients 18 years and older.        GFR    Date Value Ref Range Status   12/14/2019 >60  Final   12/13/2019 >60  Final   11/06/2019 >60  Final     Magnesium   Date Value Ref Range Status   12/13/2019 1.9 1.6 - 2.6 mg/dL Final   10/05/2019 1.8 1.6 - 2.6 mg/dL Final   10/02/2019 1.7 1.6 - 2.6 mg/dL Final     Phosphorus   Date Value Ref Range Status   12/14/2019 3.7 2.5 - 4.5 mg/dL Final   10/02/2019 2.5 2.5 - 4.5 mg/dL Final   10/01/2019 4.1 2.5 - 4.5 mg/dL Final     Recent Labs     12/14/19  0542   PH 7.418   PO2 76.9   PCO2 53.2*   HCO3 33.6*   BE 7.9* CHEST PORTABLE    (Results Pending)           PROBLEM LIST:  Active Problems:    Acute on chronic respiratory failure with hypoxia (HCC)  Resolved Problems:    * No resolved hospital problems. *    Chest x-rays were reviewed in series during this hospital stay    CT scans of the chest were noted and also reviewed from this day and this past June. Patient had bullous emphysema specifically the right upper lobe was densely consolidated on the CT scan but this had been adjacent to the large pneumothorax with compressive  Phenomena. Current radiographs show improvement in the perihilar infiltrate with small chest tube still in place. Pneumothorax is also improved. IMPRESSION:  1. Exacerbation of COPD with pneumonia associated with large pneumothorax  2. Acute on chronic respiratory failure    PLAN:  1. Continue current respiratory measures of treatment steroids antibiotics and chest tube  2. Serial chest x-ray  3. Lengthy discussion with patient's wife and family regarding these findings. ATTESTATION:  ICU Staff Physician note of personal involvement in Care  As the attending physician, I certify that I personally reviewed the patients history and personnally examined the patient to confirm the physical findings described above,  And that I reviewed the relevant imaging studies and available reports. I also discussed the differential diagnosis and all of the proposed management plans with the patient and individuals accompanying the patient to this visit. They had the opportunity to ask questions about the proposed management plans and to have those questions answered. This patient has a high probability of sudden, clinically significant deterioration, which requires the highest level of physician preparedness to intervene urgently. I managed/supervised life or organ supporting interventions that required frequent physician assessment.    I devoted my full attention to the direct care of this patient for

## 2019-12-14 NOTE — PROGRESS NOTES
P/c to Dr. Huynh [facility]'s answering service through [address] [place] [address] [phone]. Waiting for reply. 5 - p/c from Yoshi Hernandes NP for Dr. Valentina Ojeda. Gave information concerning pt's gudino placement in Dr. Bryan Jackson office. There is an appt for gudino removal on Monday, 12/16. No new orders. 1611 - p/c from Dr. Marlon Chowdary - updated that Hanane Cunningham had already called.      Dmitri Horner  12/14/2019

## 2019-12-15 NOTE — PROGRESS NOTES
(ROBITUSSIN) 100 MG/5ML syrup Take 200 mg by mouth 3 times daily as needed for Cough or Congestion With a full glass of water   Yes Historical Provider, MD   azithromycin (ZITHROMAX) 250 MG tablet Take 250 mg by mouth daily   Yes Historical Provider, MD   Multiple Vitamins-Minerals (PRESERVISION AREDS 2 PO) Take by mouth daily   Yes Historical Provider, MD   NONFORMULARY Chest vest - 3 times a day to help expectorate mucus   Yes Historical Provider, MD   NONFORMULARY Trilogy - ventilator at bedtime   Yes Historical Provider, MD   finasteride (PROSCAR) 5 MG tablet Take 1 tablet by mouth daily 10/11/19  Yes Clarisse London, DO   pantoprazole (PROTONIX) 40 MG tablet Take 1 tablet by mouth every morning (before breakfast) 10/11/19  Yes Clarisse London, DO   metoprolol succinate (TOPROL XL) 25 MG extended release tablet Take 0.5 tablets by mouth daily 10/11/19  Yes Mary Tucker, APRN - CNP   primidone (MYSOLINE) 50 MG tablet Take 200 mg by mouth 2 times daily    Yes Historical Provider, MD   theophylline (THEODUR) 300 MG extended release tablet Take 1 tablet by mouth daily 8/16/19  Yes Jose Vigil,    valACYclovir (VALTREX) 500 MG tablet Take 500 mg by mouth daily   Yes Historical Provider, MD   montelukast (SINGULAIR) 10 MG tablet Take 10 mg by mouth daily   Yes Historical Provider, MD   furosemide (LASIX) 40 MG tablet Take 80 mg by mouth daily    Yes Historical Provider, MD   diltiazem (CARDIZEM CD) 120 MG ER capsule Take 1 capsule by mouth daily 3/25/16  Yes Clarisse London, DO   NONFORMULARY Oxygen 5 lpm to maintain saturations above 90%. Provided by [facility]    Yes Historical Provider, MD   Cholecalciferol (VITAMIN D-3) 5000 UNITS TABS Take 1 tablet by mouth Twice a Week Wednesday and Sunday   Yes Historical Provider, MD   Nutritional Supplements (JUICE PLUS FIBRE PO) Take 2 capsules by mouth daily    Yes Historical Provider, MD   vitamin C (ASCORBIC ACID) 500 MG tablet Take 750 mg by mouth 2 times daily FLP:    Lab Results   Component Value Date    TRIG 66 07/26/2018    HDL 79 07/26/2018    LDLCALC 147 07/26/2018    LABVLDL 13 07/26/2018     TSH:    Lab Results   Component Value Date    TSH 0.531 10/01/2019     IRON:    Lab Results   Component Value Date    IRON 47 10/02/2019     LIPASE:  No results found for: LIPASE    ASSESSMENT AND PLAN:      Patient Active Problem List    Diagnosis Date Noted    Severe hypoxemia 09/02/2018     Priority: High     Class: Chronic    COPD, severe ([address] 07/29/2016     Priority: High     Class: Chronic    Cor pulmonale, chronic (HCC) 07/03/2013     Priority: High     Class: Chronic    Acquired bronchiectasis ([address] 07/29/2016     Priority: Medium     Class: Chronic    Secondary adrenal insufficiency (HCC) 03/11/2016     Priority: Medium     Class: Chronic    Hyperlipidemia      Priority: Medium     Class: Chronic    Multiple thyroid nodules 07/06/2014     Priority: Low     Class: Chronic    Bradycardia, sinus 01/11/2014     Priority: Low     Class: Chronic    Hypertension      Priority: Low    Acute on chronic respiratory failure with hypoxia ([address] 12/13/2019    Acute urinary retention 10/28/2019    Atelectasis of right lung 10/28/2019    Community acquired bacterial pneumonia 10/16/2019    Paroxysmal atrial fibrillation ([address] 10/16/2019    Hypokalemia 10/16/2019    Pulmonary fibrosis ([address] 10/16/2019    Essential tremor 10/16/2019    BPH (benign prostatic hyperplasia) 10/16/2019    Constipation 10/16/2019    History of cerebral aneurysm repair 10/05/2019    History of endovascular stent graft for abdominal aortic aneurysm 10/05/2019    Paroxysmal atrial fibrillation with rapid ventricular response ([address] 10/05/2019    Moderate protein-calorie malnutrition ([address] 10/02/2019    Respiratory failure ([address] 09/30/2019    Dependent edema 09/22/2019    Thyroid disease     Hyperlipidemia     COPD [facility]chronic obstructive pulmonary disease) ([facility])     Ventricular

## 2019-12-15 NOTE — PROGRESS NOTES
Chest tube clamped per Dr. Lucy Castro order. X-ray will be obtained in 3 hours per order.     Madelin Rowland  12/15/2019

## 2019-12-15 NOTE — CONSULTS
(ADVIL;MOTRIN) 200 MG tablet, Take 400 mg by mouth every 6 hours as needed for Pain  Saw [place] 450 MG CAPS, Take by mouth daily  predniSONE (DELTASONE) 10 MG tablet, Take 40 mg by mouth daily In the morning with breakfast  potassium chloride (KLOR-CON M) 20 MEQ TBCR extended release tablet, Take 20 mEq by mouth 2 times daily  glycopyrrolate-formoterol (BEVESPI AEROSPHERE) 9-4.8 MCG/ACT AERO, Inhale 2 puffs into the lungs 2 times daily  guaiFENesin (ROBITUSSIN) 100 MG/5ML syrup, Take 200 mg by mouth 3 times daily as needed for Cough or Congestion With a full glass of water  azithromycin (ZITHROMAX) 250 MG tablet, Take 250 mg by mouth daily  Multiple Vitamins-Minerals (PRESERVISION AREDS 2 PO), Take by mouth daily  NONFORMULARY, Chest vest - 3 times a day to help expectorate mucus  NONFORMULARY, Trilogy - ventilator at bedtime  finasteride (PROSCAR) 5 MG tablet, Take 1 tablet by mouth daily  pantoprazole (PROTONIX) 40 MG tablet, Take 1 tablet by mouth every morning (before breakfast)  metoprolol succinate (TOPROL XL) 25 MG extended release tablet, Take 0.5 tablets by mouth daily  primidone (MYSOLINE) 50 MG tablet, Take 200 mg by mouth 2 times daily   theophylline (THEODUR) 300 MG extended release tablet, Take 1 tablet by mouth daily  valACYclovir (VALTREX) 500 MG tablet, Take 500 mg by mouth daily  montelukast (SINGULAIR) 10 MG tablet, Take 10 mg by mouth daily  furosemide (LASIX) 40 MG tablet, Take 80 mg by mouth daily   diltiazem (CARDIZEM CD) 120 MG ER capsule, Take 1 capsule by mouth daily  NONFORMULARY, Oxygen 5 lpm to maintain saturations above 90%. Provided by [employer].   Cholecalciferol (VITAMIN D-3) 5000 UNITS TABS, Take 1 tablet by mouth Twice a Week Wednesday and Sunday  Nutritional Supplements (JUICE PLUS FIBRE PO), Take 2 capsules by mouth daily   vitamin C (ASCORBIC ACID) 500 MG tablet, Take 750 mg by mouth 2 times daily   apixaban (ELIQUIS) 2.5 MG TABS tablet, Take 1 tablet by mouth 2 times daily (Patient 9.2 6.8   RBC 3.35* 2.77* 2.69*   HGB 11.0* 9.1* 8.9*   HCT 35.0* 28.0* 27.2*   .5* 101.1* 101.1*   MCH 32.8 32.9 33.1   MCHC 31.4* 32.5 32.7   RDW 13.8 13.5 13.4    132 138   MPV 11.8 10.8 10.9         Recent Labs     12/13/19  1130 12/14/19  0535 12/15/19  0507   CREATININE 1.2 1.1 1.1     Results for Rhonda Conley (MRN [id number]) as of 12/15/2019 09:36   Ref. Range 12/14/2019 12:46   Color, UA Latest Ref Range: Straw/Yellow  Yellow   Clarity, UA Latest Ref Range: Clear  CLOUDY (A)   Glucose, UA Latest Ref Range: Negative mg/dL Negative   Bilirubin, Urine Latest Ref Range: Negative  Negative   Ketones, Urine Latest Ref Range: Negative mg/dL Negative   Specific Gravity, UA Latest Ref Range: 1.005 - 1.030  1.020   Blood, Urine Latest Ref Range: Negative  TRACE (A)   pH, UA Latest Ref Range: 5.0 - 9.0  5.5   Protein, UA Latest Ref Range: Negative mg/dL Negative   Urobilinogen, Urine Latest Ref Range: <2.0 E.U./dL 0.2   Nitrite, Urine Latest Ref Range: Negative  Negative   Leukocyte Esterase, Urine Latest Ref Range: Negative  LARGE (A)   WBC, UA Latest Ref Range: 0 - 5 /HPF >20   RBC, UA Latest Ref Range: 0 - 2 /HPF 0-1   Bacteria, UA Latest Units: /HPF FEW (A)       Assessment/plan:  BPH  Urinary Retention   Microscopic Hematuria   History of nephrolithiasis      Continue gudino   Orders placed to change this during this admission as this was last changed approximately 4 weeks ago  B&O suppositories PRN bladder spasms   Continue Proscar   Gudino will need to be left indwelling upon discharge  We will follow him as an outpatient and give him trial of voiding once he is more ambulatory and medically stable. His wife does have [facility] and they are able to change his gudino if needed after discharge. We will facilitate change while admitted here to alleviate her of this task.    No further  interventions planned at this time         Electronically signed by MAYRA Queen CNP on 12/15/2019 at 9:32 AM     The patient was seen and examined by me today. I agree with the assessment and plan of TYREL Cleary CNP.

## 2019-12-15 NOTE — PROGRESS NOTES
limbs spontaneously, distal pulses present  Neurology: Awake and alert, follows commands, moves 4 limbs on command and spontaneously, equal sensation, no dysmetria, neck is supple, no meningitic signs present. A/P:  1) Acute hypoxemic respiratory failure secondary to pneumothorax and pneumonia in a patient with COPD and pulmonary fibrosis  --Oxygen supplementation to maintain sats > 89%  --Antibiotic regimen: Cefepime/vancomycin and azithromycin   --Cultures reviewed  --Steroids: Solumedrol 40 mg q8h  --Bronchodilators:Budesonide + formoterol + albuterol and ipratropium q 4 hrs    2) Hypertension   --Continue metoprolol      DVT prophylaxis: Anticoagulated with apixaban  Nutrition:PO  Vascular catheters: Peripheral lines  Urinary catheter needed for management of urinary retention   GI prophylaxis with PPI  Goals of care: Limited code    ATTESTATION:  ICU Staff Physician note of personal involvement in Care  As the attending physician, I certify that I personally reviewed the patients history and personnally examined the patient to confirm the physical findings described above,  And that I reviewed the relevant imaging studies and available reports. I also discussed the differential diagnosis and all of the proposed management plans with the patient and individuals accompanying the patient to this visit. They had the opportunity to ask questions about the proposed management plans and to have those questions answered. This patient has a high probability of sudden, clinically significant deterioration, which requires the highest level of physician preparedness to intervene urgently. I managed/supervised life or organ supporting interventions that required frequent physician assessment. I devoted my full attention to the direct care of this patient for the amount of time indicated below.   Time I spent with the family or surrogate(s) is included only if the patient was incapable of providing the necessary information or participating in medical decisions - Time devoted to teaching and to any procedures I billed separately is not included.      CRITICAL CARE TIME:  30 minutes    Staci Thomas MD  Pulmonary and Critical Care Medicine

## 2019-12-15 NOTE — PLAN OF CARE
Problem: Risk for Impaired Skin Integrity  Goal: Tissue integrity - skin and mucous membranes  Description  Structural intactness and normal physiological function of skin and  mucous membranes.   Outcome: Met This Shift     Problem: Pain:  Goal: Pain level will decrease  Description  Pain level will decrease  Outcome: Met This Shift  Goal: Control of acute pain  Description  Control of acute pain  Outcome: Met This Shift     Problem: Cardiac:  Goal: Hemodynamic stability will improve  Description  Hemodynamic stability will improve  Outcome: Met This Shift     Problem: Coping:  Goal: Level of anxiety will decrease  Description  Level of anxiety will decrease  Outcome: Met This Shift  Goal: Ability to establish a method of communication will improve  Description  Ability to establish a method of communication will improve  Outcome: Met This Shift

## 2019-12-16 PROBLEM — E44.0 MODERATE PROTEIN-CALORIE MALNUTRITION (HCC): Status: ACTIVE | Noted: 2019-01-01

## 2019-12-16 NOTE — PROGRESS NOTES
discharge  Patient already has [facility], they will maintain Gaines after discharge if needed  Gaines catheter to be changed while patient is an inpatient  No further  interventions planned at this time      Jocelyne Pickerel, APRN - CNP   [facility]  Urology    The patient was seen today and examined by me. I reviewed the history, physical, assessment and plan of PHONG Fernández. I agree with the above management plan. Trial of voiding in office.

## 2019-12-16 NOTE — PLAN OF CARE
minimized  12/16/2019 0854 by Julio César Christianson RN  Outcome: Met This Shift  12/16/2019 0110 by Fortino Singh RN  Outcome: Met This Shift  12/15/2019 1908 by Meagan Mclaughlin RN  Outcome: Met This Shift     Problem: Skin Integrity:  Goal: Risk for impaired skin integrity will decrease  Description  Risk for impaired skin integrity will decrease  12/16/2019 0854 by Julio César Christianson RN  Outcome: Met This Shift  12/15/2019 1908 by Meagan Mclaughlin RN  Outcome: Met This Shift

## 2019-12-16 NOTE — PROGRESS NOTES
Department of Internal Medicine        CHIEF COMPLAINT: Increased shortness of breath and lethargy    Reason for Admission: Acute on chronic hypoxic respiratory failure with severe hypercapnia with pneumothorax    HISTORY OF PRESENT ILLNESS:      The patient is a 81 y.o. male who presents with having acute onset of marked respiratory distress this morning. It occurred very acutely with patient having history of chronic severe hypoxic respiratory failure on O2 nasal cannula. Patient was brought into the emergency room and chest x-ray was obtained. ABGs initially showed severe respiratory acidosis with elevated CO2 and with respiratory acidosis. A chest x-ray showed 50% right pneumothorax. A UreSil was inserted in the emergency room. Patient's family and patient still want full code so the patient was transferred to the ICU on consult with intensivist.    12/14/2019  Patient is seen and examined in ICU. Case discussed with patient's wife, son and daughter at the bedside today. Patient currently sitting up in a chair and doing better. He has some mild chest discomfort from the pigtail catheter but denies any excessive unusual shortness of breath at this time. Patient's hemoglobin is down to 9.1 and a BUN/creatinine 41/1.1. Blood pressure currently 126/72 with a heart rate of 60 and temperature 97.1. Urine output is adequate. Chest x-ray shows improved right pneumothorax but not completely resolved. 12/15/2019  Patient seen and examined in ICU. Patient feels little bit better today but complains of midthoracic back pain which is chronic. He denies any significant chest pain, abdominal pain, nausea/vomiting. Case discussed with patient's wife and oldest son at the bedside. Temperature is 98.4 with a heart rate of 16 blood pressure ranging from 88//57. Patient currently on 6 L nasal cannula with O2 sat 99% at rest.  Oral intake is poor-fair. Urine output is adequate.   Patient did have one bowel movement yesterday. BUN/creatinine is 45/1.1 with mild elevation transaminase. Hemoglobin is 8.9 with a WBC of 6.8.    12/16/2019  Patient seen and examined in ICU. Patient denies any significant chest pain, abdominal pain, nausea or vomiting. Patient still has some midthoracic back pain which is chronic for him. Case discussed with pulmonology and will probably remove the UreSil today and monitor and if no pneumothorax transferred to [facility]. Lab work reviewed with patient as noted below. Kidney function liver enzymes are very stable with hemoglobin 9.6. Temperature 98.1 with heart rate ranging from 46-68. Patient in sinus rhythm. Blood pressure currently 115/57 with O2 saturation 100% on 6 L. Urine output is adequate with patient having 2 bowel movements yesterday morning. Past Medical History:    Past Medical History:   Diagnosis Date    COPD (chronic obstructive pulmonary disease) ([address]     stable per pt    History of cardiovascular stress test 4/11/2012    Lexiscan    Hyperlipidemia     Hypertension 9-8-2014    lexiscan stress test    Kidney stone     Pulmonary fibrosis [facility]     Thyroid disease      Past Surgical History:    Past Surgical History:   Procedure Laterality Date    ABDOMINAL AORTIC ANEURYSM REPAIR  09/26/2016    BALLOON ANGIOPLASTY, ARTERY Left     Cerebral artery stenosis with angioplasty and stent    CATARACT REMOVAL WITH IMPLANT      right, left    CYSTOSCOPY  nov 2012    retro ureteroscopy stone manipulation and insertion of j stent    EYE SURGERY      cataracts-bilat    INGUINAL HERNIA REPAIR  1970s bilateral    INGUINAL HERNIA REPAIR  1990s bilateral    INGUINAL HERNIA REPAIR Right sept 2014    laparoscopic       Medications Prior to Admission:    @  Prior to Admission medications    Medication Sig Start Date End Date Taking?  Authorizing Provider   albuterol (PROVENTIL) (2.5 MG/3ML) 0.083% nebulizer solution Take 2.5 mg by nebulization every 4 hours as needed for Activity    Alcohol use: Yes     Alcohol/week: 1.0 standard drinks     Types: 1 Shots of liquor per week     Comment: daily    Drug use: No    Sexual activity: Not on file   Lifestyle    Physical activity:     Days per week: Not on file     Minutes per session: Not on file    Stress: Not on file   Relationships    Social connections:     Talks on phone: Not on file     Gets together: Not on file     Attends Nondenominational service: Not on file     Active member of club or organization: Not on file     Attends meetings of clubs or organizations: Not on file     Relationship status: Not on file    Intimate partner violence:     Fear of current or ex partner: Not on file     Emotionally abused: Not on file     Physically abused: Not on file     Forced sexual activity: Not on file   Other Topics Concern    Not on file   Social History Narrative    Not on file       Family History:   Family History   Problem Relation Age of Onset    Heart Disease Father     Kidney Disease Mother        REVIEW OF SYSTEMS:    Gen: ++ lightheadedness, dizziness. No LOC or syncope. No fevers or chills. HEENT: No earache, sore throat or nasal congestion. Resp: Denies cough, hemoptysis or sputum production. Cardiac: Denies chest pain, ++SOB, diaphoresis, no palpitations. GI: No nausea, vomiting, diarrhea or constipation. No melena or hematochezia. : No urinary complaints, dysuria, hematuria or frequency. MSK: No extremity weakness, paralysis or paresthesias. PHYSICAL EXAM:    Vitals:  BP (!) 115/57   Pulse (!) 46   Temp 98.1 °F (36.7 °C) (Oral)   Resp (!) 7   Ht 5' 6\" (1.676 m)   Wt 127 lb (57.6 kg)   SpO2 100%   BMI 20.50 kg/m²     General:  This is a 81 y.o. yo male who is alert and oriented x 3, to moderate respiratory distress noted  HEENT:  Head is normocephalic and atraumatic, PERRLA, EOMI, mucus membranes moist with no pharyngeal erythema or exudate.   Neck:  Supple with no carotid bruits, JVD or thyromegaly.   No cervical adenopathy  CV:  Regular rate and rhythm-heart sounds distant, no murmurs  Lungs: Markedly decreased breath sounds to auscultation bilaterally with no wheezes, rales or rhonchi, + UreSil noted in right mid chest  Abdomen:  Soft, nontender, nondistended, bowel sounds present  Extremities:  +1-race lower leg edema, peripheral pulses intact bilaterally,+ moderate - severe tremor of the left hand arm with mild in right  Neuro:  Cranial nerves II-XII grossly intact; motor and sensory function intact with no focal deficits  Skin:  No rashes, lesions or wounds    DATA:  CBC with Differential:    Lab Results   Component Value Date    WBC 8.3 12/16/2019    RBC 2.94 12/16/2019    HGB 9.6 12/16/2019    HCT 30.1 12/16/2019     12/16/2019    .4 12/16/2019    MCH 32.7 12/16/2019    MCHC 31.9 12/16/2019    RDW 13.3 12/16/2019    NRBC 0.9 10/01/2019    SEGSPCT 59 12/30/2013    LYMPHOPCT 7.6 12/16/2019    MONOPCT 10.1 12/16/2019    BASOPCT 0.2 12/16/2019    MONOSABS 0.84 12/16/2019    LYMPHSABS 0.63 12/16/2019    EOSABS 0.01 12/16/2019    BASOSABS 0.02 12/16/2019     CMP:    Lab Results   Component Value Date     12/16/2019    K 5.0 12/16/2019    K 3.9 09/30/2019     12/16/2019    CO2 31 12/16/2019    BUN 43 12/16/2019    CREATININE 1.0 12/16/2019    GFRAA >60 12/16/2019    LABGLOM >60 12/16/2019    GLUCOSE 121 12/16/2019    GLUCOSE 110 02/15/2012    PROT 5.1 12/16/2019    LABALBU 3.0 12/16/2019    LABALBU 4.1 02/15/2012    CALCIUM 8.4 12/16/2019    BILITOT <0.2 12/16/2019    ALKPHOS 73 12/16/2019    AST 28 12/16/2019    ALT 53 12/16/2019     Magnesium:    Lab Results   Component Value Date    MG 2.0 12/15/2019     Phosphorus:    Lab Results   Component Value Date    PHOS 4.1 12/15/2019     PT/INR:    Lab Results   Component Value Date    PROTIME 12.3 09/30/2019    INR 1.1 09/30/2019     Troponin:    Lab Results   Component Value Date    TROPONINI 0.02 12/13/2019     U/A:    Lab Results   Component Value Date    COLORU Yellow 12/14/2019    PROTEINU Negative 12/14/2019    PHUR 5.5 12/14/2019    WBCUA >20 12/14/2019    RBCUA 0-1 12/14/2019    RBCUA 10-20 11/28/2012    BACTERIA FEW 12/14/2019    CLARITYU CLOUDY 12/14/2019    SPECGRAV 1.020 12/14/2019    LEUKOCYTESUR LARGE 12/14/2019    UROBILINOGEN 0.2 12/14/2019    BILIRUBINUR Negative 12/14/2019    BLOODU TRACE 12/14/2019    GLUCOSEU Negative 12/14/2019     ABG:    Lab Results   Component Value Date    PH 7.418 12/14/2019    PCO2 53.2 12/14/2019    PO2 76.9 12/14/2019    HCO3 33.6 12/14/2019    BE 7.9 12/14/2019    O2SAT 94.9 12/14/2019     HgBA1c:    Lab Results   Component Value Date    LABA1C 6.2 03/25/2016     FLP:    Lab Results   Component Value Date    TRIG 66 07/26/2018    HDL 79 07/26/2018    LDLCALC 147 07/26/2018    LABVLDL 13 07/26/2018     TSH:    Lab Results   Component Value Date    TSH 0.531 10/01/2019     IRON:    Lab Results   Component Value Date    IRON 47 10/02/2019     LIPASE:  No results found for: LIPASE    ASSESSMENT AND PLAN:      Patient Active Problem List    Diagnosis Date Noted    Severe hypoxemia 09/02/2018     Priority: High     Class: Chronic    COPD, severe ([facility] 07/29/2016     Priority: High     Class: Chronic    Cor pulmonale, chronic (HCC) 07/03/2013     Priority: High     Class: Chronic    Acquired bronchiectasis ([address] 07/29/2016     Priority: Medium     Class: Chronic    Secondary adrenal insufficiency (HCC) 03/11/2016     Priority: Medium     Class: Chronic    Hyperlipidemia      Priority: Medium     Class: Chronic    Multiple thyroid nodules 07/06/2014     Priority: Low     Class: Chronic    Bradycardia, sinus 01/11/2014     Priority: Low     Class: Chronic    Hypertension      Priority: Low    Acute on chronic respiratory failure with hypoxia ([facility] 12/13/2019    Acute urinary retention 10/28/2019    Atelectasis of right lung 10/28/2019    Community acquired bacterial pneumonia 10/16/2019

## 2019-12-16 NOTE — PROGRESS NOTES
CRITICAL CARE PROGRESS NOTE    The patient's case was discussed in multidisciplinary rounds including critical care specialist, nursing, RT and pharmacy. His evaluation is as follows:     81year old man with PMH of chronic respiratory failure (6L at home and [employer] at [employer]), COPD, pulmonary fibrosis, hypertension, hyperlipidemia, essential tremor, COPD, thyroid disease, admitted to ICU for management of acute hypoxemic respiratory failure due spontaneous pneumothorax and pneumonia. --Chest tube has been clamped sice yesterday 10 AM, CXR with no pneumothorax, will remove chst tube. --On 6L NC    Recent Labs     12/16/19  0535      K 5.0      CO2 31*   BUN 43*   CREATININE 1.0   GLUCOSE 121*   CALCIUM 8.4*     Recent Labs     12/16/19  0535   WBC 8.3   RBC 2.94*   HGB 9.6*   HCT 30.1*   .4*   MCH 32.7   MCHC 31.9*   RDW 13.3      MPV 10.9       No results for input(s): BC in the last 72 hours. No results for input(s): Ardella Rehana in the last 72 hours.     24 HR INTAKE/OUTPUT:      Intake/Output Summary (Last 24 hours) at 12/16/2019 1119  Last data filed at 12/16/2019 0645  Gross per 24 hour   Intake 650 ml   Output 1728 ml   Net -1078 ml   /65   Pulse 61   Temp 98.1 °F (36.7 °C) (Oral)   Resp 19   Ht 5' 6\" (1.676 m)   Wt 127 lb (57.6 kg)   SpO2 100%   BMI 20.50 kg/m²   General: Awake, oriented to place, time and person, with generalized tremor  HEENT: No head lesions, PERRL, EOMI, mouth without lesions, no nasal lesions, no cervical adenopathy palpated  Respiratory: Lungs with decreased breath sounds bilaterally, no adventitious sounds auscultated, no accessory muscle use  CV: Regular rate, no murmurs, no JVD, no leg edema  Abdomen: Soft, non tender, + bowel sounds, no lesions  Skin: Hydrated, adequate turgor, no rash, capillary refill <2 seconds  Extremities: Muscular strength 5/5 in 4 limbs, moves 4 limbs spontaneously, distal pulses present  Neurology: Awake and alert, follows commands, moves 4 limbs on command and spontaneously, equal sensation, no dysmetria, neck is supple, no meningitic signs present. A/P:  1) Acute hypoxemic respiratory failure secondary to pneumothorax and pneumonia in a patient with COPD and pulmonary fibrosis  --Oxygen supplementation to maintain sats > 89%  --Antibiotic regimen: Cefepime/vancomycin and azithromycin   --Cultures reviewed  --Steroids: Solumedrol 40 mg q12h  --Bronchodilators:Budesonide + formoterol + albuterol and ipratropium q 4 hrs  --Will remove chest tube today     2) Hypertension   --Continue metoprolol      DVT prophylaxis: Anticoagulated with apixaban  Nutrition:PO  Vascular catheters: Peripheral lines  Urinary catheter needed for management of urinary retention   GI prophylaxis with PPI  Goals of care: Limited code    ATTESTATION:  ICU Staff Physician note of personal involvement in Care  As the attending physician, I certify that I personally reviewed the patients history and personnally examined the patient to confirm the physical findings described above,  And that I reviewed the relevant imaging studies and available reports. I also discussed the differential diagnosis and all of the proposed management plans with the patient and individuals accompanying the patient to this visit. They had the opportunity to ask questions about the proposed management plans and to have those questions answered. This patient has a high probability of sudden, clinically significant deterioration, which requires the highest level of physician preparedness to intervene urgently. I managed/supervised life or organ supporting interventions that required frequent physician assessment. I devoted my full attention to the direct care of this patient for the amount of time indicated below.   Time I spent with the family or surrogate(s) is included only if the patient was incapable of providing the necessary information or participating in medical decisions - Time devoted to teaching and to any procedures I billed separately is not included.      CRITICAL CARE TIME:  30 minutes    Cristine Sumner MD  Pulmonary and Critical Care Medicine

## 2019-12-16 NOTE — PROGRESS NOTES
CRITICAL CARE PROGRESS NOTE    Chest tube removed without complications, CXR ordered and pending      Anai Antonio MD  Pulmonary and Critical Care Medicine

## 2019-12-17 NOTE — PLAN OF CARE
Problem: Risk for Impaired Skin Integrity  Goal: Tissue integrity - skin and mucous membranes  Description  Structural intactness and normal physiological function of skin and  mucous membranes. 12/17/2019 1630 by Jennifer Fang RN  Outcome: Ongoing  12/17/2019 0517 by Iglesia Silveira RN  Outcome: Met This Shift  12/17/2019 0430 by Iglesia Silveira RN  Outcome: Met This Shift     Problem: Falls - Risk of:  Goal: Will remain free from falls  Description  Will remain free from falls  12/17/2019 1630 by Jennifer Fang RN  Outcome: Ongoing  12/17/2019 0517 by Iglesia Silveira RN  Outcome: Met This Shift  12/17/2019 0430 by Iglesia Silveira RN  Outcome: Met This Shift  Goal: Absence of physical injury  Description  Absence of physical injury  12/17/2019 1630 by Jennifer Fang RN  Outcome: Ongoing  12/17/2019 0517 by Iglesia Silveira RN  Outcome: Met This Shift  12/17/2019 0430 by Iglesia Silveira RN  Outcome: Met This Shift     Problem: Pain:  Goal: Pain level will decrease  Description  Pain level will decrease  12/17/2019 1630 by Jennifer Fang RN  Outcome: Ongoing  12/17/2019 0517 by Iglesia Silveira RN  Outcome: Met This Shift  12/17/2019 0430 by Iglesia Silveira RN  Outcome: Met This Shift  Goal: Control of acute pain  Description  Control of acute pain  12/17/2019 1630 by Jennifer Fang RN  Outcome: Ongoing  12/17/2019 0517 by Iglesia Silveira RN  Outcome: Met This Shift  12/17/2019 0430 by Iglesia Silveira RN  Outcome: Met This Shift  Goal: Control of chronic pain  Description  Control of chronic pain  12/17/2019 1630 by Jennifer Fang RN  Outcome: Ongoing  12/17/2019 0517 by Iglesia Silveira RN  Outcome: Met This Shift  12/17/2019 0430 by Iglesia Silveira RN  Outcome: Met This Shift     Problem: Activity:  Goal: Fatigue will decrease  Description  Fatigue will decrease  12/17/2019 1630 by Jennifer Fang RN  Outcome: Ongoing  12/17/2019 0517 by Iglesia Silveira RN  Outcome: Met This Shift  12/17/2019 0430 by Iglesia Silveira RN  Outcome:  Met maintain adequate ventilation will improve  12/17/2019 1630 by Bienvenido Cain RN  Outcome: Ongoing  12/17/2019 0517 by Yamilka Wei RN  Outcome: Met This Shift  12/17/2019 0430 by Yamilka Wei RN  Outcome: Met This Shift  Goal: Complications related to the disease process, condition or treatment will be avoided or minimized  Description  Complications related to the disease process, condition or treatment will be avoided or minimized  12/17/2019 1630 by Bienvenido Cain RN  Outcome: Ongoing  12/17/2019 0517 by Yamilka Wei RN  Outcome: Met This Shift  12/17/2019 0430 by Yamilka Wei RN  Outcome: Met This Shift     Problem: Skin Integrity:  Goal: Risk for impaired skin integrity will decrease  Description  Risk for impaired skin integrity will decrease  12/17/2019 1630 by Bienvenido Cain RN  Outcome: Ongoing  12/17/2019 0517 by Yamilka Wei RN  Outcome: Met This Shift  12/17/2019 0430 by Yamilka Wei RN  Outcome: Met This Shift

## 2019-12-17 NOTE — PROGRESS NOTES
Pulmonary Consult Dictated:    /74   Pulse 66   Temp 98.2 °F (36.8 °C)   Resp 14   Ht 5' 6\" (1.676 m)   Wt 127 lb (57.6 kg)   SpO2 96%   BMI 20.50 kg/m²     S:Dillon is alert and speech is clear and voice strong. Walked in halls and ambulated to bathroom with assistance and performed well with supplemental oxygen at 6 LPM flow. Recovering from A/C Respiratory Failure with A/C Hypercapnia with Acidemia(PH 7. 0. PaCO2 134mmHg) secondary to spontaneous secondary pneumothorax of right lung related to bullous emphysema. O:Reviewed admission History and HCP care from ED to present visit and right chest tube removed 12/16/19 and CXR reflecting right lung volume loss probably consequences in incomplete reexpansion of right lower lobe but will need better definition with Chest CT in am.  No illness from respiratory infection noted by Ruslan Morales and blood cultures negative and sputum scant and clear. A: Very advanced COPD with Chronic Respiratory Failure with Chronic Respiratory Failure recovering from spontaneous secondary right pneumothorax predisposing to hospital admission with ICU care x 3 days and stable in [address]  P:Reassess right lung status after removal of right CT and encourage deep breathing followed by coughing to reexpand right lung partial atelectasis.       7 days of antibiotics sufficient(End 12/19/19) and if stable may consider discharge planning with respiratory program outlined by myself on discharge

## 2019-12-17 NOTE — PLAN OF CARE
Problem: Risk for Impaired Skin Integrity  Goal: Tissue integrity - skin and mucous membranes  Description  Structural intactness and normal physiological function of skin and  mucous membranes. Outcome: Met This Shift     Problem: Falls - Risk of:  Goal: Will remain free from falls  Description  Will remain free from falls  Outcome: Met This Shift     Problem: Falls - Risk of:  Goal: Absence of physical injury  Description  Absence of physical injury  Outcome: Met This Shift     Problem: Pain:  Goal: Pain level will decrease  Description  Pain level will decrease  Outcome: Met This Shift     Problem: Pain:  Goal: Control of acute pain  Description  Control of acute pain  Outcome: Met This Shift     Problem: Pain:  Goal: Control of chronic pain  Description  Control of chronic pain  Outcome: Met This Shift     Problem:  Activity:  Goal: Fatigue will decrease  Description  Fatigue will decrease  Outcome: Met This Shift     Problem: Cardiac:  Goal: Hemodynamic stability will improve  Description  Hemodynamic stability will improve  Outcome: Met This Shift     Problem: Coping:  Goal: Level of anxiety will decrease  Description  Level of anxiety will decrease  Outcome: Met This Shift     Problem: Coping:  Goal: Ability to cope will improve  Description  Ability to cope will improve  Outcome: Met This Shift     Problem: Coping:  Goal: Ability to establish a method of communication will improve  Description  Ability to establish a method of communication will improve  Outcome: Met This Shift     Problem: Nutritional:  Goal: Consumption of the prescribed amount of daily calories will improve  Description  Consumption of the prescribed amount of daily calories will improve  Outcome: Met This Shift     Problem: Respiratory:  Goal: Ability to maintain a clear airway will improve  Description  Ability to maintain a clear airway will improve  Outcome: Met This Shift     Problem: Respiratory:  Goal: Ability to maintain adequate ventilation will improve  Description  Ability to maintain adequate ventilation will improve  Outcome: Met This Shift     Problem: Respiratory:  Goal: Complications related to the disease process, condition or treatment will be avoided or minimized  Description  Complications related to the disease process, condition or treatment will be avoided or minimized  Outcome: Met This Shift     Problem: Skin Integrity:  Goal: Risk for impaired skin integrity will decrease  Description  Risk for impaired skin integrity will decrease  Outcome: Met This Shift

## 2019-12-17 NOTE — CARE COORDINATION
SOCIAL WORK / DISCHARGE PLANNING:  Plan dc home next date with wife. Resume [facility] order noted. Sw notified [facility] rep. Pt has home O2 and Trilogy BMS no dme needs noted.                Electronically signed by SIN Mendez on 12/17/2019 at 12:35 PM

## 2019-12-17 NOTE — PROGRESS NOTES
by mouth daily 10/11/19  Yes Margaret Cerrato, APRN - CNP   primidone (MYSOLINE) 50 MG tablet Take 200 mg by mouth 2 times daily    Yes Historical Provider, MD   theophylline (THEODUR) 300 MG extended release tablet Take 1 tablet by mouth daily 8/16/19  Yes Henna Moreno,    valACYclovir (VALTREX) 500 MG tablet Take 500 mg by mouth daily   Yes Historical Provider, MD   montelukast (SINGULAIR) 10 MG tablet Take 10 mg by mouth daily   Yes Historical Provider, MD   furosemide (LASIX) 40 MG tablet Take 80 mg by mouth daily    Yes Historical Provider, MD   diltiazem (CARDIZEM CD) 120 MG ER capsule Take 1 capsule by mouth daily 3/25/16  Yes Monique Doan, DO   NONFORMULARY Oxygen 5 lpm to maintain saturations above 90%. Provided by [facility].    Yes Historical Provider, MD   Cholecalciferol (VITAMIN D-3) 5000 UNITS TABS Take 1 tablet by mouth Twice a Week Wednesday and Sunday   Yes Historical Provider, MD   Nutritional Supplements (JUICE PLUS FIBRE PO) Take 2 capsules by mouth daily    Yes Historical Provider, MD   vitamin C (ASCORBIC ACID) 500 MG tablet Take 750 mg by mouth 2 times daily     Historical Provider, MD   apixaban (ELIQUIS) 2.5 MG TABS tablet Take 1 tablet by mouth 2 times daily  Patient taking differently: Take 2.5 mg by mouth 2 times daily Patient was only taking twice a week 10/10/19   Monique Doan, DO   docusate sodium (COLACE, DULCOLAX) 100 MG CAPS Take 100 mg by mouth 2 times daily 10/10/19   Moniqueazalia Doan,    sucralfate (CARAFATE) 1 GM tablet Take 1 tablet by mouth 4 times daily 10/10/19   Moniqueazalia Doan, DO       Allergies:  Flomax [tamsulosin hcl]    Social History:   Social History     Socioeconomic History    Marital status:      Spouse name: Not on file    Number of children: Not on file    Years of education: Not on file    Highest education level: Not on file   Occupational History    Occupation: Physician     Comment: Retired   Social Needs    Financial resource strain: Not on file    Food insecurity:     Worry: Not on file     Inability: Not on file    Transportation needs:     Medical: Not on file     Non-medical: Not on file   Tobacco Use    Smoking status: Former Smoker     Packs/day: 2.00     Years: 42.00     Pack years: 84.00     Types: Cigarettes     Last attempt to quit: 2003     Years since quittin.8    Smokeless tobacco: Never Used   Substance and Sexual Activity    Alcohol use: Yes     Alcohol/week: 1.0 standard drinks     Types: 1 Shots of liquor per week     Comment: daily    Drug use: No    Sexual activity: Not on file   Lifestyle    Physical activity:     Days per week: Not on file     Minutes per session: Not on file    Stress: Not on file   Relationships    Social connections:     Talks on phone: Not on file     Gets together: Not on file     Attends Sabianism service: Not on file     Active member of club or organization: Not on file     Attends meetings of clubs or organizations: Not on file     Relationship status: Not on file    Intimate partner violence:     Fear of current or ex partner: Not on file     Emotionally abused: Not on file     Physically abused: Not on file     Forced sexual activity: Not on file   Other Topics Concern    Not on file   Social History Narrative    Not on file       Family History:   Family History   Problem Relation Age of Onset    Heart Disease Father     Kidney Disease Mother        REVIEW OF SYSTEMS:    Gen: ++ lightheadedness, dizziness. No LOC or syncope. No fevers or chills. HEENT: No earache, sore throat or nasal congestion. Resp: Denies cough, hemoptysis or sputum production. Cardiac: Denies chest pain, ++SOB, diaphoresis, no palpitations. GI: No nausea, vomiting, diarrhea or constipation. No melena or hematochezia. : No urinary complaints, dysuria, hematuria or frequency. MSK: No extremity weakness, paralysis or paresthesias.      PHYSICAL EXAM:    Vitals:  BP (!) 106/50   Pulse (!) 48   Temp 98.3 °F (36.8 °C)   Resp 12   Ht 5' 6\" (1.676 m)   Wt 127 lb (57.6 kg)   SpO2 99%   BMI 20.50 kg/m²     General:  This is a 81 y.o. yo male who is alert and oriented x 3, to moderate respiratory distress noted  HEENT:  Head is normocephalic and atraumatic, PERRLA, EOMI, mucus membranes moist with no pharyngeal erythema or exudate. Neck:  Supple with no carotid bruits, JVD or thyromegaly.   No cervical adenopathy  CV:  Regular rate and rhythm-heart sounds distant, no murmurs  Lungs: Markedly decreased breath sounds to auscultation bilaterally with no wheezes, rales or rhonchi, + UreSil noted in right mid chest  Abdomen:  Soft, nontender, nondistended, bowel sounds present  Extremities:  +1-race lower leg edema, peripheral pulses intact bilaterally,+ moderate - severe tremor of the left hand arm with mild in right  Neuro:  Cranial nerves II-XII grossly intact; motor and sensory function intact with no focal deficits  Skin:  No rashes, lesions or wounds    DATA:  CBC with Differential:    Lab Results   Component Value Date    WBC 8.3 12/16/2019    RBC 2.94 12/16/2019    HGB 9.6 12/16/2019    HCT 30.1 12/16/2019     12/16/2019    .4 12/16/2019    MCH 32.7 12/16/2019    MCHC 31.9 12/16/2019    RDW 13.3 12/16/2019    NRBC 0.9 10/01/2019    SEGSPCT 59 12/30/2013    LYMPHOPCT 7.6 12/16/2019    MONOPCT 10.1 12/16/2019    BASOPCT 0.2 12/16/2019    MONOSABS 0.84 12/16/2019    LYMPHSABS 0.63 12/16/2019    EOSABS 0.01 12/16/2019    BASOSABS 0.02 12/16/2019     CMP:    Lab Results   Component Value Date     12/16/2019    K 5.0 12/16/2019    K 3.9 09/30/2019     12/16/2019    CO2 31 12/16/2019    BUN 43 12/16/2019    CREATININE 1.0 12/16/2019    GFRAA >60 12/16/2019    LABGLOM >60 12/16/2019    GLUCOSE 121 12/16/2019    GLUCOSE 110 02/15/2012    PROT 5.1 12/16/2019    LABALBU 3.0 12/16/2019    LABALBU 4.1 02/15/2012    CALCIUM 8.4 12/16/2019    BILITOT <0.2 12/16/2019 ALKPHOS 73 12/16/2019    AST 28 12/16/2019    ALT 53 12/16/2019     Magnesium:    Lab Results   Component Value Date    MG 2.0 12/15/2019     Phosphorus:    Lab Results   Component Value Date    PHOS 4.1 12/15/2019     PT/INR:    Lab Results   Component Value Date    PROTIME 12.3 09/30/2019    INR 1.1 09/30/2019     Troponin:    Lab Results   Component Value Date    TROPONINI 0.02 12/13/2019     U/A:    Lab Results   Component Value Date    COLORU Yellow 12/14/2019    PROTEINU Negative 12/14/2019    PHUR 5.5 12/14/2019    WBCUA >20 12/14/2019    RBCUA 0-1 12/14/2019    RBCUA 10-20 11/28/2012    BACTERIA FEW 12/14/2019    CLARITYU CLOUDY 12/14/2019    SPECGRAV 1.020 12/14/2019    LEUKOCYTESUR LARGE 12/14/2019    UROBILINOGEN 0.2 12/14/2019    BILIRUBINUR Negative 12/14/2019    BLOODU TRACE 12/14/2019    GLUCOSEU Negative 12/14/2019     ABG:    Lab Results   Component Value Date    PH 7.418 12/14/2019    PCO2 53.2 12/14/2019    PO2 76.9 12/14/2019    HCO3 33.6 12/14/2019    BE 7.9 12/14/2019    O2SAT 94.9 12/14/2019     HgBA1c:    Lab Results   Component Value Date    LABA1C 6.2 03/25/2016     FLP:    Lab Results   Component Value Date    TRIG 66 07/26/2018    HDL 79 07/26/2018    LDLCALC 147 07/26/2018    LABVLDL 13 07/26/2018     TSH:    Lab Results   Component Value Date    TSH 0.531 10/01/2019     IRON:    Lab Results   Component Value Date    IRON 47 10/02/2019     LIPASE:  No results found for: LIPASE    ASSESSMENT AND PLAN:      Patient Active Problem List    Diagnosis Date Noted    Severe hypoxemia 09/02/2018     Priority: High     Class: Chronic    COPD, severe ([facility] 07/29/2016     Priority: High     Class: Chronic    Cor pulmonale, chronic (HCC) 07/03/2013     Priority: High     Class: Chronic    Acquired bronchiectasis ([facility] 07/29/2016     Priority: Medium     Class: Chronic    Secondary adrenal insufficiency ([facility] 03/11/2016     Priority: Medium     Class: Chronic    Hyperlipidemia      Priority: Medium     Class: Chronic    Multiple thyroid nodules 07/06/2014     Priority: Low     Class: Chronic    Bradycardia, sinus 01/11/2014     Priority: Low     Class: Chronic    Hypertension      Priority: Low    Moderate protein-calorie malnutrition ([address] 12/16/2019    Acute on chronic respiratory failure with hypoxia ([address] 12/13/2019    Acute urinary retention 10/28/2019    Atelectasis of right lung 10/28/2019    Community acquired bacterial pneumonia 10/16/2019    Paroxysmal atrial fibrillation ([address] 10/16/2019    Hypokalemia 10/16/2019    Pulmonary fibrosis ([address] 10/16/2019    Essential tremor 10/16/2019    BPH (benign prostatic hyperplasia) 10/16/2019    Constipation 10/16/2019    History of cerebral aneurysm repair 10/05/2019    History of endovascular stent graft for abdominal aortic aneurysm 10/05/2019    Paroxysmal atrial fibrillation with rapid ventricular response ([address] 10/05/2019    Respiratory failure ([address] 09/30/2019    Dependent edema 09/22/2019    Thyroid disease     Hyperlipidemia     COPD (chronic obstructive pulmonary disease) (HCC)     Ventricular tachyarrhythmia ([address] 03/28/2019    Chronic respiratory failure with hypoxia and hypercapnia (HCC) 08/06/2017     Class: Chronic    Status post repair of abdominal aortic aneurysm (AAA) using straight graft 11/30/2016     Class: Acute    Hypertension 09/08/2014    Nephrolithiasis 11/29/2012     1. Acute on chronic hypoxic and hypercapnic respiratory failure-spontaneous right pneumothorax  2. History of severe COPD on chronic O2 nasal cannula  3. History of moderate severe essential tremors  4. Hypertension  5. Hyperlipidemia  6. Hypothyroidism  7. History of paroxysmal atrial fibrillation  8. Moderate protein, caloric malnutrition  9. Right lung infiltrate  10. Bacteriuria  11. 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   Sinus bradycardia      -Admit to [address], consult Dr Aliza Ford  -[facility], [facility] in

## 2019-12-17 NOTE — PROGRESS NOTES
Occupational Therapy  OCCUPATIONAL THERAPY INITIAL EVALUATION      Date:2019  Patient Name: Shant Payne  MRN: [id number]  : 1935  Room: [address] [place] [address]    Evaluating OT: Florencia Ng OTR/L #[id number]     AM-PAC Daily Activity Raw Score:     Recommended Adaptive Equipment:  TBD     Diagnosis: acute on chronic resp failure   Patient presented to ED for SOB, lethargy and tremors      Pertinent Medical History:   Past Medical History:   Diagnosis Date    COPD (chronic obstructive pulmonary disease) ([facility] [address]     stable per pt    History of cardiovascular stress test 2012    Lexiscan    Hyperlipidemia     Hypertension 2014    lexiscan stress test    Kidney stone     [facility]     Thyroid disease       Precautions:  Falls, 6L O2 chronically, legally blind      Home Living: Pt lives with wife in a 2 story home with 1 JOSE and no hand rails   Bathroom setup: walk-in shower   Equipment owned: w/w, shower chair    Prior Level of Function: mod I with ADLs , mod I with IADLs; ambulated with w/w  Driving: no  Occupation: retired doctor     Pain Level: Pt denies pain this session    Cognition: A&O: 4/4; Follows 2 step directions   Memory:  good   Sequencing:  fair   Problem solving:  fair   Judgement/safety:  fair     Functional Assessment:   Initial Eval Status  Date: 19 Treatment Status  Date: Short Term Goals  Treatment frequency: PRN   Feeding Independent      Grooming Stand by Assist   (standing at sink)  Modified [place]    UB Dressing Stand by Assist   Modified [place]    LB Dressing Minimal Assist   Modified [place]    Bathing Minimal Assist  Modified [place]    Toileting Stand by Assist   Modified [place]    Bed Mobility  Supine to sit: Minimal Assist   Sit to supine: NT   Supine to sit: Modified [place]   Sit to supine: Modified [place]    Functional Transfers Minimal Assist   Modified [place]    Functional Mobility Contact Guard Assist  Ambulated in room and hallway with w/w - cuing on posture, w/w management and safety (O2 greater than 95% throughout session)   Modified [place]    Balance Sitting:     Static:  Supervision     Dynamic: supervision   Standing: SBA-CGA     Activity Tolerance F  F+   Visual/  Perceptual Legally blind                  Hand dominance: right      Strength ROM Additional Info:    RUE   4/5 wfl good  and wfl FMC/dexterity noted during ADL tasks     LUE 4/5 wfl good  and wfl FMC/dexterity noted during ADL tasks     Hearing: wfl  Sensation: denies numbness and tingling   Tone: wfl  Edema:none noted                             Comments/Treatment: Upon arrival, patient supine in bed and agreeable to OT Session. Therapist facilitated bed mobility, sitting balance at EOB, functional transfers (various surfaces: bed, toilet, chair), standing tolerance tasks (addressing posture, balance and activity tolerance while incorporating light functional reaching) and functional ambulation task with w/w in room and hallway (cuing on posture, w/w management and safety) - skilled cuing on hand placement, posture, body mechanics and safety. Therapist facilitated self-care retraining: UB/LB self-care tasks (gown, socks), toileting task and standing grooming task while educating pt on modified techniques, posture, safety and energy conservation techniques. Skilled monitoring of HR, O2 sats and pts response to treatment. Pt demonstrating fair understanding of education/techniques, requiring additional training / education. At end of session, patient seated in chair with call light and phone within reach, all lines and tubes intact. Pt would benefit from continued skilled OT to increase functional independence and quality of life.     Eval Complexity: Low    (Evaluation time includes thorough review of current medical information, gathering information on past medical history/social history and prior level of function, completion of standardized testing/informal observation of tasks, assessment of data, and development of POC/Goals.)      Assessment of current deficits   Functional mobility [x]  ADLs [x] Strength [x]  Cognition []  Functional transfers  [x] IADLs [x] Safety Awareness [x]  Endurance [x]  Fine Motor Coordination [] Balance [x] Vision/perception [] Sensation []   Gross Motor Coordination [] ROM [] Delirium []                  Motor Control []    Plan of Care:   ADL retraining [x]   Equipment needs [x]   Neuromuscular re-education [x] Energy Conservation Techniques [x]  Functional Transfer training [x] Patient and/or Family Education [x]  Functional Mobility training [x]  Environmental Modifications [x]  Cognitive re-training []   Compensatory techniques for ADLs [x]  Splinting Needs []   Positioning to improve overall function [x]   Therapeutic Activity [x]  Therapeutic Exercise  [x]  Visual/Perceptual: []    Delirium prevention/treatment  []   Other:  []    Rehab Potential: Good for established goals     Patient / Family Goal:  Not stated     Patient and/or family were instructed diagnosis, prognosis/goals and plan of care. Demonstrated fair understanding. [] Malnutrition indicators have been identified and nursing has been notified to ensure a dietitian consult is ordered.        AM-PAC Daily Activity Inpatient   How much help for putting on and taking off regular lower body clothing?: A Little  How much help for Bathing?: A Little  How much help for Toileting?: A Little  How much help for putting on and taking off regular upper body clothing?: A Little  How much help for taking care of personal grooming?: A Little  How much help for eating meals?: None  AM-[facility] Inpatient Daily Activity Raw Score: 19  AM-PAC Inpatient ADL T-Scale Score : 40.22  ADL Inpatient CMS 0-100% Score: 42.8  ADL Inpatient CMS G-Code Modifier : CK       low Evaluation + 24 timed treatment minutes  Tx Time in: 816  Tx Time out: 840

## 2019-12-17 NOTE — PROGRESS NOTES
safety, sequencing Modified [place]    Functional Mobility Contact Guard Assist  Ambulated in room and hallway with w/w - cuing on posture, w/w management and safety (O2 greater than 95% throughout session)  20 ft from bathroom to EOB with Min A with FWW ; cuing for navigation d/t decreased vision; assist for O2 and IV lines Modified [place]    Balance Sitting:     Static:  Supervision     Dynamic: supervision   Standing: SBA-CGA Sitting:     Static:  Supervision     Dynamic: supervision   Standing: Supervision/min A       Activity Tolerance F  fair  F+   Visual/  Perceptual Legally blind                         A:  -Balance: Sitting: fair         Standing: fair  with Minimal Assist progressing to supervision  with  FWW  -Endurance: fair  (2* to decreased endurance, strength, fatigue)  -Edema: yes - LE's; nsg aware   -Safety: fair  (VC's for walker safety, sequencing, hand placement, walker navigation; cuing d/t decreased vision, as well)  - Pt/family education/training: bed mobility, transfer training, functional mobility,  UE dressing,  toileting/hygiene, sequencing, hand placement, walker safety, ECT's  -Pt would benefit from additional ADLs, safety education, balance activities, transfer training, strength exercises, and over all endurance building.     P:  - Plan of care: Patient will be seen by OT PRN for therapeutic activity, ADL re-training, bed mobility,functional transfers, functional mobility, safety and fall prevention, balance and endurance activities, instruction and training in energy conservation principles, and   patient and family education.   - Patient and/or family understands diagnosis, prognosis and plan of care: yes   - ADL retraining, bathroom safety, DME    Treatment minutes: [facility], [facility]

## 2019-12-17 NOTE — PROGRESS NOTES
graft    Chronic respiratory failure with hypoxia and hypercapnia (HCC)    Severe hypoxemia    Ventricular tachyarrhythmia (HCC)    Thyroid disease    Hypertension    Hyperlipidemia    COPD (chronic obstructive pulmonary disease) (HCC)    Dependent edema    Respiratory failure (HCC)    Moderate protein-calorie malnutrition (HCC)    History of cerebral aneurysm repair    History of endovascular stent graft for abdominal aortic aneurysm    Paroxysmal atrial fibrillation with rapid ventricular response ([address]    Community acquired bacterial pneumonia    Paroxysmal atrial fibrillation (HCC)    Hypokalemia    Pulmonary fibrosis (HCC)    Essential tremor    BPH (benign prostatic hyperplasia)    Constipation    Acute urinary retention    Atelectasis of right lung    Acute on chronic respiratory failure with hypoxia (HCC)       Past medical history:       Diagnosis Date    COPD (chronic obstructive pulmonary disease) (HCC)     stable per pt    History of cardiovascular stress test 4/11/2012    Lexiscan    Hyperlipidemia     Hypertension 9-8-2014    lexiscan stress test    Kidney stone     Pulmonary fibrosis ([address]     Thyroid disease    ;      Procedure Laterality Date    ABDOMINAL AORTIC ANEURYSM REPAIR  09/26/2016    BALLOON ANGIOPLASTY, ARTERY Left     Cerebral artery stenosis with angioplasty and stent    CATARACT REMOVAL WITH IMPLANT      right, left    CYSTOSCOPY  nov 2012    retro ureteroscopy stone manipulation and insertion of j stent    EYE SURGERY      cataracts-bilat    INGUINAL HERNIA REPAIR  1970s bilateral    INGUINAL HERNIA REPAIR  1990s bilateral    INGUINAL HERNIA REPAIR Right sept 2014    laparoscopic       Nursing cleared patient for PT evaluation and patient agreeable. The admitting diagnosis and active problem list as listed above have been reviewed prior to the initiation of this evaluation.     Last time out of bed: today    Precautions: falls and O2 ,    Social history: Patient lives with spouse  in a two story home ,  with 1 steps to enter without Rail      Equipment owned: [employer],       Mentation: alert, cooperative, oriented x 3  and follows directions,      ROM: wfl    STRENGTH: wfl    PAIN: (measured on a visual analog scale with 0=no pain and 10=excruciating pain) 0/10. FUNCTIONAL ASSESSMENT   Bed Mobility- Supine to sit- Independent          Scooting- Independent       Sit to supine- Independent     Patient able to dangle on edge of bed for 10 minutes with no assist      Transfers-Sit to stand- Independent     Gait:  Patient ambulated 100 feet using wheeled walker with Supervision  and verbal direction d/t low vision and 6 Liters of o2 via nasal cannula o2    Steps:  Not assessed      Balance: sit-good         stand fair       Edema: no  Endurance: fair  on 6 Liters of o2 via nasal cannula (Chronic)  91% after ambulation    Treatment: eval, gait/trf   Therapist educated and facilitated patient on techniques to increase safety and independence with bed mobility, balance, functional transfers, and functional mobility. Sat edge of bed to increase dynamic sitting balance and activity tolerance. Patient demonstrating good  understanding of education/techniques, requiring additional training/education. At end of session, patient in chair with   call light and phone within reach,   all lines and tubes intact, nursing notified. Pt would benefit from continued skilled PT to increase functional independence and quality of life. Patients Goal: home      ASSESSMENT  Patient exhibits decreased strength, balance, coordination impairing functional mobility. GOALS to be met in 2 days.    Bed mobility-  Independent        Transfers-Sit to stand-Independent     Gait:  Patient to ambulate 150 feet using wheeled walker with Independent             Increase strength in affected mm groups by 1/3 grade  Increase balance to allow for improvement

## 2019-12-18 NOTE — CONSULTS
from  12/13/2019 to 12/16/2019 and he was transferred to Intermediate Care on  12/17/2019 and I was asked to co-participate in the patient's care by  Dr. Tree Barakat here. The hospital has a closed ICU unit and therefore care  began with me on Intermediate Care Unit. I am familiar with the patient as I have cared for the patient for about  seven years from the year 2013 to the present time. I evaluated the  patient with his former smoking history that he was unfortunately  susceptible to the oxidants associated with tobacco that led to his  pulmonary complication defined exclusively by the GOLD guidelines as  COPD. The degree of impairment was severe and very advanced,  predisposing him to progressive chronic respiratory failure and this was  associated with moderate hypercapnia (PaCO2 about 56 mmHg partial  pressure) and chronic hypoxemia that required at least 5 L flow of  supplemental oxygen performing his sedentary activities of daily living. There was evidence with his chronic respiratory failure that was with  hypercapnia and hypoxemia that he did have evidence of a chronic cor  pulmonale and this was very labile in spite of the patient's healthy  lifestyle that he is attempting to adhere to with his respiratory  services and bronchodilator therapy and supplemental oxygen. Now with  regard to the phenotypes of his COPD in the right lower lobe, he does  have a large bulla and that does predispose him to compressive  atelectatic changes to both the right middle and right lower lobe and  that does require the need for airway clearance therapy with the chest  vest at least twice a day to keep those airways open to reduce  complications associated with the postobstructive chronic bronchiectasis  in that right lower lobe, so this is something that we were rather  aggressive with, with regard to airway clearance therapy complementing  his bronchodilator therapy to reduce postobstructive complications.   Now  he also had evidence of bullous changes in the left lower lobe as well. 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 The patient did have  assistance with his ventilatory disposition. He used the noninvasive  positive pressure ventilatory assist at night in the form of AVAPS mode  and the tidal volume was at 350 mL targeted tidal volume and he was to  use the ventilatory support at night to keep his ventilatory status  controlled to reduce his pulmonary pressures and improve right heart  function. The patient also was very compliant with his bronchodilator  therapy and the patient's ventilator was monitored and this was with the  respiratory rate of about 14 breaths per minute and a positive  end-expiratory pressure of around 5 cm of water pressure and the minimum  pressure was 15 cm of water pressure, maximum could be a size 30 cm of  water pressure. This was interface with a full-face mask at night. His  bronchodilators were taken very faithfully and that was with a  combination bronchodilator and that included his LAMA/LABA and that was  under the brand name Amber Cristobal delivering two inhalations meter-dosed  inhaler in the morning and at night, now it is twice a day and I did  tell the patient that he can use this albuterol either in nebulization  or as a rescue inhaler as needed. Now he also had some difficulty with  urination and he had an indwelling Gaines catheter that was on a leg bag  under the care of Dr. Hood Ibrahim and that once again has been  established during his hospital stay here as well.   Now patient also was  on oral prednisone and that oral prednisone was one that he continued to  increase and was on about 30 mg a day on his prednisone to control his  respiratory disposition. PHYSICAL EXAMINATION:  GENERAL:  A very pleasant 81-year-old  male. VITAL SIGNS:  Blood pressure was 136/74 which is extremely good blood  pressure, heart rate was 65 and nice sinus rhythm. His temperature was  normal at 98.2 degrees Fahrenheit. Respiratory rate was 14 with a  weight of 127 pounds and height of 5 feet 6 inches, BMI of 20-21 was  ideal.  His O2 saturation on 6 liters was 96%. HEENT:  His hair was gray in color. His eyes, he has legal blindness. He has had some trouble with his eyes and he has had numerous surgeries  on his eyes but he could still see but his eyesight is very poor. Hearing is intact to conversation. The nasal airways are accommodated  by the prongs of the nasal cannula. We humidified his oxygen and allow  him extension tubing to walk with his walker to the bathroom with  assistance. LUNGS:  Lungs on the right side, right posterior base with diminished  breath sounds. The left lung and the remainder the right lung had  diminished breath sounds but good air exchange was noted whereas the  right lower lobe there was diminished breath sounds with no air exchange  appreciated in that lower lobe, that is suspicious for incomplete  re-expansion of the right lung due to right lower lobe atelectasis after  chest tube and his right pneumothorax complicated the air flow in that  right lower lobe and once the chest tube was removed, it looks as though  there was persistence of that right lower lobe atelectasis with volume  loss and right hemidiaphragm elevation and I am going to give better  definition of that with a chest CT in the a.m. HEART:  At this time, his heart was a sinus rhythm. I did not hear any  extrasystoles. No gallops were noted. ABDOMEN:  Belly was soft. His bowels were sluggish but movable but they  moved and it was nice and soft.   EXTREMITIES:  The leg still had some edema but not really pitting but  may be +1 pitting in the ankle and pre-pedal areas but his extremities  were warm, dry. Color was somewhat pale due to his anemia. He lost  about 2-3 gm of hemoglobin during his stay on chronic anticoagulation  with his poor nutritional intake during this hospital stay. I reviewed the lab and at this time the patient has recovered and is in  stable condition after experiencing a spontaneous secondary right  pneumothorax with development of acute on chronic hypercapnic  respiratory failure. This did improve after re-expansion of the lung by  placing a closed chest thoracostomy tube in the right chest cavity and  right at this point he still has some residual right lung volume loss,  suspicious for some residual incomplete re-expansion of the right lung  due to right lower lobe atelectasis, chest cage otherwise is without any  further pain in the back after the chest tube was removed. The patient  is on his bronchodilators, antibiotics will go seven days and we can  discontinue as there were no signs of any infection and we will continue  to assess the status of his right lung and his respiratory disposition  with the patient improved and after reassessment of that right lung on  chest CT and with his medications, we can outline a program for  discharge tentatively for Thursday or Friday. Thanks Christina Trimble for allowing me to co-participate with the patient once  again and we will continue to follow as you requested during his  hospital stay in the Intermediate Care.         Teresa Valencia DO    D: 12/17/2019 17:39:52       T: 12/17/2019 17:45:07     [email or web address]  Job#: [id number]     Doc#: [id number]    CC:

## 2019-12-18 NOTE — PROGRESS NOTES
Pulmonary Visit:    BP (!) 115/55   Pulse 56   Temp 98.5 °F (36.9 °C)   Resp 16   Ht 5' 6\" (1.676 m)   Wt 127 lb (57.6 kg)   SpO2 98%   BMI 20.50 kg/m²     S:Dillon is alert and I reviewed Chest CT results on rounds today. Informed Robby Todd that Right Lung with resolution of pneumothorax and residual right lower lobe atelectasis with mucous appreciated in right lower lobe bronchus. Discussed additional respiratory services to reexpand Right lower lobe. O:Addition of Chest Vest therapy with aerosol Mucomyst and encouraged deep breathing with a hold and cough frequently thruout waking hours. Robby Todd will complete antibiotic in am(Cefepime) and discontinued. Tomorrow will be on po medical program and if stable medically maybe considered for discharge. A:Recovering from A/C Respiratory Failure due to complication from secondary spontaneous right lung pneumothorax. P: Stable and transitioning for discharge tentatively tomorrow if Dr. [place] evaluation tomorrow suggest Robby Todd is stable on oral medical therapy and resuming home respiratory services reviewed today.

## 2019-12-18 NOTE — PROGRESS NOTES
Physical Therapy    0631/0631-01    Patient unavailable for physical therapy treatment due to asking for PM treatment.

## 2019-12-18 NOTE — PROGRESS NOTES
Physical Therapy  Physical Therapy  Daily Treatment Note  12/18/2019  [id number]                      Sheyla Ariza   [id number]                              1935    Patient Active Problem List   Diagnosis    Hypertension    Hyperlipidemia    Nephrolithiasis    Cor pulmonale, chronic (HCC)    Bradycardia, sinus    Multiple thyroid nodules    Secondary adrenal insufficiency (HCC)    Acquired bronchiectasis (HCC)    COPD, severe (HCC)    Status post repair of abdominal aortic aneurysm (AAA) using straight graft    Chronic respiratory failure with hypoxia and hypercapnia (HCC)    Severe hypoxemia    Ventricular tachyarrhythmia (HCC)    Thyroid disease    Hypertension    Hyperlipidemia    COPD (chronic obstructive pulmonary disease) (HCC)    Dependent edema    Respiratory failure (HCC)    Moderate protein-calorie malnutrition (HCC)    History of cerebral aneurysm repair    History of endovascular stent graft for abdominal aortic aneurysm    Paroxysmal atrial fibrillation with rapid ventricular response ([address]    Community acquired bacterial pneumonia    Paroxysmal atrial fibrillation (HCC)    Hypokalemia    Pulmonary fibrosis (HCC)    Essential tremor    BPH (benign prostatic hyperplasia)    Constipation    Acute urinary retention    Atelectasis of right lung    Acute on chronic respiratory failure with hypoxia (HCC)       Recommendation for discharge: Home Health Physical Therapy  Equipment prescriptions needed:Pt.  Has needed euipment    AM-PAC Mobility Inpatient   How much difficulty turning over in bed?: None  How much difficulty sitting down on / standing up from a chair with arms?: A Little  How much difficulty moving from lying on back to sitting on side of bed?: A Little  How much help from another person moving to and from a bed to a chair?: A Little  How much help from another person needed to walk in hospital room?: A Little  How much help from another person for climbing

## 2019-12-18 NOTE — PROGRESS NOTES
movement yesterday. BUN/creatinine is 45/1.1 with mild elevation transaminase. Hemoglobin is 8.9 with a WBC of 6.8.    12/16/2019  Patient seen and examined in ICU. Patient denies any significant chest pain, abdominal pain, nausea or vomiting. Patient still has some midthoracic back pain which is chronic for him. Case discussed with pulmonology and will probably remove the UreSil today and monitor and if no pneumothorax transferred to [facility]. Lab work reviewed with patient as noted below. Kidney function liver enzymes are very stable with hemoglobin 9.6. Temperature 98.1 with heart rate ranging from 46-68. Patient in sinus rhythm. Blood pressure currently 115/57 with O2 saturation 100% on 6 L. Urine output is adequate with patient having 2 bowel movements yesterday morning. 12/17/2019  Patient seen and examined in [facility]. Patient feels better today. Patient sitting up in chair without any new complaints. There is no unusual shortness of breath or chest pain. Patient's wife is at the bedside and case discussed. Temperature 90.3 but the patient's heart rate is ranging from anywhere from 48-66 in a sinus rhythm. Blood pressure currently 106/50. Patient's O2 sat is 99% on 6 L.    12/18/2019  Patient seen and examined in [facility]. Patient denies any problem with chest pain, abdominal pain, nausea vomiting or dizziness. Pulmonology note reviewed. Lab work reviewed with patient. Temperature 98.5 with blood pressure 115/55 and heart rate of 58.     Past Medical History:    Past Medical History:   Diagnosis Date    COPD (chronic obstructive pulmonary disease) ([address]     stable per pt    History of cardiovascular stress test 4/11/2012    Lexiscan    Hyperlipidemia     Hypertension 9-8-2014    lexiscan stress test    Kidney stone     Pulmonary fibrosis ([address]     Thyroid disease      Past Surgical History:    Past Surgical History:   Procedure Laterality Date    ABDOMINAL AORTIC ANEURYSM REPAIR  09/26/2016  BALLOON ANGIOPLASTY, ARTERY Left     Cerebral artery stenosis with angioplasty and stent    CATARACT REMOVAL WITH IMPLANT      right, left    CYSTOSCOPY  nov 2012    retro ureteroscopy stone manipulation and insertion of j stent    EYE SURGERY      cataracts-bilat    INGUINAL HERNIA REPAIR  1970s bilateral    INGUINAL HERNIA REPAIR  1990s bilateral    INGUINAL HERNIA REPAIR Right sept 2014    laparoscopic       Medications Prior to Admission:    @  Prior to Admission medications    Medication Sig Start Date End Date Taking?  Authorizing Provider   albuterol (PROVENTIL) (2.5 MG/3ML) 0.083% nebulizer solution Take 2.5 mg by nebulization every 4 hours as needed for Wheezing or Shortness of Breath   Yes Historical Provider, MD   ibuprofen (ADVIL;MOTRIN) 200 MG tablet Take 400 mg by mouth every 6 hours as needed for Pain   Yes Historical Provider, MD   Saw [place] 450 MG CAPS Take by mouth daily   Yes Historical Provider, MD   predniSONE (DELTASONE) 10 MG tablet Take 40 mg by mouth daily In the morning with breakfast   Yes Historical Provider, MD   potassium chloride (KLOR-CON M) 20 MEQ TBCR extended release tablet Take 20 mEq by mouth 2 times daily   Yes Historical Provider, MD   glycopyrrolate-formoterol (BEVESPI AEROSPHERE) 9-4.8 MCG/ACT AERO Inhale 2 puffs into the lungs 2 times daily   Yes Historical Provider, MD   guaiFENesin (ROBITUSSIN) 100 MG/5ML syrup Take 200 mg by mouth 3 times daily as needed for Cough or Congestion With a full glass of water   Yes Historical Provider, MD   azithromycin (ZITHROMAX) 250 MG tablet Take 250 mg by mouth daily   Yes Historical Provider, MD   Multiple Vitamins-Minerals (PRESERVISION AREDS 2 PO) Take by mouth daily   Yes Historical Provider, MD   NONFORMULARY Chest vest - 3 times a day to help expectorate mucus   Yes Historical Provider, MD   NONFORMULARY Trilogy - ventilator at bedtime   Yes Historical Provider, MD   finasteride (PROSCAR) 5 MG tablet Take 1 tablet by palpitations. GI: No nausea, vomiting, diarrhea or constipation. No melena or hematochezia. : No urinary complaints, dysuria, hematuria or frequency. MSK: No extremity weakness, paralysis or paresthesias. PHYSICAL EXAM:    Vitals:  BP (!) 115/55   Pulse 56   Temp 98.5 °F (36.9 °C)   Resp 16   Ht 5' 6\" (1.676 m)   Wt 127 lb (57.6 kg)   SpO2 98%   BMI 20.50 kg/m²     General:  This is a 81 y.o. yo male who is alert and oriented x 3, to moderate respiratory distress noted  HEENT:  Head is normocephalic and atraumatic, PERRLA, EOMI, mucus membranes moist with no pharyngeal erythema or exudate. Neck:  Supple with no carotid bruits, JVD or thyromegaly.   No cervical adenopathy  CV:  Regular rate and rhythm-heart sounds distant, no murmurs  Lungs: Markedly decreased breath sounds to auscultation bilaterally with no wheezes, rales or rhonchi, + UreSil noted in right mid chest  Abdomen:  Soft, nontender, nondistended, bowel sounds present  Extremities:  +1-race lower leg edema, peripheral pulses intact bilaterally,+ moderate - severe tremor of the left hand arm with mild in right  Neuro:  Cranial nerves II-XII grossly intact; motor and sensory function intact with no focal deficits  Skin:  No rashes, lesions or wounds    DATA:  CBC with Differential:    Lab Results   Component Value Date    WBC 7.9 12/18/2019    RBC 2.63 12/18/2019    HGB 8.6 12/18/2019    HCT 27.1 12/18/2019     12/18/2019    .0 12/18/2019    MCH 32.7 12/18/2019    MCHC 31.7 12/18/2019    RDW 13.6 12/18/2019    NRBC 0.9 10/01/2019    SEGSPCT 59 12/30/2013    LYMPHOPCT 14.8 12/18/2019    MONOPCT 12.1 12/18/2019    BASOPCT 0.6 12/18/2019    MONOSABS 0.96 12/18/2019    LYMPHSABS 1.17 12/18/2019    EOSABS 0.16 12/18/2019    BASOSABS 0.05 12/18/2019     CMP:    Lab Results   Component Value Date     12/18/2019    K 4.8 12/18/2019    K 3.9 09/30/2019     12/18/2019    CO2 31 12/18/2019    BUN 40 12/18/2019 CREATININE 0.9 12/18/2019    GFRAA >60 12/18/2019    LABGLOM >60 12/18/2019    GLUCOSE 111 12/18/2019    GLUCOSE 110 02/15/2012    PROT 5.1 12/16/2019    LABALBU 3.0 12/16/2019    LABALBU 4.1 02/15/2012    CALCIUM 8.1 12/18/2019    BILITOT <0.2 12/16/2019    ALKPHOS 73 12/16/2019    AST 28 12/16/2019    ALT 53 12/16/2019     Magnesium:    Lab Results   Component Value Date    MG 2.0 12/15/2019     Phosphorus:    Lab Results   Component Value Date    PHOS 4.1 12/15/2019     PT/INR:    Lab Results   Component Value Date    PROTIME 12.3 09/30/2019    INR 1.1 09/30/2019     Troponin:    Lab Results   Component Value Date    TROPONINI 0.02 12/13/2019     U/A:    Lab Results   Component Value Date    COLORU Yellow 12/14/2019    PROTEINU Negative 12/14/2019    PHUR 5.5 12/14/2019    WBCUA >20 12/14/2019    RBCUA 0-1 12/14/2019    RBCUA 10-20 11/28/2012    BACTERIA FEW 12/14/2019    CLARITYU CLOUDY 12/14/2019    SPECGRAV 1.020 12/14/2019    LEUKOCYTESUR LARGE 12/14/2019    UROBILINOGEN 0.2 12/14/2019    BILIRUBINUR Negative 12/14/2019    BLOODU TRACE 12/14/2019    GLUCOSEU Negative 12/14/2019     ABG:    Lab Results   Component Value Date    PH 7.418 12/14/2019    PCO2 53.2 12/14/2019    PO2 76.9 12/14/2019    HCO3 33.6 12/14/2019    BE 7.9 12/14/2019    O2SAT 94.9 12/14/2019     HgBA1c:    Lab Results   Component Value Date    LABA1C 6.2 03/25/2016     FLP:    Lab Results   Component Value Date    TRIG 66 07/26/2018    HDL 79 07/26/2018    LDLCALC 147 07/26/2018    LABVLDL 13 07/26/2018     TSH:    Lab Results   Component Value Date    TSH 0.531 10/01/2019     IRON:    Lab Results   Component Value Date    IRON 47 10/02/2019     LIPASE:  No results found for: LIPASE    ASSESSMENT AND PLAN:      Patient Active Problem List    Diagnosis Date Noted    Severe hypoxemia 09/02/2018     Priority: High     Class: Chronic    COPD, severe ([facility] 07/29/2016     Priority: High     Class: Chronic    Cor pulmonale, chronic ([facility] 07/03/2013     Priority: High     Class: Chronic    Acquired bronchiectasis ([address] 07/29/2016     Priority: Medium     Class: Chronic    Secondary adrenal insufficiency ([address] 03/11/2016     Priority: Medium     Class: Chronic    Hyperlipidemia      Priority: Medium     Class: Chronic    Multiple thyroid nodules 07/06/2014     Priority: Low     Class: Chronic    Bradycardia, sinus 01/11/2014     Priority: Low     Class: Chronic    Hypertension      Priority: Low    Moderate protein-calorie malnutrition ([address] 12/16/2019    Acute on chronic respiratory failure with hypoxia ([address] 12/13/2019    Acute urinary retention 10/28/2019    Atelectasis of right lung 10/28/2019    Community acquired bacterial pneumonia 10/16/2019    Paroxysmal atrial fibrillation ([address] 10/16/2019    Hypokalemia 10/16/2019    Pulmonary fibrosis ([address] 10/16/2019    Essential tremor 10/16/2019    BPH (benign prostatic hyperplasia) 10/16/2019    Constipation 10/16/2019    History of cerebral aneurysm repair 10/05/2019    History of endovascular stent graft for abdominal aortic aneurysm 10/05/2019    Paroxysmal atrial fibrillation with rapid ventricular response ([address] 10/05/2019    Respiratory failure ([address] 09/30/2019    Dependent edema 09/22/2019    Thyroid disease     Hyperlipidemia     COPD (chronic obstructive pulmonary disease) (HCC)     Ventricular tachyarrhythmia ([address] 03/28/2019    Chronic respiratory failure with hypoxia and hypercapnia (HCC) 08/06/2017     Class: Chronic    Status post repair of abdominal aortic aneurysm (AAA) using straight graft 11/30/2016     Class: Acute    Hypertension 09/08/2014    Nephrolithiasis 11/29/2012     1. Acute on chronic hypoxic and hypercapnic respiratory failure-spontaneous right pneumothorax  2. History of severe COPD on chronic O2 nasal cannula  3. History of moderate severe essential tremors  4. Hypertension  5. Hyperlipidemia  6. Hypothyroidism  7.   History of

## 2019-12-19 NOTE — PROGRESS NOTES
3-5 steps with a railing?: A Little  AM-PAC Inpatient Mobility Raw Score : 19  AM-PAC Inpatient T-Scale Score : 45.44  Mobility Inpatient CMS 0-100% Score: 41.77  Mobility Inpatient CMS G-Code Modifier : CK      Precautions: falls and O2    S: Patient cleared by nursing for treatment. Patient is agreeable to treatment. Coming out of bathroom with RN. Pain status: (measured on a visual analog scale with 0=no pain and 10=excruciating pain) 0/10. O: Pt was instructed in and performed the following:   Bed Mobility- Supine to sit-na     Scooting- Supervision   Sit to supine-Min A 1   Transfers-sit to stand- Min A 1    Gait:  Patient ambulated 2 x 20 feet using Wheeled Walker with Minimal assists 1, portable O2 on 6 Liters and IV pole. Steps: na  Treatment: Pt. Ambulated to bathroom and returned to eob,  Assist to lift LE's   returning to supine. Positioned head for support and comfort. Comment: Call light left by patient. A: Viral. Well, fatigues with ambulation, demonstrates SOB and low endurance level. P: Continue with physical therapy   Viri Bernabe, ELIANA  GOALS to be met in 2 days. Bed mobility-  Independent                                         Transfers-Sit to stand-Independent                Gait:  Patient to ambulate 150 feet using wheeled walker with Independent                         Increase strength in affected mm groups by 1/3 grade  Increase balance to allow for improvement towards functional goals. Increase endurance to allow for improvement towards functional goals.

## 2019-12-19 NOTE — PROGRESS NOTES
Physical Therapy  Physical Therapy  Daily Treatment Note  12/19/2019  [id number]                      Leo March   [id number]                              1935    Patient Active Problem List   Diagnosis    Hypertension    Hyperlipidemia    Nephrolithiasis    Cor pulmonale, chronic (HCC)    Bradycardia, sinus    Multiple thyroid nodules    Secondary adrenal insufficiency (HCC)    Acquired bronchiectasis (HCC)    COPD, severe (HCC)    Status post repair of abdominal aortic aneurysm (AAA) using straight graft    Chronic respiratory failure with hypoxia and hypercapnia (HCC)    Severe hypoxemia    Ventricular tachyarrhythmia (HCC)    Thyroid disease    Hypertension    Hyperlipidemia    COPD (chronic obstructive pulmonary disease) (HCC)    Dependent edema    Respiratory failure (HCC)    Moderate protein-calorie malnutrition (HCC)    History of cerebral aneurysm repair    History of endovascular stent graft for abdominal aortic aneurysm    Paroxysmal atrial fibrillation with rapid ventricular response ([address]    Community acquired bacterial pneumonia    Paroxysmal atrial fibrillation (HCC)    Hypokalemia    Pulmonary fibrosis (HCC)    Essential tremor    BPH (benign prostatic hyperplasia)    Constipation    Acute urinary retention    Atelectasis of right lung    Acute on chronic respiratory failure with hypoxia (HCC)       Recommendation for discharge: Home Health Physical Therapy  Equipment prescriptions needed:Pt.  Has needed euipment    AM-PAC Mobility Inpatient   How much difficulty turning over in bed?: None  How much difficulty sitting down on / standing up from a chair with arms?: A Little  How much difficulty moving from lying on back to sitting on side of bed?: A Little  How much help from another person moving to and from a bed to a chair?: A Little  How much help from another person needed to walk in hospital room?: A Little  How much help from another person for climbing

## 2019-12-19 NOTE — DISCHARGE SUMMARY
Department of Internal Medicine        CHIEF COMPLAINT: Increased shortness of breath and lethargy    Reason for Admission: Acute on chronic hypoxic respiratory failure with severe hypercapnia with pneumothorax    HISTORY OF PRESENT ILLNESS:      The patient is a 81 y.o. male who presents with having acute onset of marked respiratory distress this morning. It occurred very acutely with patient having history of chronic severe hypoxic respiratory failure on O2 nasal cannula. Patient was brought into the emergency room and chest x-ray was obtained. ABGs initially showed severe respiratory acidosis with elevated CO2 and with respiratory acidosis. A chest x-ray showed 50% right pneumothorax. A UreSil was inserted in the emergency room. Patient's family and patient still want full code so the patient was transferred to the ICU on consult with intensivist.    12/14/2019  Patient is seen and examined in ICU. Case discussed with patient's wife, son and daughter at the bedside today. Patient currently sitting up in a chair and doing better. He has some mild chest discomfort from the pigtail catheter but denies any excessive unusual shortness of breath at this time. Patient's hemoglobin is down to 9.1 and a BUN/creatinine 41/1.1. Blood pressure currently 126/72 with a heart rate of 60 and temperature 97.1. Urine output is adequate. Chest x-ray shows improved right pneumothorax but not completely resolved. 12/15/2019  Patient seen and examined in ICU. Patient feels little bit better today but complains of midthoracic back pain which is chronic. He denies any significant chest pain, abdominal pain, nausea/vomiting. Case discussed with patient's wife and oldest son at the bedside. Temperature is 98.4 with a heart rate of 16 blood pressure ranging from 88//57. Patient currently on 6 L nasal cannula with O2 sat 99% at rest.  Oral intake is poor-fair. Urine output is adequate.   Patient did have one bowel movement yesterday. BUN/creatinine is 45/1.1 with mild elevation transaminase. Hemoglobin is 8.9 with a WBC of 6.8.    12/16/2019  Patient seen and examined in ICU. Patient denies any significant chest pain, abdominal pain, nausea or vomiting. Patient still has some midthoracic back pain which is chronic for him. Case discussed with pulmonology and will probably remove the UreSil today and monitor and if no pneumothorax transferred to [facility]. Lab work reviewed with patient as noted below. Kidney function liver enzymes are very stable with hemoglobin 9.6. Temperature 98.1 with heart rate ranging from 46-68. Patient in sinus rhythm. Blood pressure currently 115/57 with O2 saturation 100% on 6 L. Urine output is adequate with patient having 2 bowel movements yesterday morning. 12/17/2019  Patient seen and examined in [facility]. Patient feels better today. Patient sitting up in chair without any new complaints. There is no unusual shortness of breath or chest pain. Patient's wife is at the bedside and case discussed. Temperature 90.3 but the patient's heart rate is ranging from anywhere from 48-66 in a sinus rhythm. Blood pressure currently 106/50. Patient's O2 sat is 99% on 6 L.    12/18/2019  Patient seen and examined in [facility]. Patient denies any problem with chest pain, abdominal pain, nausea vomiting or dizziness. Pulmonology note reviewed. Lab work reviewed with patient. Temperature 98.5 with blood pressure 115/55 and heart rate of 58.    12/19/2019  Patient seen and examined on [facility]. Patient feels good again today and denies any chest pain, abdominal pain, nausea/vomiting or unusual shortness of breath with the patient currently on 6 L nasal cannula with O2 sat of 98%. Case discussed with physical therapy and patient did ambulate with minimal assistance with a wheeled walker with the O2 saturation stayed above 90%. Blood pressure ranges from 99//54.   Heart rate varies 48-73 and a sinus DULCOLAX) 100 MG CAPS Take 100 mg by mouth 2 times daily 10/10/19   Maritza Rukhsana, DO   sucralfate (CARAFATE) 1 GM tablet Take 1 tablet by mouth 4 times daily 10/10/19   Maritza Rukhsana, DO       Allergies:  Flomax [tamsulosin hcl]    Social History:   Social History     Socioeconomic History    Marital status:      Spouse name: Not on file    Number of children: Not on file    Years of education: Not on file    Highest education level: Not on file   Occupational History    Occupation: Physician     Comment: Retired   Social Needs    Financial resource strain: Not on file    Food insecurity:     Worry: Not on file     Inability: Not on file   [facility] needs:     Medical: Not on file     Non-medical: Not on file   Tobacco Use    Smoking status: Former Smoker     Packs/day: 2.00     Years: 42.00     Pack years: 84.00     Types: Cigarettes     Last attempt to quit: 2003     Years since quittin.8    Smokeless tobacco: Never Used   Substance and Sexual Activity    Alcohol use:  Yes     Alcohol/week: 1.0 standard drinks     Types: 1 Shots of liquor per week     Comment: daily    Drug use: No    Sexual activity: Not on file   Lifestyle    Physical activity:     Days per week: Not on file     Minutes per session: Not on file    Stress: Not on file   Relationships    Social connections:     Talks on phone: Not on file     Gets together: Not on file     Attends Yarsanism service: Not on file     Active member of club or organization: Not on file     Attends meetings of clubs or organizations: Not on file     Relationship status: Not on file    Intimate partner violence:     Fear of current or ex partner: Not on file     Emotionally abused: Not on file     Physically abused: Not on file     Forced sexual activity: Not on file   Other Topics Concern    Not on file   Social History Narrative    Not on file       Family History:   Family History   Problem Relation Age of Onset    Heart Disease Father     Kidney Disease Mother        REVIEW OF SYSTEMS:    Gen: ++ lightheadedness, dizziness. No LOC or syncope. No fevers or chills. HEENT: No earache, sore throat or nasal congestion. Resp: Denies cough, hemoptysis or sputum production. Cardiac: Denies chest pain, ++SOB, diaphoresis, no palpitations. GI: No nausea, vomiting, diarrhea or constipation. No melena or hematochezia. : No urinary complaints, dysuria, hematuria or frequency. MSK: No extremity weakness, paralysis or paresthesias. PHYSICAL EXAM:    Vitals:  BP (!) 102/53   Pulse 58   Temp 98.4 °F (36.9 °C) (Oral)   Resp 16   Ht 5' 6\" (1.676 m)   Wt 127 lb (57.6 kg)   SpO2 99%   BMI 20.50 kg/m²     General:  This is a 81 y.o. yo male who is alert and oriented x 3, to moderate respiratory distress noted  HEENT:  Head is normocephalic and atraumatic, PERRLA, EOMI, mucus membranes moist with no pharyngeal erythema or exudate. Neck:  Supple with no carotid bruits, JVD or thyromegaly.   No cervical adenopathy  CV:  Regular rate and rhythm-heart sounds distant, no murmurs  Lungs: Markedly decreased breath sounds to auscultation bilaterally with no wheezes, rales or rhonchi, + UreSil noted in right mid chest  Abdomen:  Soft, nontender, nondistended, bowel sounds present  Extremities:  +1-race lower leg edema, peripheral pulses intact bilaterally,+ moderate - severe tremor of the left hand arm with mild in right  Neuro:  Cranial nerves II-XII grossly intact; motor and sensory function intact with no focal deficits  Skin:  No rashes, lesions or wounds    DATA:  CBC with Differential:    Lab Results   Component Value Date    WBC 7.9 12/18/2019    RBC 2.63 12/18/2019    HGB 8.6 12/18/2019    HCT 27.1 12/18/2019     12/18/2019    .0 12/18/2019    MCH 32.7 12/18/2019    MCHC 31.7 12/18/2019    RDW 13.6 12/18/2019    NRBC 0.9 10/01/2019    SEGSPCT 59 12/30/2013    LYMPHOPCT 14.8 12/18/2019    MONOPCT 12.1 12/18/2019    BASOPCT 0.6 12/18/2019    MONOSABS 0.96 12/18/2019    LYMPHSABS 1.17 12/18/2019    EOSABS 0.16 12/18/2019    BASOSABS 0.05 12/18/2019     CMP:    Lab Results   Component Value Date     12/18/2019    K 4.8 12/18/2019    K 3.9 09/30/2019     12/18/2019    CO2 31 12/18/2019    BUN 40 12/18/2019    CREATININE 0.9 12/18/2019    GFRAA >60 12/18/2019    LABGLOM >60 12/18/2019    GLUCOSE 111 12/18/2019    GLUCOSE 110 02/15/2012    PROT 5.1 12/16/2019    LABALBU 3.0 12/16/2019    LABALBU 4.1 02/15/2012    CALCIUM 8.1 12/18/2019    BILITOT <0.2 12/16/2019    ALKPHOS 73 12/16/2019    AST 28 12/16/2019    ALT 53 12/16/2019     Magnesium:    Lab Results   Component Value Date    MG 2.0 12/15/2019     Phosphorus:    Lab Results   Component Value Date    PHOS 4.1 12/15/2019     PT/INR:    Lab Results   Component Value Date    PROTIME 12.3 09/30/2019    INR 1.1 09/30/2019     Troponin:    Lab Results   Component Value Date    TROPONINI 0.02 12/13/2019     U/A:    Lab Results   Component Value Date    COLORU Yellow 12/14/2019    PROTEINU Negative 12/14/2019    PHUR 5.5 12/14/2019    WBCUA >20 12/14/2019    RBCUA 0-1 12/14/2019    RBCUA 10-20 11/28/2012    BACTERIA FEW 12/14/2019    CLARITYU CLOUDY 12/14/2019    SPECGRAV 1.020 12/14/2019    LEUKOCYTESUR LARGE 12/14/2019    UROBILINOGEN 0.2 12/14/2019    BILIRUBINUR Negative 12/14/2019    BLOODU TRACE 12/14/2019    GLUCOSEU Negative 12/14/2019     ABG:    Lab Results   Component Value Date    PH 7.418 12/14/2019    PCO2 53.2 12/14/2019    PO2 76.9 12/14/2019    HCO3 33.6 12/14/2019    BE 7.9 12/14/2019    O2SAT 94.9 12/14/2019     HgBA1c:    Lab Results   Component Value Date    LABA1C 6.2 03/25/2016     FLP:    Lab Results   Component Value Date    TRIG 66 07/26/2018    HDL 79 07/26/2018    LDLCALC 147 07/26/2018    LABVLDL 13 07/26/2018     TSH:    Lab Results   Component Value Date    TSH 0.531 10/01/2019     IRON:    Lab Results   Component Value Date  Nephrolithiasis 11/29/2012     1. Acute on chronic hypoxic and hypercapnic respiratory failure-spontaneous right pneumothorax  2. History of severe COPD on chronic O2 nasal cannula  3. History of moderate severe essential tremors  4. Hypertension  5. Hyperlipidemia  6. Hypothyroidism  7. History of paroxysmal atrial fibrillation-currently sinus rhythm/sinus bradycardia  8. Moderate protein, caloric malnutrition  9. Right lung infiltrate  10. Bacteriuria  11. 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 Sinus bradycardia    PLAN:  Discharge home today  Continue same home meds  Prednisone 10 mg tablet 3 daily and will be titrated down per instructions with Dr. Lloyd Hunt 250 mg twice daily  Stop Cardizem CD with bradycardia  Patient to follow-up with pulmonology as directed  Patient will be seen in the office and will be set up by my office staff.       Rebeca Montiel D.O.  12/19/2019  11:44 AM

## 2020-01-01 ENCOUNTER — APPOINTMENT (OUTPATIENT)
Dept: GENERAL RADIOLOGY | Age: 85
DRG: 189 | End: 2020-01-01
Payer: MEDICARE

## 2020-01-01 ENCOUNTER — TELEPHONE (OUTPATIENT)
Dept: OTHER | Facility: CLINIC | Age: 85
End: 2020-01-01

## 2020-01-01 ENCOUNTER — HOSPITAL ENCOUNTER (OUTPATIENT)
Dept: NUCLEAR MEDICINE | Age: 85
Discharge: HOME | End: 2020-01-20
Payer: MEDICARE

## 2020-01-01 ENCOUNTER — HOSPITAL ENCOUNTER (INPATIENT)
Age: 85
LOS: 10 days | Discharge: NURSING, REHAB OR LONG-TERM CARE | DRG: 189 | End: 2020-02-21
Attending: EMERGENCY MEDICINE | Admitting: INTERNAL MEDICINE
Payer: MEDICARE

## 2020-01-01 ENCOUNTER — HOSPITAL ENCOUNTER (INPATIENT)
Age: 85
LOS: 8 days | Discharge: HOME HEALTH | DRG: 190 | End: 2020-01-14
Attending: EMERGENCY MEDICINE | Admitting: INTERNAL MEDICINE
Payer: MEDICARE

## 2020-01-01 ENCOUNTER — APPOINTMENT (OUTPATIENT)
Dept: CT IMAGING | Age: 85
DRG: 190 | End: 2020-01-01
Payer: MEDICARE

## 2020-01-01 ENCOUNTER — HOSPITAL ENCOUNTER (OUTPATIENT)
Age: 85
Discharge: HOME | End: 2020-01-26
Payer: MEDICARE

## 2020-01-01 ENCOUNTER — HOSPITAL ENCOUNTER (EMERGENCY)
Age: 85
Discharge: HOME | End: 2020-02-06
Attending: EMERGENCY MEDICINE
Payer: MEDICARE

## 2020-01-01 ENCOUNTER — APPOINTMENT (OUTPATIENT)
Dept: CT IMAGING | Age: 85
DRG: 189 | End: 2020-01-01
Payer: MEDICARE

## 2020-01-01 ENCOUNTER — APPOINTMENT (OUTPATIENT)
Dept: GENERAL RADIOLOGY | Age: 85
End: 2020-01-01
Payer: MEDICARE

## 2020-01-01 ENCOUNTER — APPOINTMENT (OUTPATIENT)
Dept: GENERAL RADIOLOGY | Age: 85
DRG: 190 | End: 2020-01-01
Payer: MEDICARE

## 2020-01-01 ENCOUNTER — APPOINTMENT (OUTPATIENT)
Dept: ULTRASOUND IMAGING | Age: 85
DRG: 189 | End: 2020-01-01
Payer: MEDICARE

## 2020-01-01 ENCOUNTER — HOSPITAL ENCOUNTER (INPATIENT)
Age: 85
LOS: 3 days | DRG: 189 | End: 2020-02-25
Attending: EMERGENCY MEDICINE | Admitting: INTERNAL MEDICINE
Payer: MEDICARE

## 2020-01-01 VITALS
RESPIRATION RATE: 13 BRPM | SYSTOLIC BLOOD PRESSURE: 135 MMHG | OXYGEN SATURATION: 92 % | DIASTOLIC BLOOD PRESSURE: 80 MMHG | HEART RATE: 88 BPM | TEMPERATURE: 97.6 F | HEIGHT: 66 IN | BODY MASS INDEX: 20.03 KG/M2 | WEIGHT: 124.6 LBS

## 2020-01-01 VITALS
HEART RATE: 63 BPM | SYSTOLIC BLOOD PRESSURE: 102 MMHG | WEIGHT: 132.5 LBS | RESPIRATION RATE: 16 BRPM | DIASTOLIC BLOOD PRESSURE: 56 MMHG | TEMPERATURE: 98.4 F | HEIGHT: 66 IN | BODY MASS INDEX: 21.29 KG/M2 | OXYGEN SATURATION: 98 %

## 2020-01-01 VITALS
HEART RATE: 67 BPM | BODY MASS INDEX: 18.96 KG/M2 | HEIGHT: 66 IN | RESPIRATION RATE: 26 BRPM | TEMPERATURE: 98.3 F | SYSTOLIC BLOOD PRESSURE: 105 MMHG | OXYGEN SATURATION: 96 % | DIASTOLIC BLOOD PRESSURE: 53 MMHG | WEIGHT: 118 LBS

## 2020-01-01 VITALS
BODY MASS INDEX: 19.44 KG/M2 | DIASTOLIC BLOOD PRESSURE: 59 MMHG | HEART RATE: 65 BPM | OXYGEN SATURATION: 97 % | RESPIRATION RATE: 23 BRPM | WEIGHT: 121 LBS | HEIGHT: 66 IN | SYSTOLIC BLOOD PRESSURE: 115 MMHG | TEMPERATURE: 98.6 F

## 2020-01-01 LAB
ABO/RH: NORMAL
ADENOVIRUS BY PCR: NOT DETECTED
ALBUMIN SERPL-MCNC: 2.3 G/DL (ref 3.5–5.2)
ALBUMIN SERPL-MCNC: 2.4 G/DL (ref 3.5–5.2)
ALBUMIN SERPL-MCNC: 2.8 G/DL (ref 3.5–5.2)
ALBUMIN SERPL-MCNC: 2.8 G/DL (ref 3.5–5.2)
ALBUMIN SERPL-MCNC: 3.2 G/DL (ref 3.5–5.2)
ALBUMIN SERPL-MCNC: 3.3 G/DL (ref 3.5–5.2)
ALBUMIN SERPL-MCNC: 3.3 G/DL (ref 3.5–5.2)
ALP BLD-CCNC: 106 U/L (ref 40–129)
ALP BLD-CCNC: 114 U/L (ref 40–129)
ALP BLD-CCNC: 116 U/L (ref 40–129)
ALP BLD-CCNC: 69 U/L (ref 40–129)
ALP BLD-CCNC: 81 U/L (ref 40–129)
ALP BLD-CCNC: 84 U/L (ref 40–129)
ALP BLD-CCNC: 92 U/L (ref 40–129)
ALT SERPL-CCNC: 11 U/L (ref 0–40)
ALT SERPL-CCNC: 14 U/L (ref 0–40)
ALT SERPL-CCNC: 17 U/L (ref 0–40)
ALT SERPL-CCNC: 17 U/L (ref 0–40)
ALT SERPL-CCNC: 20 U/L (ref 0–40)
ANION GAP SERPL CALCULATED.3IONS-SCNC: 10 MMOL/L (ref 7–16)
ANION GAP SERPL CALCULATED.3IONS-SCNC: 11 MMOL/L (ref 7–16)
ANION GAP SERPL CALCULATED.3IONS-SCNC: 13 MMOL/L (ref 7–16)
ANION GAP SERPL CALCULATED.3IONS-SCNC: 7 MMOL/L (ref 7–16)
ANION GAP SERPL CALCULATED.3IONS-SCNC: 8 MMOL/L (ref 7–16)
ANION GAP SERPL CALCULATED.3IONS-SCNC: 9 MMOL/L (ref 7–16)
ANTIBODY SCREEN: NORMAL
AST SERPL-CCNC: 12 U/L (ref 0–39)
AST SERPL-CCNC: 13 U/L (ref 0–39)
AST SERPL-CCNC: 14 U/L (ref 0–39)
AST SERPL-CCNC: 14 U/L (ref 0–39)
AST SERPL-CCNC: 15 U/L (ref 0–39)
AST SERPL-CCNC: 15 U/L (ref 0–39)
AST SERPL-CCNC: 18 U/L (ref 0–39)
B.E.: 2.7 MMOL/L (ref -3–3)
B.E.: 5.6 MMOL/L (ref -3–3)
B.E.: 6.9 MMOL/L (ref -3–3)
B.E.: 7.1 MMOL/L (ref -3–3)
B.E.: 8.3 MMOL/L (ref -3–3)
B.E.: 8.6 MMOL/L (ref -3–3)
BACTERIA: ABNORMAL /HPF
BASOPHILS ABSOLUTE: 0 E9/L (ref 0–0.2)
BASOPHILS ABSOLUTE: 0.03 E9/L (ref 0–0.2)
BASOPHILS ABSOLUTE: 0.04 E9/L (ref 0–0.2)
BASOPHILS ABSOLUTE: 0.05 E9/L (ref 0–0.2)
BASOPHILS ABSOLUTE: 0.06 E9/L (ref 0–0.2)
BASOPHILS RELATIVE PERCENT: 0.3 % (ref 0–2)
BASOPHILS RELATIVE PERCENT: 0.5 % (ref 0–2)
BASOPHILS RELATIVE PERCENT: 0.5 % (ref 0–2)
BASOPHILS RELATIVE PERCENT: 0.6 % (ref 0–2)
BASOPHILS RELATIVE PERCENT: 1.1 % (ref 0–2)
BILIRUB SERPL-MCNC: 0.4 MG/DL (ref 0–1.2)
BILIRUB SERPL-MCNC: <0.2 MG/DL (ref 0–1.2)
BILIRUBIN URINE: ABNORMAL
BILIRUBIN URINE: NEGATIVE
BLOOD BANK DISPENSE STATUS: NORMAL
BLOOD BANK DISPENSE STATUS: NORMAL
BLOOD BANK PRODUCT CODE: NORMAL
BLOOD BANK PRODUCT CODE: NORMAL
BLOOD CULTURE, ROUTINE: NORMAL
BLOOD CULTURE, ROUTINE: NORMAL
BLOOD, URINE: ABNORMAL
BLOOD, URINE: ABNORMAL
BLOOD, URINE: NEGATIVE
BORDETELLA PARAPERTUSSIS BY PCR: NOT DETECTED
BORDETELLA PERTUSSIS BY PCR: NOT DETECTED
BPU ID: NORMAL
BPU ID: NORMAL
BUN BLDV-MCNC: 27 MG/DL (ref 8–23)
BUN BLDV-MCNC: 28 MG/DL (ref 8–23)
BUN BLDV-MCNC: 29 MG/DL (ref 8–23)
BUN BLDV-MCNC: 30 MG/DL (ref 8–23)
BUN BLDV-MCNC: 30 MG/DL (ref 8–23)
BUN BLDV-MCNC: 33 MG/DL (ref 8–23)
BUN BLDV-MCNC: 33 MG/DL (ref 8–23)
BUN BLDV-MCNC: 35 MG/DL (ref 8–23)
BUN BLDV-MCNC: 36 MG/DL (ref 8–23)
BUN BLDV-MCNC: 38 MG/DL (ref 8–23)
BUN BLDV-MCNC: 38 MG/DL (ref 8–23)
BUN BLDV-MCNC: 39 MG/DL (ref 8–23)
BUN BLDV-MCNC: 42 MG/DL (ref 8–23)
BUN BLDV-MCNC: 43 MG/DL (ref 8–23)
BUN BLDV-MCNC: 45 MG/DL (ref 8–23)
CALCIUM SERPL-MCNC: 8.1 MG/DL (ref 8.6–10.2)
CALCIUM SERPL-MCNC: 8.2 MG/DL (ref 8.6–10.2)
CALCIUM SERPL-MCNC: 8.3 MG/DL (ref 8.6–10.2)
CALCIUM SERPL-MCNC: 8.3 MG/DL (ref 8.6–10.2)
CALCIUM SERPL-MCNC: 8.4 MG/DL (ref 8.6–10.2)
CALCIUM SERPL-MCNC: 8.5 MG/DL (ref 8.6–10.2)
CALCIUM SERPL-MCNC: 8.6 MG/DL (ref 8.6–10.2)
CALCIUM SERPL-MCNC: 8.7 MG/DL (ref 8.6–10.2)
CALCIUM SERPL-MCNC: 8.9 MG/DL (ref 8.6–10.2)
CALCIUM SERPL-MCNC: 9 MG/DL (ref 8.6–10.2)
CALCIUM SERPL-MCNC: 9.1 MG/DL (ref 8.6–10.2)
CHLAMYDOPHILIA PNEUMONIAE BY PCR: NOT DETECTED
CHLORIDE BLD-SCNC: 101 MMOL/L (ref 98–107)
CHLORIDE BLD-SCNC: 102 MMOL/L (ref 98–107)
CHLORIDE BLD-SCNC: 102 MMOL/L (ref 98–107)
CHLORIDE BLD-SCNC: 104 MMOL/L (ref 98–107)
CHLORIDE BLD-SCNC: 105 MMOL/L (ref 98–107)
CHLORIDE BLD-SCNC: 106 MMOL/L (ref 98–107)
CHLORIDE BLD-SCNC: 97 MMOL/L (ref 98–107)
CHLORIDE BLD-SCNC: 98 MMOL/L (ref 98–107)
CHLORIDE BLD-SCNC: 98 MMOL/L (ref 98–107)
CHLORIDE BLD-SCNC: 99 MMOL/L (ref 98–107)
CLARITY: ABNORMAL
CLARITY: ABNORMAL
CLARITY: CLEAR
CO2: 28 MMOL/L (ref 22–29)
CO2: 28 MMOL/L (ref 22–29)
CO2: 29 MMOL/L (ref 22–29)
CO2: 29 MMOL/L (ref 22–29)
CO2: 30 MMOL/L (ref 22–29)
CO2: 31 MMOL/L (ref 22–29)
CO2: 33 MMOL/L (ref 22–29)
CO2: 34 MMOL/L (ref 22–29)
CO2: 35 MMOL/L (ref 22–29)
CO2: 35 MMOL/L (ref 22–29)
CO2: 36 MMOL/L (ref 22–29)
CO2: 36 MMOL/L (ref 22–29)
CO2: 37 MMOL/L (ref 22–29)
COHB: 0.3 % (ref 0–1.5)
COHB: 0.8 % (ref 0–1.5)
COLOR: YELLOW
COMMENT: ABNORMAL
CORONAVIRUS 229E BY PCR: NOT DETECTED
CORONAVIRUS HKU1 BY PCR: NOT DETECTED
CORONAVIRUS NL63 BY PCR: NOT DETECTED
CORONAVIRUS OC43 BY PCR: NOT DETECTED
CREAT SERPL-MCNC: 0.9 MG/DL (ref 0.7–1.2)
CREAT SERPL-MCNC: 1 MG/DL (ref 0.7–1.2)
CREAT SERPL-MCNC: 1.1 MG/DL (ref 0.7–1.2)
CREAT SERPL-MCNC: 1.2 MG/DL (ref 0.7–1.2)
CRITICAL: ABNORMAL
CRYSTALS, UA: ABNORMAL /HPF
CULTURE, BLOOD 2: NORMAL
CULTURE, BLOOD 2: NORMAL
DATE ANALYZED: ABNORMAL
DATE OF COLLECTION: ABNORMAL
DESCRIPTION BLOOD BANK: NORMAL
DESCRIPTION BLOOD BANK: NORMAL
EKG ATRIAL RATE: 250 BPM
EKG ATRIAL RATE: 54 BPM
EKG ATRIAL RATE: 71 BPM
EKG ATRIAL RATE: 77 BPM
EKG ATRIAL RATE: 81 BPM
EKG P AXIS: 100 DEGREES
EKG P AXIS: 25 DEGREES
EKG P AXIS: 37 DEGREES
EKG P AXIS: 50 DEGREES
EKG P AXIS: 66 DEGREES
EKG P-R INTERVAL: 130 MS
EKG P-R INTERVAL: 144 MS
EKG P-R INTERVAL: 152 MS
EKG P-R INTERVAL: 152 MS
EKG Q-T INTERVAL: 366 MS
EKG Q-T INTERVAL: 378 MS
EKG Q-T INTERVAL: 390 MS
EKG Q-T INTERVAL: 404 MS
EKG Q-T INTERVAL: 412 MS
EKG QRS DURATION: 76 MS
EKG QRS DURATION: 82 MS
EKG QRS DURATION: 82 MS
EKG QRS DURATION: 94 MS
EKG QRS DURATION: 94 MS
EKG QTC CALCULATION (BAZETT): 358 MS
EKG QTC CALCULATION (BAZETT): 414 MS
EKG QTC CALCULATION (BAZETT): 417 MS
EKG QTC CALCULATION (BAZETT): 439 MS
EKG QTC CALCULATION (BAZETT): 478 MS
EKG R AXIS: -13 DEGREES
EKG R AXIS: -15 DEGREES
EKG R AXIS: -21 DEGREES
EKG R AXIS: -32 DEGREES
EKG R AXIS: -44 DEGREES
EKG T AXIS: -14 DEGREES
EKG T AXIS: 111 DEGREES
EKG T AXIS: 49 DEGREES
EKG T AXIS: 53 DEGREES
EKG T AXIS: 57 DEGREES
EKG VENTRICULAR RATE: 54 BPM
EKG VENTRICULAR RATE: 69 BPM
EKG VENTRICULAR RATE: 71 BPM
EKG VENTRICULAR RATE: 77 BPM
EKG VENTRICULAR RATE: 81 BPM
EOSINOPHILS ABSOLUTE: 0.09 E9/L (ref 0.05–0.5)
EOSINOPHILS ABSOLUTE: 0.19 E9/L (ref 0.05–0.5)
EOSINOPHILS ABSOLUTE: 0.56 E9/L (ref 0.05–0.5)
EOSINOPHILS ABSOLUTE: 0.61 E9/L (ref 0.05–0.5)
EOSINOPHILS ABSOLUTE: 0.68 E9/L (ref 0.05–0.5)
EOSINOPHILS ABSOLUTE: 0.72 E9/L (ref 0.05–0.5)
EOSINOPHILS ABSOLUTE: 0.76 E9/L (ref 0.05–0.5)
EOSINOPHILS RELATIVE PERCENT: 1.2 % (ref 0–6)
EOSINOPHILS RELATIVE PERCENT: 3 % (ref 0–6)
EOSINOPHILS RELATIVE PERCENT: 5.2 % (ref 0–6)
EOSINOPHILS RELATIVE PERCENT: 7.6 % (ref 0–6)
EOSINOPHILS RELATIVE PERCENT: 8.8 % (ref 0–6)
EOSINOPHILS RELATIVE PERCENT: 9 % (ref 0–6)
EOSINOPHILS RELATIVE PERCENT: 9.4 % (ref 0–6)
GFR AFRICAN AMERICAN: >60
GFR NON-AFRICAN AMERICAN: 58 ML/MIN/1.73
GFR NON-AFRICAN AMERICAN: >60 ML/MIN/1.73
GLUCOSE BLD-MCNC: 101 MG/DL (ref 74–99)
GLUCOSE BLD-MCNC: 102 MG/DL (ref 74–99)
GLUCOSE BLD-MCNC: 104 MG/DL (ref 74–99)
GLUCOSE BLD-MCNC: 107 MG/DL (ref 74–99)
GLUCOSE BLD-MCNC: 112 MG/DL (ref 74–99)
GLUCOSE BLD-MCNC: 121 MG/DL (ref 74–99)
GLUCOSE BLD-MCNC: 127 MG/DL (ref 74–99)
GLUCOSE BLD-MCNC: 78 MG/DL (ref 74–99)
GLUCOSE BLD-MCNC: 82 MG/DL (ref 74–99)
GLUCOSE BLD-MCNC: 88 MG/DL (ref 74–99)
GLUCOSE BLD-MCNC: 88 MG/DL (ref 74–99)
GLUCOSE BLD-MCNC: 91 MG/DL (ref 74–99)
GLUCOSE BLD-MCNC: 91 MG/DL (ref 74–99)
GLUCOSE BLD-MCNC: 92 MG/DL (ref 74–99)
GLUCOSE BLD-MCNC: 92 MG/DL (ref 74–99)
GLUCOSE BLD-MCNC: 95 MG/DL (ref 74–99)
GLUCOSE BLD-MCNC: 96 MG/DL (ref 74–99)
GLUCOSE BLD-MCNC: 99 MG/DL (ref 74–99)
GLUCOSE URINE: NEGATIVE MG/DL
HCO3: 28.7 MMOL/L (ref 22–26)
HCO3: 32.6 MMOL/L (ref 22–26)
HCO3: 33 MMOL/L (ref 22–26)
HCO3: 33.3 MMOL/L (ref 22–26)
HCO3: 35.6 MMOL/L (ref 22–26)
HCO3: 35.9 MMOL/L (ref 22–26)
HCT VFR BLD CALC: 25.8 % (ref 37–54)
HCT VFR BLD CALC: 26.2 % (ref 37–54)
HCT VFR BLD CALC: 26.3 % (ref 37–54)
HCT VFR BLD CALC: 26.6 % (ref 37–54)
HCT VFR BLD CALC: 27 % (ref 37–54)
HCT VFR BLD CALC: 27.7 % (ref 37–54)
HCT VFR BLD CALC: 27.9 % (ref 37–54)
HCT VFR BLD CALC: 28 % (ref 37–54)
HCT VFR BLD CALC: 28.3 % (ref 37–54)
HCT VFR BLD CALC: 28.7 % (ref 37–54)
HCT VFR BLD CALC: 29.2 % (ref 37–54)
HCT VFR BLD CALC: 30 % (ref 37–54)
HCT VFR BLD CALC: 30.2 % (ref 37–54)
HCT VFR BLD CALC: 30.5 % (ref 37–54)
HCT VFR BLD CALC: 31.2 % (ref 37–54)
HCT VFR BLD CALC: 32.3 % (ref 37–54)
HCT VFR BLD CALC: 33.5 % (ref 37–54)
HCT VFR BLD CALC: 34.7 % (ref 37–54)
HCT VFR BLD CALC: 34.8 % (ref 37–54)
HCT VFR BLD CALC: 36.8 % (ref 37–54)
HEMOGLOBIN: 10.3 G/DL (ref 12.5–16.5)
HEMOGLOBIN: 10.4 G/DL (ref 12.5–16.5)
HEMOGLOBIN: 11.1 G/DL (ref 12.5–16.5)
HEMOGLOBIN: 11.1 G/DL (ref 12.5–16.5)
HEMOGLOBIN: 11.5 G/DL (ref 12.5–16.5)
HEMOGLOBIN: 8.1 G/DL (ref 12.5–16.5)
HEMOGLOBIN: 8.2 G/DL (ref 12.5–16.5)
HEMOGLOBIN: 8.3 G/DL (ref 12.5–16.5)
HEMOGLOBIN: 8.4 G/DL (ref 12.5–16.5)
HEMOGLOBIN: 8.6 G/DL (ref 12.5–16.5)
HEMOGLOBIN: 8.7 G/DL (ref 12.5–16.5)
HEMOGLOBIN: 8.7 G/DL (ref 12.5–16.5)
HEMOGLOBIN: 8.9 G/DL (ref 12.5–16.5)
HEMOGLOBIN: 9 G/DL (ref 12.5–16.5)
HEMOGLOBIN: 9.1 G/DL (ref 12.5–16.5)
HEMOGLOBIN: 9.1 G/DL (ref 12.5–16.5)
HEMOGLOBIN: 9.4 G/DL (ref 12.5–16.5)
HEMOGLOBIN: 9.6 G/DL (ref 12.5–16.5)
HHB: 4.2 % (ref 0–5)
HHB: 5.6 % (ref 0–5)
HHB: 6.3 % (ref 0–5)
HHB: 8.1 % (ref 0–5)
HHB: 8.8 % (ref 0–5)
HHB: 9.4 % (ref 0–5)
HUMAN METAPNEUMOVIRUS BY PCR: NOT DETECTED
HUMAN RHINOVIRUS/ENTEROVIRUS BY PCR: NOT DETECTED
HYPOCHROMIA: ABNORMAL
IMMATURE GRANULOCYTES #: 0.02 E9/L
IMMATURE GRANULOCYTES #: 0.03 E9/L
IMMATURE GRANULOCYTES #: 0.04 E9/L
IMMATURE GRANULOCYTES #: 0.04 E9/L
IMMATURE GRANULOCYTES #: 0.05 E9/L
IMMATURE GRANULOCYTES #: 0.07 E9/L
IMMATURE GRANULOCYTES %: 0.3 % (ref 0–5)
IMMATURE GRANULOCYTES %: 0.5 % (ref 0–5)
IMMATURE GRANULOCYTES %: 0.6 % (ref 0–5)
INFLUENZA A BY PCR: NOT DETECTED
INFLUENZA B BY PCR: NOT DETECTED
INR BLD: 1.1
IRON SATURATION: 14 % (ref 20–55)
IRON: 30 MCG/DL (ref 59–158)
KETONES, URINE: NEGATIVE MG/DL
L. PNEUMOPHILA SEROGP 1 UR AG: NORMAL
LAB: ABNORMAL
LACTIC ACID: 1.1 MMOL/L (ref 0.5–2.2)
LACTIC ACID: 2.4 MMOL/L (ref 0.5–2.2)
LEUKOCYTE ESTERASE, URINE: ABNORMAL
LEUKOCYTE ESTERASE, URINE: NEGATIVE
LYMPHOCYTES ABSOLUTE: 0.75 E9/L (ref 1.5–4)
LYMPHOCYTES ABSOLUTE: 0.82 E9/L (ref 1.5–4)
LYMPHOCYTES ABSOLUTE: 0.82 E9/L (ref 1.5–4)
LYMPHOCYTES ABSOLUTE: 1.15 E9/L (ref 1.5–4)
LYMPHOCYTES ABSOLUTE: 1.17 E9/L (ref 1.5–4)
LYMPHOCYTES ABSOLUTE: 1.28 E9/L (ref 1.5–4)
LYMPHOCYTES ABSOLUTE: 1.5 E9/L (ref 1.5–4)
LYMPHOCYTES RELATIVE PERCENT: 11.2 % (ref 20–42)
LYMPHOCYTES RELATIVE PERCENT: 15.9 % (ref 20–42)
LYMPHOCYTES RELATIVE PERCENT: 17.5 % (ref 20–42)
LYMPHOCYTES RELATIVE PERCENT: 18.5 % (ref 20–42)
LYMPHOCYTES RELATIVE PERCENT: 19.8 % (ref 20–42)
LYMPHOCYTES RELATIVE PERCENT: 6.4 % (ref 20–42)
LYMPHOCYTES RELATIVE PERCENT: 8.6 % (ref 20–42)
Lab: ABNORMAL
MAGNESIUM: 1.9 MG/DL (ref 1.6–2.6)
MAGNESIUM: 2 MG/DL (ref 1.6–2.6)
MAGNESIUM: 2 MG/DL (ref 1.6–2.6)
MAGNESIUM: 2.7 MG/DL (ref 1.6–2.6)
MCH RBC QN AUTO: 31.5 PG (ref 26–35)
MCH RBC QN AUTO: 31.6 PG (ref 26–35)
MCH RBC QN AUTO: 31.7 PG (ref 26–35)
MCH RBC QN AUTO: 31.7 PG (ref 26–35)
MCH RBC QN AUTO: 31.8 PG (ref 26–35)
MCH RBC QN AUTO: 32.2 PG (ref 26–35)
MCH RBC QN AUTO: 32.5 PG (ref 26–35)
MCH RBC QN AUTO: 32.6 PG (ref 26–35)
MCHC RBC AUTO-ENTMCNC: 30.8 % (ref 32–34.5)
MCHC RBC AUTO-ENTMCNC: 31 % (ref 32–34.5)
MCHC RBC AUTO-ENTMCNC: 31.1 % (ref 32–34.5)
MCHC RBC AUTO-ENTMCNC: 31.2 % (ref 32–34.5)
MCHC RBC AUTO-ENTMCNC: 31.3 % (ref 32–34.5)
MCHC RBC AUTO-ENTMCNC: 31.4 % (ref 32–34.5)
MCHC RBC AUTO-ENTMCNC: 31.6 % (ref 32–34.5)
MCHC RBC AUTO-ENTMCNC: 31.7 % (ref 32–34.5)
MCHC RBC AUTO-ENTMCNC: 31.9 % (ref 32–34.5)
MCV RBC AUTO: 100 FL (ref 80–99.9)
MCV RBC AUTO: 100 FL (ref 80–99.9)
MCV RBC AUTO: 100.7 FL (ref 80–99.9)
MCV RBC AUTO: 101.1 FL (ref 80–99.9)
MCV RBC AUTO: 101.6 FL (ref 80–99.9)
MCV RBC AUTO: 101.7 FL (ref 80–99.9)
MCV RBC AUTO: 101.8 FL (ref 80–99.9)
MCV RBC AUTO: 103.4 FL (ref 80–99.9)
MCV RBC AUTO: 103.7 FL (ref 80–99.9)
MCV RBC AUTO: 105.8 FL (ref 80–99.9)
MCV RBC AUTO: 99.1 FL (ref 80–99.9)
METER GLUCOSE: 129 MG/DL (ref 74–99)
METHB: 0.3 % (ref 0–1.5)
METHB: 0.3 % (ref 0–1.5)
METHB: 0.5 % (ref 0–1.5)
MODE: ABNORMAL
MONOCYTES ABSOLUTE: 0.13 E9/L (ref 0.1–0.95)
MONOCYTES ABSOLUTE: 0.81 E9/L (ref 0.1–0.95)
MONOCYTES ABSOLUTE: 0.84 E9/L (ref 0.1–0.95)
MONOCYTES ABSOLUTE: 0.94 E9/L (ref 0.1–0.95)
MONOCYTES ABSOLUTE: 1.04 E9/L (ref 0.1–0.95)
MONOCYTES ABSOLUTE: 1.07 E9/L (ref 0.1–0.95)
MONOCYTES ABSOLUTE: 1.24 E9/L (ref 0.1–0.95)
MONOCYTES RELATIVE PERCENT: 1.8 % (ref 2–12)
MONOCYTES RELATIVE PERCENT: 11.1 % (ref 2–12)
MONOCYTES RELATIVE PERCENT: 12.4 % (ref 2–12)
MONOCYTES RELATIVE PERCENT: 12.9 % (ref 2–12)
MONOCYTES RELATIVE PERCENT: 13 % (ref 2–12)
MONOCYTES RELATIVE PERCENT: 13.3 % (ref 2–12)
MONOCYTES RELATIVE PERCENT: 9.2 % (ref 2–12)
MYCOPLASMA PNEUMONIAE BY PCR: NOT DETECTED
NEUTROPHILS ABSOLUTE: 4.06 E9/L (ref 1.8–7.3)
NEUTROPHILS ABSOLUTE: 4.39 E9/L (ref 1.8–7.3)
NEUTROPHILS ABSOLUTE: 4.61 E9/L (ref 1.8–7.3)
NEUTROPHILS ABSOLUTE: 4.89 E9/L (ref 1.8–7.3)
NEUTROPHILS ABSOLUTE: 5.49 E9/L (ref 1.8–7.3)
NEUTROPHILS ABSOLUTE: 6.62 E9/L (ref 1.8–7.3)
NEUTROPHILS ABSOLUTE: 9.16 E9/L (ref 1.8–7.3)
NEUTROPHILS RELATIVE PERCENT: 58 % (ref 43–80)
NEUTROPHILS RELATIVE PERCENT: 60.7 % (ref 43–80)
NEUTROPHILS RELATIVE PERCENT: 64.1 % (ref 43–80)
NEUTROPHILS RELATIVE PERCENT: 69.7 % (ref 43–80)
NEUTROPHILS RELATIVE PERCENT: 71.9 % (ref 43–80)
NEUTROPHILS RELATIVE PERCENT: 75.5 % (ref 43–80)
NEUTROPHILS RELATIVE PERCENT: 78.3 % (ref 43–80)
NITRITE, URINE: NEGATIVE
O2 CONTENT: 13.3 ML/DL
O2 CONTENT: 13.4 ML/DL
O2 CONTENT: 14.1 ML/DL
O2 CONTENT: 15.3 ML/DL
O2 CONTENT: 15.5 ML/DL
O2 CONTENT: 15.6 ML/DL
O2 SATURATION: 90.5 % (ref 92–98.5)
O2 SATURATION: 91.1 % (ref 92–98.5)
O2 SATURATION: 91.8 % (ref 92–98.5)
O2 SATURATION: 93.6 % (ref 92–98.5)
O2 SATURATION: 94.4 % (ref 92–98.5)
O2 SATURATION: 95.8 % (ref 92–98.5)
O2HB: 90 % (ref 94–97)
O2HB: 90.4 % (ref 94–97)
O2HB: 91.1 % (ref 94–97)
O2HB: 92.4 % (ref 94–97)
O2HB: 93.6 % (ref 94–97)
O2HB: 95.2 % (ref 94–97)
OPERATOR ID: 3
OPERATOR ID: 797
OPERATOR ID: ABNORMAL
ORGANISM: ABNORMAL
ORGANISM: ABNORMAL
OVALOCYTES: ABNORMAL
PARAINFLUENZA VIRUS 1 BY PCR: NOT DETECTED
PARAINFLUENZA VIRUS 2 BY PCR: NOT DETECTED
PARAINFLUENZA VIRUS 3 BY PCR: NOT DETECTED
PARAINFLUENZA VIRUS 4 BY PCR: NOT DETECTED
PATIENT TEMP: 37
PATIENT TEMP: 37 C
PCO2: 51.1 MMHG (ref 35–45)
PCO2: 52.6 MMHG (ref 35–45)
PCO2: 55.3 MMHG (ref 35–45)
PCO2: 63.1 MMHG (ref 35–45)
PCO2: 63.6 MMHG (ref 35–45)
PCO2: 65.9 MMHG (ref 35–45)
PDW BLD-RTO: 13.5 FL (ref 11.5–15)
PDW BLD-RTO: 13.8 FL (ref 11.5–15)
PDW BLD-RTO: 14 FL (ref 11.5–15)
PDW BLD-RTO: 14.1 FL (ref 11.5–15)
PDW BLD-RTO: 14.2 FL (ref 11.5–15)
PDW BLD-RTO: 14.2 FL (ref 11.5–15)
PDW BLD-RTO: 14.3 FL (ref 11.5–15)
PDW BLD-RTO: 14.4 FL (ref 11.5–15)
PDW BLD-RTO: 14.5 FL (ref 11.5–15)
PDW BLD-RTO: 14.5 FL (ref 11.5–15)
PDW BLD-RTO: 14.7 FL (ref 11.5–15)
PEEP/CPAP: 6 CMH2O
PH BLOOD GAS: 7.34 (ref 7.35–7.45)
PH BLOOD GAS: 7.35 (ref 7.35–7.45)
PH BLOOD GAS: 7.37 (ref 7.35–7.45)
PH BLOOD GAS: 7.37 (ref 7.35–7.45)
PH BLOOD GAS: 7.4 (ref 7.35–7.45)
PH BLOOD GAS: 7.41 (ref 7.35–7.45)
PH UA: 5.5 (ref 5–9)
PH UA: 6 (ref 5–9)
PH UA: 7.5 (ref 5–9)
PHOSPHORUS: 3.2 MG/DL (ref 2.5–4.5)
PHOSPHORUS: 3.9 MG/DL (ref 2.5–4.5)
PLATELET # BLD: 157 E9/L (ref 130–450)
PLATELET # BLD: 166 E9/L (ref 130–450)
PLATELET # BLD: 168 E9/L (ref 130–450)
PLATELET # BLD: 177 E9/L (ref 130–450)
PLATELET # BLD: 178 E9/L (ref 130–450)
PLATELET # BLD: 181 E9/L (ref 130–450)
PLATELET # BLD: 189 E9/L (ref 130–450)
PLATELET # BLD: 202 E9/L (ref 130–450)
PLATELET # BLD: 228 E9/L (ref 130–450)
PLATELET # BLD: 231 E9/L (ref 130–450)
PLATELET # BLD: 231 E9/L (ref 130–450)
PMV BLD AUTO: 10.1 FL (ref 7–12)
PMV BLD AUTO: 10.3 FL (ref 7–12)
PMV BLD AUTO: 10.3 FL (ref 7–12)
PMV BLD AUTO: 10.4 FL (ref 7–12)
PMV BLD AUTO: 10.5 FL (ref 7–12)
PMV BLD AUTO: 10.5 FL (ref 7–12)
PMV BLD AUTO: 10.8 FL (ref 7–12)
PO2: 61 MMHG (ref 75–100)
PO2: 61.4 MMHG (ref 75–100)
PO2: 61.8 MMHG (ref 75–100)
PO2: 70.6 MMHG (ref 75–100)
PO2: 71.5 MMHG (ref 75–100)
PO2: 84.6 MMHG (ref 75–100)
POIKILOCYTES: ABNORMAL
POTASSIUM REFLEX MAGNESIUM: 3.8 MMOL/L (ref 3.5–5)
POTASSIUM SERPL-SCNC: 3.5 MMOL/L (ref 3.5–5)
POTASSIUM SERPL-SCNC: 3.9 MMOL/L (ref 3.5–5)
POTASSIUM SERPL-SCNC: 4 MMOL/L (ref 3.5–5)
POTASSIUM SERPL-SCNC: 4.1 MMOL/L (ref 3.5–5)
POTASSIUM SERPL-SCNC: 4.2 MMOL/L (ref 3.5–5)
POTASSIUM SERPL-SCNC: 4.2 MMOL/L (ref 3.5–5)
POTASSIUM SERPL-SCNC: 4.3 MMOL/L (ref 3.5–5)
POTASSIUM SERPL-SCNC: 4.4 MMOL/L (ref 3.5–5)
POTASSIUM SERPL-SCNC: 4.4 MMOL/L (ref 3.5–5)
POTASSIUM SERPL-SCNC: 4.5 MMOL/L (ref 3.5–5)
POTASSIUM SERPL-SCNC: 4.5 MMOL/L (ref 3.5–5)
POTASSIUM SERPL-SCNC: 4.6 MMOL/L (ref 3.5–5)
POTASSIUM SERPL-SCNC: 4.8 MMOL/L (ref 3.5–5)
PRO-BNP: 466 PG/ML (ref 0–450)
PRO-BNP: 541 PG/ML (ref 0–450)
PRO-BNP: 560 PG/ML (ref 0–450)
PRO-BNP: 999 PG/ML (ref 0–450)
PROCALCITONIN: 0.09 NG/ML (ref 0–0.08)
PROCALCITONIN: 0.09 NG/ML (ref 0–0.08)
PROCALCITONIN: 0.15 NG/ML (ref 0–0.08)
PROCALCITONIN: 0.15 NG/ML (ref 0–0.08)
PROTEIN UA: ABNORMAL MG/DL
PROTEIN UA: ABNORMAL MG/DL
PROTEIN UA: NEGATIVE MG/DL
PROTHROMBIN TIME: 12.5 SEC (ref 9.3–12.4)
PS: 16 CMH20
RBC # BLD: 2.58 E12/L (ref 3.8–5.8)
RBC # BLD: 2.66 E12/L (ref 3.8–5.8)
RBC # BLD: 2.67 E12/L (ref 3.8–5.8)
RBC # BLD: 2.87 E12/L (ref 3.8–5.8)
RBC # BLD: 2.87 E12/L (ref 3.8–5.8)
RBC # BLD: 2.92 E12/L (ref 3.8–5.8)
RBC # BLD: 2.95 E12/L (ref 3.8–5.8)
RBC # BLD: 2.98 E12/L (ref 3.8–5.8)
RBC # BLD: 3.26 E12/L (ref 3.8–5.8)
RBC # BLD: 3.29 E12/L (ref 3.8–5.8)
RBC # BLD: 3.64 E12/L (ref 3.8–5.8)
RBC UA: ABNORMAL /HPF (ref 0–2)
RESPIRATORY SYNCYTIAL VIRUS BY PCR: NOT DETECTED
SODIUM BLD-SCNC: 138 MMOL/L (ref 132–146)
SODIUM BLD-SCNC: 139 MMOL/L (ref 132–146)
SODIUM BLD-SCNC: 140 MMOL/L (ref 132–146)
SODIUM BLD-SCNC: 141 MMOL/L (ref 132–146)
SODIUM BLD-SCNC: 142 MMOL/L (ref 132–146)
SODIUM BLD-SCNC: 143 MMOL/L (ref 132–146)
SODIUM BLD-SCNC: 143 MMOL/L (ref 132–146)
SODIUM BLD-SCNC: 145 MMOL/L (ref 132–146)
SODIUM BLD-SCNC: 146 MMOL/L (ref 132–146)
SODIUM BLD-SCNC: 146 MMOL/L (ref 132–146)
SODIUM BLD-SCNC: 147 MMOL/L (ref 132–146)
SODIUM BLD-SCNC: 147 MMOL/L (ref 132–146)
SODIUM BLD-SCNC: 148 MMOL/L (ref 132–146)
SODIUM BLD-SCNC: 148 MMOL/L (ref 132–146)
SODIUM BLD-SCNC: 149 MMOL/L (ref 132–146)
SOURCE, BLOOD GAS: ABNORMAL
SPECIFIC GRAVITY UA: 1.01 (ref 1–1.03)
SPECIFIC GRAVITY UA: 1.02 (ref 1–1.03)
SPECIFIC GRAVITY UA: 1.02 (ref 1–1.03)
STREP PNEUMONIAE ANTIGEN, URINE: NORMAL
THB: 10.4 G/DL (ref 11.5–16.5)
THB: 11.1 G/DL (ref 11.5–16.5)
THB: 11.8 G/DL (ref 11.5–16.5)
THB: 11.9 G/DL (ref 11.5–16.5)
THB: 11.9 G/DL (ref 11.5–16.5)
THB: 9.9 G/DL (ref 11.5–16.5)
THEOPHYLLINE LEVEL: 4.4 MCG/ML (ref 10–20)
TIME ANALYZED: 1138
TIME ANALYZED: 1211
TIME ANALYZED: 1312
TIME ANALYZED: 1427
TIME ANALYZED: 2318
TIME ANALYZED: 959
TOTAL IRON BINDING CAPACITY: 209 MCG/DL (ref 250–450)
TOTAL PROTEIN: 4.9 G/DL (ref 6.4–8.3)
TOTAL PROTEIN: 4.9 G/DL (ref 6.4–8.3)
TOTAL PROTEIN: 5.3 G/DL (ref 6.4–8.3)
TOTAL PROTEIN: 5.3 G/DL (ref 6.4–8.3)
TOTAL PROTEIN: 5.5 G/DL (ref 6.4–8.3)
TOTAL PROTEIN: 5.6 G/DL (ref 6.4–8.3)
TOTAL PROTEIN: 5.6 G/DL (ref 6.4–8.3)
TROPONIN: 0.02 NG/ML (ref 0–0.03)
TROPONIN: 0.02 NG/ML (ref 0–0.03)
TROPONIN: <0.01 NG/ML (ref 0–0.03)
TROPONIN: <0.01 NG/ML (ref 0–0.03)
URINE CULTURE, ROUTINE: ABNORMAL
URINE CULTURE, ROUTINE: ABNORMAL
URINE CULTURE, ROUTINE: NORMAL
URINE CULTURE, ROUTINE: NORMAL
UROBILINOGEN, URINE: 0.2 E.U./DL
WBC # BLD: 11.7 E9/L (ref 4.5–11.5)
WBC # BLD: 6.3 E9/L (ref 4.5–11.5)
WBC # BLD: 6.4 E9/L (ref 4.5–11.5)
WBC # BLD: 7.3 E9/L (ref 4.5–11.5)
WBC # BLD: 7.4 E9/L (ref 4.5–11.5)
WBC # BLD: 7.6 E9/L (ref 4.5–11.5)
WBC # BLD: 8.1 E9/L (ref 4.5–11.5)
WBC # BLD: 8.9 E9/L (ref 4.5–11.5)
WBC # BLD: 9.2 E9/L (ref 4.5–11.5)
WBC # BLD: 9.2 E9/L (ref 4.5–11.5)
WBC # BLD: 9.5 E9/L (ref 4.5–11.5)
WBC UA: >20 /HPF (ref 0–5)
WBC UA: >20 /HPF (ref 0–5)
WBC UA: ABNORMAL /HPF (ref 0–5)
WBC UA: ABNORMAL /HPF (ref 0–5)

## 2020-01-01 PROCEDURE — 85018 HEMOGLOBIN: CPT

## 2020-01-01 PROCEDURE — 2700000000 HC OXYGEN THERAPY PER DAY

## 2020-01-01 PROCEDURE — 71045 X-RAY EXAM CHEST 1 VIEW: CPT

## 2020-01-01 PROCEDURE — 6360000002 HC RX W HCPCS: Performed by: INTERNAL MEDICINE

## 2020-01-01 PROCEDURE — 6370000000 HC RX 637 (ALT 250 FOR IP): Performed by: INTERNAL MEDICINE

## 2020-01-01 PROCEDURE — 84484 ASSAY OF TROPONIN QUANT: CPT

## 2020-01-01 PROCEDURE — 0W9930Z DRAINAGE OF RIGHT PLEURAL CAVITY WITH DRAINAGE DEVICE, PERCUTANEOUS APPROACH: ICD-10-PCS | Performed by: EMERGENCY MEDICINE

## 2020-01-01 PROCEDURE — 72100 X-RAY EXAM L-S SPINE 2/3 VWS: CPT

## 2020-01-01 PROCEDURE — 83550 IRON BINDING TEST: CPT

## 2020-01-01 PROCEDURE — 71260 CT THORAX DX C+: CPT

## 2020-01-01 PROCEDURE — 80198 ASSAY OF THEOPHYLLINE: CPT

## 2020-01-01 PROCEDURE — 94669 MECHANICAL CHEST WALL OSCILL: CPT

## 2020-01-01 PROCEDURE — 51702 INSERT TEMP BLADDER CATH: CPT

## 2020-01-01 PROCEDURE — 80048 BASIC METABOLIC PNL TOTAL CA: CPT

## 2020-01-01 PROCEDURE — 86923 COMPATIBILITY TEST ELECTRIC: CPT

## 2020-01-01 PROCEDURE — 2580000003 HC RX 258: Performed by: INTERNAL MEDICINE

## 2020-01-01 PROCEDURE — 94640 AIRWAY INHALATION TREATMENT: CPT

## 2020-01-01 PROCEDURE — 99285 EMERGENCY DEPT VISIT HI MDM: CPT

## 2020-01-01 PROCEDURE — 93005 ELECTROCARDIOGRAM TRACING: CPT | Performed by: INTERNAL MEDICINE

## 2020-01-01 PROCEDURE — 97530 THERAPEUTIC ACTIVITIES: CPT

## 2020-01-01 PROCEDURE — 94660 CPAP INITIATION&MGMT: CPT

## 2020-01-01 PROCEDURE — 85014 HEMATOCRIT: CPT

## 2020-01-01 PROCEDURE — 78815 PET IMAGE W/CT SKULL-THIGH: CPT

## 2020-01-01 PROCEDURE — 93005 ELECTROCARDIOGRAM TRACING: CPT | Performed by: EMERGENCY MEDICINE

## 2020-01-01 PROCEDURE — 36415 COLL VENOUS BLD VENIPUNCTURE: CPT

## 2020-01-01 PROCEDURE — 2000000000 HC ICU R&B

## 2020-01-01 PROCEDURE — 82805 BLOOD GASES W/O2 SATURATION: CPT

## 2020-01-01 PROCEDURE — 86850 RBC ANTIBODY SCREEN: CPT

## 2020-01-01 PROCEDURE — 6370000000 HC RX 637 (ALT 250 FOR IP): Performed by: EMERGENCY MEDICINE

## 2020-01-01 PROCEDURE — 84145 PROCALCITONIN (PCT): CPT

## 2020-01-01 PROCEDURE — 87186 SC STD MICRODIL/AGAR DIL: CPT

## 2020-01-01 PROCEDURE — 74230 X-RAY XM SWLNG FUNCJ C+: CPT

## 2020-01-01 PROCEDURE — 87040 BLOOD CULTURE FOR BACTERIA: CPT

## 2020-01-01 PROCEDURE — P9016 RBC LEUKOCYTES REDUCED: HCPCS

## 2020-01-01 PROCEDURE — 93971 EXTREMITY STUDY: CPT

## 2020-01-01 PROCEDURE — 81001 URINALYSIS AUTO W/SCOPE: CPT

## 2020-01-01 PROCEDURE — 97535 SELF CARE MNGMENT TRAINING: CPT

## 2020-01-01 PROCEDURE — 93010 ELECTROCARDIOGRAM REPORT: CPT | Performed by: INTERNAL MEDICINE

## 2020-01-01 PROCEDURE — 85610 PROTHROMBIN TIME: CPT

## 2020-01-01 PROCEDURE — 83880 ASSAY OF NATRIURETIC PEPTIDE: CPT

## 2020-01-01 PROCEDURE — 86901 BLOOD TYPING SEROLOGIC RH(D): CPT

## 2020-01-01 PROCEDURE — 2500000003 HC RX 250 WO HCPCS: Performed by: INTERNAL MEDICINE

## 2020-01-01 PROCEDURE — 99291 CRITICAL CARE FIRST HOUR: CPT | Performed by: INTERNAL MEDICINE

## 2020-01-01 PROCEDURE — 1200000000 HC SEMI PRIVATE

## 2020-01-01 PROCEDURE — 93005 ELECTROCARDIOGRAM TRACING: CPT | Performed by: STUDENT IN AN ORGANIZED HEALTH CARE EDUCATION/TRAINING PROGRAM

## 2020-01-01 PROCEDURE — 85025 COMPLETE CBC W/AUTO DIFF WBC: CPT

## 2020-01-01 PROCEDURE — 83735 ASSAY OF MAGNESIUM: CPT

## 2020-01-01 PROCEDURE — 36430 TRANSFUSION BLD/BLD COMPNT: CPT

## 2020-01-01 PROCEDURE — 2060000000 HC ICU INTERMEDIATE R&B

## 2020-01-01 PROCEDURE — 3430000000 HC RX DIAGNOSTIC RADIOPHARMACEUTICAL: Performed by: RADIOLOGY

## 2020-01-01 PROCEDURE — 83605 ASSAY OF LACTIC ACID: CPT

## 2020-01-01 PROCEDURE — 87088 URINE BACTERIA CULTURE: CPT

## 2020-01-01 PROCEDURE — 97110 THERAPEUTIC EXERCISES: CPT

## 2020-01-01 PROCEDURE — 87077 CULTURE AEROBIC IDENTIFY: CPT

## 2020-01-01 PROCEDURE — 6360000002 HC RX W HCPCS

## 2020-01-01 PROCEDURE — 71250 CT THORAX DX C-: CPT

## 2020-01-01 PROCEDURE — 94664 DEMO&/EVAL PT USE INHALER: CPT

## 2020-01-01 PROCEDURE — 74018 RADEX ABDOMEN 1 VIEW: CPT

## 2020-01-01 PROCEDURE — 97165 OT EVAL LOW COMPLEX 30 MIN: CPT

## 2020-01-01 PROCEDURE — 97140 MANUAL THERAPY 1/> REGIONS: CPT | Performed by: PHYSICAL THERAPIST

## 2020-01-01 PROCEDURE — 87502 INFLUENZA DNA AMP PROBE: CPT

## 2020-01-01 PROCEDURE — 6360000004 HC RX CONTRAST MEDICATION: Performed by: RADIOLOGY

## 2020-01-01 PROCEDURE — 97116 GAIT TRAINING THERAPY: CPT

## 2020-01-01 PROCEDURE — 85027 COMPLETE CBC AUTOMATED: CPT

## 2020-01-01 PROCEDURE — 97161 PT EVAL LOW COMPLEX 20 MIN: CPT | Performed by: PHYSICAL THERAPIST

## 2020-01-01 PROCEDURE — 97110 THERAPEUTIC EXERCISES: CPT | Performed by: SPEECH-LANGUAGE PATHOLOGIST

## 2020-01-01 PROCEDURE — 84100 ASSAY OF PHOSPHORUS: CPT

## 2020-01-01 PROCEDURE — 80053 COMPREHEN METABOLIC PANEL: CPT

## 2020-01-01 PROCEDURE — 96375 TX/PRO/DX INJ NEW DRUG ADDON: CPT

## 2020-01-01 PROCEDURE — 87450 HC DIRECT STREP B ANTIGEN: CPT

## 2020-01-01 PROCEDURE — 97116 GAIT TRAINING THERAPY: CPT | Performed by: PHYSICAL THERAPIST

## 2020-01-01 PROCEDURE — 0100U HC RESPIRPTHGN MULT REV TRANS & AMP PRB TECH 21 TRGT: CPT

## 2020-01-01 PROCEDURE — 0WP9X0Z REMOVAL OF DRAINAGE DEVICE FROM RIGHT PLEURAL CAVITY, EXTERNAL APPROACH: ICD-10-PCS | Performed by: RADIOLOGY

## 2020-01-01 PROCEDURE — 82962 GLUCOSE BLOOD TEST: CPT

## 2020-01-01 PROCEDURE — 92526 ORAL FUNCTION THERAPY: CPT

## 2020-01-01 PROCEDURE — 96365 THER/PROPH/DIAG IV INF INIT: CPT

## 2020-01-01 PROCEDURE — 32551 INSERTION OF CHEST TUBE: CPT

## 2020-01-01 PROCEDURE — 72170 X-RAY EXAM OF PELVIS: CPT

## 2020-01-01 PROCEDURE — 92610 EVALUATE SWALLOWING FUNCTION: CPT

## 2020-01-01 PROCEDURE — 92611 MOTION FLUOROSCOPY/SWALLOW: CPT | Performed by: SPEECH-LANGUAGE PATHOLOGIST

## 2020-01-01 PROCEDURE — 97164 PT RE-EVAL EST PLAN CARE: CPT | Performed by: PHYSICAL THERAPIST

## 2020-01-01 PROCEDURE — 92526 ORAL FUNCTION THERAPY: CPT | Performed by: SPEECH-LANGUAGE PATHOLOGIST

## 2020-01-01 PROCEDURE — 6360000002 HC RX W HCPCS: Performed by: STUDENT IN AN ORGANIZED HEALTH CARE EDUCATION/TRAINING PROGRAM

## 2020-01-01 PROCEDURE — 6360000002 HC RX W HCPCS: Performed by: EMERGENCY MEDICINE

## 2020-01-01 PROCEDURE — 81003 URINALYSIS AUTO W/O SCOPE: CPT

## 2020-01-01 PROCEDURE — 6370000000 HC RX 637 (ALT 250 FOR IP): Performed by: STUDENT IN AN ORGANIZED HEALTH CARE EDUCATION/TRAINING PROGRAM

## 2020-01-01 PROCEDURE — 97530 THERAPEUTIC ACTIVITIES: CPT | Performed by: PHYSICAL THERAPIST

## 2020-01-01 PROCEDURE — 83540 ASSAY OF IRON: CPT

## 2020-01-01 PROCEDURE — 36600 WITHDRAWAL OF ARTERIAL BLOOD: CPT

## 2020-01-01 PROCEDURE — 86900 BLOOD TYPING SEROLOGIC ABO: CPT

## 2020-01-01 PROCEDURE — 97168 OT RE-EVAL EST PLAN CARE: CPT

## 2020-01-01 PROCEDURE — A9552 F18 FDG: HCPCS | Performed by: RADIOLOGY

## 2020-01-01 PROCEDURE — 2580000003 HC RX 258: Performed by: STUDENT IN AN ORGANIZED HEALTH CARE EDUCATION/TRAINING PROGRAM

## 2020-01-01 PROCEDURE — 97162 PT EVAL MOD COMPLEX 30 MIN: CPT | Performed by: PHYSICAL THERAPIST

## 2020-01-01 PROCEDURE — 94644 CONT INHLJ TX 1ST HOUR: CPT

## 2020-01-01 RX ORDER — FINASTERIDE 5 MG/1
5 TABLET, FILM COATED ORAL DAILY
Status: DISCONTINUED | OUTPATIENT
Start: 2020-01-01 | End: 2020-01-01

## 2020-01-01 RX ORDER — IBUPROFEN 400 MG/1
400 TABLET ORAL 3 TIMES DAILY
Status: DISCONTINUED | OUTPATIENT
Start: 2020-01-01 | End: 2020-01-01 | Stop reason: HOSPADM

## 2020-01-01 RX ORDER — TRAMADOL HYDROCHLORIDE 50 MG/1
100 TABLET ORAL EVERY 6 HOURS PRN
Status: DISCONTINUED | OUTPATIENT
Start: 2020-01-01 | End: 2020-01-01

## 2020-01-01 RX ORDER — TRAMADOL HYDROCHLORIDE 50 MG/1
50 TABLET ORAL EVERY 6 HOURS PRN
Status: DISCONTINUED | OUTPATIENT
Start: 2020-01-01 | End: 2020-01-01 | Stop reason: HOSPADM

## 2020-01-01 RX ORDER — ONDANSETRON 2 MG/ML
4 INJECTION INTRAMUSCULAR; INTRAVENOUS EVERY 6 HOURS PRN
Status: DISCONTINUED | OUTPATIENT
Start: 2020-01-01 | End: 2020-01-01 | Stop reason: HOSPADM

## 2020-01-01 RX ORDER — METOPROLOL SUCCINATE 25 MG/1
12.5 TABLET, EXTENDED RELEASE ORAL DAILY
Status: DISCONTINUED | OUTPATIENT
Start: 2020-01-01 | End: 2020-01-01 | Stop reason: HOSPADM

## 2020-01-01 RX ORDER — LATANOPROST 50 UG/ML
1 SOLUTION/ DROPS OPHTHALMIC DAILY
Status: DISCONTINUED | OUTPATIENT
Start: 2020-01-01 | End: 2020-01-01 | Stop reason: HOSPADM

## 2020-01-01 RX ORDER — ZINC OXIDE
OINTMENT (GRAM) TOPICAL 4 TIMES DAILY PRN
Status: DISCONTINUED | OUTPATIENT
Start: 2020-01-01 | End: 2020-01-01 | Stop reason: HOSPADM

## 2020-01-01 RX ORDER — GUAIFENESIN 100 MG/5ML
200 SYRUP ORAL 3 TIMES DAILY PRN
Status: CANCELLED | OUTPATIENT
Start: 2020-01-01

## 2020-01-01 RX ORDER — IBUPROFEN 400 MG/1
400 TABLET ORAL EVERY 6 HOURS PRN
Status: DISCONTINUED | OUTPATIENT
Start: 2020-01-01 | End: 2020-01-01 | Stop reason: HOSPADM

## 2020-01-01 RX ORDER — METHYLPREDNISOLONE SODIUM SUCCINATE 40 MG/ML
40 INJECTION, POWDER, LYOPHILIZED, FOR SOLUTION INTRAMUSCULAR; INTRAVENOUS EVERY 12 HOURS
Status: DISCONTINUED | OUTPATIENT
Start: 2020-01-01 | End: 2020-01-01

## 2020-01-01 RX ORDER — BUDESONIDE 0.5 MG/2ML
500 INHALANT ORAL 2 TIMES DAILY
Status: DISCONTINUED | OUTPATIENT
Start: 2020-01-01 | End: 2020-01-01 | Stop reason: HOSPADM

## 2020-01-01 RX ORDER — FENTANYL CITRATE 50 UG/ML
25 INJECTION, SOLUTION INTRAMUSCULAR; INTRAVENOUS ONCE
Status: COMPLETED | OUTPATIENT
Start: 2020-01-01 | End: 2020-01-01

## 2020-01-01 RX ORDER — TRAMADOL HYDROCHLORIDE 50 MG/1
100 TABLET ORAL 2 TIMES DAILY
Status: DISCONTINUED | OUTPATIENT
Start: 2020-01-01 | End: 2020-01-01

## 2020-01-01 RX ORDER — POTASSIUM CHLORIDE 20 MEQ/1
20 TABLET, EXTENDED RELEASE ORAL 2 TIMES DAILY
Status: DISCONTINUED | OUTPATIENT
Start: 2020-01-01 | End: 2020-01-01 | Stop reason: HOSPADM

## 2020-01-01 RX ORDER — BRIMONIDINE TARTRATE 2 MG/ML
1 SOLUTION/ DROPS OPHTHALMIC EVERY 12 HOURS
Status: DISCONTINUED | OUTPATIENT
Start: 2020-01-01 | End: 2020-01-01 | Stop reason: HOSPADM

## 2020-01-01 RX ORDER — METHYLPREDNISOLONE SODIUM SUCCINATE 40 MG/ML
20 INJECTION, POWDER, LYOPHILIZED, FOR SOLUTION INTRAMUSCULAR; INTRAVENOUS ONCE
Status: COMPLETED | OUTPATIENT
Start: 2020-01-01 | End: 2020-01-01

## 2020-01-01 RX ORDER — SODIUM CHLORIDE 0.9 % (FLUSH) 0.9 %
10 SYRINGE (ML) INJECTION EVERY 12 HOURS SCHEDULED
Status: DISCONTINUED | OUTPATIENT
Start: 2020-01-01 | End: 2020-01-01 | Stop reason: HOSPADM

## 2020-01-01 RX ORDER — ACETYLCYSTEINE 100 MG/ML
4 SOLUTION ORAL; RESPIRATORY (INHALATION) EVERY 4 HOURS
Status: DISCONTINUED | OUTPATIENT
Start: 2020-01-01 | End: 2020-01-01

## 2020-01-01 RX ORDER — SUCRALFATE 1 G/1
1 TABLET ORAL 4 TIMES DAILY
Status: CANCELLED | OUTPATIENT
Start: 2020-01-01

## 2020-01-01 RX ORDER — HYDROCODONE BITARTRATE AND ACETAMINOPHEN 5; 325 MG/1; MG/1
1 TABLET ORAL EVERY 8 HOURS PRN
Status: DISCONTINUED | OUTPATIENT
Start: 2020-01-01 | End: 2020-01-01 | Stop reason: HOSPADM

## 2020-01-01 RX ORDER — PRIMIDONE 250 MG/1
250 TABLET ORAL EVERY 8 HOURS SCHEDULED
Status: DISCONTINUED | OUTPATIENT
Start: 2020-01-01 | End: 2020-01-01 | Stop reason: HOSPADM

## 2020-01-01 RX ORDER — GLYCOPYRROLATE 0.2 MG/ML
0.1 INJECTION INTRAMUSCULAR; INTRAVENOUS EVERY 4 HOURS PRN
Status: DISCONTINUED | OUTPATIENT
Start: 2020-01-01 | End: 2020-01-01 | Stop reason: HOSPADM

## 2020-01-01 RX ORDER — SODIUM PHOSPHATE,MONO-DIBASIC 19G-7G/118
1 ENEMA (ML) RECTAL ONCE
Status: COMPLETED | OUTPATIENT
Start: 2020-01-01 | End: 2020-01-01

## 2020-01-01 RX ORDER — IPRATROPIUM BROMIDE AND ALBUTEROL SULFATE 2.5; .5 MG/3ML; MG/3ML
1 SOLUTION RESPIRATORY (INHALATION) EVERY 4 HOURS
Status: DISCONTINUED | OUTPATIENT
Start: 2020-01-01 | End: 2020-01-01 | Stop reason: HOSPADM

## 2020-01-01 RX ORDER — MAG HYDROX/ALUMINUM HYD/SIMETH 400-400-40
450 SUSPENSION, ORAL (FINAL DOSE FORM) ORAL 2 TIMES DAILY
Status: DISCONTINUED | OUTPATIENT
Start: 2020-01-01 | End: 2020-01-01 | Stop reason: CLARIF

## 2020-01-01 RX ORDER — FINASTERIDE 5 MG/1
5 TABLET, FILM COATED ORAL DAILY
Status: DISCONTINUED | OUTPATIENT
Start: 2020-01-01 | End: 2020-01-01 | Stop reason: HOSPADM

## 2020-01-01 RX ORDER — POLYETHYLENE GLYCOL 3350 17 G/17G
17 POWDER, FOR SOLUTION ORAL 2 TIMES DAILY
Qty: 527 G | Refills: 1 | DISCHARGE
Start: 2020-01-01 | End: 2020-03-22

## 2020-01-01 RX ORDER — PANTOPRAZOLE SODIUM 40 MG/1
40 TABLET, DELAYED RELEASE ORAL
Status: DISCONTINUED | OUTPATIENT
Start: 2020-01-01 | End: 2020-01-01 | Stop reason: HOSPADM

## 2020-01-01 RX ORDER — METHYLPREDNISOLONE SODIUM SUCCINATE 40 MG/ML
20 INJECTION, POWDER, LYOPHILIZED, FOR SOLUTION INTRAMUSCULAR; INTRAVENOUS EVERY 12 HOURS
Status: DISCONTINUED | OUTPATIENT
Start: 2020-01-01 | End: 2020-01-01 | Stop reason: HOSPADM

## 2020-01-01 RX ORDER — BISACODYL 10 MG
10 SUPPOSITORY, RECTAL RECTAL ONCE
Status: COMPLETED | OUTPATIENT
Start: 2020-01-01 | End: 2020-01-01

## 2020-01-01 RX ORDER — SODIUM CHLORIDE 9 MG/ML
25 INJECTION, SOLUTION INTRAVENOUS EVERY 12 HOURS
Status: DISCONTINUED | OUTPATIENT
Start: 2020-01-01 | End: 2020-01-01 | Stop reason: HOSPADM

## 2020-01-01 RX ORDER — MONTELUKAST SODIUM 10 MG/1
10 TABLET ORAL DAILY
Status: DISCONTINUED | OUTPATIENT
Start: 2020-01-01 | End: 2020-01-01 | Stop reason: HOSPADM

## 2020-01-01 RX ORDER — BISACODYL 10 MG
10 SUPPOSITORY, RECTAL RECTAL PRN
Status: DISCONTINUED | OUTPATIENT
Start: 2020-01-01 | End: 2020-01-01 | Stop reason: HOSPADM

## 2020-01-01 RX ORDER — POLYETHYLENE GLYCOL 3350 17 G/17G
17 POWDER, FOR SOLUTION ORAL 2 TIMES DAILY
Status: DISCONTINUED | OUTPATIENT
Start: 2020-01-01 | End: 2020-01-01

## 2020-01-01 RX ORDER — GABAPENTIN 300 MG/1
300 CAPSULE ORAL 2 TIMES DAILY
Status: DISCONTINUED | OUTPATIENT
Start: 2020-01-01 | End: 2020-01-01

## 2020-01-01 RX ORDER — FUROSEMIDE 10 MG/ML
40 INJECTION INTRAMUSCULAR; INTRAVENOUS ONCE
Status: COMPLETED | OUTPATIENT
Start: 2020-01-01 | End: 2020-01-01

## 2020-01-01 RX ORDER — DOCUSATE SODIUM 100 MG/1
100 CAPSULE, LIQUID FILLED ORAL DAILY
Status: DISCONTINUED | OUTPATIENT
Start: 2020-01-01 | End: 2020-01-01

## 2020-01-01 RX ORDER — ACYCLOVIR 400 MG/1
400 TABLET ORAL 3 TIMES DAILY
Status: DISCONTINUED | OUTPATIENT
Start: 2020-01-01 | End: 2020-01-01 | Stop reason: HOSPADM

## 2020-01-01 RX ORDER — POLYETHYLENE GLYCOL 3350 17 G/17G
17 POWDER, FOR SOLUTION ORAL DAILY
Status: DISCONTINUED | OUTPATIENT
Start: 2020-01-01 | End: 2020-01-01 | Stop reason: HOSPADM

## 2020-01-01 RX ORDER — ACETAMINOPHEN 500 MG
500 TABLET ORAL EVERY 6 HOURS PRN
Status: DISCONTINUED | OUTPATIENT
Start: 2020-01-01 | End: 2020-01-01 | Stop reason: HOSPADM

## 2020-01-01 RX ORDER — SODIUM CHLORIDE 9 MG/ML
INJECTION, SOLUTION INTRAVENOUS EVERY 12 HOURS
Status: DISCONTINUED | OUTPATIENT
Start: 2020-01-01 | End: 2020-01-01

## 2020-01-01 RX ORDER — METHYLPREDNISOLONE SODIUM SUCCINATE 40 MG/ML
40 INJECTION, POWDER, LYOPHILIZED, FOR SOLUTION INTRAMUSCULAR; INTRAVENOUS DAILY
Status: DISCONTINUED | OUTPATIENT
Start: 2020-01-01 | End: 2020-01-01 | Stop reason: HOSPADM

## 2020-01-01 RX ORDER — OXYCODONE HYDROCHLORIDE 5 MG/1
2.5 TABLET ORAL EVERY 4 HOURS PRN
Status: DISCONTINUED | OUTPATIENT
Start: 2020-01-01 | End: 2020-01-01

## 2020-01-01 RX ORDER — SODIUM CHLORIDE 450 MG/100ML
INJECTION, SOLUTION INTRAVENOUS CONTINUOUS
Status: DISCONTINUED | OUTPATIENT
Start: 2020-01-01 | End: 2020-01-01

## 2020-01-01 RX ORDER — IBUPROFEN 800 MG/1
400 TABLET ORAL 3 TIMES DAILY
Status: DISCONTINUED | OUTPATIENT
Start: 2020-01-01 | End: 2020-01-01

## 2020-01-01 RX ORDER — MIDAZOLAM HYDROCHLORIDE 1 MG/ML
0.5 INJECTION INTRAMUSCULAR; INTRAVENOUS
Status: DISCONTINUED | OUTPATIENT
Start: 2020-01-01 | End: 2020-01-01 | Stop reason: HOSPADM

## 2020-01-01 RX ORDER — PRIMIDONE 250 MG/1
250 TABLET ORAL 2 TIMES DAILY
Status: DISCONTINUED | OUTPATIENT
Start: 2020-01-01 | End: 2020-01-01

## 2020-01-01 RX ORDER — PREDNISONE 10 MG/1
10 TABLET ORAL DAILY
Status: DISCONTINUED | OUTPATIENT
Start: 2020-01-01 | End: 2020-01-01

## 2020-01-01 RX ORDER — OXYCODONE HYDROCHLORIDE 5 MG/1
5 TABLET ORAL EVERY 4 HOURS PRN
Status: DISCONTINUED | OUTPATIENT
Start: 2020-01-01 | End: 2020-01-01

## 2020-01-01 RX ORDER — ONDANSETRON 4 MG/1
4 TABLET, ORALLY DISINTEGRATING ORAL EVERY 8 HOURS PRN
Status: DISCONTINUED | OUTPATIENT
Start: 2020-01-01 | End: 2020-01-01

## 2020-01-01 RX ORDER — IPRATROPIUM BROMIDE AND ALBUTEROL SULFATE 2.5; .5 MG/3ML; MG/3ML
3 SOLUTION RESPIRATORY (INHALATION) CONTINUOUS
Status: DISCONTINUED | OUTPATIENT
Start: 2020-01-01 | End: 2020-01-01 | Stop reason: ALTCHOICE

## 2020-01-01 RX ORDER — LATANOPROST 50 UG/ML
1 SOLUTION/ DROPS OPHTHALMIC NIGHTLY
Status: DISCONTINUED | OUTPATIENT
Start: 2020-01-01 | End: 2020-01-01 | Stop reason: HOSPADM

## 2020-01-01 RX ORDER — ALBUTEROL SULFATE 2.5 MG/3ML
2.5 SOLUTION RESPIRATORY (INHALATION) EVERY 4 HOURS PRN
Status: DISCONTINUED | OUTPATIENT
Start: 2020-01-01 | End: 2020-01-01 | Stop reason: HOSPADM

## 2020-01-01 RX ORDER — FUROSEMIDE 40 MG/1
80 TABLET ORAL DAILY
Status: DISCONTINUED | OUTPATIENT
Start: 2020-01-01 | End: 2020-01-01 | Stop reason: HOSPADM

## 2020-01-01 RX ORDER — TRAMADOL HYDROCHLORIDE 50 MG/1
100 TABLET ORAL 2 TIMES DAILY
Refills: 0 | Status: SHIPPED | DISCHARGE
Start: 2020-01-01 | End: 2020-01-01

## 2020-01-01 RX ORDER — PREDNISONE 20 MG/1
20 TABLET ORAL DAILY
Status: DISCONTINUED | OUTPATIENT
Start: 2020-01-01 | End: 2020-01-01

## 2020-01-01 RX ORDER — OXYCODONE HYDROCHLORIDE 5 MG/1
2.5 TABLET ORAL EVERY 4 HOURS PRN
Status: DISCONTINUED | OUTPATIENT
Start: 2020-01-01 | End: 2020-01-01 | Stop reason: HOSPADM

## 2020-01-01 RX ORDER — ACETYLCYSTEINE 100 MG/ML
4 SOLUTION ORAL; RESPIRATORY (INHALATION) 3 TIMES DAILY
Status: DISCONTINUED | OUTPATIENT
Start: 2020-01-01 | End: 2020-01-01 | Stop reason: HOSPADM

## 2020-01-01 RX ORDER — ACETYLCYSTEINE 100 MG/ML
4 SOLUTION ORAL; RESPIRATORY (INHALATION) EVERY 6 HOURS
Status: DISCONTINUED | OUTPATIENT
Start: 2020-01-01 | End: 2020-01-01

## 2020-01-01 RX ORDER — IPRATROPIUM BROMIDE AND ALBUTEROL SULFATE 2.5; .5 MG/3ML; MG/3ML
3 SOLUTION RESPIRATORY (INHALATION) EVERY 4 HOURS
Qty: 360 ML | DISCHARGE
Start: 2020-01-01

## 2020-01-01 RX ORDER — IPRATROPIUM BROMIDE AND ALBUTEROL SULFATE 2.5; .5 MG/3ML; MG/3ML
1 SOLUTION RESPIRATORY (INHALATION)
Status: DISCONTINUED | OUTPATIENT
Start: 2020-01-01 | End: 2020-01-01

## 2020-01-01 RX ORDER — ARFORMOTEROL TARTRATE 15 UG/2ML
15 SOLUTION RESPIRATORY (INHALATION) 2 TIMES DAILY
Status: DISCONTINUED | OUTPATIENT
Start: 2020-01-01 | End: 2020-01-01

## 2020-01-01 RX ORDER — METHYLPREDNISOLONE SODIUM SUCCINATE 40 MG/ML
40 INJECTION, POWDER, LYOPHILIZED, FOR SOLUTION INTRAMUSCULAR; INTRAVENOUS EVERY 8 HOURS
Status: DISCONTINUED | OUTPATIENT
Start: 2020-01-01 | End: 2020-01-01

## 2020-01-01 RX ORDER — ACYCLOVIR 200 MG/1
400 CAPSULE ORAL 3 TIMES DAILY
Status: DISCONTINUED | OUTPATIENT
Start: 2020-01-01 | End: 2020-01-01 | Stop reason: HOSPADM

## 2020-01-01 RX ORDER — AZITHROMYCIN 250 MG/1
250 TABLET, FILM COATED ORAL DAILY
Status: DISCONTINUED | OUTPATIENT
Start: 2020-01-01 | End: 2020-01-01

## 2020-01-01 RX ORDER — IPRATROPIUM BROMIDE AND ALBUTEROL SULFATE 2.5; .5 MG/3ML; MG/3ML
1 SOLUTION RESPIRATORY (INHALATION) ONCE
Status: COMPLETED | OUTPATIENT
Start: 2020-01-01 | End: 2020-01-01

## 2020-01-01 RX ORDER — DOCUSATE SODIUM 100 MG/1
100 CAPSULE, LIQUID FILLED ORAL 2 TIMES DAILY PRN
Status: DISCONTINUED | OUTPATIENT
Start: 2020-01-01 | End: 2020-01-01

## 2020-01-01 RX ORDER — LATANOPROST 50 UG/ML
1 SOLUTION/ DROPS OPHTHALMIC NIGHTLY
COMMUNITY

## 2020-01-01 RX ORDER — PREDNISONE 10 MG/1
30 TABLET ORAL DAILY
COMMUNITY
Start: 2020-01-01

## 2020-01-01 RX ORDER — MONTELUKAST SODIUM 10 MG/1
10 TABLET ORAL NIGHTLY
Status: DISCONTINUED | OUTPATIENT
Start: 2020-01-01 | End: 2020-01-01

## 2020-01-01 RX ORDER — POTASSIUM CHLORIDE 20 MEQ/1
20 TABLET, EXTENDED RELEASE ORAL 2 TIMES DAILY
Status: DISCONTINUED | OUTPATIENT
Start: 2020-01-01 | End: 2020-01-01

## 2020-01-01 RX ORDER — ACETAMINOPHEN 325 MG/1
650 TABLET ORAL ONCE
Status: COMPLETED | OUTPATIENT
Start: 2020-01-01 | End: 2020-01-01

## 2020-01-01 RX ORDER — KETOROLAC TROMETHAMINE 15 MG/ML
15 INJECTION, SOLUTION INTRAMUSCULAR; INTRAVENOUS EVERY 6 HOURS PRN
Status: DISPENSED | OUTPATIENT
Start: 2020-01-01 | End: 2020-01-01

## 2020-01-01 RX ORDER — AZITHROMYCIN 250 MG/1
250 TABLET, FILM COATED ORAL DAILY
Status: DISCONTINUED | OUTPATIENT
Start: 2020-01-01 | End: 2020-01-01 | Stop reason: HOSPADM

## 2020-01-01 RX ORDER — HALOPERIDOL 5 MG/ML
1 INJECTION INTRAMUSCULAR EVERY 6 HOURS PRN
Status: DISCONTINUED | OUTPATIENT
Start: 2020-01-01 | End: 2020-01-01 | Stop reason: HOSPADM

## 2020-01-01 RX ORDER — HYDROMORPHONE HCL 110MG/55ML
PATIENT CONTROLLED ANALGESIA SYRINGE INTRAVENOUS
Status: COMPLETED
Start: 2020-01-01 | End: 2020-01-01

## 2020-01-01 RX ORDER — THEOPHYLLINE 300 MG/1
300 TABLET, EXTENDED RELEASE ORAL DAILY
Status: DISCONTINUED | OUTPATIENT
Start: 2020-01-01 | End: 2020-01-01

## 2020-01-01 RX ORDER — LIDOCAINE 4 G/G
1 PATCH TOPICAL DAILY
Status: DISCONTINUED | OUTPATIENT
Start: 2020-01-01 | End: 2020-01-01 | Stop reason: HOSPADM

## 2020-01-01 RX ORDER — BUDESONIDE 0.5 MG/2ML
500 INHALANT ORAL 2 TIMES DAILY
Qty: 60 AMPULE | Refills: 3 | DISCHARGE
Start: 2020-01-01

## 2020-01-01 RX ORDER — ASCORBIC ACID 500 MG
2000 TABLET ORAL 2 TIMES DAILY
Status: DISCONTINUED | OUTPATIENT
Start: 2020-01-01 | End: 2020-01-01 | Stop reason: HOSPADM

## 2020-01-01 RX ORDER — DOCUSATE SODIUM 100 MG/1
100 CAPSULE, LIQUID FILLED ORAL 2 TIMES DAILY
Status: DISCONTINUED | OUTPATIENT
Start: 2020-01-01 | End: 2020-01-01 | Stop reason: HOSPADM

## 2020-01-01 RX ORDER — DEXTROSE AND SODIUM CHLORIDE 5; .9 G/100ML; G/100ML
INJECTION, SOLUTION INTRAVENOUS CONTINUOUS
Status: DISCONTINUED | OUTPATIENT
Start: 2020-01-01 | End: 2020-01-01

## 2020-01-01 RX ORDER — FLUDEOXYGLUCOSE F 18 200 MCI/ML
10 INJECTION, SOLUTION INTRAVENOUS
Status: COMPLETED | OUTPATIENT
Start: 2020-01-01 | End: 2020-01-01

## 2020-01-01 RX ORDER — BRIMONIDINE TARTRATE 2 MG/ML
1 SOLUTION/ DROPS OPHTHALMIC EVERY 12 HOURS
COMMUNITY

## 2020-01-01 RX ORDER — GABAPENTIN 400 MG/1
400 CAPSULE ORAL 2 TIMES DAILY
Qty: 90 CAPSULE | Refills: 3 | DISCHARGE
Start: 2020-01-01 | End: 2020-02-29

## 2020-01-01 RX ORDER — HYDROMORPHONE HCL 110MG/55ML
0.3 PATIENT CONTROLLED ANALGESIA SYRINGE INTRAVENOUS
Status: DISCONTINUED | OUTPATIENT
Start: 2020-01-01 | End: 2020-01-01

## 2020-01-01 RX ORDER — HYDROMORPHONE HCL 110MG/55ML
0.6 PATIENT CONTROLLED ANALGESIA SYRINGE INTRAVENOUS
Status: DISCONTINUED | OUTPATIENT
Start: 2020-01-01 | End: 2020-01-01

## 2020-01-01 RX ORDER — ALBUTEROL SULFATE 2.5 MG/3ML
2.5 SOLUTION RESPIRATORY (INHALATION) EVERY 4 HOURS PRN
Status: DISCONTINUED | OUTPATIENT
Start: 2020-01-01 | End: 2020-01-01

## 2020-01-01 RX ORDER — SODIUM CHLORIDE 0.9 % (FLUSH) 0.9 %
10 SYRINGE (ML) INJECTION PRN
Status: DISCONTINUED | OUTPATIENT
Start: 2020-01-01 | End: 2020-01-01 | Stop reason: HOSPADM

## 2020-01-01 RX ORDER — ACETAMINOPHEN 650 MG/1
650 SUPPOSITORY RECTAL EVERY 6 HOURS PRN
Status: DISCONTINUED | OUTPATIENT
Start: 2020-01-01 | End: 2020-01-01

## 2020-01-01 RX ORDER — METHYLPREDNISOLONE SODIUM SUCCINATE 125 MG/2ML
125 INJECTION, POWDER, LYOPHILIZED, FOR SOLUTION INTRAMUSCULAR; INTRAVENOUS ONCE
Status: DISCONTINUED | OUTPATIENT
Start: 2020-01-01 | End: 2020-01-01

## 2020-01-01 RX ORDER — DOCUSATE SODIUM 100 MG/1
100 CAPSULE, LIQUID FILLED ORAL 2 TIMES DAILY
Status: DISCONTINUED | OUTPATIENT
Start: 2020-01-01 | End: 2020-01-01

## 2020-01-01 RX ORDER — TRAMADOL HYDROCHLORIDE 50 MG/1
100 TABLET ORAL 2 TIMES DAILY
Status: DISCONTINUED | OUTPATIENT
Start: 2020-01-01 | End: 2020-01-01 | Stop reason: HOSPADM

## 2020-01-01 RX ORDER — ACETAMINOPHEN 325 MG/1
650 TABLET ORAL EVERY 6 HOURS PRN
Status: DISCONTINUED | OUTPATIENT
Start: 2020-01-01 | End: 2020-01-01 | Stop reason: HOSPADM

## 2020-01-01 RX ORDER — HYDROMORPHONE HCL 110MG/55ML
0.3 PATIENT CONTROLLED ANALGESIA SYRINGE INTRAVENOUS ONCE
Status: COMPLETED | OUTPATIENT
Start: 2020-01-01 | End: 2020-01-01

## 2020-01-01 RX ORDER — PSEUDOEPHEDRINE HCL 30 MG
100 TABLET ORAL 2 TIMES DAILY
COMMUNITY
Start: 2020-01-01

## 2020-01-01 RX ORDER — HYDROMORPHONE HCL 110MG/55ML
1 PATIENT CONTROLLED ANALGESIA SYRINGE INTRAVENOUS
Status: DISCONTINUED | OUTPATIENT
Start: 2020-01-01 | End: 2020-01-01 | Stop reason: HOSPADM

## 2020-01-01 RX ORDER — 0.9 % SODIUM CHLORIDE 0.9 %
20 INTRAVENOUS SOLUTION INTRAVENOUS ONCE
Status: COMPLETED | OUTPATIENT
Start: 2020-01-01 | End: 2020-01-01

## 2020-01-01 RX ORDER — IPRATROPIUM BROMIDE AND ALBUTEROL SULFATE 2.5; .5 MG/3ML; MG/3ML
1 SOLUTION RESPIRATORY (INHALATION)
Status: DISCONTINUED | OUTPATIENT
Start: 2020-01-01 | End: 2020-01-01 | Stop reason: HOSPADM

## 2020-01-01 RX ORDER — GABAPENTIN 400 MG/1
400 CAPSULE ORAL 2 TIMES DAILY
Status: DISCONTINUED | OUTPATIENT
Start: 2020-01-01 | End: 2020-01-01 | Stop reason: HOSPADM

## 2020-01-01 RX ORDER — IPRATROPIUM BROMIDE AND ALBUTEROL SULFATE 2.5; .5 MG/3ML; MG/3ML
3 SOLUTION RESPIRATORY (INHALATION) ONCE
Status: COMPLETED | OUTPATIENT
Start: 2020-01-01 | End: 2020-01-01

## 2020-01-01 RX ORDER — IPRATROPIUM BROMIDE AND ALBUTEROL SULFATE 2.5; .5 MG/3ML; MG/3ML
3 SOLUTION RESPIRATORY (INHALATION) EVERY 4 HOURS
Status: DISCONTINUED | OUTPATIENT
Start: 2020-01-01 | End: 2020-01-01 | Stop reason: HOSPADM

## 2020-01-01 RX ORDER — PRIMIDONE 250 MG/1
250 TABLET ORAL 2 TIMES DAILY
Status: DISCONTINUED | OUTPATIENT
Start: 2020-01-01 | End: 2020-01-01 | Stop reason: HOSPADM

## 2020-01-01 RX ORDER — DOCUSATE SODIUM 100 MG/1
100 CAPSULE, LIQUID FILLED ORAL DAILY PRN
Status: DISCONTINUED | OUTPATIENT
Start: 2020-01-01 | End: 2020-01-01

## 2020-01-01 RX ORDER — GABAPENTIN 300 MG/1
300 CAPSULE ORAL 3 TIMES DAILY
Status: DISCONTINUED | OUTPATIENT
Start: 2020-01-01 | End: 2020-01-01

## 2020-01-01 RX ORDER — ASCORBIC ACID 500 MG
2000 TABLET ORAL 2 TIMES DAILY
Status: DISCONTINUED | OUTPATIENT
Start: 2020-01-01 | End: 2020-01-01

## 2020-01-01 RX ORDER — SODIUM PHOSPHATE,MONO-DIBASIC 19G-7G/118
1 ENEMA (ML) RECTAL ONCE
Status: DISCONTINUED | OUTPATIENT
Start: 2020-01-01 | End: 2020-01-01 | Stop reason: HOSPADM

## 2020-01-01 RX ORDER — METHYLPREDNISOLONE SODIUM SUCCINATE 40 MG/ML
40 INJECTION, POWDER, LYOPHILIZED, FOR SOLUTION INTRAMUSCULAR; INTRAVENOUS ONCE
Status: COMPLETED | OUTPATIENT
Start: 2020-01-01 | End: 2020-01-01

## 2020-01-01 RX ORDER — BRIMONIDINE TARTRATE 2 MG/ML
1 SOLUTION/ DROPS OPHTHALMIC 2 TIMES DAILY
Status: DISCONTINUED | OUTPATIENT
Start: 2020-01-01 | End: 2020-01-01 | Stop reason: HOSPADM

## 2020-01-01 RX ORDER — ARFORMOTEROL TARTRATE 15 UG/2ML
15 SOLUTION RESPIRATORY (INHALATION) 2 TIMES DAILY
Status: DISCONTINUED | OUTPATIENT
Start: 2020-01-01 | End: 2020-01-01 | Stop reason: HOSPADM

## 2020-01-01 RX ORDER — THEOPHYLLINE 300 MG/1
300 TABLET, EXTENDED RELEASE ORAL DAILY
Status: DISCONTINUED | OUTPATIENT
Start: 2020-01-01 | End: 2020-01-01 | Stop reason: SDUPTHER

## 2020-01-01 RX ADMIN — ACYCLOVIR 400 MG: 400 TABLET ORAL at 14:58

## 2020-01-01 RX ADMIN — IPRATROPIUM BROMIDE AND ALBUTEROL SULFATE 1 AMPULE: .5; 3 SOLUTION RESPIRATORY (INHALATION) at 19:25

## 2020-01-01 RX ADMIN — PRIMIDONE 250 MG: 250 TABLET ORAL at 21:29

## 2020-01-01 RX ADMIN — IPRATROPIUM BROMIDE AND ALBUTEROL SULFATE 3 ML: .5; 3 SOLUTION RESPIRATORY (INHALATION) at 16:24

## 2020-01-01 RX ADMIN — TRAMADOL HYDROCHLORIDE 100 MG: 50 TABLET ORAL at 20:39

## 2020-01-01 RX ADMIN — BRIMONIDINE TARTRATE 1 DROP: 2 SOLUTION OPHTHALMIC at 08:32

## 2020-01-01 RX ADMIN — FINASTERIDE 5 MG: 5 TABLET, FILM COATED ORAL at 11:41

## 2020-01-01 RX ADMIN — IPRATROPIUM BROMIDE AND ALBUTEROL SULFATE 1 AMPULE: .5; 3 SOLUTION RESPIRATORY (INHALATION) at 22:42

## 2020-01-01 RX ADMIN — OXYCODONE HYDROCHLORIDE AND ACETAMINOPHEN 2000 MG: 500 TABLET ORAL at 23:53

## 2020-01-01 RX ADMIN — FUROSEMIDE 80 MG: 40 TABLET ORAL at 10:25

## 2020-01-01 RX ADMIN — IPRATROPIUM BROMIDE AND ALBUTEROL SULFATE 1 AMPULE: .5; 3 SOLUTION RESPIRATORY (INHALATION) at 22:45

## 2020-01-01 RX ADMIN — CEFEPIME HYDROCHLORIDE 2 G: 2 INJECTION, POWDER, FOR SOLUTION INTRAVENOUS at 23:44

## 2020-01-01 RX ADMIN — ACYCLOVIR 400 MG: 400 TABLET ORAL at 14:22

## 2020-01-01 RX ADMIN — POLYETHYLENE GLYCOL 3350 17 G: 17 POWDER, FOR SOLUTION ORAL at 10:27

## 2020-01-01 RX ADMIN — ARFORMOTEROL TARTRATE 15 MCG: 15 SOLUTION RESPIRATORY (INHALATION) at 19:00

## 2020-01-01 RX ADMIN — THEOPHYLLINE ANHYDROUS 300 MG: 300 CAPSULE, EXTENDED RELEASE ORAL at 09:17

## 2020-01-01 RX ADMIN — MONTELUKAST 10 MG: 10 TABLET, FILM COATED ORAL at 21:41

## 2020-01-01 RX ADMIN — GABAPENTIN 300 MG: 300 CAPSULE ORAL at 21:34

## 2020-01-01 RX ADMIN — TRAMADOL HYDROCHLORIDE 100 MG: 50 TABLET ORAL at 20:15

## 2020-01-01 RX ADMIN — BUDESONIDE 500 MCG: 0.5 INHALANT RESPIRATORY (INHALATION) at 17:05

## 2020-01-01 RX ADMIN — BUDESONIDE 500 MCG: 0.5 INHALANT RESPIRATORY (INHALATION) at 07:07

## 2020-01-01 RX ADMIN — DOCUSATE SODIUM 100 MG: 100 CAPSULE, LIQUID FILLED ORAL at 20:30

## 2020-01-01 RX ADMIN — FUROSEMIDE 80 MG: 40 TABLET ORAL at 09:22

## 2020-01-01 RX ADMIN — METOPROLOL SUCCINATE 12.5 MG: 25 TABLET, FILM COATED, EXTENDED RELEASE ORAL at 10:26

## 2020-01-01 RX ADMIN — ARFORMOTEROL TARTRATE 15 MCG: 15 SOLUTION RESPIRATORY (INHALATION) at 07:03

## 2020-01-01 RX ADMIN — ACYCLOVIR 400 MG: 400 TABLET ORAL at 22:35

## 2020-01-01 RX ADMIN — IPRATROPIUM BROMIDE AND ALBUTEROL SULFATE 1 AMPULE: .5; 3 SOLUTION RESPIRATORY (INHALATION) at 18:33

## 2020-01-01 RX ADMIN — BUDESONIDE 500 MCG: 0.5 INHALANT RESPIRATORY (INHALATION) at 17:08

## 2020-01-01 RX ADMIN — IBUPROFEN 400 MG: 400 TABLET, FILM COATED ORAL at 15:36

## 2020-01-01 RX ADMIN — ACYCLOVIR 400 MG: 400 TABLET ORAL at 14:23

## 2020-01-01 RX ADMIN — AZITHROMYCIN MONOHYDRATE 250 MG: 250 TABLET ORAL at 09:03

## 2020-01-01 RX ADMIN — ACYCLOVIR 400 MG: 400 TABLET ORAL at 20:15

## 2020-01-01 RX ADMIN — ACETAMINOPHEN 650 MG: 325 TABLET, FILM COATED ORAL at 23:38

## 2020-01-01 RX ADMIN — LATANOPROST 1 DROP: 50 SOLUTION OPHTHALMIC at 22:18

## 2020-01-01 RX ADMIN — PRIMIDONE 250 MG: 250 TABLET ORAL at 20:16

## 2020-01-01 RX ADMIN — OXYCODONE 5 MG: 5 TABLET ORAL at 10:13

## 2020-01-01 RX ADMIN — ACYCLOVIR 400 MG: 400 TABLET ORAL at 10:31

## 2020-01-01 RX ADMIN — IBUPROFEN 400 MG: 400 TABLET, FILM COATED ORAL at 22:57

## 2020-01-01 RX ADMIN — IPRATROPIUM BROMIDE AND ALBUTEROL SULFATE 1 AMPULE: .5; 3 SOLUTION RESPIRATORY (INHALATION) at 21:44

## 2020-01-01 RX ADMIN — ACYCLOVIR 400 MG: 200 CAPSULE ORAL at 18:38

## 2020-01-01 RX ADMIN — IBUPROFEN 400 MG: 400 TABLET, FILM COATED ORAL at 18:39

## 2020-01-01 RX ADMIN — IOPAMIDOL 80 ML: 755 INJECTION, SOLUTION INTRAVENOUS at 02:51

## 2020-01-01 RX ADMIN — PREDNISONE 30 MG: 10 TABLET ORAL at 09:06

## 2020-01-01 RX ADMIN — ACYCLOVIR 400 MG: 400 TABLET ORAL at 09:21

## 2020-01-01 RX ADMIN — SODIUM PHOSPHATE 1 ENEMA: 7; 19 ENEMA RECTAL at 09:58

## 2020-01-01 RX ADMIN — IPRATROPIUM BROMIDE AND ALBUTEROL SULFATE 1 AMPULE: .5; 3 SOLUTION RESPIRATORY (INHALATION) at 05:01

## 2020-01-01 RX ADMIN — POTASSIUM CHLORIDE 20 MEQ: 20 TABLET, EXTENDED RELEASE ORAL at 21:34

## 2020-01-01 RX ADMIN — PREDNISONE 30 MG: 10 TABLET ORAL at 09:11

## 2020-01-01 RX ADMIN — BISACODYL 10 MG: 10 SUPPOSITORY RECTAL at 13:53

## 2020-01-01 RX ADMIN — THEOPHYLLINE ANHYDROUS 300 MG: 300 CAPSULE, EXTENDED RELEASE ORAL at 09:07

## 2020-01-01 RX ADMIN — IPRATROPIUM BROMIDE AND ALBUTEROL SULFATE 1 AMPULE: .5; 3 SOLUTION RESPIRATORY (INHALATION) at 14:44

## 2020-01-01 RX ADMIN — ACETYLCYSTEINE 400 MG: 100 SOLUTION ORAL; RESPIRATORY (INHALATION) at 06:06

## 2020-01-01 RX ADMIN — MONTELUKAST 10 MG: 10 TABLET, FILM COATED ORAL at 20:31

## 2020-01-01 RX ADMIN — VANCOMYCIN HYDROCHLORIDE 1000 MG: 1 INJECTION, POWDER, LYOPHILIZED, FOR SOLUTION INTRAVENOUS at 04:17

## 2020-01-01 RX ADMIN — TRAMADOL HYDROCHLORIDE 100 MG: 50 TABLET ORAL at 08:47

## 2020-01-01 RX ADMIN — Medication 750 MG: at 20:45

## 2020-01-01 RX ADMIN — PRIMIDONE 250 MG: 250 TABLET ORAL at 06:26

## 2020-01-01 RX ADMIN — ACETYLCYSTEINE 400 MG: 100 SOLUTION ORAL; RESPIRATORY (INHALATION) at 18:32

## 2020-01-01 RX ADMIN — Medication 5000 UNITS: at 11:18

## 2020-01-01 RX ADMIN — MONTELUKAST 10 MG: 10 TABLET, FILM COATED ORAL at 09:14

## 2020-01-01 RX ADMIN — ACYCLOVIR 400 MG: 400 TABLET ORAL at 08:59

## 2020-01-01 RX ADMIN — PRIMIDONE 250 MG: 250 TABLET ORAL at 10:24

## 2020-01-01 RX ADMIN — FUROSEMIDE 80 MG: 40 TABLET ORAL at 09:12

## 2020-01-01 RX ADMIN — IPRATROPIUM BROMIDE AND ALBUTEROL SULFATE 1 AMPULE: .5; 3 SOLUTION RESPIRATORY (INHALATION) at 06:28

## 2020-01-01 RX ADMIN — BRIMONIDINE TARTRATE 1 DROP: 2 SOLUTION OPHTHALMIC at 20:40

## 2020-01-01 RX ADMIN — POTASSIUM CHLORIDE 20 MEQ: 20 TABLET, EXTENDED RELEASE ORAL at 08:28

## 2020-01-01 RX ADMIN — MONTELUKAST 10 MG: 10 TABLET, FILM COATED ORAL at 08:48

## 2020-01-01 RX ADMIN — ENOXAPARIN SODIUM 40 MG: 40 INJECTION SUBCUTANEOUS at 09:43

## 2020-01-01 RX ADMIN — IPRATROPIUM BROMIDE AND ALBUTEROL SULFATE 1 AMPULE: .5; 3 SOLUTION RESPIRATORY (INHALATION) at 10:12

## 2020-01-01 RX ADMIN — ACETYLCYSTEINE 400 MG: 100 SOLUTION ORAL; RESPIRATORY (INHALATION) at 09:52

## 2020-01-01 RX ADMIN — METHYLPREDNISOLONE SODIUM SUCCINATE 20 MG: 40 INJECTION, POWDER, FOR SOLUTION INTRAMUSCULAR; INTRAVENOUS at 18:49

## 2020-01-01 RX ADMIN — FUROSEMIDE 80 MG: 40 TABLET ORAL at 11:41

## 2020-01-01 RX ADMIN — MAGNESIUM HYDROXIDE 30 ML: 400 SUSPENSION ORAL at 21:20

## 2020-01-01 RX ADMIN — BRIMONIDINE TARTRATE 1 DROP: 2 SOLUTION OPHTHALMIC at 09:04

## 2020-01-01 RX ADMIN — IPRATROPIUM BROMIDE AND ALBUTEROL SULFATE 1 AMPULE: .5; 3 SOLUTION RESPIRATORY (INHALATION) at 09:22

## 2020-01-01 RX ADMIN — CEFEPIME HYDROCHLORIDE 1 G: 1 INJECTION, POWDER, FOR SOLUTION INTRAMUSCULAR; INTRAVENOUS at 00:10

## 2020-01-01 RX ADMIN — PRIMIDONE 250 MG: 250 TABLET ORAL at 21:04

## 2020-01-01 RX ADMIN — MONTELUKAST 10 MG: 10 TABLET, FILM COATED ORAL at 09:17

## 2020-01-01 RX ADMIN — POTASSIUM CHLORIDE 20 MEQ: 20 TABLET, EXTENDED RELEASE ORAL at 20:23

## 2020-01-01 RX ADMIN — PREDNISONE 30 MG: 10 TABLET ORAL at 09:00

## 2020-01-01 RX ADMIN — IBUPROFEN 400 MG: 400 TABLET, FILM COATED ORAL at 22:08

## 2020-01-01 RX ADMIN — DOCUSATE SODIUM 100 MG: 100 CAPSULE, LIQUID FILLED ORAL at 20:42

## 2020-01-01 RX ADMIN — IPRATROPIUM BROMIDE AND ALBUTEROL SULFATE 1 AMPULE: .5; 3 SOLUTION RESPIRATORY (INHALATION) at 14:26

## 2020-01-01 RX ADMIN — ACYCLOVIR 400 MG: 400 TABLET ORAL at 23:52

## 2020-01-01 RX ADMIN — IBUPROFEN 400 MG: 400 TABLET, FILM COATED ORAL at 16:24

## 2020-01-01 RX ADMIN — ACYCLOVIR 400 MG: 400 TABLET ORAL at 20:36

## 2020-01-01 RX ADMIN — FUROSEMIDE 80 MG: 40 TABLET ORAL at 08:50

## 2020-01-01 RX ADMIN — PRIMIDONE 250 MG: 250 TABLET ORAL at 14:16

## 2020-01-01 RX ADMIN — ACETYLCYSTEINE 400 MG: 100 SOLUTION ORAL; RESPIRATORY (INHALATION) at 07:49

## 2020-01-01 RX ADMIN — GABAPENTIN 400 MG: 400 CAPSULE ORAL at 20:39

## 2020-01-01 RX ADMIN — LATANOPROST 1 DROP: 50 SOLUTION OPHTHALMIC at 21:42

## 2020-01-01 RX ADMIN — IPRATROPIUM BROMIDE AND ALBUTEROL SULFATE 1 AMPULE: .5; 3 SOLUTION RESPIRATORY (INHALATION) at 10:40

## 2020-01-01 RX ADMIN — IPRATROPIUM BROMIDE AND ALBUTEROL SULFATE 1 AMPULE: .5; 3 SOLUTION RESPIRATORY (INHALATION) at 21:52

## 2020-01-01 RX ADMIN — FUROSEMIDE 80 MG: 40 TABLET ORAL at 10:12

## 2020-01-01 RX ADMIN — SODIUM CHLORIDE 25 ML: 9 INJECTION, SOLUTION INTRAVENOUS at 03:44

## 2020-01-01 RX ADMIN — DOCUSATE SODIUM 100 MG: 100 CAPSULE, LIQUID FILLED ORAL at 21:10

## 2020-01-01 RX ADMIN — IPRATROPIUM BROMIDE AND ALBUTEROL SULFATE 1 AMPULE: .5; 3 SOLUTION RESPIRATORY (INHALATION) at 11:30

## 2020-01-01 RX ADMIN — OXYCODONE HYDROCHLORIDE AND ACETAMINOPHEN 2000 MG: 500 TABLET ORAL at 08:28

## 2020-01-01 RX ADMIN — AZITHROMYCIN MONOHYDRATE 250 MG: 250 TABLET ORAL at 09:04

## 2020-01-01 RX ADMIN — OXYCODONE HYDROCHLORIDE AND ACETAMINOPHEN 2000 MG: 500 TABLET ORAL at 08:50

## 2020-01-01 RX ADMIN — IPRATROPIUM BROMIDE AND ALBUTEROL SULFATE 1 AMPULE: .5; 3 SOLUTION RESPIRATORY (INHALATION) at 23:24

## 2020-01-01 RX ADMIN — TRAMADOL HYDROCHLORIDE 50 MG: 50 TABLET, FILM COATED ORAL at 15:37

## 2020-01-01 RX ADMIN — ARFORMOTEROL TARTRATE 15 MCG: 15 SOLUTION RESPIRATORY (INHALATION) at 07:44

## 2020-01-01 RX ADMIN — IPRATROPIUM BROMIDE AND ALBUTEROL SULFATE 1 AMPULE: .5; 3 SOLUTION RESPIRATORY (INHALATION) at 14:39

## 2020-01-01 RX ADMIN — IPRATROPIUM BROMIDE AND ALBUTEROL SULFATE 1 AMPULE: .5; 3 SOLUTION RESPIRATORY (INHALATION) at 02:14

## 2020-01-01 RX ADMIN — BRIMONIDINE TARTRATE 1 DROP: 2 SOLUTION OPHTHALMIC at 09:09

## 2020-01-01 RX ADMIN — METOPROLOL SUCCINATE 12.5 MG: 25 TABLET, FILM COATED, EXTENDED RELEASE ORAL at 08:57

## 2020-01-01 RX ADMIN — POTASSIUM CHLORIDE 20 MEQ: 20 TABLET, EXTENDED RELEASE ORAL at 09:02

## 2020-01-01 RX ADMIN — PRIMIDONE 250 MG: 250 TABLET ORAL at 09:02

## 2020-01-01 RX ADMIN — POTASSIUM CHLORIDE 20 MEQ: 20 TABLET, EXTENDED RELEASE ORAL at 21:30

## 2020-01-01 RX ADMIN — ACYCLOVIR 400 MG: 200 CAPSULE ORAL at 10:12

## 2020-01-01 RX ADMIN — OXYCODONE 5 MG: 5 TABLET ORAL at 19:07

## 2020-01-01 RX ADMIN — BUDESONIDE 500 MCG: 0.5 INHALANT RESPIRATORY (INHALATION) at 19:09

## 2020-01-01 RX ADMIN — IPRATROPIUM BROMIDE AND ALBUTEROL SULFATE 1 AMPULE: .5; 3 SOLUTION RESPIRATORY (INHALATION) at 15:50

## 2020-01-01 RX ADMIN — POTASSIUM CHLORIDE 20 MEQ: 20 TABLET, EXTENDED RELEASE ORAL at 23:53

## 2020-01-01 RX ADMIN — TRAMADOL HYDROCHLORIDE 50 MG: 50 TABLET, FILM COATED ORAL at 20:22

## 2020-01-01 RX ADMIN — TRAMADOL HYDROCHLORIDE 100 MG: 50 TABLET ORAL at 17:25

## 2020-01-01 RX ADMIN — FINASTERIDE 5 MG: 5 TABLET, FILM COATED ORAL at 09:01

## 2020-01-01 RX ADMIN — METHYLPREDNISOLONE SODIUM SUCCINATE 20 MG: 40 INJECTION, POWDER, FOR SOLUTION INTRAMUSCULAR; INTRAVENOUS at 18:35

## 2020-01-01 RX ADMIN — CEFEPIME HYDROCHLORIDE 2 G: 2 INJECTION, POWDER, FOR SOLUTION INTRAVENOUS at 11:38

## 2020-01-01 RX ADMIN — IPRATROPIUM BROMIDE AND ALBUTEROL SULFATE 1 AMPULE: .5; 3 SOLUTION RESPIRATORY (INHALATION) at 07:44

## 2020-01-01 RX ADMIN — BUDESONIDE 500 MCG: 0.5 INHALANT RESPIRATORY (INHALATION) at 16:55

## 2020-01-01 RX ADMIN — IPRATROPIUM BROMIDE AND ALBUTEROL SULFATE 1 AMPULE: .5; 3 SOLUTION RESPIRATORY (INHALATION) at 21:54

## 2020-01-01 RX ADMIN — FINASTERIDE 5 MG: 5 TABLET, FILM COATED ORAL at 10:25

## 2020-01-01 RX ADMIN — IPRATROPIUM BROMIDE AND ALBUTEROL SULFATE 3 ML: .5; 3 SOLUTION RESPIRATORY (INHALATION) at 09:18

## 2020-01-01 RX ADMIN — IPRATROPIUM BROMIDE AND ALBUTEROL SULFATE 1 AMPULE: .5; 3 SOLUTION RESPIRATORY (INHALATION) at 10:58

## 2020-01-01 RX ADMIN — CEFEPIME HYDROCHLORIDE 2 G: 2 INJECTION, POWDER, FOR SOLUTION INTRAVENOUS at 23:12

## 2020-01-01 RX ADMIN — METHYLPREDNISOLONE SODIUM SUCCINATE 20 MG: 40 INJECTION, POWDER, FOR SOLUTION INTRAMUSCULAR; INTRAVENOUS at 06:17

## 2020-01-01 RX ADMIN — BRIMONIDINE TARTRATE 1 DROP: 2 SOLUTION OPHTHALMIC at 09:37

## 2020-01-01 RX ADMIN — TRAMADOL HYDROCHLORIDE 100 MG: 50 TABLET ORAL at 20:32

## 2020-01-01 RX ADMIN — PRIMIDONE 250 MG: 250 TABLET ORAL at 23:36

## 2020-01-01 RX ADMIN — BISACODYL 10 MG: 5 TABLET, COATED ORAL at 14:23

## 2020-01-01 RX ADMIN — IPRATROPIUM BROMIDE AND ALBUTEROL SULFATE 1 AMPULE: .5; 3 SOLUTION RESPIRATORY (INHALATION) at 13:50

## 2020-01-01 RX ADMIN — APIXABAN 2.5 MG: 2.5 TABLET, FILM COATED ORAL at 09:24

## 2020-01-01 RX ADMIN — HYDROMORPHONE HYDROCHLORIDE 1 MG: 2 INJECTION INTRAMUSCULAR; INTRAVENOUS; SUBCUTANEOUS at 15:26

## 2020-01-01 RX ADMIN — BRIMONIDINE TARTRATE 1 DROP: 2 SOLUTION OPHTHALMIC at 10:13

## 2020-01-01 RX ADMIN — IBUPROFEN 400 MG: 400 TABLET, FILM COATED ORAL at 09:22

## 2020-01-01 RX ADMIN — MONTELUKAST 10 MG: 10 TABLET, FILM COATED ORAL at 09:04

## 2020-01-01 RX ADMIN — DOCUSATE SODIUM 100 MG: 100 CAPSULE, LIQUID FILLED ORAL at 16:41

## 2020-01-01 RX ADMIN — PANTOPRAZOLE SODIUM 40 MG: 40 TABLET, DELAYED RELEASE ORAL at 05:36

## 2020-01-01 RX ADMIN — BRIMONIDINE TARTRATE 1 DROP: 2 SOLUTION OPHTHALMIC at 20:24

## 2020-01-01 RX ADMIN — IPRATROPIUM BROMIDE AND ALBUTEROL SULFATE 1 AMPULE: .5; 3 SOLUTION RESPIRATORY (INHALATION) at 06:40

## 2020-01-01 RX ADMIN — POTASSIUM CHLORIDE 20 MEQ: 20 TABLET, EXTENDED RELEASE ORAL at 08:50

## 2020-01-01 RX ADMIN — FENTANYL CITRATE 25 MCG: 50 INJECTION, SOLUTION INTRAMUSCULAR; INTRAVENOUS at 07:21

## 2020-01-01 RX ADMIN — METHYLPREDNISOLONE SODIUM SUCCINATE 20 MG: 40 INJECTION, POWDER, FOR SOLUTION INTRAMUSCULAR; INTRAVENOUS at 06:27

## 2020-01-01 RX ADMIN — IPRATROPIUM BROMIDE AND ALBUTEROL SULFATE 1 AMPULE: .5; 3 SOLUTION RESPIRATORY (INHALATION) at 04:38

## 2020-01-01 RX ADMIN — ACYCLOVIR 400 MG: 200 CAPSULE ORAL at 08:49

## 2020-01-01 RX ADMIN — OXYCODONE 5 MG: 5 TABLET ORAL at 22:43

## 2020-01-01 RX ADMIN — THEOPHYLLINE ANHYDROUS 300 MG: 300 CAPSULE, EXTENDED RELEASE ORAL at 09:22

## 2020-01-01 RX ADMIN — IPRATROPIUM BROMIDE AND ALBUTEROL SULFATE 1 AMPULE: .5; 3 SOLUTION RESPIRATORY (INHALATION) at 23:46

## 2020-01-01 RX ADMIN — ARFORMOTEROL TARTRATE 15 MCG: 15 SOLUTION RESPIRATORY (INHALATION) at 06:25

## 2020-01-01 RX ADMIN — Medication 750 MG: at 08:49

## 2020-01-01 RX ADMIN — POLYETHYLENE GLYCOL 3350 17 G: 17 POWDER, FOR SOLUTION ORAL at 09:14

## 2020-01-01 RX ADMIN — PRIMIDONE 250 MG: 250 TABLET ORAL at 06:15

## 2020-01-01 RX ADMIN — TRAMADOL HYDROCHLORIDE 100 MG: 50 TABLET ORAL at 00:01

## 2020-01-01 RX ADMIN — ACYCLOVIR 400 MG: 200 CAPSULE ORAL at 21:30

## 2020-01-01 RX ADMIN — PRIMIDONE 250 MG: 250 TABLET ORAL at 20:23

## 2020-01-01 RX ADMIN — HALOPERIDOL LACTATE 1 MG: 5 INJECTION INTRAMUSCULAR at 12:14

## 2020-01-01 RX ADMIN — IPRATROPIUM BROMIDE AND ALBUTEROL SULFATE 1 AMPULE: .5; 3 SOLUTION RESPIRATORY (INHALATION) at 01:39

## 2020-01-01 RX ADMIN — IPRATROPIUM BROMIDE AND ALBUTEROL SULFATE 1 AMPULE: .5; 3 SOLUTION RESPIRATORY (INHALATION) at 18:52

## 2020-01-01 RX ADMIN — IPRATROPIUM BROMIDE AND ALBUTEROL SULFATE 1 AMPULE: .5; 3 SOLUTION RESPIRATORY (INHALATION) at 07:02

## 2020-01-01 RX ADMIN — BARIUM SULFATE 45 G: 0.81 POWDER, FOR SUSPENSION ORAL at 14:41

## 2020-01-01 RX ADMIN — POTASSIUM CHLORIDE 20 MEQ: 20 TABLET, EXTENDED RELEASE ORAL at 20:45

## 2020-01-01 RX ADMIN — POLYETHYLENE GLYCOL 3350 17 G: 17 POWDER, FOR SOLUTION ORAL at 08:24

## 2020-01-01 RX ADMIN — GABAPENTIN 400 MG: 400 CAPSULE ORAL at 20:42

## 2020-01-01 RX ADMIN — Medication 5000 UNITS: at 08:58

## 2020-01-01 RX ADMIN — DOCUSATE SODIUM 100 MG: 100 CAPSULE, LIQUID FILLED ORAL at 21:12

## 2020-01-01 RX ADMIN — ACYCLOVIR 400 MG: 400 TABLET ORAL at 14:52

## 2020-01-01 RX ADMIN — HYDROMORPHONE HYDROCHLORIDE 1 MG: 2 INJECTION INTRAMUSCULAR; INTRAVENOUS; SUBCUTANEOUS at 03:03

## 2020-01-01 RX ADMIN — ACETYLCYSTEINE 400 MG: 100 SOLUTION ORAL; RESPIRATORY (INHALATION) at 06:47

## 2020-01-01 RX ADMIN — FINASTERIDE 5 MG: 5 TABLET, FILM COATED ORAL at 09:04

## 2020-01-01 RX ADMIN — IPRATROPIUM BROMIDE AND ALBUTEROL SULFATE 1 AMPULE: .5; 3 SOLUTION RESPIRATORY (INHALATION) at 09:19

## 2020-01-01 RX ADMIN — OXYCODONE HYDROCHLORIDE AND ACETAMINOPHEN 2000 MG: 500 TABLET ORAL at 22:10

## 2020-01-01 RX ADMIN — IPRATROPIUM BROMIDE AND ALBUTEROL SULFATE 1 AMPULE: .5; 3 SOLUTION RESPIRATORY (INHALATION) at 07:07

## 2020-01-01 RX ADMIN — ARFORMOTEROL TARTRATE 15 MCG: 15 SOLUTION RESPIRATORY (INHALATION) at 19:09

## 2020-01-01 RX ADMIN — POTASSIUM CHLORIDE 20 MEQ: 20 TABLET, EXTENDED RELEASE ORAL at 20:56

## 2020-01-01 RX ADMIN — HYDROMORPHONE HYDROCHLORIDE 0.3 MG: 2 INJECTION, SOLUTION INTRAMUSCULAR; INTRAVENOUS; SUBCUTANEOUS at 12:42

## 2020-01-01 RX ADMIN — METHYLPREDNISOLONE SODIUM SUCCINATE 40 MG: 40 INJECTION, POWDER, FOR SOLUTION INTRAMUSCULAR; INTRAVENOUS at 00:16

## 2020-01-01 RX ADMIN — MONTELUKAST 10 MG: 10 TABLET, FILM COATED ORAL at 10:24

## 2020-01-01 RX ADMIN — IPRATROPIUM BROMIDE AND ALBUTEROL SULFATE 1 AMPULE: .5; 3 SOLUTION RESPIRATORY (INHALATION) at 13:09

## 2020-01-01 RX ADMIN — FUROSEMIDE 80 MG: 40 TABLET ORAL at 08:58

## 2020-01-01 RX ADMIN — BRIMONIDINE TARTRATE 1 DROP: 2 SOLUTION OPHTHALMIC at 09:01

## 2020-01-01 RX ADMIN — DOCUSATE SODIUM 100 MG: 100 CAPSULE, LIQUID FILLED ORAL at 08:28

## 2020-01-01 RX ADMIN — METHYLPREDNISOLONE SODIUM SUCCINATE 20 MG: 40 INJECTION, POWDER, FOR SOLUTION INTRAMUSCULAR; INTRAVENOUS at 17:06

## 2020-01-01 RX ADMIN — Medication 750 MG: at 20:56

## 2020-01-01 RX ADMIN — METHYLPREDNISOLONE SODIUM SUCCINATE 20 MG: 40 INJECTION, POWDER, FOR SOLUTION INTRAMUSCULAR; INTRAVENOUS at 06:15

## 2020-01-01 RX ADMIN — CEFEPIME HYDROCHLORIDE 1 G: 1 INJECTION, POWDER, FOR SOLUTION INTRAMUSCULAR; INTRAVENOUS at 13:48

## 2020-01-01 RX ADMIN — AZITHROMYCIN MONOHYDRATE 250 MG: 250 TABLET ORAL at 09:01

## 2020-01-01 RX ADMIN — ACYCLOVIR 400 MG: 400 TABLET ORAL at 21:33

## 2020-01-01 RX ADMIN — POTASSIUM CHLORIDE 20 MEQ: 20 TABLET, EXTENDED RELEASE ORAL at 21:05

## 2020-01-01 RX ADMIN — BRIMONIDINE TARTRATE 1 DROP: 2 SOLUTION OPHTHALMIC at 00:10

## 2020-01-01 RX ADMIN — IBUPROFEN 400 MG: 400 TABLET, FILM COATED ORAL at 14:52

## 2020-01-01 RX ADMIN — HYDROMORPHONE HYDROCHLORIDE 1 MG: 2 INJECTION INTRAMUSCULAR; INTRAVENOUS; SUBCUTANEOUS at 13:44

## 2020-01-01 RX ADMIN — BUDESONIDE 500 MCG: 0.5 INHALANT RESPIRATORY (INHALATION) at 00:30

## 2020-01-01 RX ADMIN — LATANOPROST 1 DROP: 50 SOLUTION OPHTHALMIC at 20:24

## 2020-01-01 RX ADMIN — IPRATROPIUM BROMIDE AND ALBUTEROL SULFATE 1 AMPULE: .5; 3 SOLUTION RESPIRATORY (INHALATION) at 17:08

## 2020-01-01 RX ADMIN — LATANOPROST 1 DROP: 50 SOLUTION OPHTHALMIC at 00:09

## 2020-01-01 RX ADMIN — CEFEPIME HYDROCHLORIDE 2 G: 2 INJECTION, POWDER, FOR SOLUTION INTRAVENOUS at 23:16

## 2020-01-01 RX ADMIN — Medication 750 MG: at 10:12

## 2020-01-01 RX ADMIN — PRIMIDONE 250 MG: 250 TABLET ORAL at 09:17

## 2020-01-01 RX ADMIN — IPRATROPIUM BROMIDE AND ALBUTEROL SULFATE 1 AMPULE: .5; 3 SOLUTION RESPIRATORY (INHALATION) at 06:36

## 2020-01-01 RX ADMIN — DOCUSATE SODIUM 100 MG: 100 CAPSULE, LIQUID FILLED ORAL at 20:56

## 2020-01-01 RX ADMIN — ACETYLCYSTEINE 400 MG: 100 SOLUTION ORAL; RESPIRATORY (INHALATION) at 18:43

## 2020-01-01 RX ADMIN — FINASTERIDE 5 MG: 5 TABLET, FILM COATED ORAL at 08:50

## 2020-01-01 RX ADMIN — AZITHROMYCIN MONOHYDRATE 250 MG: 250 TABLET ORAL at 11:18

## 2020-01-01 RX ADMIN — IBUPROFEN 400 MG: 400 TABLET, FILM COATED ORAL at 09:03

## 2020-01-01 RX ADMIN — MONTELUKAST 10 MG: 10 TABLET, FILM COATED ORAL at 08:50

## 2020-01-01 RX ADMIN — GABAPENTIN 400 MG: 400 CAPSULE ORAL at 23:48

## 2020-01-01 RX ADMIN — MONTELUKAST 10 MG: 10 TABLET, FILM COATED ORAL at 18:38

## 2020-01-01 RX ADMIN — IBUPROFEN 400 MG: 400 TABLET, FILM COATED ORAL at 08:28

## 2020-01-01 RX ADMIN — DOCUSATE SODIUM 100 MG: 100 CAPSULE, LIQUID FILLED ORAL at 00:12

## 2020-01-01 RX ADMIN — OXYCODONE HYDROCHLORIDE 2.5 MG: 5 TABLET ORAL at 16:03

## 2020-01-01 RX ADMIN — METHYLPREDNISOLONE SODIUM SUCCINATE 40 MG: 40 INJECTION, POWDER, FOR SOLUTION INTRAMUSCULAR; INTRAVENOUS at 18:48

## 2020-01-01 RX ADMIN — METOPROLOL SUCCINATE 12.5 MG: 25 TABLET, FILM COATED, EXTENDED RELEASE ORAL at 09:05

## 2020-01-01 RX ADMIN — BUDESONIDE 500 MCG: 0.5 INHALANT RESPIRATORY (INHALATION) at 05:01

## 2020-01-01 RX ADMIN — DOCUSATE SODIUM 100 MG: 100 CAPSULE, LIQUID FILLED ORAL at 09:10

## 2020-01-01 RX ADMIN — METHYLPREDNISOLONE SODIUM SUCCINATE 40 MG: 40 INJECTION, POWDER, FOR SOLUTION INTRAMUSCULAR; INTRAVENOUS at 06:27

## 2020-01-01 RX ADMIN — SODIUM CHLORIDE: 4.5 INJECTION, SOLUTION INTRAVENOUS at 09:41

## 2020-01-01 RX ADMIN — LATANOPROST 1 DROP: 50 SOLUTION OPHTHALMIC at 20:31

## 2020-01-01 RX ADMIN — IPRATROPIUM BROMIDE AND ALBUTEROL SULFATE 1 AMPULE: .5; 3 SOLUTION RESPIRATORY (INHALATION) at 10:32

## 2020-01-01 RX ADMIN — METHYLPREDNISOLONE SODIUM SUCCINATE 40 MG: 40 INJECTION, POWDER, FOR SOLUTION INTRAMUSCULAR; INTRAVENOUS at 12:29

## 2020-01-01 RX ADMIN — OXYCODONE HYDROCHLORIDE AND ACETAMINOPHEN 2000 MG: 500 TABLET ORAL at 00:12

## 2020-01-01 RX ADMIN — ARFORMOTEROL TARTRATE 15 MCG: 15 SOLUTION RESPIRATORY (INHALATION) at 21:05

## 2020-01-01 RX ADMIN — BRIMONIDINE TARTRATE 1 DROP: 2 SOLUTION OPHTHALMIC at 09:22

## 2020-01-01 RX ADMIN — BRIMONIDINE TARTRATE 1 DROP: 2 SOLUTION OPHTHALMIC at 20:57

## 2020-01-01 RX ADMIN — IPRATROPIUM BROMIDE AND ALBUTEROL SULFATE 1 AMPULE: .5; 3 SOLUTION RESPIRATORY (INHALATION) at 16:16

## 2020-01-01 RX ADMIN — Medication 750 MG: at 21:23

## 2020-01-01 RX ADMIN — Medication 750 MG: at 21:29

## 2020-01-01 RX ADMIN — LATANOPROST 1 DROP: 50 SOLUTION OPHTHALMIC at 20:38

## 2020-01-01 RX ADMIN — ACYCLOVIR 400 MG: 200 CAPSULE ORAL at 16:17

## 2020-01-01 RX ADMIN — CEFEPIME HYDROCHLORIDE 2 G: 2 INJECTION, POWDER, FOR SOLUTION INTRAVENOUS at 22:48

## 2020-01-01 RX ADMIN — ENOXAPARIN SODIUM 40 MG: 40 INJECTION SUBCUTANEOUS at 08:21

## 2020-01-01 RX ADMIN — MAGNESIUM HYDROXIDE 30 ML: 400 SUSPENSION ORAL at 13:55

## 2020-01-01 RX ADMIN — IBUPROFEN 400 MG: 400 TABLET, FILM COATED ORAL at 14:58

## 2020-01-01 RX ADMIN — PANTOPRAZOLE SODIUM 40 MG: 40 TABLET, DELAYED RELEASE ORAL at 06:19

## 2020-01-01 RX ADMIN — FUROSEMIDE 80 MG: 40 TABLET ORAL at 09:15

## 2020-01-01 RX ADMIN — IPRATROPIUM BROMIDE AND ALBUTEROL SULFATE 1 AMPULE: .5; 3 SOLUTION RESPIRATORY (INHALATION) at 07:41

## 2020-01-01 RX ADMIN — BUDESONIDE 500 MCG: 0.5 INHALANT RESPIRATORY (INHALATION) at 06:45

## 2020-01-01 RX ADMIN — IPRATROPIUM BROMIDE AND ALBUTEROL SULFATE 1 AMPULE: .5; 3 SOLUTION RESPIRATORY (INHALATION) at 06:00

## 2020-01-01 RX ADMIN — LATANOPROST 1 DROP: 50 SOLUTION OPHTHALMIC at 23:54

## 2020-01-01 RX ADMIN — BRIMONIDINE TARTRATE 1 DROP: 2 SOLUTION OPHTHALMIC at 21:43

## 2020-01-01 RX ADMIN — PRIMIDONE 250 MG: 250 TABLET ORAL at 10:31

## 2020-01-01 RX ADMIN — IBUPROFEN 400 MG: 400 TABLET, FILM COATED ORAL at 20:19

## 2020-01-01 RX ADMIN — DOCUSATE SODIUM 100 MG: 100 CAPSULE, LIQUID FILLED ORAL at 08:49

## 2020-01-01 RX ADMIN — ACYCLOVIR 400 MG: 200 CAPSULE ORAL at 21:10

## 2020-01-01 RX ADMIN — PRIMIDONE 250 MG: 250 TABLET ORAL at 09:29

## 2020-01-01 RX ADMIN — ARFORMOTEROL TARTRATE 15 MCG: 15 SOLUTION RESPIRATORY (INHALATION) at 06:44

## 2020-01-01 RX ADMIN — IPRATROPIUM BROMIDE AND ALBUTEROL SULFATE 1 AMPULE: .5; 3 SOLUTION RESPIRATORY (INHALATION) at 03:04

## 2020-01-01 RX ADMIN — AZITHROMYCIN MONOHYDRATE 250 MG: 250 TABLET ORAL at 08:50

## 2020-01-01 RX ADMIN — ACYCLOVIR 400 MG: 200 CAPSULE ORAL at 13:53

## 2020-01-01 RX ADMIN — THEOPHYLLINE ANHYDROUS 300 MG: 300 CAPSULE, EXTENDED RELEASE ORAL at 08:28

## 2020-01-01 RX ADMIN — MONTELUKAST 10 MG: 10 TABLET, FILM COATED ORAL at 10:13

## 2020-01-01 RX ADMIN — OXYCODONE HYDROCHLORIDE AND ACETAMINOPHEN 2000 MG: 500 TABLET ORAL at 20:55

## 2020-01-01 RX ADMIN — PANTOPRAZOLE SODIUM 40 MG: 40 TABLET, DELAYED RELEASE ORAL at 06:26

## 2020-01-01 RX ADMIN — TRAMADOL HYDROCHLORIDE 50 MG: 50 TABLET, FILM COATED ORAL at 12:46

## 2020-01-01 RX ADMIN — BUDESONIDE 500 MCG: 0.5 INHALANT RESPIRATORY (INHALATION) at 07:44

## 2020-01-01 RX ADMIN — ACYCLOVIR 400 MG: 200 CAPSULE ORAL at 09:03

## 2020-01-01 RX ADMIN — DEXTROSE AND SODIUM CHLORIDE: 5; 900 INJECTION, SOLUTION INTRAVENOUS at 17:59

## 2020-01-01 RX ADMIN — FINASTERIDE 5 MG: 5 TABLET, FILM COATED ORAL at 09:02

## 2020-01-01 RX ADMIN — IPRATROPIUM BROMIDE AND ALBUTEROL SULFATE 1 AMPULE: .5; 3 SOLUTION RESPIRATORY (INHALATION) at 21:09

## 2020-01-01 RX ADMIN — IPRATROPIUM BROMIDE AND ALBUTEROL SULFATE 1 AMPULE: .5; 3 SOLUTION RESPIRATORY (INHALATION) at 15:00

## 2020-01-01 RX ADMIN — BUDESONIDE 500 MCG: 0.5 INHALANT RESPIRATORY (INHALATION) at 19:00

## 2020-01-01 RX ADMIN — THEOPHYLLINE 300 MG: 300 TABLET, EXTENDED RELEASE ORAL at 10:27

## 2020-01-01 RX ADMIN — IPRATROPIUM BROMIDE AND ALBUTEROL SULFATE 1 AMPULE: .5; 3 SOLUTION RESPIRATORY (INHALATION) at 15:02

## 2020-01-01 RX ADMIN — DOCUSATE SODIUM 100 MG: 100 CAPSULE, LIQUID FILLED ORAL at 22:14

## 2020-01-01 RX ADMIN — LATANOPROST 1 DROP: 50 SOLUTION OPHTHALMIC at 23:48

## 2020-01-01 RX ADMIN — ACYCLOVIR 400 MG: 200 CAPSULE ORAL at 10:24

## 2020-01-01 RX ADMIN — ACYCLOVIR 400 MG: 200 CAPSULE ORAL at 15:05

## 2020-01-01 RX ADMIN — TRAMADOL HYDROCHLORIDE 100 MG: 50 TABLET ORAL at 09:12

## 2020-01-01 RX ADMIN — IPRATROPIUM BROMIDE AND ALBUTEROL SULFATE 1 AMPULE: .5; 3 SOLUTION RESPIRATORY (INHALATION) at 03:05

## 2020-01-01 RX ADMIN — METOPROLOL SUCCINATE 12.5 MG: 25 TABLET, EXTENDED RELEASE ORAL at 18:39

## 2020-01-01 RX ADMIN — IPRATROPIUM BROMIDE AND ALBUTEROL SULFATE 1 AMPULE: .5; 3 SOLUTION RESPIRATORY (INHALATION) at 18:42

## 2020-01-01 RX ADMIN — IPRATROPIUM BROMIDE AND ALBUTEROL SULFATE 1 AMPULE: .5; 3 SOLUTION RESPIRATORY (INHALATION) at 19:03

## 2020-01-01 RX ADMIN — IPRATROPIUM BROMIDE AND ALBUTEROL SULFATE 1 AMPULE: .5; 3 SOLUTION RESPIRATORY (INHALATION) at 16:43

## 2020-01-01 RX ADMIN — PRIMIDONE 250 MG: 250 TABLET ORAL at 09:22

## 2020-01-01 RX ADMIN — ACYCLOVIR 400 MG: 400 TABLET ORAL at 20:56

## 2020-01-01 RX ADMIN — MONTELUKAST 10 MG: 10 TABLET, FILM COATED ORAL at 08:28

## 2020-01-01 RX ADMIN — ACYCLOVIR 400 MG: 200 CAPSULE ORAL at 13:55

## 2020-01-01 RX ADMIN — THEOPHYLLINE 300 MG: 300 TABLET, EXTENDED RELEASE ORAL at 11:18

## 2020-01-01 RX ADMIN — IPRATROPIUM BROMIDE AND ALBUTEROL SULFATE 1 AMPULE: .5; 3 SOLUTION RESPIRATORY (INHALATION) at 22:19

## 2020-01-01 RX ADMIN — GABAPENTIN 400 MG: 400 CAPSULE ORAL at 09:22

## 2020-01-01 RX ADMIN — POLYETHYLENE GLYCOL 3350 17 G: 17 POWDER, FOR SOLUTION ORAL at 16:41

## 2020-01-01 RX ADMIN — PRIMIDONE 250 MG: 250 TABLET ORAL at 21:33

## 2020-01-01 RX ADMIN — PRIMIDONE 250 MG: 250 TABLET ORAL at 21:10

## 2020-01-01 RX ADMIN — ACETYLCYSTEINE 400 MG: 100 SOLUTION ORAL; RESPIRATORY (INHALATION) at 06:35

## 2020-01-01 RX ADMIN — Medication 750 MG: at 09:14

## 2020-01-01 RX ADMIN — BUDESONIDE 500 MCG: 0.5 INHALANT RESPIRATORY (INHALATION) at 06:00

## 2020-01-01 RX ADMIN — IPRATROPIUM BROMIDE AND ALBUTEROL SULFATE 1 AMPULE: .5; 3 SOLUTION RESPIRATORY (INHALATION) at 09:32

## 2020-01-01 RX ADMIN — POTASSIUM CHLORIDE 20 MEQ: 20 TABLET, EXTENDED RELEASE ORAL at 23:43

## 2020-01-01 RX ADMIN — FUROSEMIDE 80 MG: 40 TABLET ORAL at 09:02

## 2020-01-01 RX ADMIN — BRIMONIDINE TARTRATE 1 DROP: 2 SOLUTION OPHTHALMIC at 08:50

## 2020-01-01 RX ADMIN — FINASTERIDE 5 MG: 5 TABLET, FILM COATED ORAL at 08:22

## 2020-01-01 RX ADMIN — Medication 750 MG: at 20:23

## 2020-01-01 RX ADMIN — SODIUM CHLORIDE: 9 INJECTION, SOLUTION INTRAVENOUS at 15:47

## 2020-01-01 RX ADMIN — OXYCODONE 5 MG: 5 TABLET ORAL at 17:59

## 2020-01-01 RX ADMIN — Medication 750 MG: at 21:12

## 2020-01-01 RX ADMIN — METOPROLOL SUCCINATE 12.5 MG: 25 TABLET, EXTENDED RELEASE ORAL at 09:03

## 2020-01-01 RX ADMIN — IBUPROFEN 400 MG: 400 TABLET, FILM COATED ORAL at 15:37

## 2020-01-01 RX ADMIN — ACETYLCYSTEINE 400 MG: 100 SOLUTION ORAL; RESPIRATORY (INHALATION) at 19:25

## 2020-01-01 RX ADMIN — VANCOMYCIN HYDROCHLORIDE 1000 MG: 1 INJECTION, POWDER, LYOPHILIZED, FOR SOLUTION INTRAVENOUS at 04:25

## 2020-01-01 RX ADMIN — METHYLPREDNISOLONE SODIUM SUCCINATE 40 MG: 40 INJECTION, POWDER, FOR SOLUTION INTRAMUSCULAR; INTRAVENOUS at 05:41

## 2020-01-01 RX ADMIN — IPRATROPIUM BROMIDE AND ALBUTEROL SULFATE 1 AMPULE: .5; 3 SOLUTION RESPIRATORY (INHALATION) at 00:39

## 2020-01-01 RX ADMIN — LATANOPROST 1 DROP: 50 SOLUTION OPHTHALMIC at 20:45

## 2020-01-01 RX ADMIN — LATANOPROST 1 DROP: 50 SOLUTION OPHTHALMIC at 20:41

## 2020-01-01 RX ADMIN — PANTOPRAZOLE SODIUM 40 MG: 40 TABLET, DELAYED RELEASE ORAL at 06:15

## 2020-01-01 RX ADMIN — THEOPHYLLINE ANHYDROUS 300 MG: 300 CAPSULE, EXTENDED RELEASE ORAL at 09:06

## 2020-01-01 RX ADMIN — BUDESONIDE 500 MCG: 0.5 INHALANT RESPIRATORY (INHALATION) at 16:26

## 2020-01-01 RX ADMIN — BUDESONIDE 500 MCG: 0.5 INHALANT RESPIRATORY (INHALATION) at 09:18

## 2020-01-01 RX ADMIN — BUDESONIDE 500 MCG: 0.5 INHALANT RESPIRATORY (INHALATION) at 06:40

## 2020-01-01 RX ADMIN — ACYCLOVIR 400 MG: 200 CAPSULE ORAL at 21:04

## 2020-01-01 RX ADMIN — BRIMONIDINE TARTRATE 1 DROP: 2 SOLUTION OPHTHALMIC at 10:26

## 2020-01-01 RX ADMIN — OXYCODONE 2.5 MG: 5 TABLET ORAL at 11:45

## 2020-01-01 RX ADMIN — PRIMIDONE 250 MG: 250 TABLET ORAL at 20:35

## 2020-01-01 RX ADMIN — IPRATROPIUM BROMIDE AND ALBUTEROL SULFATE 3 AMPULE: .5; 3 SOLUTION RESPIRATORY (INHALATION) at 22:21

## 2020-01-01 RX ADMIN — GABAPENTIN 400 MG: 400 CAPSULE ORAL at 20:14

## 2020-01-01 RX ADMIN — ARFORMOTEROL TARTRATE 15 MCG: 15 SOLUTION RESPIRATORY (INHALATION) at 16:49

## 2020-01-01 RX ADMIN — MONTELUKAST 10 MG: 10 TABLET, FILM COATED ORAL at 10:12

## 2020-01-01 RX ADMIN — SODIUM CHLORIDE 25 ML: 9 INJECTION, SOLUTION INTRAVENOUS at 04:43

## 2020-01-01 RX ADMIN — IPRATROPIUM BROMIDE AND ALBUTEROL SULFATE 1 AMPULE: .5; 3 SOLUTION RESPIRATORY (INHALATION) at 17:04

## 2020-01-01 RX ADMIN — ACYCLOVIR 400 MG: 200 CAPSULE ORAL at 13:38

## 2020-01-01 RX ADMIN — SODIUM CHLORIDE 20 ML: 9 INJECTION, SOLUTION INTRAVENOUS at 20:04

## 2020-01-01 RX ADMIN — BRIMONIDINE TARTRATE 1 DROP: 2 SOLUTION OPHTHALMIC at 09:14

## 2020-01-01 RX ADMIN — SODIUM PHOSPHATE 1 ENEMA: 7; 19 ENEMA RECTAL at 08:20

## 2020-01-01 RX ADMIN — IPRATROPIUM BROMIDE AND ALBUTEROL SULFATE 1 AMPULE: .5; 3 SOLUTION RESPIRATORY (INHALATION) at 08:40

## 2020-01-01 RX ADMIN — ACYCLOVIR 400 MG: 400 TABLET ORAL at 00:13

## 2020-01-01 RX ADMIN — IPRATROPIUM BROMIDE AND ALBUTEROL SULFATE 1 AMPULE: .5; 3 SOLUTION RESPIRATORY (INHALATION) at 09:58

## 2020-01-01 RX ADMIN — IPRATROPIUM BROMIDE AND ALBUTEROL SULFATE 3 AMPULE: .5; 3 SOLUTION RESPIRATORY (INHALATION) at 12:48

## 2020-01-01 RX ADMIN — SODIUM CHLORIDE 25 ML: 9 INJECTION, SOLUTION INTRAVENOUS at 13:37

## 2020-01-01 RX ADMIN — PREDNISONE 20 MG: 20 TABLET ORAL at 10:24

## 2020-01-01 RX ADMIN — DEXTROSE AND SODIUM CHLORIDE: 5; 900 INJECTION, SOLUTION INTRAVENOUS at 12:29

## 2020-01-01 RX ADMIN — VANCOMYCIN HYDROCHLORIDE 1250 MG: 10 INJECTION, POWDER, LYOPHILIZED, FOR SOLUTION INTRAVENOUS at 15:46

## 2020-01-01 RX ADMIN — AZITHROMYCIN MONOHYDRATE 250 MG: 250 TABLET ORAL at 09:14

## 2020-01-01 RX ADMIN — PANTOPRAZOLE SODIUM 40 MG: 40 TABLET, DELAYED RELEASE ORAL at 05:47

## 2020-01-01 RX ADMIN — SODIUM CHLORIDE 25 ML: 9 INJECTION, SOLUTION INTRAVENOUS at 15:54

## 2020-01-01 RX ADMIN — CEFEPIME HYDROCHLORIDE 2 G: 2 INJECTION, POWDER, FOR SOLUTION INTRAVENOUS at 11:59

## 2020-01-01 RX ADMIN — POTASSIUM CHLORIDE 20 MEQ: 20 TABLET, EXTENDED RELEASE ORAL at 09:03

## 2020-01-01 RX ADMIN — BRIMONIDINE TARTRATE 1 DROP: 2 SOLUTION OPHTHALMIC at 22:00

## 2020-01-01 RX ADMIN — IBUPROFEN 400 MG: 800 TABLET, FILM COATED ORAL at 21:42

## 2020-01-01 RX ADMIN — BRIMONIDINE TARTRATE 1 DROP: 2 SOLUTION OPHTHALMIC at 23:48

## 2020-01-01 RX ADMIN — ACETYLCYSTEINE 400 MG: 100 SOLUTION ORAL; RESPIRATORY (INHALATION) at 09:57

## 2020-01-01 RX ADMIN — CEFEPIME HYDROCHLORIDE 2 G: 2 INJECTION, POWDER, FOR SOLUTION INTRAVENOUS at 09:55

## 2020-01-01 RX ADMIN — METOPROLOL SUCCINATE 12.5 MG: 25 TABLET, EXTENDED RELEASE ORAL at 08:51

## 2020-01-01 RX ADMIN — BARIUM SULFATE 45 G: 0.6 CREAM ORAL at 14:41

## 2020-01-01 RX ADMIN — FINASTERIDE 5 MG: 5 TABLET, FILM COATED ORAL at 09:15

## 2020-01-01 RX ADMIN — TRAMADOL HYDROCHLORIDE 100 MG: 50 TABLET ORAL at 00:13

## 2020-01-01 RX ADMIN — OXYCODONE HYDROCHLORIDE AND ACETAMINOPHEN 2000 MG: 500 TABLET ORAL at 21:42

## 2020-01-01 RX ADMIN — PRIMIDONE 250 MG: 250 TABLET ORAL at 15:54

## 2020-01-01 RX ADMIN — IBUPROFEN 400 MG: 400 TABLET, FILM COATED ORAL at 00:17

## 2020-01-01 RX ADMIN — OXYCODONE 5 MG: 5 TABLET ORAL at 04:50

## 2020-01-01 RX ADMIN — IPRATROPIUM BROMIDE AND ALBUTEROL SULFATE 3 ML: .5; 3 SOLUTION RESPIRATORY (INHALATION) at 04:55

## 2020-01-01 RX ADMIN — IPRATROPIUM BROMIDE AND ALBUTEROL SULFATE 1 AMPULE: .5; 3 SOLUTION RESPIRATORY (INHALATION) at 02:42

## 2020-01-01 RX ADMIN — ACYCLOVIR 400 MG: 400 TABLET ORAL at 09:11

## 2020-01-01 RX ADMIN — THEOPHYLLINE ANHYDROUS 300 MG: 300 CAPSULE, EXTENDED RELEASE ORAL at 09:09

## 2020-01-01 RX ADMIN — DOCUSATE SODIUM 100 MG: 100 CAPSULE, LIQUID FILLED ORAL at 09:03

## 2020-01-01 RX ADMIN — THEOPHYLLINE ANHYDROUS 300 MG: 300 CAPSULE, EXTENDED RELEASE ORAL at 08:49

## 2020-01-01 RX ADMIN — AZITHROMYCIN MONOHYDRATE 250 MG: 250 TABLET ORAL at 09:24

## 2020-01-01 RX ADMIN — PRIMIDONE 250 MG: 250 TABLET ORAL at 13:38

## 2020-01-01 RX ADMIN — IPRATROPIUM BROMIDE AND ALBUTEROL SULFATE 1 AMPULE: .5; 3 SOLUTION RESPIRATORY (INHALATION) at 13:35

## 2020-01-01 RX ADMIN — GABAPENTIN 300 MG: 300 CAPSULE ORAL at 08:50

## 2020-01-01 RX ADMIN — DOCUSATE SODIUM 100 MG: 100 CAPSULE, LIQUID FILLED ORAL at 23:47

## 2020-01-01 RX ADMIN — ARFORMOTEROL TARTRATE 15 MCG: 15 SOLUTION RESPIRATORY (INHALATION) at 07:07

## 2020-01-01 RX ADMIN — PRIMIDONE 250 MG: 250 TABLET ORAL at 21:26

## 2020-01-01 RX ADMIN — IPRATROPIUM BROMIDE AND ALBUTEROL SULFATE 1 AMPULE: .5; 3 SOLUTION RESPIRATORY (INHALATION) at 21:35

## 2020-01-01 RX ADMIN — OXYCODONE HYDROCHLORIDE AND ACETAMINOPHEN 2000 MG: 500 TABLET ORAL at 21:33

## 2020-01-01 RX ADMIN — IBUPROFEN 400 MG: 400 TABLET, FILM COATED ORAL at 14:23

## 2020-01-01 RX ADMIN — ACYCLOVIR 400 MG: 400 TABLET ORAL at 23:56

## 2020-01-01 RX ADMIN — TRAMADOL HYDROCHLORIDE 100 MG: 50 TABLET ORAL at 09:06

## 2020-01-01 RX ADMIN — IPRATROPIUM BROMIDE AND ALBUTEROL SULFATE 1 AMPULE: .5; 3 SOLUTION RESPIRATORY (INHALATION) at 14:27

## 2020-01-01 RX ADMIN — CEFEPIME HYDROCHLORIDE 2 G: 2 INJECTION, POWDER, FOR SOLUTION INTRAVENOUS at 08:46

## 2020-01-01 RX ADMIN — DOCUSATE SODIUM 100 MG: 100 CAPSULE, LIQUID FILLED ORAL at 21:05

## 2020-01-01 RX ADMIN — IPRATROPIUM BROMIDE AND ALBUTEROL SULFATE 3 ML: .5; 3 SOLUTION RESPIRATORY (INHALATION) at 20:35

## 2020-01-01 RX ADMIN — IPRATROPIUM BROMIDE AND ALBUTEROL SULFATE 1 AMPULE: .5; 3 SOLUTION RESPIRATORY (INHALATION) at 10:30

## 2020-01-01 RX ADMIN — FENTANYL CITRATE 25 MCG: 50 INJECTION, SOLUTION INTRAMUSCULAR; INTRAVENOUS at 12:30

## 2020-01-01 RX ADMIN — FUROSEMIDE 80 MG: 40 TABLET ORAL at 09:06

## 2020-01-01 RX ADMIN — BUDESONIDE 500 MCG: 0.5 INHALANT RESPIRATORY (INHALATION) at 04:55

## 2020-01-01 RX ADMIN — CEFEPIME HYDROCHLORIDE 2 G: 2 INJECTION, POWDER, FOR SOLUTION INTRAVENOUS at 10:35

## 2020-01-01 RX ADMIN — BUDESONIDE 500 MCG: 0.5 INHALANT RESPIRATORY (INHALATION) at 17:34

## 2020-01-01 RX ADMIN — GABAPENTIN 400 MG: 400 CAPSULE ORAL at 22:13

## 2020-01-01 RX ADMIN — Medication 750 MG: at 21:10

## 2020-01-01 RX ADMIN — SODIUM CHLORIDE: 4.5 INJECTION, SOLUTION INTRAVENOUS at 05:35

## 2020-01-01 RX ADMIN — POTASSIUM CHLORIDE 20 MEQ: 20 TABLET, EXTENDED RELEASE ORAL at 08:25

## 2020-01-01 RX ADMIN — POLYETHYLENE GLYCOL 3350 17 G: 17 POWDER, FOR SOLUTION ORAL at 09:22

## 2020-01-01 RX ADMIN — FLUDEOXYGLUCOSE F 18 10 MILLICURIE: 200 INJECTION, SOLUTION INTRAVENOUS at 19:08

## 2020-01-01 RX ADMIN — PRIMIDONE 250 MG: 250 TABLET ORAL at 09:07

## 2020-01-01 RX ADMIN — POTASSIUM CHLORIDE 20 MEQ: 20 TABLET, EXTENDED RELEASE ORAL at 08:58

## 2020-01-01 RX ADMIN — DEXTROSE AND SODIUM CHLORIDE: 5; 900 INJECTION, SOLUTION INTRAVENOUS at 03:31

## 2020-01-01 RX ADMIN — PANTOPRAZOLE SODIUM 40 MG: 40 TABLET, DELAYED RELEASE ORAL at 05:23

## 2020-01-01 RX ADMIN — ACYCLOVIR 400 MG: 200 CAPSULE ORAL at 14:16

## 2020-01-01 RX ADMIN — IBUPROFEN 400 MG: 400 TABLET, FILM COATED ORAL at 20:57

## 2020-01-01 RX ADMIN — IPRATROPIUM BROMIDE AND ALBUTEROL SULFATE 1 AMPULE: .5; 3 SOLUTION RESPIRATORY (INHALATION) at 19:11

## 2020-01-01 RX ADMIN — IBUPROFEN 400 MG: 400 TABLET, FILM COATED ORAL at 15:40

## 2020-01-01 RX ADMIN — OXYCODONE 5 MG: 5 TABLET ORAL at 23:54

## 2020-01-01 RX ADMIN — IPRATROPIUM BROMIDE AND ALBUTEROL SULFATE 3 ML: .5; 3 SOLUTION RESPIRATORY (INHALATION) at 17:04

## 2020-01-01 RX ADMIN — ACETYLCYSTEINE 400 MG: 100 SOLUTION ORAL; RESPIRATORY (INHALATION) at 07:41

## 2020-01-01 RX ADMIN — BUDESONIDE 500 MCG: 0.5 INHALANT RESPIRATORY (INHALATION) at 06:25

## 2020-01-01 RX ADMIN — PREDNISONE 30 MG: 10 TABLET ORAL at 08:49

## 2020-01-01 RX ADMIN — DOCUSATE SODIUM 100 MG: 100 CAPSULE, LIQUID FILLED ORAL at 15:44

## 2020-01-01 RX ADMIN — POTASSIUM CHLORIDE 20 MEQ: 20 TABLET, EXTENDED RELEASE ORAL at 21:41

## 2020-01-01 RX ADMIN — POTASSIUM CHLORIDE 20 MEQ: 20 TABLET, EXTENDED RELEASE ORAL at 09:11

## 2020-01-01 RX ADMIN — CEFEPIME HYDROCHLORIDE 2 G: 2 INJECTION, POWDER, FOR SOLUTION INTRAVENOUS at 11:45

## 2020-01-01 RX ADMIN — POLYETHYLENE GLYCOL 3350 17 G: 17 POWDER, FOR SOLUTION ORAL at 09:08

## 2020-01-01 RX ADMIN — PANTOPRAZOLE SODIUM 40 MG: 40 TABLET, DELAYED RELEASE ORAL at 09:00

## 2020-01-01 RX ADMIN — ACYCLOVIR 400 MG: 200 CAPSULE ORAL at 20:23

## 2020-01-01 RX ADMIN — IBUPROFEN 400 MG: 400 TABLET, FILM COATED ORAL at 09:15

## 2020-01-01 RX ADMIN — ACETYLCYSTEINE 400 MG: 100 SOLUTION ORAL; RESPIRATORY (INHALATION) at 18:34

## 2020-01-01 RX ADMIN — HYDROMORPHONE HYDROCHLORIDE 1 MG: 2 INJECTION INTRAMUSCULAR; INTRAVENOUS; SUBCUTANEOUS at 13:14

## 2020-01-01 RX ADMIN — VANCOMYCIN HYDROCHLORIDE 1000 MG: 1 INJECTION, POWDER, LYOPHILIZED, FOR SOLUTION INTRAVENOUS at 04:27

## 2020-01-01 RX ADMIN — IBUPROFEN 400 MG: 400 TABLET, FILM COATED ORAL at 14:22

## 2020-01-01 RX ADMIN — BARIUM SULFATE 45 ML: 400 SUSPENSION ORAL at 14:41

## 2020-01-01 RX ADMIN — IBUPROFEN 400 MG: 800 TABLET, FILM COATED ORAL at 08:23

## 2020-01-01 RX ADMIN — PRIMIDONE 250 MG: 250 TABLET ORAL at 11:18

## 2020-01-01 RX ADMIN — IPRATROPIUM BROMIDE AND ALBUTEROL SULFATE 1 AMPULE: .5; 3 SOLUTION RESPIRATORY (INHALATION) at 09:52

## 2020-01-01 RX ADMIN — ACETYLCYSTEINE 400 MG: 100 SOLUTION ORAL; RESPIRATORY (INHALATION) at 10:19

## 2020-01-01 RX ADMIN — IPRATROPIUM BROMIDE AND ALBUTEROL SULFATE 1 AMPULE: .5; 3 SOLUTION RESPIRATORY (INHALATION) at 22:35

## 2020-01-01 RX ADMIN — IPRATROPIUM BROMIDE AND ALBUTEROL SULFATE 1 AMPULE: .5; 3 SOLUTION RESPIRATORY (INHALATION) at 02:05

## 2020-01-01 RX ADMIN — KETOROLAC TROMETHAMINE 15 MG: 15 INJECTION, SOLUTION INTRAMUSCULAR; INTRAVENOUS at 04:08

## 2020-01-01 RX ADMIN — OXYCODONE 5 MG: 5 TABLET ORAL at 12:59

## 2020-01-01 RX ADMIN — GABAPENTIN 300 MG: 300 CAPSULE ORAL at 09:02

## 2020-01-01 RX ADMIN — ACYCLOVIR 400 MG: 200 CAPSULE ORAL at 09:14

## 2020-01-01 RX ADMIN — ACYCLOVIR 400 MG: 400 TABLET ORAL at 09:07

## 2020-01-01 RX ADMIN — FINASTERIDE 5 MG: 5 TABLET, FILM COATED ORAL at 09:24

## 2020-01-01 RX ADMIN — METOPROLOL SUCCINATE 12.5 MG: 25 TABLET, EXTENDED RELEASE ORAL at 10:12

## 2020-01-01 RX ADMIN — GABAPENTIN 400 MG: 400 CAPSULE ORAL at 08:29

## 2020-01-01 RX ADMIN — ARFORMOTEROL TARTRATE 15 MCG: 15 SOLUTION RESPIRATORY (INHALATION) at 05:01

## 2020-01-01 RX ADMIN — MONTELUKAST 10 MG: 10 TABLET, FILM COATED ORAL at 09:07

## 2020-01-01 RX ADMIN — POTASSIUM CHLORIDE 20 MEQ: 20 TABLET, EXTENDED RELEASE ORAL at 10:28

## 2020-01-01 RX ADMIN — MONTELUKAST 10 MG: 10 TABLET, FILM COATED ORAL at 08:49

## 2020-01-01 RX ADMIN — MAGNESIUM HYDROXIDE 30 ML: 400 SUSPENSION ORAL at 15:40

## 2020-01-01 RX ADMIN — SODIUM CHLORIDE, PRESERVATIVE FREE 10 ML: 5 INJECTION INTRAVENOUS at 09:45

## 2020-01-01 RX ADMIN — BRIMONIDINE TARTRATE 1 DROP: 2 SOLUTION OPHTHALMIC at 20:26

## 2020-01-01 RX ADMIN — BRIMONIDINE TARTRATE 1 DROP: 2 SOLUTION OPHTHALMIC at 21:32

## 2020-01-01 RX ADMIN — IPRATROPIUM BROMIDE AND ALBUTEROL SULFATE 1 AMPULE: .5; 3 SOLUTION RESPIRATORY (INHALATION) at 06:06

## 2020-01-01 RX ADMIN — POTASSIUM CHLORIDE 20 MEQ: 20 TABLET, EXTENDED RELEASE ORAL at 20:12

## 2020-01-01 RX ADMIN — BRIMONIDINE TARTRATE 1 DROP: 2 SOLUTION OPHTHALMIC at 15:05

## 2020-01-01 RX ADMIN — OXYCODONE HYDROCHLORIDE AND ACETAMINOPHEN 2000 MG: 500 TABLET ORAL at 20:32

## 2020-01-01 RX ADMIN — PRIMIDONE 250 MG: 250 TABLET ORAL at 20:56

## 2020-01-01 RX ADMIN — GABAPENTIN 400 MG: 400 CAPSULE ORAL at 10:25

## 2020-01-01 RX ADMIN — PANTOPRAZOLE SODIUM 40 MG: 40 TABLET, DELAYED RELEASE ORAL at 10:26

## 2020-01-01 RX ADMIN — FINASTERIDE 5 MG: 5 TABLET, FILM COATED ORAL at 09:12

## 2020-01-01 RX ADMIN — ACETYLCYSTEINE 400 MG: 100 SOLUTION ORAL; RESPIRATORY (INHALATION) at 11:56

## 2020-01-01 RX ADMIN — PANTOPRAZOLE SODIUM 40 MG: 40 TABLET, DELAYED RELEASE ORAL at 05:37

## 2020-01-01 RX ADMIN — GABAPENTIN 300 MG: 300 CAPSULE ORAL at 12:59

## 2020-01-01 RX ADMIN — METOPROLOL SUCCINATE 12.5 MG: 25 TABLET, EXTENDED RELEASE ORAL at 09:24

## 2020-01-01 RX ADMIN — ACYCLOVIR 400 MG: 200 CAPSULE ORAL at 21:13

## 2020-01-01 RX ADMIN — ALBUTEROL SULFATE 2.5 MG: 2.5 SOLUTION RESPIRATORY (INHALATION) at 04:24

## 2020-01-01 RX ADMIN — Medication 5000 UNITS: at 09:17

## 2020-01-01 RX ADMIN — POTASSIUM CHLORIDE 20 MEQ: 20 TABLET, EXTENDED RELEASE ORAL at 21:10

## 2020-01-01 RX ADMIN — POTASSIUM CHLORIDE 20 MEQ: 20 TABLET, EXTENDED RELEASE ORAL at 09:05

## 2020-01-01 RX ADMIN — DOCUSATE SODIUM 100 MG: 100 CAPSULE, LIQUID FILLED ORAL at 10:25

## 2020-01-01 RX ADMIN — IPRATROPIUM BROMIDE AND ALBUTEROL SULFATE 1 AMPULE: .5; 3 SOLUTION RESPIRATORY (INHALATION) at 13:30

## 2020-01-01 RX ADMIN — OXYCODONE HYDROCHLORIDE AND ACETAMINOPHEN 2000 MG: 500 TABLET ORAL at 09:07

## 2020-01-01 RX ADMIN — PANTOPRAZOLE SODIUM 40 MG: 40 TABLET, DELAYED RELEASE ORAL at 06:27

## 2020-01-01 RX ADMIN — Medication 750 MG: at 21:04

## 2020-01-01 RX ADMIN — POTASSIUM CHLORIDE 20 MEQ: 20 TABLET, EXTENDED RELEASE ORAL at 20:57

## 2020-01-01 RX ADMIN — DOCUSATE SODIUM 100 MG: 100 CAPSULE, LIQUID FILLED ORAL at 09:12

## 2020-01-01 RX ADMIN — SODIUM CHLORIDE: 4.5 INJECTION, SOLUTION INTRAVENOUS at 22:22

## 2020-01-01 RX ADMIN — PANTOPRAZOLE SODIUM 40 MG: 40 TABLET, DELAYED RELEASE ORAL at 06:09

## 2020-01-01 RX ADMIN — PREDNISONE 10 MG: 10 TABLET ORAL at 09:05

## 2020-01-01 RX ADMIN — IPRATROPIUM BROMIDE AND ALBUTEROL SULFATE 1 AMPULE: .5; 3 SOLUTION RESPIRATORY (INHALATION) at 06:25

## 2020-01-01 RX ADMIN — LATANOPROST 1 DROP: 50 SOLUTION OPHTHALMIC at 22:00

## 2020-01-01 RX ADMIN — POLYETHYLENE GLYCOL 3350 17 G: 17 POWDER, FOR SOLUTION ORAL at 20:26

## 2020-01-01 RX ADMIN — PANTOPRAZOLE SODIUM 40 MG: 40 TABLET, DELAYED RELEASE ORAL at 06:24

## 2020-01-01 RX ADMIN — IPRATROPIUM BROMIDE AND ALBUTEROL SULFATE 1 AMPULE: .5; 3 SOLUTION RESPIRATORY (INHALATION) at 09:55

## 2020-01-01 RX ADMIN — BRIMONIDINE TARTRATE 1 DROP: 2 SOLUTION OPHTHALMIC at 20:45

## 2020-01-01 RX ADMIN — TRAMADOL HYDROCHLORIDE 100 MG: 50 TABLET ORAL at 22:37

## 2020-01-01 RX ADMIN — THEOPHYLLINE ANHYDROUS 300 MG: 300 CAPSULE, EXTENDED RELEASE ORAL at 08:57

## 2020-01-01 RX ADMIN — LATANOPROST 1 DROP: 50 SOLUTION OPHTHALMIC at 21:32

## 2020-01-01 RX ADMIN — AZITHROMYCIN MONOHYDRATE 250 MG: 250 TABLET ORAL at 09:00

## 2020-01-01 RX ADMIN — GABAPENTIN 400 MG: 400 CAPSULE ORAL at 00:13

## 2020-01-01 RX ADMIN — THEOPHYLLINE 300 MG: 300 TABLET, EXTENDED RELEASE ORAL at 11:42

## 2020-01-01 RX ADMIN — CEFTRIAXONE 2 G: 2 INJECTION, POWDER, FOR SOLUTION INTRAMUSCULAR; INTRAVENOUS at 04:17

## 2020-01-01 RX ADMIN — GLYCOPYRROLATE 0.1 MG: 0.2 INJECTION INTRAMUSCULAR; INTRAVENOUS at 12:16

## 2020-01-01 RX ADMIN — PRIMIDONE 250 MG: 250 TABLET ORAL at 06:19

## 2020-01-01 RX ADMIN — POTASSIUM CHLORIDE 20 MEQ: 20 TABLET, EXTENDED RELEASE ORAL at 10:25

## 2020-01-01 RX ADMIN — HYDROMORPHONE HYDROCHLORIDE 0.3 MG: 2 INJECTION INTRAMUSCULAR; INTRAVENOUS; SUBCUTANEOUS at 11:02

## 2020-01-01 RX ADMIN — FINASTERIDE 5 MG: 5 TABLET, FILM COATED ORAL at 09:22

## 2020-01-01 RX ADMIN — IPRATROPIUM BROMIDE AND ALBUTEROL SULFATE 1 AMPULE: .5; 3 SOLUTION RESPIRATORY (INHALATION) at 14:54

## 2020-01-01 RX ADMIN — DEXTROSE AND SODIUM CHLORIDE: 5; 900 INJECTION, SOLUTION INTRAVENOUS at 08:59

## 2020-01-01 RX ADMIN — IBUPROFEN 400 MG: 800 TABLET, FILM COATED ORAL at 14:51

## 2020-01-01 RX ADMIN — PREDNISONE 30 MG: 10 TABLET ORAL at 08:28

## 2020-01-01 RX ADMIN — ARFORMOTEROL TARTRATE 15 MCG: 15 SOLUTION RESPIRATORY (INHALATION) at 17:34

## 2020-01-01 RX ADMIN — IBUPROFEN 400 MG: 800 TABLET, FILM COATED ORAL at 00:51

## 2020-01-01 RX ADMIN — POTASSIUM CHLORIDE 20 MEQ: 20 TABLET, EXTENDED RELEASE ORAL at 00:11

## 2020-01-01 RX ADMIN — LATANOPROST 1 DROP: 50 SOLUTION OPHTHALMIC at 21:35

## 2020-01-01 RX ADMIN — IPRATROPIUM BROMIDE AND ALBUTEROL SULFATE 3 ML: .5; 3 SOLUTION RESPIRATORY (INHALATION) at 21:30

## 2020-01-01 RX ADMIN — METHYLPREDNISOLONE SODIUM SUCCINATE 20 MG: 40 INJECTION, POWDER, FOR SOLUTION INTRAMUSCULAR; INTRAVENOUS at 18:41

## 2020-01-01 RX ADMIN — ACETYLCYSTEINE 400 MG: 100 SOLUTION ORAL; RESPIRATORY (INHALATION) at 11:33

## 2020-01-01 RX ADMIN — IPRATROPIUM BROMIDE AND ALBUTEROL SULFATE 1 AMPULE: .5; 3 SOLUTION RESPIRATORY (INHALATION) at 18:31

## 2020-01-01 RX ADMIN — HYDROCODONE BITARTRATE AND ACETAMINOPHEN 1 TABLET: 5; 325 TABLET ORAL at 01:29

## 2020-01-01 RX ADMIN — ACYCLOVIR 400 MG: 400 TABLET ORAL at 09:01

## 2020-01-01 RX ADMIN — PANTOPRAZOLE SODIUM 40 MG: 40 TABLET, DELAYED RELEASE ORAL at 09:06

## 2020-01-01 RX ADMIN — PRIMIDONE 250 MG: 250 TABLET ORAL at 22:15

## 2020-01-01 RX ADMIN — OXYCODONE 5 MG: 5 TABLET ORAL at 14:36

## 2020-01-01 RX ADMIN — ACYCLOVIR 400 MG: 400 TABLET ORAL at 13:00

## 2020-01-01 RX ADMIN — TRAMADOL HYDROCHLORIDE 100 MG: 50 TABLET ORAL at 20:55

## 2020-01-01 RX ADMIN — METHYLPREDNISOLONE SODIUM SUCCINATE 40 MG: 40 INJECTION, POWDER, FOR SOLUTION INTRAMUSCULAR; INTRAVENOUS at 18:22

## 2020-01-01 RX ADMIN — OXYCODONE HYDROCHLORIDE AND ACETAMINOPHEN 2000 MG: 500 TABLET ORAL at 23:48

## 2020-01-01 RX ADMIN — AZITHROMYCIN MONOHYDRATE 250 MG: 250 TABLET ORAL at 09:06

## 2020-01-01 RX ADMIN — AZITHROMYCIN MONOHYDRATE 250 MG: 250 TABLET ORAL at 11:42

## 2020-01-01 RX ADMIN — ACETYLCYSTEINE 400 MG: 100 SOLUTION ORAL; RESPIRATORY (INHALATION) at 06:29

## 2020-01-01 RX ADMIN — GABAPENTIN 400 MG: 400 CAPSULE ORAL at 20:56

## 2020-01-01 RX ADMIN — PREDNISONE 10 MG: 10 TABLET ORAL at 08:26

## 2020-01-01 RX ADMIN — HYDROCODONE BITARTRATE AND ACETAMINOPHEN 1 TABLET: 5; 325 TABLET ORAL at 17:51

## 2020-01-01 RX ADMIN — PRIMIDONE 250 MG: 250 TABLET ORAL at 23:52

## 2020-01-01 RX ADMIN — METHYLPREDNISOLONE SODIUM SUCCINATE 40 MG: 40 INJECTION, POWDER, FOR SOLUTION INTRAMUSCULAR; INTRAVENOUS at 06:48

## 2020-01-01 RX ADMIN — BRIMONIDINE TARTRATE 1 DROP: 2 SOLUTION OPHTHALMIC at 11:43

## 2020-01-01 RX ADMIN — GABAPENTIN 400 MG: 400 CAPSULE ORAL at 09:12

## 2020-01-01 RX ADMIN — IPRATROPIUM BROMIDE AND ALBUTEROL SULFATE 1 AMPULE: .5; 3 SOLUTION RESPIRATORY (INHALATION) at 17:05

## 2020-01-01 RX ADMIN — ACYCLOVIR 400 MG: 200 CAPSULE ORAL at 09:24

## 2020-01-01 RX ADMIN — IPRATROPIUM BROMIDE AND ALBUTEROL SULFATE 1 AMPULE: .5; 3 SOLUTION RESPIRATORY (INHALATION) at 02:18

## 2020-01-01 RX ADMIN — BUDESONIDE 500 MCG: 0.5 INHALANT RESPIRATORY (INHALATION) at 16:49

## 2020-01-01 RX ADMIN — THEOPHYLLINE 300 MG: 300 TABLET, EXTENDED RELEASE ORAL at 18:43

## 2020-01-01 RX ADMIN — CEFEPIME HYDROCHLORIDE 2 G: 2 INJECTION, POWDER, FOR SOLUTION INTRAVENOUS at 21:58

## 2020-01-01 RX ADMIN — IPRATROPIUM BROMIDE AND ALBUTEROL SULFATE 1 AMPULE: .5; 3 SOLUTION RESPIRATORY (INHALATION) at 10:19

## 2020-01-01 RX ADMIN — TRAMADOL HYDROCHLORIDE 100 MG: 50 TABLET ORAL at 08:53

## 2020-01-01 RX ADMIN — ARFORMOTEROL TARTRATE 15 MCG: 15 SOLUTION RESPIRATORY (INHALATION) at 18:33

## 2020-01-01 RX ADMIN — AZITHROMYCIN MONOHYDRATE 250 MG: 250 TABLET ORAL at 10:23

## 2020-01-01 RX ADMIN — ACETYLCYSTEINE 400 MG: 100 SOLUTION ORAL; RESPIRATORY (INHALATION) at 17:04

## 2020-01-01 RX ADMIN — ACYCLOVIR 400 MG: 400 TABLET ORAL at 15:34

## 2020-01-01 RX ADMIN — TRAMADOL HYDROCHLORIDE 100 MG: 50 TABLET ORAL at 23:47

## 2020-01-01 RX ADMIN — THEOPHYLLINE ANHYDROUS 300 MG: 300 CAPSULE, EXTENDED RELEASE ORAL at 09:02

## 2020-01-01 RX ADMIN — SODIUM CHLORIDE: 4.5 INJECTION, SOLUTION INTRAVENOUS at 13:43

## 2020-01-01 RX ADMIN — FINASTERIDE 5 MG: 5 TABLET, FILM COATED ORAL at 09:08

## 2020-01-01 RX ADMIN — PRIMIDONE 250 MG: 250 TABLET ORAL at 20:37

## 2020-01-01 RX ADMIN — PANTOPRAZOLE SODIUM 40 MG: 40 TABLET, DELAYED RELEASE ORAL at 06:11

## 2020-01-01 RX ADMIN — DEXTROSE AND SODIUM CHLORIDE: 5; 900 INJECTION, SOLUTION INTRAVENOUS at 19:22

## 2020-01-01 RX ADMIN — BRIMONIDINE TARTRATE 1 DROP: 2 SOLUTION OPHTHALMIC at 20:38

## 2020-01-01 RX ADMIN — BRIMONIDINE TARTRATE 1 DROP: 2 SOLUTION OPHTHALMIC at 22:18

## 2020-01-01 RX ADMIN — IPRATROPIUM BROMIDE AND ALBUTEROL SULFATE 3 ML: .5; 3 SOLUTION RESPIRATORY (INHALATION) at 04:26

## 2020-01-01 RX ADMIN — HYDROMORPHONE HYDROCHLORIDE 0.3 MG: 2 INJECTION INTRAMUSCULAR; INTRAVENOUS; SUBCUTANEOUS at 12:37

## 2020-01-01 RX ADMIN — METOPROLOL SUCCINATE 12.5 MG: 25 TABLET, FILM COATED, EXTENDED RELEASE ORAL at 09:14

## 2020-01-01 RX ADMIN — POTASSIUM CHLORIDE 20 MEQ: 20 TABLET, EXTENDED RELEASE ORAL at 09:14

## 2020-01-01 RX ADMIN — ARFORMOTEROL TARTRATE 15 MCG: 15 SOLUTION RESPIRATORY (INHALATION) at 16:55

## 2020-01-01 RX ADMIN — OXYCODONE 5 MG: 5 TABLET ORAL at 00:14

## 2020-01-01 RX ADMIN — IBUPROFEN 400 MG: 400 TABLET, FILM COATED ORAL at 23:53

## 2020-01-01 RX ADMIN — BUDESONIDE 500 MCG: 0.5 INHALANT RESPIRATORY (INHALATION) at 18:33

## 2020-01-01 RX ADMIN — IPRATROPIUM BROMIDE AND ALBUTEROL SULFATE 1 AMPULE: .5; 3 SOLUTION RESPIRATORY (INHALATION) at 07:49

## 2020-01-01 RX ADMIN — DOCUSATE SODIUM 100 MG: 100 CAPSULE, LIQUID FILLED ORAL at 20:38

## 2020-01-01 RX ADMIN — Medication 750 MG: at 09:04

## 2020-01-01 RX ADMIN — ACYCLOVIR 400 MG: 200 CAPSULE ORAL at 15:44

## 2020-01-01 RX ADMIN — SODIUM CHLORIDE, PRESERVATIVE FREE 10 ML: 5 INJECTION INTRAVENOUS at 08:26

## 2020-01-01 RX ADMIN — CEFEPIME HYDROCHLORIDE 2 G: 2 INJECTION, POWDER, FOR SOLUTION INTRAVENOUS at 09:52

## 2020-01-01 RX ADMIN — POLYETHYLENE GLYCOL 3350 17 G: 17 POWDER, FOR SOLUTION ORAL at 08:28

## 2020-01-01 RX ADMIN — METHYLPREDNISOLONE SODIUM SUCCINATE 40 MG: 40 INJECTION, POWDER, FOR SOLUTION INTRAMUSCULAR; INTRAVENOUS at 18:43

## 2020-01-01 RX ADMIN — ACETYLCYSTEINE 400 MG: 100 SOLUTION ORAL; RESPIRATORY (INHALATION) at 09:22

## 2020-01-01 RX ADMIN — ACYCLOVIR 400 MG: 400 TABLET ORAL at 09:22

## 2020-01-01 RX ADMIN — TRAMADOL HYDROCHLORIDE 50 MG: 50 TABLET, FILM COATED ORAL at 09:37

## 2020-01-01 RX ADMIN — PANTOPRAZOLE SODIUM 40 MG: 40 TABLET, DELAYED RELEASE ORAL at 05:41

## 2020-01-01 RX ADMIN — DEXTROSE AND SODIUM CHLORIDE: 5; 900 INJECTION, SOLUTION INTRAVENOUS at 15:47

## 2020-01-01 RX ADMIN — PANTOPRAZOLE SODIUM 40 MG: 40 TABLET, DELAYED RELEASE ORAL at 06:46

## 2020-01-01 RX ADMIN — ACYCLOVIR 400 MG: 200 CAPSULE ORAL at 21:23

## 2020-01-01 RX ADMIN — BRIMONIDINE TARTRATE 1 DROP: 2 SOLUTION OPHTHALMIC at 08:21

## 2020-01-01 RX ADMIN — LATANOPROST 1 DROP: 50 SOLUTION OPHTHALMIC at 20:57

## 2020-01-01 RX ADMIN — BRIMONIDINE TARTRATE 1 DROP: 2 SOLUTION OPHTHALMIC at 09:12

## 2020-01-01 RX ADMIN — POTASSIUM CHLORIDE 20 MEQ: 20 TABLET, EXTENDED RELEASE ORAL at 20:31

## 2020-01-01 RX ADMIN — BUDESONIDE 500 MCG: 0.5 INHALANT RESPIRATORY (INHALATION) at 07:03

## 2020-01-01 RX ADMIN — PRIMIDONE 250 MG: 250 TABLET ORAL at 08:57

## 2020-01-01 RX ADMIN — TRAMADOL HYDROCHLORIDE 50 MG: 50 TABLET ORAL at 10:26

## 2020-01-01 RX ADMIN — IBUPROFEN 400 MG: 400 TABLET, FILM COATED ORAL at 08:52

## 2020-01-01 RX ADMIN — IPRATROPIUM BROMIDE AND ALBUTEROL SULFATE 1 AMPULE: .5; 3 SOLUTION RESPIRATORY (INHALATION) at 06:35

## 2020-01-01 RX ADMIN — THEOPHYLLINE 300 MG: 300 TABLET, EXTENDED RELEASE ORAL at 08:52

## 2020-01-01 RX ADMIN — METHYLPREDNISOLONE SODIUM SUCCINATE 20 MG: 40 INJECTION, POWDER, FOR SOLUTION INTRAMUSCULAR; INTRAVENOUS at 17:54

## 2020-01-01 RX ADMIN — IPRATROPIUM BROMIDE AND ALBUTEROL SULFATE 3 ML: .5; 3 SOLUTION RESPIRATORY (INHALATION) at 12:48

## 2020-01-01 RX ADMIN — IBUPROFEN 400 MG: 400 TABLET, FILM COATED ORAL at 21:34

## 2020-01-01 RX ADMIN — METOPROLOL SUCCINATE 12.5 MG: 25 TABLET, EXTENDED RELEASE ORAL at 11:42

## 2020-01-01 RX ADMIN — IBUPROFEN 400 MG: 400 TABLET, FILM COATED ORAL at 20:37

## 2020-01-01 RX ADMIN — IPRATROPIUM BROMIDE AND ALBUTEROL SULFATE 1 AMPULE: .5; 3 SOLUTION RESPIRATORY (INHALATION) at 13:32

## 2020-01-01 RX ADMIN — Medication: at 10:00

## 2020-01-01 RX ADMIN — OXYCODONE HYDROCHLORIDE AND ACETAMINOPHEN 2000 MG: 500 TABLET ORAL at 08:30

## 2020-01-01 RX ADMIN — PRIMIDONE 250 MG: 250 TABLET ORAL at 09:08

## 2020-01-01 RX ADMIN — ARFORMOTEROL TARTRATE 15 MCG: 15 SOLUTION RESPIRATORY (INHALATION) at 17:05

## 2020-01-01 RX ADMIN — THEOPHYLLINE 300 MG: 300 TABLET, EXTENDED RELEASE ORAL at 09:24

## 2020-01-01 RX ADMIN — HYDROMORPHONE HYDROCHLORIDE 1 MG: 2 INJECTION INTRAMUSCULAR; INTRAVENOUS; SUBCUTANEOUS at 19:30

## 2020-01-01 RX ADMIN — PRIMIDONE 250 MG: 250 TABLET ORAL at 08:50

## 2020-01-01 RX ADMIN — POTASSIUM CHLORIDE 20 MEQ: 20 TABLET, EXTENDED RELEASE ORAL at 22:26

## 2020-01-01 RX ADMIN — IPRATROPIUM BROMIDE AND ALBUTEROL SULFATE 1 AMPULE: .5; 3 SOLUTION RESPIRATORY (INHALATION) at 13:29

## 2020-01-01 RX ADMIN — ARFORMOTEROL TARTRATE 15 MCG: 15 SOLUTION RESPIRATORY (INHALATION) at 06:00

## 2020-01-01 RX ADMIN — BRIMONIDINE TARTRATE 1 DROP: 2 SOLUTION OPHTHALMIC at 00:18

## 2020-01-01 RX ADMIN — FINASTERIDE 5 MG: 5 TABLET, FILM COATED ORAL at 18:38

## 2020-01-01 RX ADMIN — IBUPROFEN 400 MG: 400 TABLET, FILM COATED ORAL at 22:13

## 2020-01-01 RX ADMIN — WATER 1 G: 1 INJECTION INTRAMUSCULAR; INTRAVENOUS; SUBCUTANEOUS at 16:46

## 2020-01-01 RX ADMIN — BRIMONIDINE TARTRATE 1 DROP: 2 SOLUTION OPHTHALMIC at 10:33

## 2020-01-01 RX ADMIN — PRIMIDONE 250 MG: 250 TABLET ORAL at 21:12

## 2020-01-01 RX ADMIN — DEXTROSE AND SODIUM CHLORIDE: 5; 900 INJECTION, SOLUTION INTRAVENOUS at 12:40

## 2020-01-01 RX ADMIN — PANTOPRAZOLE SODIUM 40 MG: 40 TABLET, DELAYED RELEASE ORAL at 08:21

## 2020-01-01 RX ADMIN — BRIMONIDINE TARTRATE 1 DROP: 2 SOLUTION OPHTHALMIC at 21:31

## 2020-01-01 RX ADMIN — POTASSIUM CHLORIDE 20 MEQ: 20 TABLET, EXTENDED RELEASE ORAL at 10:13

## 2020-01-01 RX ADMIN — IPRATROPIUM BROMIDE AND ALBUTEROL SULFATE 1 AMPULE: .5; 3 SOLUTION RESPIRATORY (INHALATION) at 03:02

## 2020-01-01 RX ADMIN — IPRATROPIUM BROMIDE AND ALBUTEROL SULFATE 1 AMPULE: .5; 3 SOLUTION RESPIRATORY (INHALATION) at 10:00

## 2020-01-01 RX ADMIN — IBUPROFEN 400 MG: 400 TABLET, FILM COATED ORAL at 23:47

## 2020-01-01 RX ADMIN — POTASSIUM CHLORIDE 20 MEQ: 20 TABLET, EXTENDED RELEASE ORAL at 21:23

## 2020-01-01 RX ADMIN — METOPROLOL SUCCINATE 12.5 MG: 25 TABLET, FILM COATED, EXTENDED RELEASE ORAL at 09:02

## 2020-01-01 RX ADMIN — OXYCODONE HYDROCHLORIDE AND ACETAMINOPHEN 2000 MG: 500 TABLET ORAL at 20:34

## 2020-01-01 RX ADMIN — ACETYLCYSTEINE 400 MG: 100 SOLUTION ORAL; RESPIRATORY (INHALATION) at 23:40

## 2020-01-01 RX ADMIN — MONTELUKAST 10 MG: 10 TABLET, FILM COATED ORAL at 10:31

## 2020-01-01 RX ADMIN — CEFEPIME HYDROCHLORIDE 2 G: 2 INJECTION, POWDER, FOR SOLUTION INTRAVENOUS at 20:30

## 2020-01-01 RX ADMIN — FINASTERIDE 5 MG: 5 TABLET, FILM COATED ORAL at 10:12

## 2020-01-01 RX ADMIN — OXYCODONE 5 MG: 5 TABLET ORAL at 08:59

## 2020-01-01 RX ADMIN — ACYCLOVIR 400 MG: 400 TABLET ORAL at 08:29

## 2020-01-01 RX ADMIN — PRIMIDONE 250 MG: 250 TABLET ORAL at 00:18

## 2020-01-01 RX ADMIN — TRAMADOL HYDROCHLORIDE 100 MG: 50 TABLET ORAL at 09:23

## 2020-01-01 RX ADMIN — ACETAMINOPHEN 650 MG: 325 TABLET, FILM COATED ORAL at 05:45

## 2020-01-01 RX ADMIN — ACETYLCYSTEINE 400 MG: 100 SOLUTION ORAL; RESPIRATORY (INHALATION) at 10:40

## 2020-01-01 RX ADMIN — IPRATROPIUM BROMIDE AND ALBUTEROL SULFATE 1 AMPULE: .5; 3 SOLUTION RESPIRATORY (INHALATION) at 00:33

## 2020-01-01 RX ADMIN — IPRATROPIUM BROMIDE AND ALBUTEROL SULFATE 1 AMPULE: .5; 3 SOLUTION RESPIRATORY (INHALATION) at 01:50

## 2020-01-01 RX ADMIN — FINASTERIDE 5 MG: 5 TABLET, FILM COATED ORAL at 08:51

## 2020-01-01 RX ADMIN — IBUPROFEN 400 MG: 400 TABLET, FILM COATED ORAL at 20:35

## 2020-01-01 RX ADMIN — IPRATROPIUM BROMIDE AND ALBUTEROL SULFATE 1 AMPULE: .5; 3 SOLUTION RESPIRATORY (INHALATION) at 11:04

## 2020-01-01 RX ADMIN — POTASSIUM CHLORIDE 20 MEQ: 20 TABLET, EXTENDED RELEASE ORAL at 09:23

## 2020-01-01 RX ADMIN — DOCUSATE SODIUM 100 MG: 100 CAPSULE, LIQUID FILLED ORAL at 20:17

## 2020-01-01 RX ADMIN — BISACODYL 10 MG: 10 SUPPOSITORY RECTAL at 09:32

## 2020-01-01 RX ADMIN — ACYCLOVIR 400 MG: 200 CAPSULE ORAL at 14:21

## 2020-01-01 RX ADMIN — MONTELUKAST 10 MG: 10 TABLET, FILM COATED ORAL at 11:41

## 2020-01-01 RX ADMIN — IPRATROPIUM BROMIDE AND ALBUTEROL SULFATE 1 AMPULE: .5; 3 SOLUTION RESPIRATORY (INHALATION) at 00:43

## 2020-01-01 RX ADMIN — DOCUSATE SODIUM 100 MG: 100 CAPSULE, LIQUID FILLED ORAL at 09:14

## 2020-01-01 RX ADMIN — BRIMONIDINE TARTRATE 1 DROP: 2 SOLUTION OPHTHALMIC at 09:03

## 2020-01-01 RX ADMIN — HYDROMORPHONE HYDROCHLORIDE 0.6 MG: 2 INJECTION INTRAMUSCULAR; INTRAVENOUS; SUBCUTANEOUS at 12:22

## 2020-01-01 RX ADMIN — IPRATROPIUM BROMIDE AND ALBUTEROL SULFATE 1 AMPULE: .5; 3 SOLUTION RESPIRATORY (INHALATION) at 22:58

## 2020-01-01 RX ADMIN — FUROSEMIDE 80 MG: 40 TABLET ORAL at 09:03

## 2020-01-01 RX ADMIN — METOPROLOL SUCCINATE 12.5 MG: 25 TABLET, EXTENDED RELEASE ORAL at 10:25

## 2020-01-01 RX ADMIN — IBUPROFEN 400 MG: 400 TABLET, FILM COATED ORAL at 15:22

## 2020-01-01 RX ADMIN — ACYCLOVIR 400 MG: 400 TABLET ORAL at 14:28

## 2020-01-01 RX ADMIN — PREDNISONE 30 MG: 10 TABLET ORAL at 09:22

## 2020-01-01 RX ADMIN — Medication 750 MG: at 08:50

## 2020-01-01 RX ADMIN — IPRATROPIUM BROMIDE AND ALBUTEROL SULFATE 1 AMPULE: .5; 3 SOLUTION RESPIRATORY (INHALATION) at 04:04

## 2020-01-01 RX ADMIN — IBUPROFEN 400 MG: 400 TABLET, FILM COATED ORAL at 08:58

## 2020-01-01 RX ADMIN — PRIMIDONE 250 MG: 250 TABLET ORAL at 16:18

## 2020-01-01 RX ADMIN — IPRATROPIUM BROMIDE AND ALBUTEROL SULFATE 1 AMPULE: .5; 3 SOLUTION RESPIRATORY (INHALATION) at 06:45

## 2020-01-01 RX ADMIN — LATANOPROST 1 DROP: 50 SOLUTION OPHTHALMIC at 21:25

## 2020-01-01 RX ADMIN — PANTOPRAZOLE SODIUM 40 MG: 40 TABLET, DELAYED RELEASE ORAL at 05:48

## 2020-01-01 RX ADMIN — SODIUM CHLORIDE 25 ML: 9 INJECTION, SOLUTION INTRAVENOUS at 16:09

## 2020-01-01 RX ADMIN — OXYCODONE 5 MG: 5 TABLET ORAL at 05:47

## 2020-01-01 RX ADMIN — IBUPROFEN 400 MG: 400 TABLET, FILM COATED ORAL at 18:35

## 2020-01-01 RX ADMIN — ACETYLCYSTEINE 400 MG: 100 SOLUTION ORAL; RESPIRATORY (INHALATION) at 06:37

## 2020-01-01 RX ADMIN — MONTELUKAST 10 MG: 10 TABLET, FILM COATED ORAL at 09:22

## 2020-01-01 RX ADMIN — PREDNISONE 30 MG: 10 TABLET ORAL at 09:03

## 2020-01-01 RX ADMIN — THEOPHYLLINE ANHYDROUS 300 MG: 300 CAPSULE, EXTENDED RELEASE ORAL at 09:23

## 2020-01-01 RX ADMIN — METOPROLOL SUCCINATE 12.5 MG: 25 TABLET, FILM COATED, EXTENDED RELEASE ORAL at 09:06

## 2020-01-01 RX ADMIN — BISACODYL 10 MG: 10 SUPPOSITORY RECTAL at 20:24

## 2020-01-01 RX ADMIN — IPRATROPIUM BROMIDE AND ALBUTEROL SULFATE 1 AMPULE: .5; 3 SOLUTION RESPIRATORY (INHALATION) at 10:01

## 2020-01-01 RX ADMIN — GABAPENTIN 400 MG: 400 CAPSULE ORAL at 09:08

## 2020-01-01 RX ADMIN — TRAMADOL HYDROCHLORIDE 100 MG: 50 TABLET ORAL at 02:42

## 2020-01-01 RX ADMIN — FUROSEMIDE 80 MG: 40 TABLET ORAL at 09:23

## 2020-01-01 RX ADMIN — TRAMADOL HYDROCHLORIDE 100 MG: 50 TABLET ORAL at 08:29

## 2020-01-01 RX ADMIN — IPRATROPIUM BROMIDE AND ALBUTEROL SULFATE 1 AMPULE: .5; 3 SOLUTION RESPIRATORY (INHALATION) at 16:56

## 2020-01-01 RX ADMIN — CEFEPIME HYDROCHLORIDE 2 G: 2 INJECTION, POWDER, FOR SOLUTION INTRAVENOUS at 10:52

## 2020-01-01 RX ADMIN — IBUPROFEN 400 MG: 400 TABLET, FILM COATED ORAL at 14:28

## 2020-01-01 RX ADMIN — IBUPROFEN 400 MG: 400 TABLET, FILM COATED ORAL at 10:31

## 2020-01-01 RX ADMIN — PRIMIDONE 250 MG: 250 TABLET ORAL at 23:53

## 2020-01-01 RX ADMIN — GABAPENTIN 400 MG: 400 CAPSULE ORAL at 08:53

## 2020-01-01 RX ADMIN — IPRATROPIUM BROMIDE AND ALBUTEROL SULFATE 3 ML: .5; 3 SOLUTION RESPIRATORY (INHALATION) at 08:10

## 2020-01-01 RX ADMIN — DOCUSATE SODIUM 100 MG: 100 CAPSULE, LIQUID FILLED ORAL at 09:02

## 2020-01-01 RX ADMIN — SODIUM CHLORIDE, PRESERVATIVE FREE 10 ML: 5 INJECTION INTRAVENOUS at 11:07

## 2020-01-01 RX ADMIN — WATER 1 G: 1 INJECTION INTRAMUSCULAR; INTRAVENOUS; SUBCUTANEOUS at 17:03

## 2020-01-01 RX ADMIN — BISACODYL 10 MG: 10 SUPPOSITORY RECTAL at 18:49

## 2020-01-01 RX ADMIN — BRIMONIDINE TARTRATE 1 DROP: 2 SOLUTION OPHTHALMIC at 23:54

## 2020-01-01 RX ADMIN — Medication 5000 UNITS: at 08:50

## 2020-01-01 RX ADMIN — IPRATROPIUM BROMIDE AND ALBUTEROL SULFATE 1 AMPULE: .5; 3 SOLUTION RESPIRATORY (INHALATION) at 19:00

## 2020-01-01 RX ADMIN — SODIUM CHLORIDE: 9 INJECTION, SOLUTION INTRAVENOUS at 03:00

## 2020-01-01 RX ADMIN — ALBUTEROL SULFATE 2.5 MG: 2.5 SOLUTION RESPIRATORY (INHALATION) at 07:01

## 2020-01-01 RX ADMIN — FUROSEMIDE 40 MG: 10 INJECTION, SOLUTION INTRAMUSCULAR; INTRAVENOUS at 18:43

## 2020-01-01 RX ADMIN — LATANOPROST 1 DROP: 50 SOLUTION OPHTHALMIC at 00:18

## 2020-01-01 RX ADMIN — ACYCLOVIR 400 MG: 400 TABLET ORAL at 20:37

## 2020-01-01 RX ADMIN — IPRATROPIUM BROMIDE AND ALBUTEROL SULFATE 1 AMPULE: .5; 3 SOLUTION RESPIRATORY (INHALATION) at 22:32

## 2020-01-01 RX ADMIN — METOPROLOL SUCCINATE 12.5 MG: 25 TABLET, FILM COATED, EXTENDED RELEASE ORAL at 09:23

## 2020-01-01 RX ADMIN — THEOPHYLLINE ANHYDROUS 300 MG: 300 CAPSULE, EXTENDED RELEASE ORAL at 10:31

## 2020-01-01 RX ADMIN — SODIUM CHLORIDE 25 ML: 9 INJECTION, SOLUTION INTRAVENOUS at 04:22

## 2020-01-01 RX ADMIN — ACYCLOVIR 400 MG: 400 TABLET ORAL at 09:10

## 2020-01-01 RX ADMIN — AZITHROMYCIN MONOHYDRATE 250 MG: 250 TABLET ORAL at 08:29

## 2020-01-01 RX ADMIN — OXYCODONE HYDROCHLORIDE AND ACETAMINOPHEN 2000 MG: 500 TABLET ORAL at 09:02

## 2020-01-01 RX ADMIN — SODIUM CHLORIDE 25 ML: 9 INJECTION, SOLUTION INTRAVENOUS at 03:55

## 2020-01-01 RX ADMIN — IPRATROPIUM BROMIDE AND ALBUTEROL SULFATE 1 AMPULE: .5; 3 SOLUTION RESPIRATORY (INHALATION) at 19:10

## 2020-01-01 RX ADMIN — IPRATROPIUM BROMIDE AND ALBUTEROL SULFATE 1 AMPULE: .5; 3 SOLUTION RESPIRATORY (INHALATION) at 12:42

## 2020-01-01 RX ADMIN — PREDNISONE 30 MG: 10 TABLET ORAL at 10:25

## 2020-01-01 RX ADMIN — IPRATROPIUM BROMIDE AND ALBUTEROL SULFATE 1 AMPULE: .5; 3 SOLUTION RESPIRATORY (INHALATION) at 23:39

## 2020-01-01 RX ADMIN — SODIUM CHLORIDE 25 ML: 9 INJECTION, SOLUTION INTRAVENOUS at 15:35

## 2020-01-01 RX ADMIN — MINERAL OIL 330 ML: 1000 LIQUID ORAL at 10:04

## 2020-01-01 RX ADMIN — BUDESONIDE 500 MCG: 0.5 INHALANT RESPIRATORY (INHALATION) at 04:26

## 2020-01-01 RX ADMIN — METHYLPREDNISOLONE SODIUM SUCCINATE 40 MG: 40 INJECTION, POWDER, FOR SOLUTION INTRAMUSCULAR; INTRAVENOUS at 09:29

## 2020-01-01 RX ADMIN — IPRATROPIUM BROMIDE AND ALBUTEROL SULFATE 1 AMPULE: .5; 3 SOLUTION RESPIRATORY (INHALATION) at 11:54

## 2020-01-01 RX ADMIN — AZITHROMYCIN MONOHYDRATE 250 MG: 250 TABLET ORAL at 18:38

## 2020-01-01 RX ADMIN — ACYCLOVIR 400 MG: 200 CAPSULE ORAL at 20:57

## 2020-01-01 RX ADMIN — POTASSIUM CHLORIDE 20 MEQ: 20 TABLET, EXTENDED RELEASE ORAL at 21:12

## 2020-01-01 RX ADMIN — IPRATROPIUM BROMIDE AND ALBUTEROL SULFATE 3 ML: .5; 3 SOLUTION RESPIRATORY (INHALATION) at 12:12

## 2020-01-01 RX ADMIN — KETOROLAC TROMETHAMINE 15 MG: 15 INJECTION, SOLUTION INTRAMUSCULAR; INTRAVENOUS at 17:10

## 2020-01-01 RX ADMIN — DOCUSATE SODIUM 100 MG: 100 CAPSULE, LIQUID FILLED ORAL at 08:50

## 2020-01-01 RX ADMIN — PRIMIDONE 250 MG: 250 TABLET ORAL at 20:57

## 2020-01-01 RX ADMIN — MONTELUKAST 10 MG: 10 TABLET, FILM COATED ORAL at 09:23

## 2020-01-01 RX ADMIN — BRIMONIDINE TARTRATE 1 DROP: 2 SOLUTION OPHTHALMIC at 08:49

## 2020-01-01 RX ADMIN — OXYCODONE 5 MG: 5 TABLET ORAL at 17:16

## 2020-01-01 RX ADMIN — POTASSIUM CHLORIDE 20 MEQ: 20 TABLET, EXTENDED RELEASE ORAL at 20:37

## 2020-01-01 RX ADMIN — ARFORMOTEROL TARTRATE 15 MCG: 15 SOLUTION RESPIRATORY (INHALATION) at 17:08

## 2020-01-01 RX ADMIN — FINASTERIDE 5 MG: 5 TABLET, FILM COATED ORAL at 08:29

## 2020-01-01 RX ADMIN — OXYCODONE 2.5 MG: 5 TABLET ORAL at 01:53

## 2020-01-01 RX ADMIN — IBUPROFEN 400 MG: 400 TABLET, FILM COATED ORAL at 09:11

## 2020-01-01 RX ADMIN — ACETYLCYSTEINE 400 MG: 100 SOLUTION ORAL; RESPIRATORY (INHALATION) at 15:50

## 2020-01-01 RX ADMIN — POTASSIUM CHLORIDE 20 MEQ: 20 TABLET, EXTENDED RELEASE ORAL at 20:36

## 2020-01-01 RX ADMIN — TRAMADOL HYDROCHLORIDE 100 MG: 50 TABLET ORAL at 20:43

## 2020-01-01 RX ADMIN — PANTOPRAZOLE SODIUM 40 MG: 40 TABLET, DELAYED RELEASE ORAL at 06:12

## 2020-01-01 RX ADMIN — MAGNESIUM HYDROXIDE 30 ML: 400 SUSPENSION ORAL at 20:23

## 2020-01-01 RX ADMIN — KETOROLAC TROMETHAMINE 15 MG: 15 INJECTION, SOLUTION INTRAMUSCULAR; INTRAVENOUS at 22:37

## 2020-01-01 RX ADMIN — SODIUM CHLORIDE 25 ML: 9 INJECTION, SOLUTION INTRAVENOUS at 15:44

## 2020-01-01 RX ADMIN — ACYCLOVIR 400 MG: 200 CAPSULE ORAL at 20:45

## 2020-01-01 RX ADMIN — DOCUSATE SODIUM 100 MG: 100 CAPSULE, LIQUID FILLED ORAL at 09:22

## 2020-01-01 RX ADMIN — IPRATROPIUM BROMIDE AND ALBUTEROL SULFATE 3 ML: .5; 3 SOLUTION RESPIRATORY (INHALATION) at 00:10

## 2020-01-01 RX ADMIN — Medication 750 MG: at 09:24

## 2020-01-01 RX ADMIN — DOCUSATE SODIUM 100 MG: 100 CAPSULE, LIQUID FILLED ORAL at 08:21

## 2020-01-01 RX ADMIN — BRIMONIDINE TARTRATE 1 DROP: 2 SOLUTION OPHTHALMIC at 08:58

## 2020-01-01 RX ADMIN — IPRATROPIUM BROMIDE AND ALBUTEROL SULFATE 1 AMPULE: .5; 3 SOLUTION RESPIRATORY (INHALATION) at 02:09

## 2020-01-01 RX ADMIN — ACYCLOVIR 400 MG: 400 TABLET ORAL at 15:22

## 2020-01-01 RX ADMIN — BRIMONIDINE TARTRATE 1 DROP: 2 SOLUTION OPHTHALMIC at 21:25

## 2020-01-01 RX ADMIN — ARFORMOTEROL TARTRATE 15 MCG: 15 SOLUTION RESPIRATORY (INHALATION) at 06:41

## 2020-01-01 RX ADMIN — POTASSIUM CHLORIDE 20 MEQ: 20 TABLET, EXTENDED RELEASE ORAL at 09:22

## 2020-01-01 RX ADMIN — FUROSEMIDE 80 MG: 40 TABLET ORAL at 08:28

## 2020-01-01 RX ADMIN — IPRATROPIUM BROMIDE AND ALBUTEROL SULFATE 3 ML: .5; 3 SOLUTION RESPIRATORY (INHALATION) at 12:59

## 2020-01-01 RX ADMIN — BUDESONIDE 500 MCG: 0.5 INHALANT RESPIRATORY (INHALATION) at 07:30

## 2020-01-01 RX ADMIN — IPRATROPIUM BROMIDE AND ALBUTEROL SULFATE 1 AMPULE: .5; 3 SOLUTION RESPIRATORY (INHALATION) at 17:34

## 2020-01-01 RX ADMIN — BUDESONIDE 500 MCG: 0.5 INHALANT RESPIRATORY (INHALATION) at 19:03

## 2020-01-01 RX ADMIN — Medication 750 MG: at 10:24

## 2020-01-01 RX ADMIN — IPRATROPIUM BROMIDE AND ALBUTEROL SULFATE 1 AMPULE: .5; 3 SOLUTION RESPIRATORY (INHALATION) at 14:19

## 2020-01-01 RX ADMIN — HYDROMORPHONE HYDROCHLORIDE 1 MG: 2 INJECTION INTRAMUSCULAR; INTRAVENOUS; SUBCUTANEOUS at 23:40

## 2020-01-01 RX ADMIN — METHYLPREDNISOLONE SODIUM SUCCINATE 20 MG: 40 INJECTION, POWDER, FOR SOLUTION INTRAMUSCULAR; INTRAVENOUS at 06:11

## 2020-01-01 RX ADMIN — ARFORMOTEROL TARTRATE 15 MCG: 15 SOLUTION RESPIRATORY (INHALATION) at 07:30

## 2020-01-01 RX ADMIN — IPRATROPIUM BROMIDE AND ALBUTEROL SULFATE 1 AMPULE: .5; 3 SOLUTION RESPIRATORY (INHALATION) at 07:30

## 2020-01-01 ASSESSMENT — PAIN DESCRIPTION - PAIN TYPE
TYPE: CHRONIC PAIN
TYPE: CHRONIC PAIN
TYPE: ACUTE PAIN
TYPE: CHRONIC PAIN
TYPE: CHRONIC PAIN
TYPE: ACUTE PAIN
TYPE: CHRONIC PAIN
TYPE: ACUTE PAIN
TYPE: CHRONIC PAIN
TYPE: ACUTE PAIN
TYPE: CHRONIC PAIN
TYPE: ACUTE PAIN
TYPE: CHRONIC PAIN
TYPE: ACUTE PAIN
TYPE: CHRONIC PAIN
TYPE: CHRONIC PAIN

## 2020-01-01 ASSESSMENT — PAIN DESCRIPTION - ORIENTATION
ORIENTATION: RIGHT;LEFT
ORIENTATION: UPPER
ORIENTATION: RIGHT;LEFT
ORIENTATION: RIGHT;LEFT;LOWER;MID;UPPER
ORIENTATION: RIGHT;LEFT
ORIENTATION: RIGHT;LEFT
ORIENTATION: RIGHT;LEFT;LOWER;MID;UPPER
ORIENTATION: RIGHT;LEFT
ORIENTATION: RIGHT;LEFT;LOWER;MID;UPPER
ORIENTATION: UPPER;LOWER
ORIENTATION: RIGHT;LEFT
ORIENTATION: MID
ORIENTATION: MID
ORIENTATION: RIGHT;LEFT;LOWER;MID;UPPER
ORIENTATION: RIGHT;LEFT
ORIENTATION: MID
ORIENTATION: MID
ORIENTATION: RIGHT;LEFT
ORIENTATION: RIGHT;LEFT;LOWER;MID;UPPER

## 2020-01-01 ASSESSMENT — PAIN SCALES - GENERAL
PAINLEVEL_OUTOF10: 3
PAINLEVEL_OUTOF10: 0
PAINLEVEL_OUTOF10: 0
PAINLEVEL_OUTOF10: 9
PAINLEVEL_OUTOF10: 0
PAINLEVEL_OUTOF10: 0
PAINLEVEL_OUTOF10: 7
PAINLEVEL_OUTOF10: 4
PAINLEVEL_OUTOF10: 0
PAINLEVEL_OUTOF10: 8
PAINLEVEL_OUTOF10: 4
PAINLEVEL_OUTOF10: 6
PAINLEVEL_OUTOF10: 0
PAINLEVEL_OUTOF10: 9
PAINLEVEL_OUTOF10: 0
PAINLEVEL_OUTOF10: 0
PAINLEVEL_OUTOF10: 3
PAINLEVEL_OUTOF10: 0
PAINLEVEL_OUTOF10: 6
PAINLEVEL_OUTOF10: 0
PAINLEVEL_OUTOF10: 0
PAINLEVEL_OUTOF10: 4
PAINLEVEL_OUTOF10: 0
PAINLEVEL_OUTOF10: 9
PAINLEVEL_OUTOF10: 0
PAINLEVEL_OUTOF10: 4
PAINLEVEL_OUTOF10: 9
PAINLEVEL_OUTOF10: 6
PAINLEVEL_OUTOF10: 0
PAINLEVEL_OUTOF10: 0
PAINLEVEL_OUTOF10: 3
PAINLEVEL_OUTOF10: 0
PAINLEVEL_OUTOF10: 0
PAINLEVEL_OUTOF10: 4
PAINLEVEL_OUTOF10: 7
PAINLEVEL_OUTOF10: 6
PAINLEVEL_OUTOF10: 0
PAINLEVEL_OUTOF10: 8
PAINLEVEL_OUTOF10: 6
PAINLEVEL_OUTOF10: 0
PAINLEVEL_OUTOF10: 7
PAINLEVEL_OUTOF10: 6
PAINLEVEL_OUTOF10: 0
PAINLEVEL_OUTOF10: 7
PAINLEVEL_OUTOF10: 0
PAINLEVEL_OUTOF10: 8
PAINLEVEL_OUTOF10: 0
PAINLEVEL_OUTOF10: 0
PAINLEVEL_OUTOF10: 4
PAINLEVEL_OUTOF10: 0
PAINLEVEL_OUTOF10: 6
PAINLEVEL_OUTOF10: 3
PAINLEVEL_OUTOF10: 9
PAINLEVEL_OUTOF10: 0
PAINLEVEL_OUTOF10: 8
PAINLEVEL_OUTOF10: 9
PAINLEVEL_OUTOF10: 0
PAINLEVEL_OUTOF10: 0
PAINLEVEL_OUTOF10: 2
PAINLEVEL_OUTOF10: 6
PAINLEVEL_OUTOF10: 9
PAINLEVEL_OUTOF10: 8
PAINLEVEL_OUTOF10: 3
PAINLEVEL_OUTOF10: 8
PAINLEVEL_OUTOF10: 3
PAINLEVEL_OUTOF10: 0
PAINLEVEL_OUTOF10: 8
PAINLEVEL_OUTOF10: 7
PAINLEVEL_OUTOF10: 0
PAINLEVEL_OUTOF10: 0
PAINLEVEL_OUTOF10: 8
PAINLEVEL_OUTOF10: 0
PAINLEVEL_OUTOF10: 4
PAINLEVEL_OUTOF10: 0
PAINLEVEL_OUTOF10: 9
PAINLEVEL_OUTOF10: 4
PAINLEVEL_OUTOF10: 9
PAINLEVEL_OUTOF10: 0
PAINLEVEL_OUTOF10: 8
PAINLEVEL_OUTOF10: 6
PAINLEVEL_OUTOF10: 9
PAINLEVEL_OUTOF10: 4
PAINLEVEL_OUTOF10: 7
PAINLEVEL_OUTOF10: 7
PAINLEVEL_OUTOF10: 0
PAINLEVEL_OUTOF10: 9
PAINLEVEL_OUTOF10: 0
PAINLEVEL_OUTOF10: 6
PAINLEVEL_OUTOF10: 8
PAINLEVEL_OUTOF10: 5
PAINLEVEL_OUTOF10: 6
PAINLEVEL_OUTOF10: 0
PAINLEVEL_OUTOF10: 8
PAINLEVEL_OUTOF10: 8
PAINLEVEL_OUTOF10: 0
PAINLEVEL_OUTOF10: 9
PAINLEVEL_OUTOF10: 8
PAINLEVEL_OUTOF10: 10
PAINLEVEL_OUTOF10: 0
PAINLEVEL_OUTOF10: 9
PAINLEVEL_OUTOF10: 9
PAINLEVEL_OUTOF10: 8
PAINLEVEL_OUTOF10: 6
PAINLEVEL_OUTOF10: 7
PAINLEVEL_OUTOF10: 2
PAINLEVEL_OUTOF10: 0
PAINLEVEL_OUTOF10: 0
PAINLEVEL_OUTOF10: 6
PAINLEVEL_OUTOF10: 0
PAINLEVEL_OUTOF10: 8
PAINLEVEL_OUTOF10: 8
PAINLEVEL_OUTOF10: 0
PAINLEVEL_OUTOF10: 6
PAINLEVEL_OUTOF10: 8
PAINLEVEL_OUTOF10: 0
PAINLEVEL_OUTOF10: 8
PAINLEVEL_OUTOF10: 0
PAINLEVEL_OUTOF10: 9
PAINLEVEL_OUTOF10: 7
PAINLEVEL_OUTOF10: 0
PAINLEVEL_OUTOF10: 6
PAINLEVEL_OUTOF10: 8
PAINLEVEL_OUTOF10: 7
PAINLEVEL_OUTOF10: 0
PAINLEVEL_OUTOF10: 0

## 2020-01-01 ASSESSMENT — PAIN DESCRIPTION - LOCATION
LOCATION: BACK
LOCATION: BACK
LOCATION: GENERALIZED
LOCATION: BACK
LOCATION: GENERALIZED
LOCATION: BACK
LOCATION: BACK;NECK
LOCATION: NECK
LOCATION: BACK;HIP;NECK
LOCATION: NECK;BACK
LOCATION: BACK;HIP;NECK
LOCATION: BACK;HIP;NECK
LOCATION: BACK
LOCATION: BACK;NECK
LOCATION: GENERALIZED
LOCATION: GENERALIZED
LOCATION: BACK
LOCATION: BACK;NECK
LOCATION: BACK;HIP;NECK
LOCATION: NECK
LOCATION: BACK;HIP;NECK
LOCATION: BACK
LOCATION: BACK;NECK
LOCATION: BACK
LOCATION: BACK;NECK
LOCATION: GENERALIZED
LOCATION: BACK;NECK
LOCATION: BACK;HIP;NECK
LOCATION: GENERALIZED
LOCATION: HIP;NECK;BACK
LOCATION: BACK
LOCATION: BACK;NECK
LOCATION: BUTTOCKS

## 2020-01-01 ASSESSMENT — ENCOUNTER SYMPTOMS
ALLERGIC/IMMUNOLOGIC NEGATIVE: 1
SORE THROAT: 0
CONSTIPATION: 0
DIARRHEA: 0
EYE DISCHARGE: 0
VOMITING: 0
BACK PAIN: 0
ABDOMINAL PAIN: 0
CHOKING: 0
NAUSEA: 0
BLOOD IN STOOL: 0
BLOOD IN STOOL: 0
RHINORRHEA: 0
COUGH: 0
WHEEZING: 0
COUGH: 1
DIARRHEA: 0
COLOR CHANGE: 0
ABDOMINAL PAIN: 0
CHEST TIGHTNESS: 0
CONSTIPATION: 0
ABDOMINAL PAIN: 0
NAUSEA: 0
NAUSEA: 0
COUGH: 1
ABDOMINAL PAIN: 0
VOMITING: 0
SHORTNESS OF BREATH: 1
SHORTNESS OF BREATH: 1
WHEEZING: 0
BACK PAIN: 0
EYE REDNESS: 0
CHEST TIGHTNESS: 0
SHORTNESS OF BREATH: 1
BACK PAIN: 0
SORE THROAT: 0
COUGH: 0
SINUS PRESSURE: 0
EYE PAIN: 0
VOMITING: 0
RHINORRHEA: 0
DIARRHEA: 0
SHORTNESS OF BREATH: 1
ALLERGIC/IMMUNOLOGIC NEGATIVE: 1
EYE PAIN: 0
BACK PAIN: 0
WHEEZING: 1

## 2020-01-01 ASSESSMENT — PAIN DESCRIPTION - DESCRIPTORS
DESCRIPTORS: ACHING;CONSTANT
DESCRIPTORS: ACHING;DISCOMFORT
DESCRIPTORS: ACHING;DISCOMFORT
DESCRIPTORS: ACHING;CRAMPING
DESCRIPTORS: ACHING;DISCOMFORT
DESCRIPTORS: STABBING
DESCRIPTORS: ACHING;CRUSHING;DISCOMFORT
DESCRIPTORS: ACHING;CRAMPING
DESCRIPTORS: STABBING
DESCRIPTORS: ACHING;CONSTANT;DISCOMFORT
DESCRIPTORS: ACHING;DISCOMFORT
DESCRIPTORS: ACHING
DESCRIPTORS: ACHING
DESCRIPTORS: ACHING;CONSTANT;DISCOMFORT
DESCRIPTORS: ACHING;DISCOMFORT
DESCRIPTORS: ACHING;DISCOMFORT;SORE
DESCRIPTORS: ACHING;DISCOMFORT
DESCRIPTORS: ACHING;CRAMPING;CRUSHING;DISCOMFORT
DESCRIPTORS: ACHING;CONSTANT;DISCOMFORT
DESCRIPTORS: ACHING;CRAMPING
DESCRIPTORS: ACHING;DISCOMFORT;SORE
DESCRIPTORS: ACHING;DISCOMFORT
DESCRIPTORS: ACHING;DISCOMFORT;SORE
DESCRIPTORS: ACHING;CONSTANT;DISCOMFORT
DESCRIPTORS: ACHING;DISCOMFORT
DESCRIPTORS: ACHING;DISCOMFORT
DESCRIPTORS: ACHING
DESCRIPTORS: STABBING
DESCRIPTORS: ACHING;CRAMPING;CRUSHING;DISCOMFORT
DESCRIPTORS: ACHING;DISCOMFORT
DESCRIPTORS: ACHING;CRUSHING;DISCOMFORT

## 2020-01-01 ASSESSMENT — PAIN DESCRIPTION - PROGRESSION
CLINICAL_PROGRESSION: NOT CHANGED
CLINICAL_PROGRESSION: GRADUALLY IMPROVING
CLINICAL_PROGRESSION: NOT CHANGED
CLINICAL_PROGRESSION: GRADUALLY WORSENING
CLINICAL_PROGRESSION: NOT CHANGED
CLINICAL_PROGRESSION: GRADUALLY WORSENING
CLINICAL_PROGRESSION: NOT CHANGED
CLINICAL_PROGRESSION: GRADUALLY WORSENING
CLINICAL_PROGRESSION: GRADUALLY WORSENING
CLINICAL_PROGRESSION: GRADUALLY IMPROVING
CLINICAL_PROGRESSION: GRADUALLY WORSENING
CLINICAL_PROGRESSION: NOT CHANGED

## 2020-01-01 ASSESSMENT — PAIN DESCRIPTION - FREQUENCY
FREQUENCY: CONTINUOUS
FREQUENCY: CONTINUOUS
FREQUENCY: INTERMITTENT
FREQUENCY: INTERMITTENT
FREQUENCY: CONTINUOUS
FREQUENCY: INTERMITTENT
FREQUENCY: INTERMITTENT
FREQUENCY: CONTINUOUS
FREQUENCY: CONTINUOUS
FREQUENCY: INTERMITTENT
FREQUENCY: CONTINUOUS
FREQUENCY: CONTINUOUS
FREQUENCY: INTERMITTENT
FREQUENCY: INTERMITTENT
FREQUENCY: CONTINUOUS
FREQUENCY: CONTINUOUS
FREQUENCY: INTERMITTENT
FREQUENCY: INTERMITTENT

## 2020-01-01 ASSESSMENT — PAIN DESCRIPTION - ONSET
ONSET: ON-GOING
ONSET: GRADUAL
ONSET: ON-GOING
ONSET: ON-GOING
ONSET: GRADUAL
ONSET: GRADUAL
ONSET: ON-GOING

## 2020-01-01 ASSESSMENT — PAIN - FUNCTIONAL ASSESSMENT

## 2020-01-06 PROBLEM — R06.03 RESPIRATORY DISTRESS: Status: ACTIVE | Noted: 2020-01-01

## 2020-01-06 NOTE — ED PROVIDER NOTES
Conjunctiva/sclera: Conjunctivae normal.   Neck:      Musculoskeletal: Normal range of motion and neck supple. Trachea: Trachea and phonation normal.   Cardiovascular:      Rate and Rhythm: Normal rate and regular rhythm. Heart sounds: Normal heart sounds. Pulmonary:      Effort: Tachypnea, accessory muscle usage and respiratory distress present. Breath sounds: Wheezing present. Abdominal:      General: Abdomen is flat. Palpations: Abdomen is soft. Tenderness: There is no tenderness. There is no guarding or rebound. Skin:     General: Skin is warm and dry. Neurological:      General: No focal deficit present. Mental Status: He is alert and oriented to person, place, and time. GCS: GCS eye subscore is 4. GCS verbal subscore is 5. GCS motor subscore is 6. Psychiatric:         Attention and Perception: Attention and perception normal.         Mood and Affect: Mood and affect normal.         Speech: Speech normal.         Behavior: Behavior normal. Behavior is cooperative. Thought Content: Thought content normal.         Cognition and Memory: Cognition and memory normal.         Judgment: Judgment normal.          Procedures     MDM  Number of Diagnoses or Management Options  Diagnosis management comments: Patient's lab work unremarkable, patient is wheezing, given 125 mg Solu-Medrol on ambulance, given duo nebs in the ED states he is feeling better. Dr. Briseyda Fam came to the ER and evaluated patient and would like to admit him for COPD exacerbation. I am agreeable to this at this patient. All course of been answered.                 --------------------------------------------- PAST HISTORY ---------------------------------------------  Past Medical History:  has a past medical history of COPD (chronic obstructive pulmonary disease) ([facility] [address] History of cardiovascular stress test, Hyperlipidemia, Hypertension, Kidney stone, Pulmonary fibrosis ([facility] [address] and Thyroid Normal    ------------------------------------------ PROGRESS NOTES ------------------------------------------  Re-evaluation(s):  Time: 1500  Patients symptoms are improving  Repeat physical examination is improved    Counseling:  I have spoken with the patient and discussed todays results, in addition to providing specific details for the plan of care and counseling regarding the diagnosis and prognosis. Their questions are answered at this time and they are agreeable with the plan of admission.    --------------------------------- ADDITIONAL PROVIDER NOTES ---------------------------------  Consultations:  Time: 1510. Spoke with Dr. Michael Ojeda. Discussed case. They will admit the patient. This patient's ED course included: a personal history and physicial examination    This patient has remained hemodynamically stable during their ED course. Diagnosis:  1. COPD exacerbation ([address]        Disposition:  Patient's disposition: Admit to telemetry  Patient's condition is stable.              Brooke Rockwell DO  Resident  01/06/20 9969

## 2020-01-06 NOTE — H&P
12/19/19   Gabe Cordova DO   predniSONE (DELTASONE) 10 MG tablet Take 3 tablets by mouth daily In the morning with breakfast 12/19/19   Gabe Cordova DO   albuterol (PROVENTIL) (2.5 MG/3ML) 0.083% nebulizer solution Take 2.5 mg by nebulization every 4 hours as needed for Wheezing or Shortness of Breath    Historical Provider, MD   ibuprofen (ADVIL;MOTRIN) 200 MG tablet Take 400 mg by mouth every 6 hours as needed for Pain    Historical Provider, MD Freire Sc[place]oose 450 MG CAPS Take by mouth daily    Historical Provider, MD   vitamin C (ASCORBIC ACID) 500 MG tablet Take 750 mg by mouth 2 times daily     Historical Provider, MD   potassium chloride (KLOR-CON M) 20 MEQ TBCR extended release tablet Take 20 mEq by mouth 2 times daily    Historical Provider, MD   glycopyrrolate-formoterol (Griffin Freud) 9-4.8 MCG/ACT AERO Inhale 2 puffs into the lungs 2 times daily    Historical Provider, MD   guaiFENesin (ROBITUSSIN) 100 MG/5ML syrup Take 200 mg by mouth 3 times daily as needed for Cough or Congestion With a full glass of water    Historical Provider, MD   azithromycin (ZITHROMAX) 250 MG tablet Take 250 mg by mouth daily    Historical Provider, MD   Multiple Vitamins-Minerals (PRESERVISION AREDS 2 PO) Take by mouth daily    Historical Provider, MD   NONFORMULARY Trilogy - ventilator at bedtime    Historical Provider, MD   apixaban (ELIQUIS) 2.5 MG TABS tablet Take 1 tablet by mouth 2 times daily  Patient taking differently: Take 2.5 mg by mouth 2 times daily Patient was only taking twice a week 10/10/19   Gabe Cordova DO   docusate sodium (COLACE, DULCOLAX) 100 MG CAPS Take 100 mg by mouth 2 times daily 10/10/19   Gabe Cordova DO   finasteride (PROSCAR) 5 MG tablet Take 1 tablet by mouth daily 10/11/19   Gabe Cordova DO   pantoprazole (PROTONIX) 40 MG tablet Take 1 tablet by mouth every morning (before breakfast) 10/11/19   Gabe Cordova DO   sucralfate (CARAFATE) 1 GM tablet Take 1 tablet by mouth 4 times daily 10/10/19   Eliza Chambers, DO   metoprolol succinate (TOPROL XL) 25 MG extended release tablet Take 0.5 tablets by mouth daily 10/11/19   MAYRA Rojas CNP   theophylline (THEODUR) 300 MG extended release tablet Take 1 tablet by mouth daily 19   Genevie Mode, DO   valACYclovir (VALTREX) 500 MG tablet Take 500 mg by mouth daily    Historical Provider, MD   montelukast (SINGULAIR) 10 MG tablet Take 10 mg by mouth daily    Historical Provider, MD   furosemide (LASIX) 40 MG tablet Take 80 mg by mouth daily     Historical Provider, MD   NONFORMULARY Oxygen 5 lpm to maintain saturations above 90%. Provided by [facility]. Historical Provider, MD   Cholecalciferol (VITAMIN D-3) 5000 UNITS TABS Take 1 tablet by mouth Twice a Week Wednesday and     Historical Provider, MD   Nutritional Supplements (JUICE PLUS FIBRE PO) Take 2 capsules by mouth daily     Historical Provider, MD       Allergies:  Flomax [tamsulosin hcl]    Social History:   Social History     Socioeconomic History    Marital status:      Spouse name: Not on file    Number of children: Not on file    Years of education: Not on file    Highest education level: Not on file   Occupational History    Occupation: Physician     Comment: Retired   Social Needs    Financial resource strain: Not on file    Food insecurity:     Worry: Not on file     Inability: Not on file   [facility] needs:     Medical: Not on file     Non-medical: Not on file   Tobacco Use    Smoking status: Former Smoker     Packs/day: 2.00     Years: 42.00     Pack years: 84.00     Types: Cigarettes     Last attempt to quit: 2003     Years since quittin.8    Smokeless tobacco: Never Used   Substance and Sexual Activity    Alcohol use:  Yes     Alcohol/week: 1.0 standard drinks     Types: 1 Shots of liquor per week     Comment: daily    Drug use: No    Sexual activity: Not on file   Lifestyle    Physical activity: bowel sounds present  Extremities:  + 2 lateral lower leg edema, peripheral pulses intact bilaterally  Neuro:  Cranial nerves II-XII grossly intact; motor and sensory function intact with no focal deficits  Skin:  No rashes, lesions or wounds    DATA:  CBC with Differential:    Lab Results   Component Value Date    WBC 6.4 01/06/2020    RBC 2.95 01/06/2020    HGB 9.6 01/06/2020    HCT 31.2 01/06/2020     01/06/2020    .8 01/06/2020    MCH 32.5 01/06/2020    MCHC 30.8 01/06/2020    RDW 13.8 01/06/2020    NRBC 0.9 10/01/2019    SEGSPCT 59 12/30/2013    LYMPHOPCT 17.5 01/06/2020    MONOPCT 1.8 01/06/2020    BASOPCT 1.1 01/06/2020    MONOSABS 0.13 01/06/2020    LYMPHSABS 1.15 01/06/2020    EOSABS 0.56 01/06/2020    BASOSABS 0.00 01/06/2020     CMP:    Lab Results   Component Value Date     12/18/2019    K 4.8 12/18/2019    K 3.9 09/30/2019     12/18/2019    CO2 31 12/18/2019    BUN 40 12/18/2019    CREATININE 0.9 12/18/2019    GFRAA >60 12/18/2019    LABGLOM >60 12/18/2019    GLUCOSE 111 12/18/2019    GLUCOSE 110 02/15/2012    PROT 5.1 12/16/2019    LABALBU 3.0 12/16/2019    LABALBU 4.1 02/15/2012    CALCIUM 8.1 12/18/2019    BILITOT <0.2 12/16/2019    ALKPHOS 73 12/16/2019    AST 28 12/16/2019    ALT 53 12/16/2019     Magnesium:    Lab Results   Component Value Date    MG 2.0 12/15/2019     Phosphorus:    Lab Results   Component Value Date    PHOS 4.1 12/15/2019     PT/INR:    Lab Results   Component Value Date    PROTIME 12.3 09/30/2019    INR 1.1 09/30/2019     Troponin:    Lab Results   Component Value Date    TROPONINI 0.02 12/13/2019     U/A:    Lab Results   Component Value Date    COLORU Yellow 12/14/2019    PROTEINU Negative 12/14/2019    PHUR 5.5 12/14/2019    WBCUA >20 12/14/2019    RBCUA 0-1 12/14/2019    RBCUA 10-20 11/28/2012    BACTERIA FEW 12/14/2019    CLARITYU CLOUDY 12/14/2019    SPECGRAV 1.020 12/14/2019    LEUKOCYTESUR LARGE 12/14/2019    UROBILINOGEN 0.2 12/14/2019

## 2020-01-07 NOTE — PLAN OF CARE
Problem: Falls - Risk of:  Goal: Will remain free from falls  Description  Will remain free from falls  1/7/2020 0449 by Narcisa Sky RN  Outcome: Met This Shift     Problem: Falls - Risk of:  Goal: Absence of physical injury  Description  Absence of physical injury  Outcome: Met This Shift     Problem: Risk for Impaired Skin Integrity  Goal: Tissue integrity - skin and mucous membranes  Description  Structural intactness and normal physiological function of skin and  mucous membranes.   1/7/2020 0449 by Narcisa Sky RN  Outcome: Met This Shift     Problem: Pain:  Goal: Pain level will decrease  Description  Pain level will decrease  Outcome: Met This Shift

## 2020-01-07 NOTE — PLAN OF CARE
Problem: Falls - Risk of:  Goal: Will remain free from falls  Description  Will remain free from falls  1/7/2020 1049 by Demarcus Simmons RN  Outcome: Met This Shift     Problem: Falls - Risk of:  Goal: Absence of physical injury  Description  Absence of physical injury  1/7/2020 1049 by Demarcus Simmons RN  Outcome: Met This Shift     Problem: Risk for Impaired Skin Integrity  Goal: Tissue integrity - skin and mucous membranes  Description  Structural intactness and normal physiological function of skin and  mucous membranes.   1/7/2020 1049 by Demarcus Simmons RN  Outcome: Met This Shift     Problem: Pain:  Goal: Pain level will decrease  Description  Pain level will decrease  1/7/2020 1049 by Demarcus Simmons RN  Outcome: Met This Shift     Problem: Pain:  Goal: Control of acute pain  Description  Control of acute pain  Outcome: Met This Shift     Problem: Pain:  Goal: Control of chronic pain  Description  Control of chronic pain  Outcome: Met This Shift

## 2020-01-07 NOTE — PROGRESS NOTES
[facility]  Internal Medicine  -Resident Progress Note-    PCP:  Cristina Gross DO  Admitting Physician:  Cristina Gross DO  Consultants:  Pulmonology  Chief Complaint:    Chief Complaint   Patient presents with    Shortness of Breath     PT SOB today, pt normally wears 6 L NC, PT took 3 breathing treatments at home and recieved 125 MG solumedrol via EMS. PT 94% 6L NC upon arrival        History of Present Illness  The patient is a 81 y.o. male who presents with having increased shortness of breath at home. Symptoms started about 3 days ago. Patient was not getting any better at home even after putting himself on a continuous albuterol aerosol treatment. In the ER patient was given DuoNeb aerosol and was also given IV steroids in the ambulance. Patient says he started feeling little bit better. Chest x-ray suggested a new 3.1 cm rounded mass in the right lower lung. With the above problem patient put in for further evaluation. Patient denies any chest pain, palpitation, dizziness or syncope. There is no nausea vomiting. 1/7/2020  Arianne Roman was seen and examined at bedside today; family was present for the examination. No acute overnight events were noted. He feels that his breathing has improved and he is at his baseline O2 usage. He is anxious for discharge. Awaiting pulmonary recommendations. Review of Systems  All bolded are positive; please see HPI  General:  Fever, chills, diaphoresis, fatigue, malaise, night sweats, weight loss  Psychological:  Anxiety, disorientation, hallucinations. ENT:  Epistaxis, headaches, vertigo, visual changes. Cardiovascular:  Chest pain, irregular heartbeats, palpitations, paroxysmal nocturnal dyspnea. Respiratory:  Shortness of breath, coughing, sputum production, hemoptysis, wheezing, orthopnea.   Gastrointestinal:  Nausea, vomiting, diarrhea, heartburn, constipation, abdominal pain, hematemesis, hematochezia, melena, acholic stools  Genito-Urinary:  Dysuria, urgency, frequency, hematuria  Musculoskeletal:  Joint pain, joint stiffness, joint swelling, muscle pain  Neurology:  Headache, focal neurological deficits, weakness, numbness, paresthesia  Derm:  Rashes, ulcers, excoriations, bruising  Extremities:  Decreased ROM, peripheral edema, mottling    Physical Examination  Vitals:  /63   Pulse 56   Temp 98 °F (36.7 °C) (Oral)   Resp 16   Ht 5' 6\" (1.676 m)   Wt 132 lb 8 oz (60.1 kg)   SpO2 97%   BMI 21.39 kg/m²   General Appearance:  awake, alert, and oriented to person, place, time, and purpose; appears stated age and cooperative; no apparent distress no labored breathing 6L NC  HEENT:  PERRL; EOMI; sclera clear; buccal mucosa moist  Neck:  supple; trachea midline; no thyromegaly; no JVD; no bruits  Heart:  rhythm regular; rate controlled; no murmurs  Lungs:  symmetrical; decreased breath sounds bilaterally no wheezes; no rhonchi; no rales  Abdomen:  soft, non-tender, non-distended; bowel sounds positive; no organomegaly or masses; no pain on palpation  Extremities:  peripheral pulses present; 2+ bilateral LE edema; no ulcers  Neurologic:  alert and oriented x 3; no focal deficit; cranial nerves grossly intact  Skin:  no petechia; no hemorrhage; no wounds    Medications  Scheduled Meds    [START ON 1/9/2020] vitamin D-3  5,000 Units Oral Once per day on Mon Thu    furosemide  80 mg Oral Daily    acyclovir  400 mg Oral TID    montelukast  10 mg Oral Daily    apixaban  2.5 mg Oral BID    finasteride  5 mg Oral Daily    pantoprazole  40 mg Oral QAM AC    metoprolol succinate  12.5 mg Oral Daily    vitamin C  750 mg Oral BID    potassium chloride  20 mEq Oral BID    azithromycin  250 mg Oral Daily    primidone  250 mg Oral BID    ipratropium-albuterol  1 ampule Inhalation Q4H WA    methylPREDNISolone  40 mg Intravenous Q8H    brimonidine  1 drop Right Eye Q12H    latanoprost  1 drop Left Eye Daily    Phosphorus    Collection Time: 01/07/20  7:28 AM   Result Value Ref Range    Phosphorus 3.9 2.5 - 4.5 mg/dL       Imaging  Ct Chest Wo Contrast    Result Date: 12/18/2019  Location:100 Exam: CT CHEST WO CONTRAST Comparison: December 13, 2019 History: Follow-up pneumothorax following removal of chest tube. Incomplete expansion of the right lung. Technique: Spiral CT the chest without contrast. Coronal and sagittal reconstructions were obtained. Automated dose exposure control was utilized for this examination. FINDINGS: No pneumothorax. Moderate emphysematous changes, centrilobular, probably the upper lobes. Persistent area of consolidation in the posterior basilar segments on the right. Filling defect and soft tissue prominence around a right lower lobe bronchus, cannot exclude a mass. Alternatively this could represent an area of lung consolidation with mucous plug chronic elevation right diaphragm. The rounded area of consolidation in the right lower lobe seen on the prior study is obscured by the adjacent lung consolidation. Is a tiny right pleural effusion. The right main pulmonary artery measures 4.2 cm, main pulmonary measuring 3.3 cm. Consider pulmonary artery hypertension. . No evidence of hilar or mediastinal adenopathy. The heart and pericardium and mediastinum are within normal limits. The visualized upper abdomen is within normal limits. 1. No pneumothorax following removal of chest tube. 2. Posterior basilar lung consolidation, obscuring the rounded area consolidation identified in the previous CT the chest. Rounded area of soft tissue opacity and obstruction of the right lower lobe bronchus, possibly related to mucous plug and/or underlying mass is not definitely excluded but was not identified on the prior CT scan in the same area. 3. Enlarged pulmonary artery, right pulmonary arteries particularly enlarged, consider pulmonary artery hypertension. 4. Centrilobular emphysema.     Ct Guided Needle Placement    Result Date: 12/13/2019  READING LOCATION: 200 HISTORY: Right pneumothorax. ICU intensivist consulted interventional radiology for right chest tube placement. PROCEDURE: The procedure was expanded to the patient and consent was obtained. A timeout was performed. The patient was placed on the CT table and axial images through the thorax were obtained. Note is made of a 50% right pneumothorax. There is a UreSil in place, however the pneumothorax did not decrease when compared with the earlier chest x-rays. The patient's anterior chest wall was then prepped and draped in sterile fashion using maximal sterile barrier technique. Within the mid clavicular line at the fifth intercostal space, following the uneventful administration of lidocaine, I introduced a 5 Western Lois Yueh catheter into the pleural cavity. Through the Yueh needle, an Amplatz wire was advanced. Over the Amplatz wire, a dilator was used to dilate a tract into the thoracic cavity. I then placed a 10 Turkmen multi-sidehole pigtail catheter. The catheter was secured to the skin with a secure device. The catheter was then connected to an atrium and wall suction. Post chest tube placement CT scan was obtained which confirmed position of the catheter and reinflation of the right lung. There is consolidation seen within the right upper lobe and there is incomplete reinflation of the right upper lobe. A smooth-bordered 2.9 cm nodule is seen within the right lung base. 1. Successful CT-guided placement of a 10 Turkmen right chest tube. 2. 10% residual pneumothorax is noted on the post chest tube placement images. There is significant consolidation within the right upper lobe adjacent to the residual pneumothorax. These findings are worrisome for malignancy. Follow-up CT scan is recommended following complete reinflation of the right lung. 3. 2.9 cm round mass within the right lower lobe.      Xr Chest Portable    Result Date: 1/6/2020  LOCATION: 200 EXAM: XR CHEST PORTABLE COMPARISON: 12/16/2019 HISTORY: Shortness of breath TECHNIQUE: Single frontal view of the chest was obtained. FINDINGS:  SUPPORT DEVICES: None. LUNGS: New 3.1 cm rounded mass seen in the right lower lung zone. Left lung is relatively clear. Small effusions are noted. PLEURA: No pneumothorax visualized. LUNG VOLUMES: Satisfactory inspirator effort. MEDIASTINAL STRUCTURES: No lymphadenopathy. Normal aortic contour. HEART SIZE: Normal. BONES AND SOFT TISSUES: No fracture or soft tissue abnormality. 1. New 3.1 cm rounded mass in the right lung, likely representing malignancy. No evidence of a focal pneumonia. Recommend a chest CT for further assessment. Microbiology  Respiratory panel pending  Strep pending  Legionella pending        Assessment and Plan  Patient is a 81 y.o. male who presented with SOB. The active problem list is as follows:    · Acute on chronic hypoxic respiratory failure  · Acute exacerbation COPD  · Enlarging right lower lung lung mass  · History of hypertension  · History of paroxysmal atrial fibrillation on Eliquis  · History of benign essential tremor  · History of constipation     -Consult dr Raúl Chauhan  -Meds reviewed  -Switch to DuoNeb aerosols until seen by pulmonary medicine  -Repeat viral respiratory panel and urine antigen study  -We will wait for pulmonary medicine to decide to repeat CT of the lungs since one was ordered December. · Routine labs in AM.  · DVT prophylaxis. Eliquis  · Please see orders for further management and care. The plan was discussed with Dr. Fabian Li    9:44 AM  1/7/2020    The chart was reviewed and the patient was seen and examined with the resident. The case was discussed in detail with the resident and agree with current impressions and plan. Case discussed with patient's wife and daughter at bedside in detail again today. Pending evaluation by Dr. Raúl Chauhan.     Cristina Gross D.O.  12:48 PM  1/7/2020

## 2020-01-07 NOTE — PROGRESS NOTES
Pulmonary Consult Dictated    /63   Pulse 56   Temp 98 °F (36.7 °C) (Oral)   Resp 16   Ht 5' 6\" (1.676 m)   Wt 132 lb 8 oz (60.1 kg)   SpO2 97%   BMI 21.39 kg/m²     S:Dillon feels back to his baseline and wishes to continue care at home. Jessica Gutierrez has very supportive family and understands his medication and respiratory services to assist with controlling his advanced COPD with Chronic Respiratory Failure with Hypercapnia/Hypoxemia. O:Incidental finding on admission CXR of right lower lobe nodular-mass opacity(3.1cm) and Chest CT in June 2019 did not appreciate this opacity. In December 2019,Chest CT not able to evaluate right lung status as Jessica Gutierrez presented with secondary spontaneous pneumothorax with right lung partial collapse in right lower lobe,now reexpanded. Anemia with hemoglobin 8.7 g/dl and discussed holding off on anticoagulation until hemoglobin above 10 g/dl then may restart 81 mg Aspirin as Factor 10a inhibitors resulting in epistaxis. Lungs on auscultation with good air exchange bilateral lung fields with left lung free of adventitial sounds and right lung with right posterior basilar course rhonchi suggesting retained mucous but right basilar segments reexpanded since last admissions discharge on 12/19/2019. A:Recovered from an exacerbation of advanced COPD with Chronic Respiratory Failure with Chronic Hypercapnia(PaCO2 54 mmHG)/Chronic Hypoxemia requiring 6 LPM O2 supplement  P: 1) Nutritional support with iron rich foods to improve O2 carrying capacity to Hemoglobin above 10 g/dl       2)Continue respiratory services and bronchodilator program as prior with continued airway clearance therapy with chest vest 3x/day and assist with ventilatory support using  AVAPS-AE to assist with ventilatory insufficiency as directed.        3) Hold anticoagulation until hemoglobin at 10 g/dl  Then may start 81 mg aspirin daily

## 2020-01-07 NOTE — PROGRESS NOTES
Nutrition Assessment    Type and Reason for Visit: Initial, Positive Nutrition Screen    Nutrition Recommendations: Continue current diet, Start ONS  Magic Cup TID. Nutrition Assessment: Pt admit w/ SOB noted H/o COPD w/ incidental finding of large R lung mass. PO diet advanced however poor intake. Malnutrition Assessment:  · Malnutrition Status: Meets the criteria for moderate malnutrition  · Context: Chronic illness  · Findings of the 6 clinical characteristics of malnutrition (Minimum of 2 out of 6 clinical characteristics is required to make the diagnosis of moderate or severe Protein Calorie Malnutrition based on AND/ASPEN Guidelines):  1. Energy Intake-Less than or equal to 75% of estimated energy requirement, Greater than or equal to 3 months    2. Weight Loss-10% loss or greater, (x8 mo)  3. Fat Loss-Moderate subcutaneous fat loss,    4. Muscle Loss-Moderate muscle mass loss,    5. Fluid Accumulation-No significant fluid accumulation,    6.  Strength-Not measured    Nutrition Risk Level: Moderate    Nutrient Needs:  · Estimated Daily Total Kcal: (MSJ 1244 x1.3= 1617)  · Estimated Daily Protein (g): 75-85(1.2-1.4gm/kg CBW)    Nutrition Diagnosis:   · Problem:  Moderate malnutrition, In context of chronic illness  · Etiology: related to Catabolic FWNTAPA(5/7 COPD w/ noted lung mass)     Signs and symptoms:  as evidenced by Weight loss, Moderate loss of subcutaneous fat, Moderate muscle loss, Diet history of poor intake    Objective Information:  · Nutrition-Focused Physical Findings: abd exam WDL, hypernatremia, +1 edema, fluids WNL, SOB   · Wound Type: None  · Current Nutrition Therapies:  · Oral Diet Orders: General   · Oral Diet intake: 0%  · Anthropometric Measures:  · Ht: 5' 6\" (167.6 cm)   · Current Body Wt: 132 lb (59.9 kg)(1/6 actual)  · Admission Body Wt: 127 lb (57.6 kg)(1/6 no method)  · Usual Body Wt: 151 lb (68.5 kg)(05/2019 per EMR, actual)  · % Weight Change:  ,  noted

## 2020-01-08 NOTE — PROGRESS NOTES
[address] [facility]  Internal Medicine  -Resident Progress Note-    PCP:  Demetri Batista DO  Admitting Physician:  Demetri Batista DO  Consultants:  Pulmonology  Chief Complaint:    Chief Complaint   Patient presents with    Shortness of Breath     PT SOB today, pt normally wears 6 L NC, PT took 3 breathing treatments at home and recieved 125 MG solumedrol via EMS. PT 94% 6L NC upon arrival        History of Present Illness  The patient is a 81 y.o. male who presents with having increased shortness of breath at home. Symptoms started about 3 days ago. Patient was not getting any better at home even after putting himself on a continuous albuterol aerosol treatment. In the ER patient was given DuoNeb aerosol and was also given IV steroids in the ambulance. Patient says he started feeling little bit better. Chest x-ray suggested a new 3.1 cm rounded mass in the right lower lung. With the above problem patient put in for further evaluation. Patient denies any chest pain, palpitation, dizziness or syncope. There is no nausea vomiting. 1/7/2020  Luis E Luna was seen and examined at bedside today; family was present for the examination. No acute overnight events were noted. He feels that his breathing has improved and he is at his baseline O2 usage. He is anxious for discharge. Awaiting pulmonary recommendations. 1/8/2020  Experienced respiratory distress this morning and was placed on BiPAP. On my examination @9954 he has taken BiPAP off. NC O2 was replaced and he is at 97%. He states he took off BiPAP because he needed a break. He says his breathing feels much better now. CT showing mass suspicious for malignancy. Review of Systems  All bolded are positive; please see HPI  General:  Fever, chills, diaphoresis, fatigue, malaise, night sweats, weight loss  Psychological:  Anxiety, disorientation, hallucinations. ENT:  Epistaxis, headaches, vertigo, visual changes.   Cardiovascular:  Chest pain, irregular heartbeats, palpitations, paroxysmal nocturnal dyspnea. Respiratory:  Shortness of breath, coughing, sputum production, hemoptysis, wheezing, orthopnea.   Gastrointestinal:  Nausea, vomiting, diarrhea, heartburn, constipation, abdominal pain, hematemesis, hematochezia, melena, acholic stools  Genito-Urinary:  Dysuria, urgency, frequency, hematuria  Musculoskeletal:  Joint pain, joint stiffness, joint swelling, muscle pain  Neurology:  Headache, focal neurological deficits, weakness, numbness, paresthesia  Derm:  Rashes, ulcers, excoriations, bruising  Extremities:  Decreased ROM, peripheral edema, mottling    Physical Examination  Vitals:  /68   Pulse 57   Temp 98 °F (36.7 °C) (Oral)   Resp 19   Ht 5' 6\" (1.676 m)   Wt 132 lb 8 oz (60.1 kg)   SpO2 96%   BMI 21.39 kg/m²   General Appearance:  awake, alert, and oriented to person, place, time, and purpose; appears stated age and cooperative; no apparent distress no labored breathing BiPAP  HEENT:  PERRL; EOMI; sclera clear; buccal mucosa moist  Neck:  supple; trachea midline; no thyromegaly; no JVD; no bruits  Heart:  rhythm regular; rate controlled; no murmurs  Lungs:  symmetrical; decreased breath sounds bilaterally, left lung wheezing no wheezes; no rhonchi; no rales  Abdomen:  soft, non-tender, non-distended; bowel sounds positive; no organomegaly or masses; no pain on palpation  Extremities:  peripheral pulses present; 2+ bilateral LE edema; no ulcers  Neurologic:  alert and oriented x 3; no focal deficit; cranial nerves grossly intact  Skin:  no petechia; no hemorrhage; no wounds    Medications  Scheduled Meds    acetylcysteine  4 mL Inhalation Q4H    predniSONE  20 mg Oral Daily    [START ON 1/9/2020] vitamin D-3  5,000 Units Oral Once per day on Mon Thu    furosemide  80 mg Oral Daily    acyclovir  400 mg Oral TID    montelukast  10 mg Oral Daily    finasteride  5 mg Oral Daily    pantoprazole  40 mg Oral QAM AC    metoprolol labs in AM.  · DVT prophylaxis. Eliquis currently held   · Please see orders for further management and care. The plan was discussed with Dr. Jessica Bowen    8:53 AM  1/8/2020    The chart was reviewed and the patient was seen and examined with the resident. The case was discussed in detail with the resident and agree with current impressions and plan. Case discussed with patient's wife at the bedside. CT scan showed a 23 mm nodule in the right lower lobe suspicious for CA. Dr. Lyubov Mabry to see patient later today.     Ana Dorsey D.O.  11:33 AM  1/8/2020

## 2020-01-08 NOTE — PLAN OF CARE
Problem: Breathing Pattern - Ineffective:  Goal: Ability to achieve and maintain a regular respiratory rate will improve  Description  Ability to achieve and maintain a regular respiratory rate will improve  Outcome: Not Met This Shift  Note:   Placed on bipap for resp distress

## 2020-01-08 NOTE — CONSULTS
Temperature is normal at 98  degrees Fahrenheit. Respiratory rate 16 and comfortable today. Blood  pressure is normotensive, heart rate stable and sinus. He shows no  signs of infection. Respiratory disposition is comfortable at 5 feet 6  inches height and a weight of 132 pounds, his BMI of 21-22 is ideal for  the patient with COPD, on 6 L flow of oxygen he is 97%. His hair is cut  short, gray in color. He does to a ayers to have his hair done. His  eyes are focused. He has severe eyesight. 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 He has seen  ophthalmologist and is considered clinically blind. He does wear  corrective lenses to see and at times he can see some shadows but his  eyesight is very poor. Hearing is intact to conversation. Nasal  airways are accommodated by the prongs of the nasal cannula. His speech  is still clear and short, three to four word sentences. The neck is  supple. No jugular venous distention is noted. The translaryngeal flow  varied through the vocal cords is preserved. Good breath sounds are  appreciated in the right and left lung. They are diminished but there  is good air exchange. The left lung demonstrates no adventitial  wheezes, crackles or rhonchi. The right lung is clear in the right  upper lobe, but the right lower lobe still has coarse rhonchi that seems  to be scattered but air exchange behind that is good. No crackles were  noted. So I think this is still some retained secretions. He has been  using his airway clearance therapy with the chest vest three times a day  at home. He may be getting some trouble with that right lower lobe that  needs further definition, so we are repeating the chest CT here prior to  his discharge here today. Legs are without any edema and the legs are  warm, dry.   Now he is anemic with the anticoagulation therapy, so I  stopped that and I told the family that after his hemoglobin back up help assist in controlling his ventilatory disposition when in  distress, and at this point this advanced respiratory program is  monitored by me every three months as an outpatient and the patient is  supported by his family at home and his family is familiar with the  respiratory services that we have available. I did tell the patient  that he needs a nutrition that is high in iron related rich foods to  help improve his hemoglobin and to stay off the chronic anticoagulation  until his hemoglobin is back to 10 gm/dL, obtaining hemoglobin and  hematocrit every week until that is achieved, then he can consider  returning back to the 81 mg aspirin daily. At this time, his prednisone  is just a chronic use by the patient and I told him he can return back  to 20 mg daily and step it up if necessary at home. He should have his  action plan available for the flu season. We have widespread influenza  in the community and also in the hospital, so I recommend that he uses  some antiviral therapy along with the antibiotic and step-up in his  prednisone when necessary. His prognosis, of course, is extremely  guarded. The patient's Levaquin and prednisone are always backing up an  antiviral agent initially started as an outpatient for any signs of  respiratory infection, as we have widespread flu in the community. Thanks Elnor Babinski for allowing me to co-participate with the patient. I  told the patient that we will do the chest CT, let him go home.   I will  evaluate the chest CT if we still feel but that could be an early stage  malignancy, then my recommendations would be to follow that up with a  PET scan and to further define that nodule and possibly consider therapy  if that tends to be an early stage non-small-cell lung cancer, but at  this time I find that this occurred after a complication to the right  lung with the right secondary spontaneous pneumothorax and was not there  prior to that, so this may be

## 2020-01-09 NOTE — PLAN OF CARE
Problem: Falls - Risk of:  Goal: Will remain free from falls  Description  Will remain free from falls  Outcome: Met This Shift     Problem: Falls - Risk of:  Goal: Will remain free from falls  Description  Will remain free from falls  Outcome: Met This Shift     Problem: Risk for Impaired Skin Integrity  Goal: Tissue integrity - skin and mucous membranes  Description  Structural intactness and normal physiological function of skin and  mucous membranes.   Outcome: Met This Shift     Problem: Pain:  Goal: Pain level will decrease  Description  Pain level will decrease  Outcome: Met This Shift     Problem: Breathing Pattern - Ineffective:  Goal: Ability to achieve and maintain a regular respiratory rate will improve  Description  Ability to achieve and maintain a regular respiratory rate will improve  Outcome: Met This Shift

## 2020-01-09 NOTE — PROGRESS NOTES
Pulmonary Visit:    BP (!) 101/56   Pulse 61   Temp 98.1 °F (36.7 °C) (Oral)   Resp 16   Ht 5' 6\" (1.676 m)   Wt 132 lb 8 oz (60.1 kg)   SpO2 97%   BMI 21.39 kg/m²     S: Corey Gomez was sitting in bedside chair and comfortable this afternoon with supplemental oxygen at 8 LPM flow with O2 saturations at 97%. Appetiter is poor except for chocolate pudding. O:Dillon is conversing comfortably and voice is strong. Corey Gomez did not require ventilatory assist today with BiPAP. Auscultation with diminished breath sounds but no adventitial sounds noted. Heart with sinus rhythm without extrasystoles. Tremors noted. A: Convalescing A/C Respiratory Failure with return to baseline and incidental finding of Right Lower Lobe pleural based 25mm pulmonary nodule requiring further definition  P: Discussed with IR and will perform on Friday afternoon if possible or Monday.         PT to strengthen U/L extremities and start ambulation with wheeled walker        Shower with Assistance        Continue respiratory services as prescribed

## 2020-01-09 NOTE — PROGRESS NOTES
vitamin D-3  5,000 Units Oral Once per day on Mon Thu    furosemide  80 mg Oral Daily    acyclovir  400 mg Oral TID    montelukast  10 mg Oral Daily    finasteride  5 mg Oral Daily    pantoprazole  40 mg Oral QAM AC    metoprolol succinate  12.5 mg Oral Daily    vitamin C  750 mg Oral BID    potassium chloride  20 mEq Oral BID    azithromycin  250 mg Oral Daily    primidone  250 mg Oral BID    ipratropium-albuterol  1 ampule Inhalation Q4H WA    brimonidine  1 drop Right Eye Q12H    latanoprost  1 drop Left Eye Daily    theophylline  300 mg Oral Daily     Infusion Meds   PRN Meds docusate sodium, albuterol, ibuprofen, ondansetron, magnesium hydroxide, ketorolac    Laboratory Data  Recent Results (from the past 24 hour(s))   Basic Metabolic Panel    Collection Time: 01/08/20  9:44 AM   Result Value Ref Range    Sodium 145 132 - 146 mmol/L    Potassium 4.2 3.5 - 5.0 mmol/L    Chloride 101 98 - 107 mmol/L    CO2 33 (H) 22 - 29 mmol/L    Anion Gap 11 7 - 16 mmol/L    Glucose 92 74 - 99 mg/dL    BUN 36 (H) 8 - 23 mg/dL    CREATININE 1.0 0.7 - 1.2 mg/dL    GFR Non-African American >60 >=60 mL/min/1.73    GFR African American >60     Calcium 9.0 8.6 - 10.2 mg/dL   Hemoglobin and hematocrit, blood    Collection Time: 01/08/20  9:44 AM   Result Value Ref Range    Hemoglobin 9.4 (L) 12.5 - 16.5 g/dL    Hematocrit 30.5 (L) 37.0 - 54.0 %   Urinalysis    Collection Time: 01/08/20  2:00 PM   Result Value Ref Range    Color, UA Yellow Straw/Yellow    Clarity, UA SLCLOUDY Clear    Glucose, Ur Negative Negative mg/dL    Bilirubin Urine Negative Negative    Ketones, Urine Negative Negative mg/dL    Specific Gravity, UA 1.015 1.005 - 1.030    Blood, Urine SMALL (A) Negative    pH, UA 7.5 5.0 - 9.0    Protein, UA TRACE Negative mg/dL    Urobilinogen, Urine 0.2 <2.0 E.U./dL    Nitrite, Urine Negative Negative    Leukocyte Esterase, Urine LARGE (A) Negative   Microscopic Urinalysis    Collection Time: 01/08/20  2:00 PM Result Value Ref Range    WBC, UA >20 0 - 5 /HPF    RBC, UA 0-1 0 - 2 /HPF    Bacteria, UA FEW (A) /HPF       Imaging  Ct Chest Wo Contrast    Result Date: 12/18/2019  Location:100 Exam: CT CHEST WO CONTRAST Comparison: December 13, 2019 History: Follow-up pneumothorax following removal of chest tube. Incomplete expansion of the right lung. Technique: Spiral CT the chest without contrast. Coronal and sagittal reconstructions were obtained. Automated dose exposure control was utilized for this examination. FINDINGS: No pneumothorax. Moderate emphysematous changes, centrilobular, probably the upper lobes. Persistent area of consolidation in the posterior basilar segments on the right. Filling defect and soft tissue prominence around a right lower lobe bronchus, cannot exclude a mass. Alternatively this could represent an area of lung consolidation with mucous plug chronic elevation right diaphragm. The rounded area of consolidation in the right lower lobe seen on the prior study is obscured by the adjacent lung consolidation. Is a tiny right pleural effusion. The right main pulmonary artery measures 4.2 cm, main pulmonary measuring 3.3 cm. Consider pulmonary artery hypertension. . No evidence of hilar or mediastinal adenopathy. The heart and pericardium and mediastinum are within normal limits. The visualized upper abdomen is within normal limits. 1. No pneumothorax following removal of chest tube. 2. Posterior basilar lung consolidation, obscuring the rounded area consolidation identified in the previous CT the chest. Rounded area of soft tissue opacity and obstruction of the right lower lobe bronchus, possibly related to mucous plug and/or underlying mass is not definitely excluded but was not identified on the prior CT scan in the same area. 3. Enlarged pulmonary artery, right pulmonary arteries particularly enlarged, consider pulmonary artery hypertension. 4. Centrilobular emphysema.     Ct Guided Needle Placement    Result Date: 12/13/2019  READING LOCATION: 200 HISTORY: Right pneumothorax. ICU intensivist consulted interventional radiology for right chest tube placement. PROCEDURE: The procedure was expanded to the patient and consent was obtained. A timeout was performed. The patient was placed on the CT table and axial images through the thorax were obtained. Note is made of a 50% right pneumothorax. There is a UreSil in place, however the pneumothorax did not decrease when compared with the earlier chest x-rays. The patient's anterior chest wall was then prepped and draped in sterile fashion using maximal sterile barrier technique. Within the mid clavicular line at the fifth intercostal space, following the uneventful administration of lidocaine, I introduced a 5 Western Lois Yueh catheter into the pleural cavity. Through the Yueh needle, an Amplatz wire was advanced. Over the Amplatz wire, a dilator was used to dilate a tract into the thoracic cavity. I then placed a 10 Chinese multi-sidehole pigtail catheter. The catheter was secured to the skin with a secure device. The catheter was then connected to an atrium and wall suction. Post chest tube placement CT scan was obtained which confirmed position of the catheter and reinflation of the right lung. There is consolidation seen within the right upper lobe and there is incomplete reinflation of the right upper lobe. A smooth-bordered 2.9 cm nodule is seen within the right lung base. 1. Successful CT-guided placement of a 10 Chinese right chest tube. 2. 10% residual pneumothorax is noted on the post chest tube placement images. There is significant consolidation within the right upper lobe adjacent to the residual pneumothorax. These findings are worrisome for malignancy. Follow-up CT scan is recommended following complete reinflation of the right lung. 3. 2.9 cm round mass within the right lower lobe.      Xr Chest Portable    Result Date: 1/6/2020  LOCATION: 200 EXAM: XR CHEST PORTABLE COMPARISON: 12/16/2019 HISTORY: Shortness of breath TECHNIQUE: Single frontal view of the chest was obtained. FINDINGS:  SUPPORT DEVICES: None. LUNGS: New 3.1 cm rounded mass seen in the right lower lung zone. Left lung is relatively clear. Small effusions are noted. PLEURA: No pneumothorax visualized. LUNG VOLUMES: Satisfactory inspirator effort. MEDIASTINAL STRUCTURES: No lymphadenopathy. Normal aortic contour. HEART SIZE: Normal. BONES AND SOFT TISSUES: No fracture or soft tissue abnormality. 1. New 3.1 cm rounded mass in the right lung, likely representing malignancy. No evidence of a focal pneumonia. Recommend a chest CT for further assessment. Microbiology  Respiratory panel pending  Strep pending  Legionella pending        Assessment and Plan  Patient is a 81 y.o. male who presented with SOB. The active problem list is as follows:    · Acute on chronic hypoxic respiratory failure  · Acute exacerbation COPD  · Enlarging right lower lung lung mass  · History of hypertension  · History of paroxysmal atrial fibrillation on Eliquis  · History of benign essential tremor  · History of constipation     -Consulted pulmonology, recommendations reviewed  -CT chest showing nodular opacity very suspicious for lung cancer,  Pulmonology recommending FNABx with IR.  -Continue supportive treatments    · Routine labs in AM.  · DVT prophylaxis. Eliquis currently held   · Please see orders for further management and care. The plan was discussed with Dr. Yamileth Duron    8:47 AM  1/9/2020    The chart was reviewed and the patient was seen and examined with the resident. The case was discussed in detail with the resident and agree with current impressions and plan. Case discussed with patient daughter at the bedside. Patient hopefully can have needle biopsy of lung mass tomorrow after the patient's been off BiPAP for 48 hours. Pulmonology note reviewed.     Philly Mcarthur Ramon Krause.  74:89 PM  1/9/2020

## 2020-01-10 NOTE — PROGRESS NOTES
failure (HCC)    Moderate protein-calorie malnutrition ([address]    History of cerebral aneurysm repair    History of endovascular stent graft for abdominal aortic aneurysm    Paroxysmal atrial fibrillation with rapid ventricular response ([address]    Community acquired bacterial pneumonia    Paroxysmal atrial fibrillation (HCC)    Hypokalemia    Pulmonary fibrosis (HCC)    Essential tremor    BPH (benign prostatic hyperplasia)    Constipation    Acute urinary retention    Atelectasis of right lung    Acute on chronic respiratory failure with hypoxia (HCC)    Respiratory distress       Past medical history:       Diagnosis Date    COPD (chronic obstructive pulmonary disease) ([address]     stable per pt    History of cardiovascular stress test 4/11/2012    Lexiscan    Hyperlipidemia     Hypertension 9-8-2014    lexiscan stress test    Kidney stone     Pulmonary fibrosis ([address]     Thyroid disease    ;      Procedure Laterality Date    ABDOMINAL AORTIC ANEURYSM REPAIR  09/26/2016    BALLOON ANGIOPLASTY, ARTERY Left     Cerebral artery stenosis with angioplasty and stent    CATARACT REMOVAL WITH IMPLANT      right, left    CYSTOSCOPY  nov 2012    retro ureteroscopy stone manipulation and insertion of j stent    EYE SURGERY      cataracts-bilat    INGUINAL HERNIA REPAIR  1970s bilateral    INGUINAL HERNIA REPAIR  1990s bilateral    INGUINAL HERNIA REPAIR Right sept 2014    laparoscopic       Nursing cleared patient for PT evaluation and patient agreeable. The admitting diagnosis and active problem list as listed above have been reviewed prior to the initiation of this evaluation.     Last time out of bed: prior to admit    Precautions: falls, alarm and O2 , 6-8 Liters of o2 via nasal cannula at home  Social history: Patient lives with spouse  in a two story home resides on first floor  with 1 steps to enter      Equipment owned: [employer] and O2,      Mentation: alert, cooperative, oriented x 3  and follows directions,      ROM: wfl    STRENGTH: 3+/5  PAIN: (measured on a visual analog scale with 0=no pain and 10=excruciating pain) 0 /10. FUNCTIONAL ASSESSMENT   Bed Mobility- Supine to sit- Minimal assist          Scooting- Minimal assist       Sit to supine- Minimal assist     Patient able to dangle on edge of bed for 5 minutes with no assist      Transfers-Sit to stand- Minimal assist     Gait:  Patient ambulated 6 feet using hand held assist with Minimal assist  To cart for testing   Steps:  Not assessed      Balance: sit-good         stand fair       Edema: no  Endurance: poor 8 Liters of o2 via nasal cannula     Treatment: eval , dangle and pre gait  Therapist educated and facilitated patient on techniques to increase safety and independence with bed mobility, balance, functional transfers, and functional mobility. Sat edge of bed to increase dynamic sitting balance and activity tolerance. Patient demonstrating fair   understanding of education/techniques, requiring additional training/education. At end of session, patient on cart with   call light and phone within reach,   all lines and tubes intact, nursing notified. Pt would benefit from continued skilled PT to increase functional independence and quality of life. Patients Goal: home      ASSESSMENT  Patient exhibits decreased strength, balance, coordination impairing functional mobility. GOALS to be met in 3 days. Bed mobility-  Supervision         Transfers-Sit to stand-Supervision      Gait:  Patient to ambulate 50 feet using wheeled walker with Supervision        Increase strength in affected mm groups by 1/3 grade  Increase balance to allow for improvement towards functional goals. Increase endurance to allow for improvement towards functional goals.      Evaluation time includes  review of current medical information, gathering information on past medical history/social history and prior level of function,

## 2020-01-10 NOTE — PROGRESS NOTES
3-5 ft  Modified [place]    Balance Sitting:     Static: good     Dynamic:good  Standing: fair     Activity Tolerance fair     Visual/  Perceptual Glasses: no, pt has low vision and states that he can see shapes                   Strength ROM Additional Info:    RUE  3/5  WFL good  and wfl FMC/dexterity noted during ADL tasks     LUE 3/5  WFL good  and wfl FMC/dexterity noted during ADL tasks         Hearing:  [facility]   Sensation:   No c/o numbness or tingling   Tone:  WFL  Edema: none noted                            Comments/Treatment: Upon arrival, patient supine in bed. OT eval completed. Therapist facilitated: Bed mobility, sitting balance at EOb for 5 minutes with supervision, transfer training with verbal cues for hand placement, static standing balance with Min A, functional mobility with Min A with HHA, verbal cues for sequence and safety, pt left in the care of transport at end of eval. All needs within reach. Pt would benefit from continued skilled OT to increase safety and independence with completion of ADL/IADL tasks for functional independence and quality of life.     Eval Complexity: Low      Assessment of current deficits   Functional mobility [x]  ADLs [x] Strength [x]  Cognition []  Functional transfers  [x] IADLs [x] Safety Awareness [x]  Endurance [x]  Fine Motor Coordination [] Balance [x] Vision/perception [] Sensation []   Gross Motor Coordination [] ROM [] Delirium []                  Motor Control []    Plan of Care:   ADL retraining [x]   Equipment needs [x]   Neuromuscular re-education [x] Energy Conservation Techniques [x]  Functional Transfer training [x] Patient and/or Family Education [x]  Functional Mobility training [x]  Environmental Modifications [x]  Cognitive re-training []   Compensatory techniques for ADLs [x]  Splinting Needs []   Positioning to improve overall function [x]   Therapeutic Activity [x]  Therapeutic Exercise  [x]  Visual/Perceptual: []    Delirium prevention/treatment  []   Other:  []    Rehab Potential: Good for established goals     Patient / Family Goal: return home      Patient and/or family were instructed diagnosis, prognosis/goals and plan of care. Demonstrated good understanding.     Time in: 1015  Time out: 1035  Evaluation + 16 treatment time    Stevie OTR/L [id number]

## 2020-01-10 NOTE — PROGRESS NOTES
steps with a railing?: A Lot  AM-PAC Inpatient Mobility Raw Score : 17  AM-PAC Inpatient T-Scale Score : 42.13  Mobility Inpatient CMS 0-100% Score: 50.57  Mobility Inpatient CMS G-Code Modifier : CK      Precautions: falls, alarm, 6 - 8L O2, vision deficits per pt's wife    S: Patient cleared by nursing for treatment. Patient is agreeable to treatment. Pain status: (measured on a visual analog scale with 0=no pain and 10=excruciating pain) 0/10. O: Pt was instructed in and performed the following:   Bed Mobility- Supine to sit-Not assessed  Pt sitting in a chair at start of tx session. Scooting- Not assessed     Sit to supine-Not assessed   Transfers-sit to stand- Minimal assist     Gait:  Patient ambulated 70 feet x 2 using Wheeled Walker with Minimal assists x 1. Comments: Verbal/tactile cues for walker management, upright posture, pacing, increased ADRIENNE, obstacle negotiation, and safety. Steps: Not assessed  Treatment: Pt stood, ambulated in the hallway, needed a seated rest period at the end of the hallway. Pt rested for 4 minutes and ambulated back to the chair in his room. Pt performed seated ex's: ankle pumps, long arc quad, marching, hip abduction, hip adduction, AAROM/AROM  x 10 -15 reps. Comment: Verbal/tactile cues for technique. Comment: Call light left by patient. Pt in chair at end of tx session with his wife present. A: Pt needing minimal assist with walker management d/t visual deficits. No LOB or unsteadiness noted during ambulation. Pt displays a kyphotic posture and tremors. P: Continue with physical therapy   ATUL GALLAGHER, PTA   GOALS to be met in 3 days.               Bed mobility-  Supervision                                          Transfers-Sit to stand-Supervision                 Gait:  Patient to ambulate 50 feet using wheeled walker with Supervision        Increase strength in affected mm groups by 1/3 grade  Increase balance to allow for improvement towards functional goals.  Increase endurance to allow for improvement towards functional goals.

## 2020-01-10 NOTE — PROGRESS NOTES
evaluate for pneumothorax    Review of Systems  All bolded are positive; please see HPI  General:  Fever, chills, diaphoresis, fatigue, malaise, night sweats, weight loss  Psychological:  Anxiety, disorientation, hallucinations. ENT:  Epistaxis, headaches, vertigo, visual changes. Cardiovascular:  Chest pain, irregular heartbeats, palpitations, paroxysmal nocturnal dyspnea. Respiratory:  Shortness of breath, coughing, sputum production, hemoptysis, wheezing, orthopnea.   Gastrointestinal:  Nausea, vomiting, diarrhea, heartburn, constipation, abdominal pain, hematemesis, hematochezia, melena, acholic stools  Genito-Urinary:  Dysuria, urgency, frequency, hematuria  Musculoskeletal:  Joint pain, joint stiffness, joint swelling, muscle pain  Neurology:  Headache, focal neurological deficits, weakness, numbness, paresthesia  Derm:  Rashes, ulcers, excoriations, bruising  Extremities:  Decreased ROM, peripheral edema, mottling    Physical Examination  Vitals:  /60   Pulse 71   Temp 98.1 °F (36.7 °C) (Oral)   Resp 18   Ht 5' 6\" (1.676 m)   Wt 132 lb 8 oz (60.1 kg)   SpO2 96%   BMI 21.39 kg/m²   General Appearance:  awake, alert, and oriented to person, place, time, and purpose; appears stated age and cooperative; no apparent distress no labored breathing 8L NC  HEENT:  PERRL; EOMI; sclera clear; buccal mucosa moist  Neck:  supple; trachea midline; no thyromegaly; no JVD; no bruits  Heart:  rhythm regular; rate controlled; no murmurs  Lungs:  symmetrical; decreased breath sounds bilaterally no wheezes; no rhonchi; no rales  Abdomen:  soft, non-tender, non-distended; bowel sounds positive; no organomegaly or masses; no pain on palpation  Extremities:  peripheral pulses present; 1+ bilateral LE edema; no ulcers  Neurologic:  alert and oriented x 3; no focal deficit; cranial nerves grossly intact  Skin:  no petechia; no hemorrhage; no wounds    Medications  Scheduled Meds    acetylcysteine  4 mL Inhalation TID chronic hypoxic respiratory failure  · Acute exacerbation COPD  · Enlarging right lower lung lung mass  · History of hypertension  · History of paroxysmal atrial fibrillation on Eliquis  · UTI  · History of benign essential tremor  · History of constipation  · Moderate protein calorie malnutrition     -Consulted pulmonology, recommendations reviewed  -CT chest showing nodular opacity very suspicious for lung cancer,  Pulmonology recommending FNABx with IR. Cancelled for today, CHest CT going to be repeated  -Continue supportive treatments    · Routine labs in AM.  · DVT prophylaxis. Eliquis currently held   · Please see orders for further management and care. The plan was discussed with Dr. Ma Cassette    7:39 AM  1/10/2020    The chart was reviewed and the patient was seen and examined with the resident. The case was discussed in detail with the resident and agree with current impressions and plan. Case discussed with patient's family at the bedside. IR refused to do needle biopsy because a high risk of pneumothorax. Continue PT/OT with pulmonology ordering a repeat CT scan today.     Maritza Milian D.O.  11:41 AM  1/10/2020

## 2020-01-11 NOTE — PLAN OF CARE
Problem: Falls - Risk of:  Goal: Will remain free from falls  Description  Will remain free from falls  1/11/2020 0520 by Cheli Tony RN  Outcome: Met This Shift     Problem: Falls - Risk of:  Goal: Absence of physical injury  Description  Absence of physical injury  1/11/2020 0520 by Cheli Tony RN  Outcome: Met This Shift     Problem: Risk for Impaired Skin Integrity  Goal: Tissue integrity - skin and mucous membranes  Description  Structural intactness and normal physiological function of skin and  mucous membranes.   Outcome: Met This Shift     Problem: Pain:  Goal: Pain level will decrease  Description  Pain level will decrease  Outcome: Met This Shift     Problem: Pain:  Goal: Control of acute pain  Description  Control of acute pain  Outcome: Met This Shift     Problem: Pain:  Goal: Control of chronic pain  Description  Control of chronic pain  Outcome: Met This Shift     Problem: Breathing Pattern - Ineffective:  Goal: Ability to achieve and maintain a regular respiratory rate will improve  Description  Ability to achieve and maintain a regular respiratory rate will improve  Outcome: Met This Shift

## 2020-01-11 NOTE — PROGRESS NOTES
[address] [facility]  Internal Medicine  -Progress Note-    PCP:  Eliza Chambers DO  Admitting Physician:  Eliza Chambers DO  Consultants:  Pulmonology  Chief Complaint:    Chief Complaint   Patient presents with    Shortness of Breath     PT SOB today, pt normally wears 6 L NC, PT took 3 breathing treatments at home and recieved 125 MG solumedrol via EMS. PT 94% 6L NC upon arrival        History of Present Illness  The patient is a 81 y.o. male who presents with having increased shortness of breath at home. Symptoms started about 3 days ago. Patient was not getting any better at home even after putting himself on a continuous albuterol aerosol treatment. In the ER patient was given DuoNeb aerosol and was also given IV steroids in the ambulance. Patient says he started feeling little bit better. Chest x-ray suggested a new 3.1 cm rounded mass in the right lower lung. With the above problem patient put in for further evaluation. Patient denies any chest pain, palpitation, dizziness or syncope. There is no nausea vomiting. 1/7/2020  Maria Teresa Garcia was seen and examined at bedside today; family was present for the examination. No acute overnight events were noted. He feels that his breathing has improved and he is at his baseline O2 usage. He is anxious for discharge. Awaiting pulmonary recommendations. 1/8/2020  Experienced respiratory distress this morning and was placed on BiPAP. On my examination @7397 he has taken BiPAP off. NC O2 was replaced and he is at 97%. He states he took off BiPAP because he needed a break. He says his breathing feels much better now. CT showing mass suspicious for malignancy. 1/9/2020  Seen and examined on IM. Seen by pulmonology yesterday and is agreeable with biopsy by interventional radiologist. On 8L NC @97%. Did not require BiPAP after yesterday. 1/10/20  Patient seen and examined on [facility]. Lung biopsy cancelled for today.  CT chest ordered to evaluate 8 oz (60.1 kg)   SpO2 95%   BMI 21.39 kg/m²   General Appearance:  awake, alert, and oriented to person, place, time, and purpose; appears stated age and cooperative; no apparent distress no labored breathing 8L NC  HEENT:  PERRL; EOMI; sclera clear; buccal mucosa moist  Neck:  supple; trachea midline; no thyromegaly; no JVD; no bruits  Heart:  rhythm regular; rate controlled; no murmurs  Lungs:  symmetrical; decreased breath sounds bilaterally no wheezes; no rhonchi; no rales  Abdomen:  soft, non-tender, non-distended; bowel sounds positive; no organomegaly or masses; no pain on palpation  Extremities:  peripheral pulses present; 1+ bilateral LE edema; no ulcers  Neurologic:  alert and oriented x 3; no focal deficit; cranial nerves grossly intact  Skin:  no petechia; no hemorrhage; no wounds    Medications  Scheduled Meds    cefepime  2 g Intravenous Q12H    methylPREDNISolone  20 mg Intravenous Q12H    docusate sodium  100 mg Oral BID    sodium phosphate  1 enema Rectal Once    acetylcysteine  4 mL Inhalation TID    vitamin D-3  5,000 Units Oral Once per day on Mon Thu    furosemide  80 mg Oral Daily    acyclovir  400 mg Oral TID    montelukast  10 mg Oral Daily    finasteride  5 mg Oral Daily    pantoprazole  40 mg Oral QAM AC    metoprolol succinate  12.5 mg Oral Daily    vitamin C  750 mg Oral BID    potassium chloride  20 mEq Oral BID    azithromycin  250 mg Oral Daily    primidone  250 mg Oral BID    ipratropium-albuterol  1 ampule Inhalation Q4H WA    brimonidine  1 drop Right Eye Q12H    latanoprost  1 drop Left Eye Daily    theophylline  300 mg Oral Daily     Infusion Meds    sodium chloride 25 mL (01/11/20 0355)     PRN Meds bisacodyl, albuterol, ibuprofen, ondansetron, magnesium hydroxide, ketorolac    Laboratory Data  No results found for this or any previous visit (from the past 24 hour(s)).     Imaging  Ct Chest Wo Contrast    Result Date: 12/18/2019  Location:[facility] Exam: CT CHEST WO CONTRAST Comparison: December 13, 2019 History: Follow-up pneumothorax following removal of chest tube. Incomplete expansion of the right lung. Technique: Spiral CT the chest without contrast. Coronal and sagittal reconstructions were obtained. Automated dose exposure control was utilized for this examination. FINDINGS: No pneumothorax. Moderate emphysematous changes, centrilobular, probably the upper lobes. Persistent area of consolidation in the posterior basilar segments on the right. Filling defect and soft tissue prominence around a right lower lobe bronchus, cannot exclude a mass. Alternatively this could represent an area of lung consolidation with mucous plug chronic elevation right diaphragm. The rounded area of consolidation in the right lower lobe seen on the prior study is obscured by the adjacent lung consolidation. Is a tiny right pleural effusion. The right main pulmonary artery measures 4.2 cm, main pulmonary measuring 3.3 cm. Consider pulmonary artery hypertension. . No evidence of hilar or mediastinal adenopathy. The heart and pericardium and mediastinum are within normal limits. The visualized upper abdomen is within normal limits. 1. No pneumothorax following removal of chest tube. 2. Posterior basilar lung consolidation, obscuring the rounded area consolidation identified in the previous CT the chest. Rounded area of soft tissue opacity and obstruction of the right lower lobe bronchus, possibly related to mucous plug and/or underlying mass is not definitely excluded but was not identified on the prior CT scan in the same area. 3. Enlarged pulmonary artery, right pulmonary arteries particularly enlarged, consider pulmonary artery hypertension. 4. Centrilobular emphysema. Ct Guided Needle Placement    Result Date: 12/13/2019  READING LOCATION: [facility] HISTORY: Right pneumothorax. ICU intensivist consulted interventional radiology for right chest tube placement.  PROCEDURE: The procedure was expanded to the patient and consent was obtained. A timeout was performed. The patient was placed on the CT table and axial images through the thorax were obtained. Note is made of a 50% right pneumothorax. There is a UreSil in place, however the pneumothorax did not decrease when compared with the earlier chest x-rays. The patient's anterior chest wall was then prepped and draped in sterile fashion using maximal sterile barrier technique. Within the mid clavicular line at the fifth intercostal space, following the uneventful administration of lidocaine, I introduced a 5 Western Lois Yueh catheter into the pleural cavity. Through the Yueh needle, an Amplatz wire was advanced. Over the Amplatz wire, a dilator was used to dilate a tract into the thoracic cavity. I then placed a 10 Pashto multi-sidehole pigtail catheter. The catheter was secured to the skin with a secure device. The catheter was then connected to an atrium and wall suction. Post chest tube placement CT scan was obtained which confirmed position of the catheter and reinflation of the right lung. There is consolidation seen within the right upper lobe and there is incomplete reinflation of the right upper lobe. A smooth-bordered 2.9 cm nodule is seen within the right lung base. 1. Successful CT-guided placement of a 10 Pashto right chest tube. 2. 10% residual pneumothorax is noted on the post chest tube placement images. There is significant consolidation within the right upper lobe adjacent to the residual pneumothorax. These findings are worrisome for malignancy. Follow-up CT scan is recommended following complete reinflation of the right lung. 3. 2.9 cm round mass within the right lower lobe. Xr Chest Portable    Result Date: 1/6/2020  LOCATION: 200 EXAM: XR CHEST PORTABLE COMPARISON: 12/16/2019 HISTORY: Shortness of breath TECHNIQUE: Single frontal view of the chest was obtained. FINDINGS:  SUPPORT DEVICES: None.  LUNGS: New 3.1 cm rounded mass seen in the right lower lung zone. Left lung is relatively clear. Small effusions are noted. PLEURA: No pneumothorax visualized. LUNG VOLUMES: Satisfactory inspirator effort. MEDIASTINAL STRUCTURES: No lymphadenopathy. Normal aortic contour. HEART SIZE: Normal. BONES AND SOFT TISSUES: No fracture or soft tissue abnormality. 1. New 3.1 cm rounded mass in the right lung, likely representing malignancy. No evidence of a focal pneumonia. Recommend a chest CT for further assessment. Microbiology  Respiratory panel negative  Strep negative  Legionella negative    Urine culture [[id number]] (Abnormal) Collected: 01/08/20 1710   Order Status: Completed Specimen: Urine, clean catch Updated: 01/09/20 1252    Organism Gram negative rodAbnormal     Urine Culture, Routine --    >100,000 CFU/ml   Identification and sensitivity to follow          Assessment and Plan  Patient is a 81 y.o. male who presented with SOB. The active problem list is as follows:    · Acute on chronic hypoxic respiratory failure  · Acute exacerbation COPD  · Enlarging right lower lung lung mass  · History of hypertension  · History of paroxysmal atrial fibrillation on Eliquis  · UTI  · History of benign essential tremor  · History of constipation  · Moderate protein calorie malnutrition  · Constipation     -Consulted pulmonology, recommendations reviewed  -CT chest showing nodular opacity very suspicious for lung cancer,  Pulmonology recommending FNABx with IR. Cancelled for today, CHest CT going to be repeated  -Continue with Colace twice daily but will give milk of mag 30 cc x 1 now  -Continue supportive treatments    · Routine labs in AM.  · DVT prophylaxis. Eliquis currently held   · Please see orders for further management and care.     José Antonio Kelly D.O.  11:28 AM  1/11/2020

## 2020-01-11 NOTE — PROGRESS NOTES
Physical Therapy  Physical Therapy  Daily Treatment Note  1/11/2020  [id number]                    Ruslan Brooks   [id number]                              1935  Patient Active Problem List   Diagnosis    Hypertension    Hyperlipidemia    Nephrolithiasis    Cor pulmonale, chronic (HCC)    Bradycardia, sinus    Multiple thyroid nodules    Secondary adrenal insufficiency (HCC)    Acquired bronchiectasis (HCC)    COPD, severe (HCC)    Status post repair of abdominal aortic aneurysm (AAA) using straight graft    Chronic respiratory failure with hypoxia and hypercapnia (HCC)    Severe hypoxemia    Ventricular tachyarrhythmia (HCC)    Thyroid disease    Hypertension    Hyperlipidemia    COPD (chronic obstructive pulmonary disease) (HCC)    Dependent edema    Respiratory failure (HCC)    Moderate protein-calorie malnutrition (HCC)    History of cerebral aneurysm repair    History of endovascular stent graft for abdominal aortic aneurysm    Paroxysmal atrial fibrillation with rapid ventricular response ([address]    Community acquired bacterial pneumonia    Paroxysmal atrial fibrillation (HCC)    Hypokalemia    Pulmonary fibrosis (HCC)    Essential tremor    BPH (benign prostatic hyperplasia)    Constipation    Acute urinary retention    Atelectasis of right lung    Acute on chronic respiratory failure with hypoxia (HCC)    Respiratory distress     Recommendation for discharge: HHPT  Equipment prescriptions needed:to be determined    AM-[facility] Mobility Inpatient   How much difficulty turning over in bed?: A Little  How much difficulty sitting down on / standing up from a chair with arms?: A Little  How much difficulty moving from lying on back to sitting on side of bed?: A Little  How much help from another person moving to and from a bed to a chair?: A Little  How much help from another person needed to walk in hospital room?: A Little  How much help from another person for climbing 3-5 steps Transfers-Sit to stand-Supervision                 Gait:  Patient to ambulate 50 feet using wheeled walker with Supervision        Increase strength in affected mm groups by 1/3 grade  Increase balance to allow for improvement towards functional goals. Increase endurance to allow for improvement towards functional goals.

## 2020-01-12 NOTE — PROGRESS NOTES
[facility]  Internal Medicine  -Progress Note-    PCP:  Ekta Connell DO  Admitting Physician:  Ekta Connell DO  Consultants:  Pulmonology  Chief Complaint:    Chief Complaint   Patient presents with    Shortness of Breath     PT SOB today, pt normally wears 6 L NC, PT took 3 breathing treatments at home and recieved 125 MG solumedrol via EMS. PT 94% 6L NC upon arrival        History of Present Illness  The patient is a 81 y.o. male who presents with having increased shortness of breath at home. Symptoms started about 3 days ago. Patient was not getting any better at home even after putting himself on a continuous albuterol aerosol treatment. In the ER patient was given DuoNeb aerosol and was also given IV steroids in the ambulance. Patient says he started feeling little bit better. Chest x-ray suggested a new 3.1 cm rounded mass in the right lower lung. With the above problem patient put in for further evaluation. Patient denies any chest pain, palpitation, dizziness or syncope. There is no nausea vomiting. 1/7/2020  Corey Gomez was seen and examined at bedside today; family was present for the examination. No acute overnight events were noted. He feels that his breathing has improved and he is at his baseline O2 usage. He is anxious for discharge. Awaiting pulmonary recommendations. 1/8/2020  Experienced respiratory distress this morning and was placed on BiPAP. On my examination @2352 he has taken BiPAP off. NC O2 was replaced and he is at 97%. He states he took off BiPAP because he needed a break. He says his breathing feels much better now. CT showing mass suspicious for malignancy. 1/9/2020  Seen and examined on IMCU. Seen by pulmonology yesterday and is agreeable with biopsy by interventional radiologist. On 8L NC @97%. Did not require BiPAP after yesterday. 1/10/20  Patient seen and examined on [facility]. Lung biopsy cancelled for today.  CT chest ordered to evaluate for pneumothorax    1/11/2020  Seen and examined on [address]. Patient denies any chest pain or any significant abdominal pain but does state he has some fullness or bloating. Patient's breathing is about at its baseline according to the patient. Patient does complain of some mild right upper quadrant abdominal pain on palpation. Abdomen otherwise seems soft and no significant distention. Nursing states patient had multiple bowel movements over the last couple days but patient still claiming that he is constipated and has been asking for a Dulcolax suppository for the last 3 days. Patient still thinks he has some stool in there and is requesting milk of mag x1. Pulmonary note reviewed. Temperature ranges from 97.7-100. Heart rate 66 with a blood pressure 115/60. O2 saturation 95% on 8 L high flow nasal cannula. CT scan again showed a 2.2 cm low attenuating lesion in the right lung base. 1/12/2020  Patient seen and examined on [address]. Patient feels little bit better with good results from MOM. Patient's wife is at the bedside and case discussed. Hemoglobin is 8.7 with BUN/creatinine 45/1.1. Temperature is 97.7 with a heart rate of 70 and blood pressure ranging 108//56. O2 sats 94% on 7 L. Review of Systems  All bolded are positive; please see HPI  General:  Fever, chills, diaphoresis, fatigue, malaise, night sweats, weight loss  Psychological:  Anxiety, disorientation, hallucinations. ENT:  Epistaxis, headaches, vertigo, visual changes. Cardiovascular:  Chest pain, irregular heartbeats, palpitations, paroxysmal nocturnal dyspnea. Respiratory:  Shortness of breath, coughing, sputum production, hemoptysis, wheezing, orthopnea.   Gastrointestinal:  Nausea, vomiting, diarrhea, heartburn, constipation, abdominal pain, hematemesis, hematochezia, melena, acholic stools  Genito-Urinary:  Dysuria, urgency, frequency, hematuria  Musculoskeletal:  Joint pain, joint stiffness, joint swelling, muscle pain  Neurology:  Headache, focal neurological deficits, weakness, numbness, paresthesia  Derm:  Rashes, ulcers, excoriations, bruising  Extremities:  Decreased ROM, peripheral edema, mottling    Physical Examination  Vitals:  BP (!) 108/58   Pulse 70   Temp 97.7 °F (36.5 °C) (Oral)   Resp 22   Ht 5' 6\" (1.676 m)   Wt 132 lb 8 oz (60.1 kg)   SpO2 94%   BMI 21.39 kg/m²   General Appearance:  awake, alert, and oriented to person, place, time, and purpose; appears stated age and cooperative; no apparent distress no labored breathing 8L NC  HEENT:  PERRL; EOMI; sclera clear; buccal mucosa moist  Neck:  supple; trachea midline; no thyromegaly; no JVD; no bruits  Heart:  rhythm regular; rate controlled; no murmurs  Lungs:  symmetrical; decreased breath sounds bilaterally no wheezes; no rhonchi; no rales  Abdomen:  soft, non-tender, non-distended; bowel sounds positive; no organomegaly or masses; no pain on palpation  Extremities:  peripheral pulses present; 1+ bilateral LE edema; no ulcers  Neurologic:  alert and oriented x 3; no focal deficit; cranial nerves grossly intact  Skin:  no petechia; no hemorrhage; no wounds    Medications  Scheduled Meds    primidone  250 mg Oral 3 times per day    theophylline  300 mg Oral Daily    cefepime  2 g Intravenous Q12H    methylPREDNISolone  20 mg Intravenous Q12H    docusate sodium  100 mg Oral BID    sodium phosphate  1 enema Rectal Once    acetylcysteine  4 mL Inhalation TID    vitamin D-3  5,000 Units Oral Once per day on Mon Thu    furosemide  80 mg Oral Daily    acyclovir  400 mg Oral TID    montelukast  10 mg Oral Daily    finasteride  5 mg Oral Daily    pantoprazole  40 mg Oral QAM AC    metoprolol succinate  12.5 mg Oral Daily    vitamin C  750 mg Oral BID    potassium chloride  20 mEq Oral BID    azithromycin  250 mg Oral Daily    ipratropium-albuterol  1 ampule Inhalation Q4H WA    brimonidine  1 drop Right Eye Q12H    latanoprost  1 drop Left Eye Daily     Infusion Meds    sodium chloride 25 mL (01/12/20 0344)     PRN Meds bisacodyl, albuterol, ibuprofen, ondansetron, magnesium hydroxide    Laboratory Data  Recent Results (from the past 24 hour(s))   Basic Metabolic Panel    Collection Time: 01/12/20  5:38 AM   Result Value Ref Range    Sodium 143 132 - 146 mmol/L    Potassium 4.8 3.5 - 5.0 mmol/L    Chloride 99 98 - 107 mmol/L    CO2 37 (H) 22 - 29 mmol/L    Anion Gap 7 7 - 16 mmol/L    Glucose 92 74 - 99 mg/dL    BUN 45 (H) 8 - 23 mg/dL    CREATININE 1.1 0.7 - 1.2 mg/dL    GFR Non-African American >60 >=60 mL/min/1.73    GFR African American >60     Calcium 8.4 (L) 8.6 - 10.2 mg/dL   Hemoglobin and Hematocrit, Blood    Collection Time: 01/12/20  5:38 AM   Result Value Ref Range    Hemoglobin 8.7 (L) 12.5 - 16.5 g/dL    Hematocrit 28.0 (L) 37.0 - 54.0 %   MAGNESIUM    Collection Time: 01/12/20  5:38 AM   Result Value Ref Range    Magnesium 2.7 (H) 1.6 - 2.6 mg/dL       Imaging  Ct Chest Wo Contrast    Result Date: 12/18/2019  Location:100 Exam: CT CHEST WO CONTRAST Comparison: December 13, 2019 History: Follow-up pneumothorax following removal of chest tube. Incomplete expansion of the right lung. Technique: Spiral CT the chest without contrast. Coronal and sagittal reconstructions were obtained. Automated dose exposure control was utilized for this examination. FINDINGS: No pneumothorax. Moderate emphysematous changes, centrilobular, probably the upper lobes. Persistent area of consolidation in the posterior basilar segments on the right. Filling defect and soft tissue prominence around a right lower lobe bronchus, cannot exclude a mass. Alternatively this could represent an area of lung consolidation with mucous plug chronic elevation right diaphragm. The rounded area of consolidation in the right lower lobe seen on the prior study is obscured by the adjacent lung consolidation. Is a tiny right pleural effusion.  The right main pulmonary artery measures 4.2 cm, main pulmonary measuring 3.3 cm. Consider pulmonary artery hypertension. . No evidence of hilar or mediastinal adenopathy. The heart and pericardium and mediastinum are within normal limits. The visualized upper abdomen is within normal limits. 1. No pneumothorax following removal of chest tube. 2. Posterior basilar lung consolidation, obscuring the rounded area consolidation identified in the previous CT the chest. Rounded area of soft tissue opacity and obstruction of the right lower lobe bronchus, possibly related to mucous plug and/or underlying mass is not definitely excluded but was not identified on the prior CT scan in the same area. 3. Enlarged pulmonary artery, right pulmonary arteries particularly enlarged, consider pulmonary artery hypertension. 4. Centrilobular emphysema. Ct Guided Needle Placement    Result Date: 12/13/2019  READING LOCATION: 200 HISTORY: Right pneumothorax. ICU intensivist consulted interventional radiology for right chest tube placement. PROCEDURE: The procedure was expanded to the patient and consent was obtained. A timeout was performed. The patient was placed on the CT table and axial images through the thorax were obtained. Note is made of a 50% right pneumothorax. There is a UreSil in place, however the pneumothorax did not decrease when compared with the earlier chest x-rays. The patient's anterior chest wall was then prepped and draped in sterile fashion using maximal sterile barrier technique. Within the mid clavicular line at the fifth intercostal space, following the uneventful administration of lidocaine, I introduced a 5 Western Lois Yueh catheter into the pleural cavity. Through the Yueh needle, an Amplatz wire was advanced. Over the Amplatz wire, a dilator was used to dilate a tract into the thoracic cavity. I then placed a 10 Swedish multi-sidehole pigtail catheter. The catheter was secured to the skin with a secure device.  The catheter was then presented with SOB. The active problem list is as follows:    · Acute on chronic hypoxic respiratory failure  · Acute exacerbation COPD  · Enlarging right lower lung lung mass  · History of hypertension  · History of paroxysmal atrial fibrillation on Eliquis  · UTI-pseudomonas aeruginosa  · History of benign essential tremor  · History of constipation  · Moderate protein calorie malnutrition  · Constipation     -Consulted pulmonology, recommendations reviewed  -CT chest showing nodular opacity very suspicious for lung cancer,  Pulmonology recommending FNABx with IR. Cancelled for today, CHest CT going to be repeated  -Continue with Colace twice daily but will give milk of mag 30 cc x 1 now  -Cefepime 1 g IV piggyback every 12 hours- day 3  -Continue supportive treatments    · Routine labs in AM.  · DVT prophylaxis. Eliquis currently held   · Please see orders for further management and care.     Maddy Delaney D.O.  11:22 AM  1/12/2020

## 2020-01-12 NOTE — PROGRESS NOTES
Physical Therapy  Physical Therapy  Daily Treatment Note  1/12/2020  [id number]                    Ruslan Brooks   [id number]                              1935  Patient Active Problem List   Diagnosis    Hypertension    Hyperlipidemia    Nephrolithiasis    Cor pulmonale, chronic (HCC)    Bradycardia, sinus    Multiple thyroid nodules    Secondary adrenal insufficiency (HCC)    Acquired bronchiectasis (HCC)    COPD, severe (HCC)    Status post repair of abdominal aortic aneurysm (AAA) using straight graft    Chronic respiratory failure with hypoxia and hypercapnia (HCC)    Severe hypoxemia    Ventricular tachyarrhythmia (HCC)    Thyroid disease    Hypertension    Hyperlipidemia    COPD (chronic obstructive pulmonary disease) (HCC)    Dependent edema    Respiratory failure (HCC)    Moderate protein-calorie malnutrition (HCC)    History of cerebral aneurysm repair    History of endovascular stent graft for abdominal aortic aneurysm    Paroxysmal atrial fibrillation with rapid ventricular response ([address]    Community acquired bacterial pneumonia    Paroxysmal atrial fibrillation (HCC)    Hypokalemia    Pulmonary fibrosis (HCC)    Essential tremor    BPH (benign prostatic hyperplasia)    Constipation    Acute urinary retention    Atelectasis of right lung    Acute on chronic respiratory failure with hypoxia (HCC)    Respiratory distress     Recommendation for discharge: HHPT  Equipment prescriptions needed: to be determined    AM-[facility] Mobility Inpatient   How much difficulty turning over in bed?: A Little  How much difficulty sitting down on / standing up from a chair with arms?: A Little  How much difficulty moving from lying on back to sitting on side of bed?: A Little  How much help from another person moving to and from a bed to a chair?: A Little  How much help from another person needed to walk in hospital room?: A Little  How much help from another person for climbing 3-5 steps with a railing?: A Lot  AM-PAC Inpatient Mobility Raw Score : 17  AM-PAC Inpatient T-Scale Score : 42.13  Mobility Inpatient CMS 0-100% Score: 50.57  Mobility Inpatient CMS G-Code Modifier : CK    Precautions: falls, alarm, 6 - 8L O2, vision deficits per pt's wife, tremors    S: Patient cleared by nursing for treatment. Patient is agreeable to treatment. Pt was sitting in chair at start of treatment. Pt requested assistance to the bathroom. Pain status: (measured on a visual analog scale with 0=no pain and 10=excruciating pain) 0/10. O: Pt was instructed in and performed the following:   Bed Mobility- Supine to sit- Not assessed   Scooting- Not assessed     Sit to supine- Not assessed   Transfers-sit to stand- Minimal assist from commode and from bedside chair   Gait: Patient ambulated 15 feet and 50 feet x 2 using Wheeled Walker with Minimal assistance. Comments: Verbal/tactile cues for walker management, upright posture, pacing, increased ADRIENNE, breathing technique, obstacle negotiation, and safety. Pt ambulated wearing nasal cannula on 8L; SpO2 was in 90's throughout. Steps: Not assessed  Treatment: Pt was educated and facilitated on techniques to increase safety and independence with bed mobility, balance, functional transfers, and functional mobility. Pt stood from chair and ambulated to bathroom. Pt required assist with commode transfers. Pt ambulated in hallway and to bedside chair. Comment: Call light left by patient. Pt was sitting in chair at end of treatment with family present. A: Pt was able to progress ambulation distance but c/o fatigue and shortness of breath. Pt continues to need minimal assist with walker management d/t visual deficits. No LOB or unsteadiness noted during ambulation but displays a kyphotic posture and tremors. P: Continue with physical therapy. Maxine Espana, ELIANA     GOALS to be met in 3 days.               Bed mobility- Supervision

## 2020-01-12 NOTE — PLAN OF CARE
Problem: Falls - Risk of:  Goal: Will remain free from falls  Description  Will remain free from falls  Outcome: Met This Shift     Problem: Falls - Risk of:  Goal: Absence of physical injury  Description  Absence of physical injury  Outcome: Met This Shift     Problem: Risk for Impaired Skin Integrity  Goal: Tissue integrity - skin and mucous membranes  Description  Structural intactness and normal physiological function of skin and  mucous membranes.   Outcome: Met This Shift     Problem: Pain:  Goal: Pain level will decrease  Description  Pain level will decrease  Outcome: Met This Shift     Problem: Pain:  Goal: Control of acute pain  Description  Control of acute pain  Outcome: Met This Shift     Problem: Pain:  Goal: Control of chronic pain  Description  Control of chronic pain  Outcome: Met This Shift     Problem: Breathing Pattern - Ineffective:  Goal: Ability to achieve and maintain a regular respiratory rate will improve  Description  Ability to achieve and maintain a regular respiratory rate will improve  Outcome: Met This Shift

## 2020-01-12 NOTE — PLAN OF CARE
Problem: Falls - Risk of:  Goal: Will remain free from falls  Description  Will remain free from falls  1/12/2020 1044 by Beth Apodaca RN  Outcome: Met This Shift     Problem: Falls - Risk of:  Goal: Absence of physical injury  Description  Absence of physical injury  1/12/2020 1044 by Beth Apodaca RN  Outcome: Met This Shift     Problem: Risk for Impaired Skin Integrity  Goal: Tissue integrity - skin and mucous membranes  Description  Structural intactness and normal physiological function of skin and  mucous membranes.   1/12/2020 1044 by Beth Apodaca RN  Outcome: Met This Shift     Problem: Pain:  Goal: Pain level will decrease  Description  Pain level will decrease  1/12/2020 1044 by Beth Apodaca RN  Outcome: Met This Shift     Problem: Breathing Pattern - Ineffective:  Goal: Ability to achieve and maintain a regular respiratory rate will improve  Description  Ability to achieve and maintain a regular respiratory rate will improve  1/12/2020 1044 by Beth Apodaca RN  Outcome: Not Met This Shift  Note:   Patient states that he is still far from baseline

## 2020-01-12 NOTE — PROGRESS NOTES
Pulmonary Visit    BP (!) 100/59   Pulse 64   Temp 97.7 °F (36.5 °C) (Oral)   Resp 20   Ht 5' 6\" (1.676 m)   Wt 132 lb 8 oz (60.1 kg)   SpO2 98%   BMI 21.39 kg/m²     S:Dillon did well today entertaining a walk with PT and ate better with encouragement from family. Today [facility] states back pain not controlled with NSAID and Tramadol ineffective from use in past and agreeable to trial of Norco 5/325 mg tab every 8 hours as needed. Back pain not reproducible in mid interscapular region when palpating spinous process of T4-T8. O: Breath sounds better today on auscultation. Diminished to +1/4 A&P with end expiratory wheeze faintly appreciated. Alert and very talkative today.   A: Continue 5 days of Maxipime for Pseudomonas UTI and maybe discharged to home  P: If OK with Dr. Fabian Benítez discharge on Tuesday to home

## 2020-01-13 NOTE — PROGRESS NOTES
Occupational Therapy  O.T. Bedside Treatment Note    Date:2020  Patient Name: Ruslan Brooks  MRN: [id number]  : 1935  ROOM #: [id number]    Problem list / Diagnosis:   Patient Active Problem List   Diagnosis    Hypertension    Hyperlipidemia    Nephrolithiasis    Cor pulmonale, chronic (HCC)    Bradycardia, sinus    Multiple thyroid nodules    Secondary adrenal insufficiency (HCC)    Acquired bronchiectasis (HCC)    COPD, severe (HCC)    Status post repair of abdominal aortic aneurysm (AAA) using straight graft    Chronic respiratory failure with hypoxia and hypercapnia (HCC)    Severe hypoxemia    Ventricular tachyarrhythmia (HCC)    Thyroid disease    Hypertension    Hyperlipidemia    COPD (chronic obstructive pulmonary disease) (HCC)    Dependent edema    Respiratory failure (HCC)    Moderate protein-calorie malnutrition (HCC)    History of cerebral aneurysm repair    History of endovascular stent graft for abdominal aortic aneurysm    Paroxysmal atrial fibrillation with rapid ventricular response ([address]    Community acquired bacterial pneumonia    Paroxysmal atrial fibrillation (HCC)    Hypokalemia    Pulmonary fibrosis (HCC)    Essential tremor    BPH (benign prostatic hyperplasia)    Constipation    Acute urinary retention    Atelectasis of right lung    Acute on chronic respiratory failure with hypoxia (HCC)    Respiratory distress     Past Medical History:   Past Medical History:   Diagnosis Date    COPD (chronic obstructive pulmonary disease) (HCC)     stable per pt    History of cardiovascular stress test 2012    Lexiscan    Hyperlipidemia     Hypertension 2014    lexiscan stress test    Kidney stone     Pulmonary fibrosis ([address]     Thyroid disease      Onset/Medical history: medical history reviewed  Past medical history: reviewed    The admitting diagnosis and active problem list as listed above have been reviewed prior to treatment.  Nursing cleared the patient for treatment. Patient is agreeable to treatment. Discharge recommendations: [facility]  With 24/7 supervision/assist vs. LACEY pending pt progress in IP setting and family ability to assist  Equipment prescriptions needed: pt has needed equipment    AM [facility] Daily Activity Inpatient   AM-PAC Daily Activity Inpatient   How much help for putting on and taking off regular lower body clothing?: A Lot  How much help for Bathing?: A Lot  How much help for Toileting?: A Lot  How much help for putting on and taking off regular upper body clothing?: A Lot  How much help for taking care of personal grooming?: A Lot  How much help for eating meals?: A Little  [facility] 13  AM-PAC Inpatient ADL T-Scale Score : 32.03  ADL Inpatient CMS 0-100% Score: 63.03  ADL Inpatient CMS G-Code Modifier : CL        Precautions:  Falls, \"Dr. Ricky Davis impaired, 02, kyphotic posture    S:  - Pt cleared for treatment through nursing.  - Pain: pt had no c/o pain at this time. - Pt pleasant and cooperative during session. O:            Functional Assessment:    Initial Eval Status  Date: 1/10/20 Treatment Status  Date: 1/13/2020 Short Term Goals  Treatment frequency: PRN 1-3x/week   Feeding Independent  N/T  Independent    Grooming Moderate Assist  Mod A when standing sinkside to wash hands after toileting; assist for paper towel management Minimal Assist    UB Dressing Moderate Assist  Mod A to tie back of gown  Supervision    LB Dressing Moderate Assist  Max A to don heelbos on RTB  Minimal Assist    Bathing Moderate Assist N/T Modified [place]    Toileting Moderate Assist  Mod  A for transfer to/from [facility] Modified [place]    Bed Mobility  Supine to sit: Minimal Assist   Sit to supine: Minimal Assist  Min A x 2 for sit to supine ; assist to guide UB and LE's to supine; assist for positioning in R sidelying position per pt request Supine to sit:  Independent   Sit to supine: Independent    Functional Transfers Minimal Assist with sit to stand from EOB  Min A for sit to stand transfer to/from chair; transfer to/from toilet at min A with FWW; transfer to EOB at min A with FWW Supervision    Functional Mobility Minimal Assist with HHA x2, for approx 3-5 ft 15 ft +70 ft x 2 with MIn A with FWW; no LOB noted; cuing for walker safety, as pt tends to place walker at increased distance in front of him during mobility  Modified [place]    Balance Sitting:     Static: good     Dynamic:good  Standing: fair Sitting:     Static: good     Dynamic:good  Standing: fair      Activity Tolerance fair fair      Visual/  Perceptual Glasses: no, pt has low vision and states that he can see shapes                       A:  -Balance: Sitting:  EOB, chair, toilet       Standing: fair with Minimal Assist  with FWW  -Endurance: fair minus (2* to decreased endurance, strength and fatigue)  -Edema: yes - LE's; nsg aware; positioning provided  -Safety:fair (VC's for walker safety, sequencing, hand placement, cuing d/t low vision)  - Pt/family education/training: bed mobility, transfer training, functional mobility,LE dressing,  UE dressing,  toileting/hygiene, sequencing, hand placement, walker safety, bathroom safety, DME,  ECT's,  grooming, ADL retraining  -Pt would benefit from additional ADLs, safety education, balance activities, transfer training, strength exercises, and over all endurance building.     P:  - Plan of care: Patient will be seen by OT 1-3x/wk PRN for therapeutic activity, ADL re-training, bed mobility,functional transfers, functional mobility, safety and fall prevention, balance and endurance activities, instruction and training in energy conservation principles, and   patient and family education.   - Patient and/or family understands diagnosis, prognosis and plan of care: yes   - ADL retraining, bathroom safety, DME    Treatment minutes:23  Janki Rolon, [address]

## 2020-01-13 NOTE — PROGRESS NOTES
Physical Therapy  Physical Therapy  Daily Treatment Note  1/13/2020  [id number]                    Josselyn Haney   [id number]                              1935  Patient Active Problem List   Diagnosis    Hypertension    Hyperlipidemia    Nephrolithiasis    Cor pulmonale, chronic (HCC)    Bradycardia, sinus    Multiple thyroid nodules    Secondary adrenal insufficiency (HCC)    Acquired bronchiectasis (HCC)    COPD, severe (HCC)    Status post repair of abdominal aortic aneurysm (AAA) using straight graft    Chronic respiratory failure with hypoxia and hypercapnia (HCC)    Severe hypoxemia    Ventricular tachyarrhythmia (HCC)    Thyroid disease    Hypertension    Hyperlipidemia    COPD (chronic obstructive pulmonary disease) (HCC)    Dependent edema    Respiratory failure (HCC)    Moderate protein-calorie malnutrition (HCC)    History of cerebral aneurysm repair    History of endovascular stent graft for abdominal aortic aneurysm    Paroxysmal atrial fibrillation with rapid ventricular response ([address]    Community acquired bacterial pneumonia    Paroxysmal atrial fibrillation (HCC)    Hypokalemia    Pulmonary fibrosis (HCC)    Essential tremor    BPH (benign prostatic hyperplasia)    Constipation    Acute urinary retention    Atelectasis of right lung    Acute on chronic respiratory failure with hypoxia (HCC)    Respiratory distress     Recommendation for discharge: HHPT  Equipment prescriptions needed: to be determined    AM[facility] Mobility Inpatient   How much difficulty turning over in bed?: A Little  How much difficulty sitting down on / standing up from a chair with arms?: A Little  How much difficulty moving from lying on back to sitting on side of bed?: A Little  How much help from another person moving to and from a bed to a chair?: A Little  How much help from another person needed to walk in hospital room?: A Little  How much help from another person for climbing 3-5 steps Supervision                                          Transfers-Sit to stand- Supervision                 Gait:  Patient to ambulate 50 feet using wheeled walker with Supervision        Increase strength in affected mm groups by 1/3 grade  Increase balance to allow for improvement towards functional goals. Increase endurance to allow for improvement towards functional goals.

## 2020-01-13 NOTE — PROGRESS NOTES
for pneumothorax    1/11/2020  Seen and examined on [address]. Patient denies any chest pain or any significant abdominal pain but does state he has some fullness or bloating. Patient's breathing is about at its baseline according to the patient. Patient does complain of some mild right upper quadrant abdominal pain on palpation. Abdomen otherwise seems soft and no significant distention. Nursing states patient had multiple bowel movements over the last couple days but patient still claiming that he is constipated and has been asking for a Dulcolax suppository for the last 3 days. Patient still thinks he has some stool in there and is requesting milk of mag x1. Pulmonary note reviewed. Temperature ranges from 97.7-100. Heart rate 66 with a blood pressure 115/60. O2 saturation 95% on 8 L high flow nasal cannula. CT scan again showed a 2.2 cm low attenuating lesion in the right lung base. 1/12/2020  Patient seen and examined on [address]. Patient feels little bit better with good results from MOM. Patient's wife is at the bedside and case discussed. Hemoglobin is 8.7 with BUN/creatinine 45/1.1. Temperature is 97.7 with a heart rate of 70 and blood pressure ranging 108//56. O2 sats 94% on 7 L.    1/13/2020  Patient seen and examined on IMCU. No acute complaints. 97% on 7 L NCO2. Patient hypotensive this am but asymptomatic. Review of Systems  All bolded are positive; please see HPI  General:  Fever, chills, diaphoresis, fatigue, malaise, night sweats, weight loss  Psychological:  Anxiety, disorientation, hallucinations. ENT:  Epistaxis, headaches, vertigo, visual changes. Cardiovascular:  Chest pain, irregular heartbeats, palpitations, paroxysmal nocturnal dyspnea. Respiratory:  Shortness of breath, coughing, sputum production, hemoptysis, wheezing, orthopnea.   Gastrointestinal:  Nausea, vomiting, diarrhea, heartburn, constipation, abdominal pain, hematemesis, hematochezia, melena, acholic stools  Genito-Urinary:  Dysuria, urgency, frequency, hematuria  Musculoskeletal:  Joint pain, joint stiffness, joint swelling, muscle pain  Neurology:  Headache, focal neurological deficits, weakness, numbness, paresthesia  Derm:  Rashes, ulcers, excoriations, bruising  Extremities:  Decreased ROM, peripheral edema, mottling    Physical Examination  Vitals:  BP (!) 96/46   Pulse 52   Temp 98.1 °F (36.7 °C) (Oral)   Resp 20   Ht 5' 6\" (1.676 m)   Wt 132 lb 8 oz (60.1 kg)   SpO2 100%   BMI 21.39 kg/m²   General Appearance:  awake, alert, and oriented to person, place, time, and purpose; appears stated age and cooperative; no apparent distress no labored breathing 7L NC  HEENT:  PERRL; EOMI; sclera clear; buccal mucosa moist  Neck:  supple; trachea midline; no thyromegaly; no JVD; no bruits  Heart:  rhythm regular; rate controlled; no murmurs  Lungs:  symmetrical; decreased breath sounds bilaterally no wheezes; no rhonchi; no rales  Abdomen:  soft, non-tender, non-distended; bowel sounds positive; no organomegaly or masses; no pain on palpation  Extremities:  peripheral pulses present; 1+ bilateral LE edema; no ulcers  Neurologic:  alert and oriented x 3; no focal deficit; cranial nerves grossly intact  Skin:  no petechia; no hemorrhage; no wounds    Medications  Scheduled Meds    primidone  250 mg Oral 3 times per day    theophylline  300 mg Oral Daily    cefepime  2 g Intravenous Q12H    methylPREDNISolone  20 mg Intravenous Q12H    docusate sodium  100 mg Oral BID    sodium phosphate  1 enema Rectal Once    acetylcysteine  4 mL Inhalation TID    vitamin D-3  5,000 Units Oral Once per day on Mon Thu    furosemide  80 mg Oral Daily    acyclovir  400 mg Oral TID    montelukast  10 mg Oral Daily    finasteride  5 mg Oral Daily    pantoprazole  40 mg Oral QAM AC    metoprolol succinate  12.5 mg Oral Daily    vitamin C  750 mg Oral BID    potassium chloride  20 mEq Oral BID    azithromycin  250 mg could represent an area of lung consolidation with mucous plug chronic elevation right diaphragm. The rounded area of consolidation in the right lower lobe seen on the prior study is obscured by the adjacent lung consolidation. Is a tiny right pleural effusion. The right main pulmonary artery measures 4.2 cm, main pulmonary measuring 3.3 cm. Consider pulmonary artery hypertension. . No evidence of hilar or mediastinal adenopathy. The heart and pericardium and mediastinum are within normal limits. The visualized upper abdomen is within normal limits. 1. No pneumothorax following removal of chest tube. 2. Posterior basilar lung consolidation, obscuring the rounded area consolidation identified in the previous CT the chest. Rounded area of soft tissue opacity and obstruction of the right lower lobe bronchus, possibly related to mucous plug and/or underlying mass is not definitely excluded but was not identified on the prior CT scan in the same area. 3. Enlarged pulmonary artery, right pulmonary arteries particularly enlarged, consider pulmonary artery hypertension. 4. Centrilobular emphysema. Ct Guided Needle Placement    Result Date: 12/13/2019  READING LOCATION: [facility] HISTORY: Right pneumothorax. ICU intensivist consulted interventional radiology for right chest tube placement. PROCEDURE: The procedure was expanded to the patient and consent was obtained. A timeout was performed. The patient was placed on the CT table and axial images through the thorax were obtained. Note is made of a 50% right pneumothorax. There is a UreSil in place, however the pneumothorax did not decrease when compared with the earlier chest x-rays. The patient's anterior chest wall was then prepped and draped in sterile fashion using maximal sterile barrier technique.  Within the mid clavicular line at the fifth intercostal space, following the uneventful administration of lidocaine, I introduced a 5 Western Lois Yueh catheter into the pleural cavity. Through the Yueh needle, an Amplatz wire was advanced. Over the Amplatz wire, a dilator was used to dilate a tract into the thoracic cavity. I then placed a 10 Belgian multi-sidehole pigtail catheter. The catheter was secured to the skin with a secure device. The catheter was then connected to an atrium and wall suction. Post chest tube placement CT scan was obtained which confirmed position of the catheter and reinflation of the right lung. There is consolidation seen within the right upper lobe and there is incomplete reinflation of the right upper lobe. A smooth-bordered 2.9 cm nodule is seen within the right lung base. 1. Successful CT-guided placement of a 10 Belgian right chest tube. 2. 10% residual pneumothorax is noted on the post chest tube placement images. There is significant consolidation within the right upper lobe adjacent to the residual pneumothorax. These findings are worrisome for malignancy. Follow-up CT scan is recommended following complete reinflation of the right lung. 3. 2.9 cm round mass within the right lower lobe. Xr Chest Portable    Result Date: 1/6/2020  LOCATION: 200 EXAM: XR CHEST PORTABLE COMPARISON: 12/16/2019 HISTORY: Shortness of breath TECHNIQUE: Single frontal view of the chest was obtained. FINDINGS:  SUPPORT DEVICES: None. LUNGS: New 3.1 cm rounded mass seen in the right lower lung zone. Left lung is relatively clear. Small effusions are noted. PLEURA: No pneumothorax visualized. LUNG VOLUMES: Satisfactory inspirator effort. MEDIASTINAL STRUCTURES: No lymphadenopathy. Normal aortic contour. HEART SIZE: Normal. BONES AND SOFT TISSUES: No fracture or soft tissue abnormality. 1. New 3.1 cm rounded mass in the right lung, likely representing malignancy. No evidence of a focal pneumonia. Recommend a chest CT for further assessment.       Microbiology  Respiratory panel negative  Strep negative  Legionella negative    Urine culture [[id number]] (Abnormal) Collected: 01/08/20 1710   Order Status: Completed Specimen: Urine, clean catch Updated: 01/10/20 0956    Organism Pseudomonas aeruginosaAbnormal     Urine Culture, Routine >100,000 CFU/ml           Assessment and Plan  Patient is a 81 y.o. male who presented with SOB. The active problem list is as follows:    · Acute on chronic hypoxic respiratory failure  · Acute exacerbation COPD  · Enlarging right lower lung lung mass  · History of hypertension  · History of paroxysmal atrial fibrillation on Eliquis  · UTI-pseudomonas aeruginosa  · History of benign essential tremor  · History of constipation  · Moderate protein calorie malnutrition  · Constipation     -Consulted pulmonology, recommendations reviewed  -CT chest showing nodular opacity very suspicious for lung cancer,  Pulmonology recommending FNABx with IR  -Cefepime 1 g IV piggyback every 12 hours- day 4  -Continue supportive treatments  -Will need PET outpatient    · Routine labs in AM.  · DVT prophylaxis. Eliquis currently held   · Please see orders for further management and care. Seen and discussed with Dr. Nate Tapia D.O. PGYIII  7:22 AM  1/13/2020     The chart was reviewed and the patient was seen and examined with the resident. The case was discussed in detail with the resident and agree with current impressions and plan.     Clarisse London D.O.  10:17 AM  1/13/2020

## 2020-01-13 NOTE — PROGRESS NOTES
Wt: 151 lb (68.5 kg)(05/2019 per EMR, actual)  · % Weight Change:  ,  noted 12.5% wt loss x8 mo   · Ideal Body Wt: 142 lb (64.4 kg), % Ideal Body 92%  · BMI Classification: BMI 18.5 - 24.9 Normal Weight    Nutrition Interventions:   Continued Inpatient Monitoring, Coordination of Care    Nutrition Evaluation:   · Evaluation: Goals set   · Goals: Pt to consume >50% of meals and ONS     · Monitoring: Meal Intake, Supplement Intake, Diet Tolerance, Skin Integrity, I&O, Monitor Hemodynamic Status, Monitor Bowel Function, Weight, Pertinent Labs      Electronically signed by Davi Cee MS, RD, LD on 1/13/20 at 2:42 PM  Contact Number: [phone]

## 2020-01-13 NOTE — CARE COORDINATION
1/13/2020 1108 [id number] [facility] and anticipated dc for tomorrow 1/14/2020. Pt has home o2 at 7-8 liters, family transports home. Kisha TABARES

## 2020-01-14 NOTE — DISCHARGE SUMMARY
Discharge Summary    Admit date: 1/6/2020    Discharge date and time: No discharge date for patient encounter. Admitting Physician: Rebeca Montiel DO     Consultants: Pulmonology    Admission Diagnoses:  ACUTE ON CHRONIC HYPOXIC RESPIRATORY FAILURE    Discharge Diagnoses:   1. Acute on chronic hypoxic respiratory failure  2. Acute exacerbation COPD  3. Enlarging right lower lung lung mass  4. History of hypertension  5. History of paroxysmal atrial fibrillation on Eliquis  6. UTI-pseudomonas aeruginosa  7. History of benign essential tremor  8. History of constipation  9. Moderate protein calorie malnutrition  10. Constipation    Hospital Course: The patient is a 81 y. o. male who presents with having increased shortness of breath at home.  Symptoms started about 3 days ago. Flavia Cogan was not getting any better at home even after putting himself on a continuous albuterol aerosol treatment.  In the ER patient was given DuoNeb aerosol and was also given IV steroids in the ambulance. Flavia Cogan says he started feeling little bit better.  Chest x-ray suggested a new 3.1 cm rounded mass in the right lower lung.  With the above problem patient put in for further evaluation.  Patient denies any chest pain, palpitation, dizziness or syncope. Krissy Cedar is no nausea vomiting.     1/7/2020  Omer Higuera was seen and examined at bedside today; family was present for the examination. No acute overnight events were noted. He feels that his breathing has improved and he is at his baseline O2 usage. He is anxious for discharge. Awaiting pulmonary recommendations.    1/8/2020  Experienced respiratory distress this morning and was placed on BiPAP. On my examination @5453 he has taken BiPAP off. NC O2 was replaced and he is at 97%. He states he took off BiPAP because he needed a break. He says his breathing feels much better now. CT showing mass suspicious for malignancy.      1/9/2020  Seen and examined on IM.  Seen by pulmonology yesterday and is agreeable with biopsy by interventional radiologist. On 8L NC @97%. Did not require BiPAP after yesterday.     1/10/20  Patient seen and examined on IMCU. Lung biopsy cancelled for today. CT chest ordered to evaluate for pneumothorax     1/11/2020  Seen and examined on [facility]. Patient denies any chest pain or any significant abdominal pain but does state he has some fullness or bloating. Patient's breathing is about at its baseline according to the patient. Patient does complain of some mild right upper quadrant abdominal pain on palpation. Abdomen otherwise seems soft and no significant distention. Nursing states patient had multiple bowel movements over the last couple days but patient still claiming that he is constipated and has been asking for a Dulcolax suppository for the last 3 days. Patient still thinks he has some stool in there and is requesting milk of mag x1. Pulmonary note reviewed. Temperature ranges from 97.7-100. Heart rate 66 with a blood pressure 115/60. O2 saturation 95% on 8 L high flow nasal cannula. CT scan again showed a 2.2 cm low attenuating lesion in the right lung base.     1/12/2020  Patient seen and examined on [facility]. Patient feels little bit better with good results from MOM. Patient's wife is at the bedside and case discussed. Hemoglobin is 8.7 with BUN/creatinine 45/1.1. Temperature is 97.7 with a heart rate of 70 and blood pressure ranging 108//56. O2 sats 94% on 7 L.     1/13/2020  Patient seen and examined on IMCU. No acute complaints. 97% on 7 L NCO2. Patient hypotensive this am but asymptomatic. 1/14/2020  Patient seen and examined on IMCU. No acute complaints. Today will be 5th day of antibiotics. Case discussed with patient's wife at the bedside. The patient and the patient's wife is ready to go home. He denies any chest pain, unusual shortness of breath. Patient is stable for discharge.     Discharge Exam:  General Appearance: minutes.     Trice Dunne DO PGYIII  12:35 PM  1/14/2020

## 2020-02-06 NOTE — ED PROVIDER NOTES
Neurological:      Mental Status: He is alert and oriented to person, place, and time. Cranial Nerves: No cranial nerve deficit. Procedures     MDM     ED Course as of Feb 06 1539   Thu Feb 06, 2020   1151 Patient now 98% on 4 L nasal cannula, not in any respiratory distress, did not feel like the breathing treatment helped him.    [SO]   1152 Family reports that the patient fell several days ago, complaining of pain to the back side, will order x-rays rule out fractures    [SO]   1336 Patient 95% on his nasal cannula oxygen. States that he feels fine his breathing is fine. He does want to go home, does not want to be admitted to the hospital this time. Family is requesting a theophylline level be drawn as the pulmonologist has requested it.    [SO]   [id number] Patient requesting to go home, does not want stay in the hospital.  Spoke with the patient spouse as well who also agrees him taking him home. 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 He will return to the ED for any problems or worsening, follow-up with family doctor also. I did speak with the family physician regarding the patient being in the emergency department today.    [SO]      ED Course User Index  [SO] Dinorah Ask, DO        EKG: Sinus bradycardia with some ectopy, leftward axis, nonspecific ST changes, some T wave abnormalities as well. ED Course as of Feb 06 1539   Thu Feb 06, 2020   1151 Patient now 98% on 4 L nasal cannula, not in any respiratory distress, did not feel like the breathing treatment helped him.    [SO]   1152 Family reports that the patient fell several days ago, complaining of pain to the back side, will order x-rays rule out fractures    [SO]   1336 Patient 95% on his nasal cannula oxygen. States that he feels fine his breathing is fine. He does want to go home, does not want to be admitted to the hospital this time.   Family is requesting a theophylline level be drawn as the pulmonologist has requested it.    [SO]   [id number] Patient requesting to go home, does not want stay in the hospital.  Spoke with the patient spouse as well who also agrees him taking him home. 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 He will return to the ED for any problems or worsening, follow-up with family doctor also. I did speak with the family physician regarding the patient being in the emergency department today.    [SO]      ED Course User Index  [SO] Brijesh Johnson, DO       --------------------------------------------- PAST HISTORY ---------------------------------------------  Past Medical History:  has a past medical history of COPD (chronic obstructive pulmonary disease) ([facility] [address] History of cardiovascular stress test, Hyperlipidemia, Hypertension, Kidney stone, Pulmonary fibrosis ([facility] [address] and Thyroid disease. Past Surgical History:  has a past surgical history that includes Inguinal hernia repair (1970s bilateral); Inguinal hernia repair (1990s bilateral); Cystocopy (nov 2012); Cataract removal with implant; eye surgery; Inguinal hernia repair (Right, sept 2014); Abdominal aortic aneurysm repair (09/26/2016); and Balloon angioplasty, artery (Left). Social History:  reports that he quit smoking about 16 years ago. His smoking use included cigarettes. He has a 84.00 pack-year smoking history. He has never used smokeless tobacco. He reports current alcohol use of about 1.0 standard drinks of alcohol per week. He reports that he does not use drugs. Family History: family history includes Heart Disease in his father; Kidney Disease in his mother. The patients home medications have been reviewed.     Allergies: Flomax [tamsulosin hcl]    -------------------------------------------------- RESULTS -------------------------------------------------  Labs:  Results for orders placed or performed during the NC- 4 L     Date Of Collection      Time Collected      Pt Temp 37.0 C     ID [id number]     Lab [id number]     Critical(s) Notified . No Critical Values    Urinalysis   Result Value Ref Range    Color, UA Yellow Straw/Yellow    Clarity, UA Clear Clear    Glucose, Ur Negative Negative mg/dL    Bilirubin Urine Negative Negative    Ketones, Urine Negative Negative mg/dL    Specific Gravity, UA 1.020 1.005 - 1.030    Blood, Urine Negative Negative    pH, UA 6.0 5.0 - 9.0    Protein, UA Negative Negative mg/dL    Urobilinogen, Urine 0.2 <2.0 E.U./dL    Nitrite, Urine Negative Negative    Leukocyte Esterase, Urine TRACE (A) Negative   Microscopic Urinalysis   Result Value Ref Range    WBC, UA 2-5 0 - 5 /HPF    RBC, UA 0-1 0 - 2 /HPF    Bacteria, UA FEW (A) /HPF    Crystals Few    EKG 12 Lead   Result Value Ref Range    Ventricular Rate 54 BPM    Atrial Rate 54 BPM    P-R Interval 152 ms    QRS Duration 94 ms    Q-T Interval 378 ms    QTc Calculation (Bazett) 358 ms    P Axis 37 degrees    R Axis -15 degrees    T Axis 111 degrees       Radiology:  XR LUMBAR SPINE (2-3 VIEWS)   Final Result   Arthritis with no acute findings. XR PELVIS (1-2 VIEWS)   Final Result   Arthritis with no acute findings. XR CHEST PORTABLE   Final Result   No active pulmonary disease.          ------------------------- NURSING NOTES AND VITALS REVIEWED ---------------------------  Date / Time Roomed:  2/6/2020 10:31 AM  ED Bed Assignment:  02/02    The nursing notes within the ED encounter and vital signs as below have been reviewed.    BP (!) 105/53   Pulse 67   Temp 98.3 °F (36.8 °C) (Oral)   Resp 26   Ht 5' 6\" (1.676 m)   Wt 118 lb (53.5 kg)   SpO2 96%   BMI 19.05 kg/m²   Oxygen Saturation Interpretation: Normal      ------------------------------------------ PROGRESS NOTES ------------------------------------------  I have spoken with the patient and spouse and discussed todays results, in addition to providing specific details for the plan of care and counseling regarding the diagnosis and prognosis. Their questions are answered at this time and they are agreeable with the plan. I discussed at length with them reasons for immediate return here for re evaluation. They will followup with primary care by calling their office tomorrow. --------------------------------- ADDITIONAL PROVIDER NOTES ---------------------------------  At this time the patient is without objective evidence of an acute process requiring hospitalization or inpatient management. They have remained hemodynamically stable throughout their entire ED visit and are stable for discharge with outpatient follow-up. The plan has been discussed in detail and they are aware of the specific conditions for emergent return, as well as the importance of follow-up. New Prescriptions    No medications on file       Diagnosis:  1. COPD exacerbation ([facility] [address]        Disposition:  Patient's disposition: Discharge to home  Patient's condition is stable.          Alex Richardson DO  02/06/20 1539

## 2020-02-06 NOTE — TELEPHONE ENCOUNTER
Attempted to contact patient to inform of ED f/u appointment scheduled with PCP. No answer and VM left.

## 2020-02-12 NOTE — PROGRESS NOTES
Pharmacy Note    Spenser Rush was ordered Saw Palmetto 450 mg. As per the [address] [place] [address], herbals and certain dietary supplements will be discontinued. The herbal or dietary supplement may be continued after discharge from the hospital.  Chelsie Oliver.

## 2020-02-12 NOTE — H&P
Department of Internal Medicine        CHIEF COMPLAINT: Shortness of breath    Reason for Admission: Large right pneumothorax    HISTORY OF PRESENT ILLNESS:      The patient is a 81 y.o. male who presents with increased shortness of breath at home. Patient was recently discharged. 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 Patient symptoms started the day of admission on 2/11. He has a nonproductive cough but denies any fever and chills or chest pain. Initially chest x-ray was not read out as pneumothorax but a reread this morning showed large right pneumothorax. Case discussed with the patient's wife and daughter at the bedside. Patient admitted for further evaluation. Past Medical History:    Past Medical History:   Diagnosis Date    COPD (chronic obstructive pulmonary disease) ([address]     stable per pt    History of cardiovascular stress test 4/11/2012    Lexiscan    Hyperlipidemia     Hypertension 9-8-2014    lexiscan stress test    Kidney stone     Pulmonary fibrosis ([address]     Thyroid disease      Past Surgical History:    Past Surgical History:   Procedure Laterality Date    ABDOMINAL AORTIC ANEURYSM REPAIR  09/26/2016    BALLOON ANGIOPLASTY, ARTERY Left     Cerebral artery stenosis with angioplasty and stent    CATARACT REMOVAL WITH IMPLANT      right, left    CYSTOSCOPY  nov 2012    retro ureteroscopy stone manipulation and insertion of j stent    EYE SURGERY      cataracts-bilat    INGUINAL HERNIA REPAIR  1970s bilateral    INGUINAL HERNIA REPAIR  1990s bilateral    INGUINAL HERNIA REPAIR Right sept 2014    laparoscopic       Medications Prior to Admission:    @  Prior to Admission medications    Medication Sig Start Date End Date Taking?  Authorizing Provider   predniSONE (DELTASONE) 10 MG tablet Take 3 tablets by mouth daily In the morning with breakfast 1/14/20  Yes Cam Sylvester,    brimonidine (ALPHAGAN) 0.2 % ophthalmic solution Place 1 drop into Differential:    Lab Results   Component Value Date    WBC 7.3 02/12/2020    RBC 2.87 02/12/2020    HGB 9.1 02/12/2020    HCT 29.2 02/12/2020     02/12/2020    .7 02/12/2020    MCH 31.7 02/12/2020    MCHC 31.2 02/12/2020    RDW 14.4 02/12/2020    NRBC 0.9 10/01/2019    SEGSPCT 59 12/30/2013    LYMPHOPCT 11.2 02/12/2020    MONOPCT 11.1 02/12/2020    BASOPCT 0.5 02/12/2020    MONOSABS 0.81 02/12/2020    LYMPHSABS 0.82 02/12/2020    EOSABS 0.09 02/12/2020    BASOSABS 0.04 02/12/2020     CMP:    Lab Results   Component Value Date     02/12/2020    K 4.4 02/12/2020    K 3.8 01/06/2020     02/12/2020    CO2 31 02/12/2020    BUN 43 02/12/2020    CREATININE 1.1 02/12/2020    GFRAA >60 02/12/2020    LABGLOM >60 02/12/2020    GLUCOSE 121 02/12/2020    GLUCOSE 110 02/15/2012    PROT 5.6 02/12/2020    LABALBU 3.3 02/12/2020    LABALBU 4.1 02/15/2012    CALCIUM 8.2 02/12/2020    BILITOT <0.2 02/12/2020    ALKPHOS 116 02/12/2020    AST 15 02/12/2020    ALT 17 02/12/2020     Magnesium:    Lab Results   Component Value Date    MG 2.7 01/12/2020     Phosphorus:    Lab Results   Component Value Date    PHOS 3.9 01/07/2020     PT/INR:    Lab Results   Component Value Date    PROTIME 12.5 01/10/2020    INR 1.1 01/10/2020     Troponin:    Lab Results   Component Value Date    TROPONINI <0.01 02/11/2020     U/A:    Lab Results   Component Value Date    COLORU Yellow 02/06/2020    PROTEINU Negative 02/06/2020    PHUR 6.0 02/06/2020    WBCUA 2-5 02/06/2020    RBCUA 0-1 02/06/2020    RBCUA 10-20 11/28/2012    BACTERIA FEW 02/06/2020    CLARITYU Clear 02/06/2020    SPECGRAV 1.020 02/06/2020    LEUKOCYTESUR TRACE 02/06/2020    UROBILINOGEN 0.2 02/06/2020    BILIRUBINUR Negative 02/06/2020    BLOODU Negative 02/06/2020    GLUCOSEU Negative 02/06/2020     ABG:    Lab Results   Component Value Date    PH 7.366 02/11/2020    PCO2 63.6 02/11/2020    PO2 61.8 02/11/2020    HCO3 35.6 02/11/2020    BE 8.6 02/11/2020    O2SAT 91.1 02/11/2020     HgBA1c:    Lab Results   Component Value Date    LABA1C 6.2 03/25/2016     FLP:    Lab Results   Component Value Date    TRIG 66 07/26/2018    HDL 79 07/26/2018    LDLCALC 147 07/26/2018    LABVLDL 13 07/26/2018     TSH:    Lab Results   Component Value Date    TSH 0.531 10/01/2019     IRON:    Lab Results   Component Value Date    IRON 30 01/13/2020     LIPASE:  No results found for: LIPASE    ASSESSMENT AND PLAN:      Patient Active Problem List    Diagnosis Date Noted    Severe hypoxemia 09/02/2018     Priority: High     Class: Chronic    COPD, severe ([address] 07/29/2016     Priority: High     Class: Chronic    Cor pulmonale, chronic (HCC) 07/03/2013     Priority: High     Class: Chronic    Acquired bronchiectasis ([address] 07/29/2016     Priority: Medium     Class: Chronic    Secondary adrenal insufficiency (HCC) 03/11/2016     Priority: Medium     Class: Chronic    Hyperlipidemia      Priority: Medium     Class: Chronic    Multiple thyroid nodules 07/06/2014     Priority: Low     Class: Chronic    Bradycardia, sinus 01/11/2014     Priority: Low     Class: Chronic    Hypertension      Priority: Low    Respiratory distress 01/06/2020    Moderate protein-calorie malnutrition ([address] 12/16/2019    Acute on chronic respiratory failure with hypoxia ([address] 12/13/2019    Acute urinary retention 10/28/2019    Atelectasis of right lung 10/28/2019    Community acquired bacterial pneumonia 10/16/2019    Paroxysmal atrial fibrillation ([address] 10/16/2019    Hypokalemia 10/16/2019    Pulmonary fibrosis ([address] 10/16/2019    Essential tremor 10/16/2019    BPH (benign prostatic hyperplasia) 10/16/2019    Constipation 10/16/2019    History of cerebral aneurysm repair 10/05/2019    History of endovascular stent graft for abdominal aortic aneurysm 10/05/2019    Paroxysmal atrial fibrillation with rapid ventricular response ([address] 10/05/2019    Respiratory failure ([address] 09/30/2019    Dependent edema 09/22/2019    Thyroid disease     Hyperlipidemia     COPD (chronic obstructive pulmonary disease) (HCC)     Ventricular tachyarrhythmia (HCC) 03/28/2019    Chronic respiratory failure with hypoxia and hypercapnia (HCC) 08/06/2017     Class: Chronic    Status post repair of abdominal aortic aneurysm (AAA) using straight graft 11/30/2016     Class: Acute    Hypertension 09/08/2014    Nephrolithiasis 11/29/2012     1. Acute on chronic hypoxic respiratory failure  2. Right pneumothorax  3. History of severe COPD on chronic O2  4. History of paroxysmal atrial fib on Eliquis  5. History of constipation  6. History of hypertension  7.   Moderate protein caloric malnutrition      Plan:  Chest tube inserted in the ER  Consult pulmonology  Home meds reviewed  Routine labs  Adjust pain medication      Ekta Connell D.O.  2/12/2020  9:13 AM

## 2020-02-12 NOTE — ED NOTES
Bed: H4  Expected date:   Expected time:   Means of arrival:   Comments:  Christopher Valencia, RN  02/11/20 [id number]

## 2020-02-12 NOTE — ED PROVIDER NOTES
7:50 AM  I received this patient at sign out from Dr. Clem Kocher. I have discussed the patient's initial exam, treatment and plan of care with the out going physician. I have introduced my self to the patient / family and have answered their questions to this point. I have examined the patient myself and reviewed ordered tests / medications and  reviewed any available results to this point. If a resident is involved in the Emergency Department care, I have discussed my findings and plan with them as well. I took a call from radiology on the discrepancy on the chest x-ray read, I was given a brief signout as the patient was not admitted patient awaiting a bed. Radiology informs me there was a 50% pneumothorax on the right. Discussion held, I question blood, they state absolutely not this is a pneumothorax. Patient does have a history of pneumothorax. I examined the patient who is in the bed on BiPAP slightly tachycardic with diminished breath sounds throughout. Trachea midline. He is awake and alert and able to converse with me, I informed him there is a pneumothorax and I discussed the risks and benefits of a chest tube, he consents to the chest tube. I do then talk to his wife who is power of  via telephone whom I have spoken with in the past on his previous visits. I discussed with her reinsertion of a chest tube similar to what he had last time although larger this time, she verbally consents. His daughter then presents to the ER prior to administration of the chest tube and consents verbally. PROCEDURE  2/12/20       Time: 0730    CHEST TUBE INSERTION  Risks, benefits and alternatives (for applicable procedures below) described. Performed By: Mitzi Torres DO. Indication:  pneumothorax. Informed consent: Patient provided verbal consent and marked on the consent form, I discussed with the wife the power of  via telephone who also consented. Procedure:  The right side was prepped with betadine and draped in a sterile fashion. Local anesthesia  obtained by infiltration using 1% Lidocaine without epinephrine. A 20 K[place] sized chest tube was inserted at the 4th intercostal space laterally in the midaxillary line. .    Chest tube output:  air. Post Procedure Xray: which showed good tube position. Procedural Complications:  None. Patient tolerated the procedure well. Chest x-ray result read by radiologist shows resolution of the pneumothorax after chest tube placement      7:58 AM  Patient sitting up in the bed talking with family on BiPAP. Heart rate 76. Other vital signs unremarkable. No distress.       Diagnosis:  Severe COPD exacerbation  Right-sided pneumothorax  Chest thoracostomy    Disposition:  Admission stable condition to [employer], DO  02/12/20 [id number]       Deepali Irene, DO  02/12/20 [id number]

## 2020-02-13 PROBLEM — E43 SEVERE PROTEIN-CALORIE MALNUTRITION (HCC): Status: ACTIVE | Noted: 2020-01-01

## 2020-02-13 NOTE — PLAN OF CARE
Problem: Falls - Risk of:  Goal: Will remain free from falls  Description  Will remain free from falls  2/13/2020 1244 by Levi Velarde RN  Outcome: Met This Shift     Problem: Falls - Risk of:  Goal: Absence of physical injury  Description  Absence of physical injury  2/13/2020 1244 by Levi Velarde RN  Outcome: Met This Shift     Problem: Risk for Impaired Skin Integrity  Goal: Tissue integrity - skin and mucous membranes  Description  Structural intactness and normal physiological function of skin and  mucous membranes.   2/13/2020 1244 by Levi Velarde RN  Outcome: Met This Shift     Problem: Pain:  Goal: Pain level will decrease  Description  Pain level will decrease  Outcome: Met This Shift     Problem: Pain:  Goal: Control of acute pain  Description  Control of acute pain  Outcome: Met This Shift     Problem: Pain:  Goal: Control of chronic pain  Description  Control of chronic pain  Outcome: Met This Shift

## 2020-02-13 NOTE — PROGRESS NOTES
Room #:   9824/4791-16  Date: 2020       Patient Name: Kahlil Anthony  : 1935      MRN: [id number]   Referring Provider: Garry Pearson DO    Patient unavailable for physical therapy eval due to spouse states patient finally comfortable and resting. Requests to not awaken and check back tomorrow. Will attempt PT evaluation at a later time. Thank you.        Martin Gambino, PT

## 2020-02-13 NOTE — PROGRESS NOTES
WBCUA 2-5 02/06/2020    RBCUA 0-1 02/06/2020    RBCUA 10-20 11/28/2012    BACTERIA FEW 02/06/2020    CLARITYU Clear 02/06/2020    SPECGRAV 1.020 02/06/2020    LEUKOCYTESUR TRACE 02/06/2020    UROBILINOGEN 0.2 02/06/2020    BILIRUBINUR Negative 02/06/2020    BLOODU Negative 02/06/2020    GLUCOSEU Negative 02/06/2020     ABG:    Lab Results   Component Value Date    PH 7.366 02/11/2020    PCO2 63.6 02/11/2020    PO2 61.8 02/11/2020    HCO3 35.6 02/11/2020    BE 8.6 02/11/2020    O2SAT 91.1 02/11/2020     HgBA1c:    Lab Results   Component Value Date    LABA1C 6.2 03/25/2016     FLP:    Lab Results   Component Value Date    TRIG 66 07/26/2018    HDL 79 07/26/2018    LDLCALC 147 07/26/2018    LABVLDL 13 07/26/2018     TSH:    Lab Results   Component Value Date    TSH 0.531 10/01/2019     IRON:    Lab Results   Component Value Date    IRON 30 01/13/2020     LIPASE:  No results found for: LIPASE    ASSESSMENT AND PLAN:      Patient Active Problem List    Diagnosis Date Noted    Severe hypoxemia 09/02/2018     Priority: High     Class: Chronic    COPD, severe ([address] 07/29/2016     Priority: High     Class: Chronic    Cor pulmonale, chronic (HCC) 07/03/2013     Priority: High     Class: Chronic    Acquired bronchiectasis ([facility] 07/29/2016     Priority: Medium     Class: Chronic    Secondary adrenal insufficiency (HCC) 03/11/2016     Priority: Medium     Class: Chronic    Hyperlipidemia      Priority: Medium     Class: Chronic    Multiple thyroid nodules 07/06/2014     Priority: Low     Class: Chronic    Bradycardia, sinus 01/11/2014     Priority: Low     Class: Chronic    Hypertension      Priority: Low    Respiratory distress 01/06/2020    Moderate protein-calorie malnutrition ([address] 12/16/2019    Acute on chronic respiratory failure with hypoxia ([facility] 12/13/2019    Acute urinary retention 10/28/2019    Atelectasis of right lung 10/28/2019    Community acquired bacterial pneumonia 10/16/2019    Paroxysmal atrial fibrillation ([address] 10/16/2019    Hypokalemia 10/16/2019    Pulmonary fibrosis ([address] 10/16/2019    Essential tremor 10/16/2019    BPH (benign prostatic hyperplasia) 10/16/2019    Constipation 10/16/2019    History of cerebral aneurysm repair 10/05/2019    History of endovascular stent graft for abdominal aortic aneurysm 10/05/2019    Paroxysmal atrial fibrillation with rapid ventricular response ([address] 10/05/2019    Respiratory failure ([address] 09/30/2019    Dependent edema 09/22/2019    Thyroid disease     Hyperlipidemia     COPD (chronic obstructive pulmonary disease) (HCC)     Ventricular tachyarrhythmia ([address] 03/28/2019    Chronic respiratory failure with hypoxia and hypercapnia ([facility]) 08/06/2017     Class: Chronic    Status post repair of abdominal aortic aneurysm (AAA) using straight graft 11/30/2016     Class: Acute    Hypertension 09/08/2014    Nephrolithiasis 11/29/2012     1. Acute on chronic hypoxic respiratory failure  2. Right pneumothorax  3. History of severe COPD on chronic O2  4. History of paroxysmal atrial fib on Eliquis  5. History of constipation  6. History of hypertension  7.   Moderate protein caloric malnutrition      Plan:  Chest tube inserted in the ER  Consult pulmonology  Home meds reviewed  Routine labs  Adjust pain medication      Valentin Powers D.O.  2/13/2020  3:00 PM

## 2020-02-13 NOTE — CONSULTS
94%-95% but not lowering  his oxygen below 4 liters flow. At 5 feet 6 inches and 118 pounds, his  BMI of 19 is considered underweight. 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 His speech is  clear, so he can eat if he wishes but he does not eat a lot. He picks  but we can see that he has lost a lot of weight and a very marasmic  appearance to him at this time. Now the lung on the left has good  breath sounds at +2/4 in the upper and lower lung fields. The right  lung is diminished to +1/4 but air exchange is present on both lungs. There is persistent air leak in the thoraseal that is noted to be persistent  and continuous. With regards to the pain, he experiences pain in the  right back shoulder area, the interscapular region on the right and that  is related to the placement of that right chest thoracostomy tube and so  pain medications were prescribed. I did give him Neurontin here to add  onto his narcotic analgesic and his NSAID therapy that he is prescribed. His heart is sinus rhythm. Belly is soft. Legs are without much in the  way of edema at this point and his skin is warm, dry. He does have that  persistent tremor. Cognitively, he is alert and responds appropriately  to questions. ASSESSMENT:  At this time, my assessment on the patient here is an  exacerbation of his advanced COPD with chronic respiratory failure,  complications of a recurrent secondary right spontaneous pneumothorax. RECOMMENDATIONS:  My recommendations tonight:  Because of his poor  appetite, I want him to have fluids to assure intravascular volume can  give him the best ventilation perfusion matching.   I also want him to  see if he can spontaneously ventilate with supplemental oxygen at the  lowest flow to maintain his O2 sats of 94%-95% and only use noninvasive  positive pressure ventilatory

## 2020-02-13 NOTE — PROGRESS NOTES
Pulmonary Visit:    BP (!) 100/56   Pulse 64   Temp 98.3 °F (36.8 °C) (Axillary)   Resp 18   Ht 5' 6\" (1.676 m)   Wt 118 lb (53.5 kg)   SpO2 96%   BMI 19.05 kg/m²     S:On rounds today,I spoke with his wife and she states Georgi Cm is comfortable and pain from chest thoracotomy tube in right upper thorax is controlled. He has been sleeping most of the day and did feel we can reduce Neurontin to 2x/day and monitor pain control with today's modification. Wife states Georgi Cm ate a blizzard shake today. O:CXR is improved today with improved right lung volume but still with persistent air leak noted. Breath sounds appreciated on auscultation +1/4 right and left lung but diminished in right base. Hemoglobin at 8.2 g/dl today and discussed with family that if hemoglobin < 8 g/dl with type and cross for 2 units PRBC.   A: Convalescing right lung secondary spontaneous pneumothorax complicating advanced COPD with Chronic Respiratory Failure with A?C Anemia  P: If Persistent air leak continues without improvement over next few days, will consult thoracic surgery()       H&H in am and if <8 G/DL will require transfusion        Lowered Neurontin frequency to 300mg 2x/day and monitor control of pain with reduce frequency in am

## 2020-02-13 NOTE — CARE COORDINATION
2-13-Cm note: met with patient and his wife Mesha Bran, pt is active with [address] , pt has home oxygen 6l , also has Trilogy machine. Pt owns a walker and has an adjustable bed. His wife states she doesn't want Physical therapy right away when they go home because he will be too exhausted from being in the hospital, plan is to return home with [facility] , wife says she will provide transportation home. SS/CM will continue to follow throughout his hospital stay should any needs arise.  Thank you, Electronically signed by Timothy Gomez RN on 2/13/2020 at 2:31 PM

## 2020-02-14 NOTE — PLAN OF CARE
Problem: Falls - Risk of:  Goal: Will remain free from falls  Description  Will remain free from falls  Outcome: Met This Shift  Goal: Absence of physical injury  Description  Absence of physical injury  Outcome: Met This Shift     Problem: Risk for Impaired Skin Integrity  Goal: Tissue integrity - skin and mucous membranes  Description  Structural intactness and normal physiological function of skin and  mucous membranes. Outcome: Met This Shift     Problem: Malnutrition  (NI-5.2)  Goal: Food and/or Nutrient Delivery  Description  Individualized approach for food/nutrient provision.   2/13/2020 1922 by Macy Espinal RD, LD  Outcome: Met This Shift        Patient was turned every 2 hours to maintain skin integrity

## 2020-02-14 NOTE — PROGRESS NOTES
Pulmonary Visit:    /72   Pulse 56   Temp 97.7 °F (36.5 °C) (Oral)   Resp 16   Ht 5' 6\" (1.676 m)   Wt 118 lb (53.5 kg)   SpO2 97%   BMI 19.05 kg/m²     S: Hassie Riding still requesting pain relief in upper shoulders and probably from right chest tube and will adjust pain medication(Increased Neurontin dose to 400mg 2x/day and placed Ultram 100mg 2x/day until chest tube removed. O: Weak and breathless with poor appetite and blood loss in right chest cavity with drop in hemoglobin to 8 g/dl and with very advanced COPD with Chronic respiratory Failure requires blood transfusionto assist with end organ delivery of oxygen. Chest tube to negative pressure with air leak now only with deep breath. A: Convalescing acute secondary spontaneous right pneumothorax reexpanded with right closed chest thoracostomy tube and addressing pain from chest tube experienced in upper right shoulder and anemia from blood loss and poor nutrition. P:Modified pain therapy to relieve discomfort that Hassie Riding is experiencing.       Transfusing 2 units PRBC's        CXR in am

## 2020-02-14 NOTE — PROGRESS NOTES
Physical Therapy    Physical Therapy Initial Evaluation    Room #:  6031/7307-70  Patient Name: Josselyn Haney  YOB: 1935  MRN: [id number]    Referring Provider: Mariella Torres DO    Date of Service: 2/14/2020    Evaluating Physical Therapist:  Eben Deras, PT  Lic. # [id number]      Diagnosis:   Acute on chronic respiratory failure with hypoxia (HCC) [J96.21]  Acute on chronic respiratory failure with hypoxia (HCC) [J96.21]      S/p chest tube placement    Patient Active Problem List   Diagnosis    Hypertension    Hyperlipidemia    Nephrolithiasis    Cor pulmonale, chronic (HCC)    Bradycardia, sinus    Multiple thyroid nodules    Secondary adrenal insufficiency (HCC)    Acquired bronchiectasis (HCC)    COPD, severe (HCC)    Status post repair of abdominal aortic aneurysm (AAA) using straight graft    Chronic respiratory failure with hypoxia and hypercapnia (HCC)    Severe hypoxemia    Ventricular tachyarrhythmia (HCC)    Thyroid disease    Hypertension    Hyperlipidemia    COPD (chronic obstructive pulmonary disease) ([address]    Dependent edema    Respiratory failure (HCC)    Severe protein-calorie malnutrition (HCC)    History of cerebral aneurysm repair    History of endovascular stent graft for abdominal aortic aneurysm    Paroxysmal atrial fibrillation with rapid ventricular response ([address]    Community acquired bacterial pneumonia    Paroxysmal atrial fibrillation (HCC)    Hypokalemia    Pulmonary fibrosis (HCC)    Essential tremor    BPH (benign prostatic hyperplasia)    Constipation    Acute urinary retention    Atelectasis of right lung    Acute on chronic respiratory failure with hypoxia (HCC)    Respiratory distress        Tentative placement recommendation: Subacute rehab    Equipment recommendation:  To be determined      Prior Level of Function: Patient ambulated independently  wheeled walker and home o2  Rehab Potential: fair  for baseline    Past medical painful on left side and agrees to 1/2 hour and pain meds if possible    Treatment:  Patient practiced and was instructed in the following treatment:     Therapist educated and facilitated patient on techniques to increase safety and independence with bed mobility, balance, functional transfers, and functional mobility. Sat edge of bed 10 minutes with Minimal assist to increase dynamic sitting balance and activity tolerance. sit to stand and standing wt shift to encourage circulation and mm recruitment    At end of session, patient in bed with alarm call light and phone within reach,   all lines and tubes intact, nursing notified. Patient would benefit from continued skilled Physical Therapy to improve functional independence and quality of life. Patient's/ family goals   home        Patient and or family understand(s) diagnosis, prognosis, and plan of care. PLAN:    Physical Therapy care will be provided in accordance with the objectives noted above. Exercises and functional mobility practice will be used as well as appropriate assistive devices or modalities to obtain goals. Patient and family education will also be administered as needed. Frequency of treatments: Patient will be seen    daily  for therapeutic exercise, functional retraining, endurance activities, balance exercises, family and patient education. Time in  1100  Time out  1130    Total Treatment Time  30 minutes    Evaluation time includes thorough review of current medical information, gathering information on past medical history/social history and prior level of function, completion of standardized testing/informal observation of tasks, assessment of data, and development of Plan of care and goals. CPT codes:   Moderate Complexity PT evaluation (39770)  Therapeutic activities (28770)   23 minutes  2 unit(s)    Baron Rekha PT

## 2020-02-14 NOTE — PROGRESS NOTES
Physical Findings: abd WDL, +BS, +3 BLE edema, -1.3L I/O, generalized weakness, chest tube  · Wound Type: None  · Current Nutrition Therapies:  · Oral Diet Orders: General   · Oral Diet intake: 1-25%  · Oral Nutrition Supplement (ONS) Orders: None  · Anthropometric Measures:  · Ht: 5' 6\" (167.6 cm)   · Current Body Wt: 124 lb 8 oz (56.5 kg)(2/13 bed scale)  · Admission Body Wt: 118 lb (53.5 kg)(2/6/20 actual, per EMR )  · Usual Body Wt: 151 lb (68.5 kg)(5/2019 actual per EMR)  · % Weight Change:  ,  22% wt loss in 9 months  · Ideal Body Wt: 142 lb (64.4 kg), % Ideal Body 83%, using admit wt d/t current fluid status  · BMI Classification: BMI 18.5 - 24.9 Normal Weight    Nutrition Interventions:   Continue current diet, Start ONS(Per pt's wife pt enjoys Magic Cup and may drink 1 Ensure [place] daily.  Will order Ensure Enlive once daily, Magic Cup TID, and pt's wife stated she will bring Boost from home as pt enjoys these)  Continued Inpatient Monitoring, Education Initiated(Discussed small frequent meals with high calorie and protein foods, encouraged pt's wife to bring food from home)    Nutrition Evaluation:   · Evaluation: Goals set   · Goals: Pt is to consume >50% of most meals/ONS    · Monitoring: Meal Intake, Supplement Intake, Diet Tolerance, Skin Integrity, I&O, Weight, Pertinent Labs, Monitor Bowel Function      Electronically signed by Xiomara Ray RD, LD on 2/13/20 at 7:22 PM    Contact Number: [phone]

## 2020-02-14 NOTE — PROGRESS NOTES
Occupational Therapy  Occupational Therapy Initial Assessment      Date:2020  Patient Name: Fabiola Fuller  MRN: [id number]  : 1935  Room: [address] [place] [address]      Evaluating OT: Guillermo Diehl #[id number]    Referring Physician: Dariela Jung DO    Recommendation for Placement: Subacute  Recommended Adaptive Equipment:  TBD   AM-PAC Daily Activity Raw Score:        Diagnosis: Respiratory Failure    Pertinent Medical History:    Past Medical History:   Diagnosis Date    COPD (chronic obstructive [facility] disease) [facility]     stable per pt    History of cardiovascular stress test 2012    Lexiscan    Hyperlipidemia     Hypertension 2014    lexiscan stress test    Kidney stone     Pulmonary fibrosis ([facility]     Thyroid disease       Precautions:  Falls, chest tube, vision impaired, kyphotic posture, o2     Home Living: Pt lives with spouse single family home, 2 story, 1 step to enter rail, walk-in shower.    Equipment owned: wheeled walker    Prior Level of Function: Needs assist with ADLs , and with IADLs; ambulated walker and assist    Pain Level: pt c/o of pain in R scapula; nursing aware   Cognition: A&O: 4/4; Follows 2 step directions   Memory:  good     Sequencing:  good     Problem solving:  good     Judgement/safety:  good       Functional Assessment:   Initial Eval Status  Date: 20 Treatment Status  Date: Short Term Goals  Treatment frequency: PRN 1-3x/week   Feeding Moderate Assist   Supervision    Grooming Dependent   Maximal Assist    UB Dressing Moderate Assist   Minimal Assist    LB Dressing Dependent   Moderate Assist    Bathing Dependent  Modified [place]    Toileting Maximal Assist   Moderate Assist    Bed Mobility  Supine to sit: Maximal Assist   Sit to supine: Maximal Assist   Supine to sit: Minimal Assist   Sit to supine: Minimal Assist    Functional Transfers Moderate Assist x2 for sit to stand at EOB x2 reps  Minimal Assist    Functional Mobility Moderate Assist x2 with walker for steps at bed side   Modified [place]    Balance Sitting:     Static:  Fair, posterior lean    Dynamic:fair  Standing: fair-     Activity Tolerance Poor, spo2 90's throughout session     Visual/  Perceptual Glasses: no, pt states that he is almost blind                   Strength ROM Additional Info:    RUE  2-/5 WFL good  and wfl FMC/dexterity noted during ADL tasks     LUE 2-/5  WFL good  and wfl FMC/dexterity noted during ADL tasks         Hearing:  [facility]   Sensation:   No c/o numbness or tingling   Tone:  WFL  Edema: none noted                            Comments: Upon arrival patient supine in bed. Pt educated on the role of Occupational Therapy. Returned to bed At end of session, patient  with call light and phone within reach, all lines and tubes intact. Overall patient demonstrated  decreased independence and safety during completion of ADL/functional transfer/mobility tasks. Pt would benefit from continued skilled OT to increase safety and independence with completion of ADL/IADL tasks for functional independence and quality of life. Treatment: OT facilitated, Bed Mobility, sitting balance at EOB for 10 minutes with Min A, transfer training with verbal cues for hand placement, static standing balance with Mod A x2, functional mobility with Mod A x2, in addition services provided include Instruction/training on safety and adapted techniques for completion of transfers, verbal cues for walker sequence and safety. Additional directional verbal cues provided d/t low vison, pt instructed on standing weight shifting ex's to increase overall strength and endurence Facilitated development of skills for increased participation for completion of ADLs/IADLs.      Eval Complexity: Low      Assessment of current deficits   Functional mobility [x]  ADLs [x] Strength [x]  Cognition []  Functional transfers  [x] IADLs [x] Safety Awareness [x]  Endurance [x]  Fine Motor Coordination [] Balance [x] Vision/perception [] Sensation []   Gross Motor Coordination [] ROM [] Delirium []                  Motor Control []    Plan of Care:   ADL retraining [x]   Equipment needs [x]   Neuromuscular re-education [x] Energy Conservation Techniques [x]  Functional Transfer training [x] Patient and/or Family Education [x]  Functional Mobility training [x]  Environmental Modifications [x]  Cognitive re-training []   Compensatory techniques for ADLs [x]  Splinting Needs []   Positioning to improve overall function [x]   Therapeutic Activity [x]  Therapeutic Exercise  [x]  Visual/Perceptual: []    Delirium prevention/treatment  []   Other:  []    Rehab Potential: Good for established goals     Patient / Family Goal: reduce pain      Patient and/or family were instructed diagnosis, prognosis/goals and plan of care. Demonstrated good understanding.       Treatment Time In:1102          Treatment Time Out: 0278               Treatment Charges: Mins Units   Ther Ex  92910     Manual Therapy [facility] [place] [id number] [phone]   ADL/Home Mgt [id number]     Neuro Re-ed 05220 3    Orthotic manage/training  40577     Total Timed Treatment 20 [facility] OTR/L [id number]

## 2020-02-14 NOTE — PROGRESS NOTES
Department of Internal Medicine        CHIEF COMPLAINT: Shortness of breath    Reason for Admission: Large right pneumothorax    HISTORY OF PRESENT ILLNESS:      The patient is a 81 y.o. male who presents with increased shortness of breath at home. Patient was recently discharged. 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 Patient symptoms started the day of admission on 2/11. He has a nonproductive cough but denies any fever and chills or chest pain. Initially chest x-ray was not read out as pneumothorax but a reread this morning showed large right pneumothorax. Case discussed with the patient's wife and daughter at the bedside. Patient admitted for further evaluation. 2/13/2020  Patient is seen and examined on [facility]. Patient's wife is at the bedside and case discussed. Case also discussed with pulmonary medicine at the bedside. Patient is doing fairly well considering but still having rib chest pain secondary to #2. Hemoglobin is down to 8.2 from admission 10.4 with normal liver enzymes and BMP is essentially unchanged. Patient currently on 6 L nasal cannula with O2 sat 96% at rest.  Vital signs are fairly stable with a blood pressure 100/56.    2/14/2020  Patient seen and examined on [facility]. Patient's wife is at the bedside and case discussed. Patient is still complaining of increasing pain in his left back area all the way from his upper back down to his mid left back. Massaging the area does not make it feel better. Patient states not necessarily worse with respirations. The 5 mg oxycodone is not helping the pain. Case discussed with Dr. Susanne Eng. Blood pressure ranges 98//72. O2 sat 95% on 5 L today. Patient's had good urinary output.     Past Medical History:    Past Medical History:   Diagnosis Date    COPD (chronic obstructive pulmonary disease) ([facility]     stable per pt    History of cardiovascular stress test 4/11/2012    Lexiscan    Hyperlipidemia     frequency. MSK: No extremity weakness, paralysis or paresthesias. PHYSICAL EXAM:    Vitals:  /72   Pulse 56   Temp 97.7 °F (36.5 °C) (Oral)   Resp 16   Ht 5' 6\" (1.676 m)   Wt 118 lb (53.5 kg)   SpO2 95%   BMI 19.05 kg/m²     General:  This is a 81 y.o. yo male who is alert and oriented in NAD  HEENT:  Head is normocephalic and atraumatic, PERRLA, EOMI, mucus membranes moist with no pharyngeal erythema or exudate. Neck:  Supple with no carotid bruits, JVD or thyromegaly.   No cervical adenopathy  CV:  Regular rate and rhythm, distant heart sounds, no murmurs  Lungs: Coarse Marked decreased breath sounds to auscultation bilaterally with no wheezes, rales or rhonchi  Abdomen:  Soft, nontender, nondistended, bowel sounds present  Extremities: Trace lower leg edema, peripheral pulses intact bilaterally  Neuro:  Cranial nerves II-XII grossly intact; motor and sensory function intact with no focal deficits  Skin:  No rashes, lesions or wounds    DATA:  CBC with Differential:    Lab Results   Component Value Date    WBC 7.6 02/13/2020    RBC 2.58 02/13/2020    HGB 8.1 02/14/2020    HCT 25.8 02/14/2020     02/13/2020    .6 02/13/2020    MCH 31.8 02/13/2020    MCHC 31.3 02/13/2020    RDW 14.1 02/13/2020    NRBC 0.9 10/01/2019    SEGSPCT 59 12/30/2013    LYMPHOPCT 19.8 02/13/2020    MONOPCT 12.4 02/13/2020    BASOPCT 0.5 02/13/2020    MONOSABS 0.94 02/13/2020    LYMPHSABS 1.50 02/13/2020    EOSABS 0.68 02/13/2020    BASOSABS 0.04 02/13/2020     CMP:    Lab Results   Component Value Date     02/13/2020    K 3.9 02/13/2020    K 3.8 01/06/2020    CL 98 02/13/2020    CO2 36 02/13/2020    BUN 36 02/13/2020    CREATININE 1.1 02/13/2020    GFRAA >60 02/13/2020    LABGLOM >60 02/13/2020    GLUCOSE 88 02/13/2020    GLUCOSE 110 02/15/2012    PROT 5.3 02/13/2020    LABALBU 3.2 02/13/2020    LABALBU 4.1 02/15/2012    CALCIUM 8.4 02/13/2020    BILITOT <0.2 02/13/2020    ALKPHOS 114 02/13/2020    AST medication      Gabe Cordova D.O.  2/14/2020  2:26 PM

## 2020-02-15 NOTE — PROGRESS NOTES
Pt's wife and daughter are present at bedside and concerned about patient being \"so sleepy\" and \"not his self. \" I updated family that patient was A&Ox4 and awake this morning, ate good, and took his medications with no issues. Family was concerned about patient being lethargic d/t pain medications and asked what medications I had given. I told them on my shift I have only given ultram and ibuprofen that is ordered straight. Patient had not received any PRN pain medication. Family demanded a repeat set of vitals and for Dr. Bergmna Mail to be called regarding their concern for his change in alertness. Vitals stable. Dr. Bergman Mail present and updated. Family requested that patient be reassigned to a new nurse. Nurse to nurse report given to Sybil Russell, [facility].

## 2020-02-15 NOTE — PROGRESS NOTES
Department of Internal Medicine        CHIEF COMPLAINT: Shortness of breath    Reason for Admission: Large right pneumothorax    HISTORY OF PRESENT ILLNESS:      The patient is a 81 y.o. male who presents with increased shortness of breath at home. Patient was recently discharged. 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 Patient symptoms started the day of admission on 2/11. He has a nonproductive cough but denies any fever and chills or chest pain. Initially chest x-ray was not read out as pneumothorax but a reread this morning showed large right pneumothorax. Case discussed with the patient's wife and daughter at the bedside. Patient admitted for further evaluation. 2/13/2020  Patient is seen and examined on [address]. Patient's wife is at the bedside and case discussed. Case also discussed with pulmonary medicine at the bedside. Patient is doing fairly well considering but still having rib chest pain secondary to #2. Hemoglobin is down to 8.2 from admission 10.4 with normal liver enzymes and BMP is essentially unchanged. Patient currently on 6 L nasal cannula with O2 sat 96% at rest.  Vital signs are fairly stable with a blood pressure 100/56.    2/14/2020  Patient seen and examined on [address]. Patient's wife is at the bedside and case discussed. Patient is still complaining of increasing pain in his left back area all the way from his upper back down to his mid left back. Massaging the area does not make it feel better. Patient states not necessarily worse with respirations. The 5 mg oxycodone is not helping the pain. Case discussed with Dr. Giles Nunez. Blood pressure ranges 98//72. O2 sat 95% on 5 L today. Patient's had good urinary output. 2/15/2020  Patient seen and examined on [address]. Multiple family members are at the bedside and case discussed. Patient is a lot weaker today than normally.   Patient is alert to himself and place but otherwise extremely weak and more lethargic. His last oxycodone was at 5:47 AM today. It had been discussed with patient's family on admission that if we increased his pain meds he could become more lethargic. His O2 saturation is good at 94% on his 5 L nasal cannula. The patient has been temperature is 98.6 with a heart rate 62 with respiratory rate of 16. Blood pressure ranges from 116//66. BUN/creatinine was 28/0.9 with potassium 4.0. Past Medical History:    Past Medical History:   Diagnosis Date    COPD (chronic obstructive pulmonary disease) ([facility]     stable per pt    History of cardiovascular stress test 4/11/2012    Lexiscan    Hyperlipidemia     Hypertension 9-8-2014    lexiscan stress test    Kidney stone     Pulmonary fibrosis ([facility] [address]     Thyroid disease      Past Surgical History:    Past Surgical History:   Procedure Laterality Date    ABDOMINAL AORTIC ANEURYSM REPAIR  09/26/2016    BALLOON ANGIOPLASTY, ARTERY Left     Cerebral artery stenosis with angioplasty and stent    CATARACT REMOVAL WITH IMPLANT      right, left    CYSTOSCOPY  nov 2012    retro ureteroscopy stone manipulation and insertion of j stent    EYE SURGERY      cataracts-bilat    INGUINAL HERNIA REPAIR  1970s bilateral    INGUINAL HERNIA REPAIR  1990s bilateral    INGUINAL HERNIA REPAIR Right sept 2014    laparoscopic       Medications Prior to Admission:    @  Prior to Admission medications    Medication Sig Start Date End Date Taking?  Authorizing Provider   predniSONE (DELTASONE) 10 MG tablet Take 3 tablets by mouth daily In the morning with breakfast 1/14/20  Yes Cam Sylvester, DO   brimonidine (ALPHAGAN) 0.2 % ophthalmic solution Place 1 drop into the right eye every 12 hours   Yes Historical Provider, MD   OXYGEN Inhale 6 L into the lungs continuous    Yes Historical Provider, MD   Bisacodyl (DULCOLAX PO) Take 1 tablet by mouth 2 times daily as needed (Upset Stomach)   Yes Historical Provider, MD   latanoprost (XALATAN) 0.005 % ophthalmic solution Place 1 drop into the left eye nightly    Yes Historical Provider, MD   influenza virus trivalent vaccine (FLUZONE) injection Inject 0.5 mLs into the muscle once Given 10/2019 @ hospital   Yes Historical Provider, MD   primidone (MYSOLINE) 50 MG tablet Take 5 tablets by mouth 2 times daily 12/19/19  Yes Cam Sylvester,    albuterol (PROVENTIL) (2.5 MG/3ML) 0.083% nebulizer solution Take 2.5 mg by nebulization every 4 hours as needed for Wheezing or Shortness of Breath   Yes Historical Provider, MD   ibuprofen (ADVIL;MOTRIN) 200 MG tablet Take 400 mg by mouth 3 times daily    Yes Historical Provider, MD   Saw [place] 450 MG CAPS Take 450 mg by mouth 2 times daily    Yes Historical Provider, MD   vitamin C (ASCORBIC ACID) 500 MG tablet Take 2,000 mg by mouth 2 times daily    Yes Historical Provider, MD   potassium chloride (KLOR-CON M) 20 MEQ TBCR extended release tablet Take 20 mEq by mouth 2 times daily   Yes Historical Provider, MD   glycopyrrolate-formoterol (Y[place] Deirdre) 9-4.8 MCG/ACT AERO Inhale 2 puffs into the lungs 2 times daily    Yes Historical Provider, MD   azithromycin (ZITHROMAX) 250 MG tablet Take 250 mg by mouth daily   Yes Historical Provider, MD   Multiple Vitamins-Minerals (PRESERVISION AREDS 2 PO) Take 1 tablet by mouth 2 times daily    Yes Historical Provider, MD   finasteride (PROSCAR) 5 MG tablet Take 1 tablet by mouth daily 10/11/19  Yes Gray Hansen, DO   pantoprazole (PROTONIX) 40 MG tablet Take 1 tablet by mouth every morning (before breakfast) 10/11/19  Yes Gray Hansen DO   metoprolol succinate (TOPROL XL) 25 MG extended release tablet Take 0.5 tablets by mouth daily 10/11/19  Yes Mary Tucker, APRN - CNP   theophylline (THEODUR) 300 MG extended release tablet Take 1 tablet by mouth daily 8/16/19  Yes Leon Marques,    valACYclovir (VALTREX) 500 MG tablet Take 500 mg by mouth daily   Yes Historical Provider, MD   montelukast (SINGULAIR) 10 MG tablet Take 10 mg by mouth daily   Yes Historical Provider, MD   furosemide (LASIX) 40 MG tablet Take 80 mg by mouth daily    Yes Historical Provider, MD   Cholecalciferol (VITAMIN D-3) 5000 UNITS TABS Take 1 tablet by mouth Twice a Week  & Wednesday   Yes Historical Provider, MD   NONFORMULARY Trilogy - ventilator at bedtime    Historical Provider, MD       Allergies:  Flomax [tamsulosin hcl]    Social History:   Social History     Socioeconomic History    Marital status:      Spouse name: Not on file    Number of children: Not on file    Years of education: Not on file    Highest education level: Not on file   Occupational History    Occupation: Physician     Comment: Retired   Social Needs    Financial resource strain: Not on file    Food insecurity:     Worry: Not on file     Inability: Not on file   [employer] needs:     Medical: Not on file     Non-medical: Not on file   Tobacco Use    Smoking status: Former Smoker     Packs/day: 2.00     Years: 42.00     Pack years: 84.00     Types: Cigarettes     Last attempt to quit: 2003     Years since quittin.9    Smokeless tobacco: Never Used   Substance and Sexual Activity    Alcohol use:  Yes     Alcohol/week: 1.0 standard drinks     Types: 1 Shots of liquor per week     Comment: daily    Drug use: No    Sexual activity: Not on file   Lifestyle    Physical activity:     Days per week: Not on file     Minutes per session: Not on file    Stress: Not on file   Relationships    Social connections:     Talks on phone: Not on file     Gets together: Not on file     Attends Hinduism service: Not on file     Active member of club or organization: Not on file     Attends meetings of clubs or organizations: Not on file     Relationship status: Not on file    Intimate partner violence:     Fear of current or ex partner: Not on file     Emotionally abused: Not on file     Physically abused: Not on file     Forced sexual activity: Not on file   Other Topics Concern    Not on file   Social History Narrative    Not on file       Family History:   Family History   Problem Relation Age of Onset    Heart Disease Father     Kidney Disease Mother        REVIEW OF SYSTEMS:    Gen: Patient denies any lightheadedness or dizziness. No LOC or syncope. No fevers or chills. HEENT: No earache, sore throat or nasal congestion. Resp: Denies cough, hemoptysis or sputum production. Cardiac: Denies chest pain, +SOB, no diaphoresis or palpitations. GI: No nausea, vomiting, diarrhea or constipation. No melena or hematochezia. : No urinary complaints, dysuria, hematuria or frequency. MSK: No extremity weakness, paralysis or paresthesias. PHYSICAL EXAM:    Vitals:  /66   Pulse 62   Temp 98.6 °F (37 °C) (Oral)   Resp 16   Ht 5' 6\" (1.676 m)   Wt 128 lb 14.4 oz (58.5 kg)   SpO2 95%   BMI 20.81 kg/m²     General:  This is a 81 y.o. yo male who is alert and oriented in NAD  HEENT:  Head is normocephalic and atraumatic, PERRLA, EOMI, mucus membranes moist with no pharyngeal erythema or exudate. Neck:  Supple with no carotid bruits, JVD or thyromegaly.   No cervical adenopathy  CV:  Regular rate and rhythm, distant heart sounds, no murmurs  Lungs: Coarse Marked decreased breath sounds to auscultation bilaterally with no wheezes, rales or rhonchi  Abdomen:  Soft, nontender, nondistended, bowel sounds present  Extremities: Trace lower leg edema, peripheral pulses intact bilaterally  Neuro:  Cranial nerves II-XII grossly intact; motor and sensory function intact with no focal deficits  Skin:  No rashes, lesions or wounds    DATA:  CBC with Differential:    Lab Results   Component Value Date    WBC 7.6 02/13/2020    RBC 2.58 02/13/2020    HGB 8.1 02/14/2020    HCT 25.8 02/14/2020     02/13/2020    .6 02/13/2020    MCH 31.8 02/13/2020    MCHC 31.3 02/13/2020    RDW 14.1 02/13/2020    NRBC 0.9 10/01/2019    SEGSPCT 59 12/30/2013    LYMPHOPCT 19.8 02/13/2020    MONOPCT 12.4 02/13/2020    BASOPCT 0.5 02/13/2020    MONOSABS 0.94 02/13/2020    LYMPHSABS 1.50 02/13/2020    EOSABS 0.68 02/13/2020    BASOSABS 0.04 02/13/2020     CMP:    Lab Results   Component Value Date     02/15/2020    K 4.0 02/15/2020    K 3.8 01/06/2020    CL 99 02/15/2020    CO2 33 02/15/2020    BUN 28 02/15/2020    CREATININE 0.9 02/15/2020    GFRAA >60 02/15/2020    LABGLOM >60 02/15/2020    GLUCOSE 96 02/15/2020    GLUCOSE 110 02/15/2012    PROT 5.3 02/13/2020    LABALBU 3.2 02/13/2020    LABALBU 4.1 02/15/2012    CALCIUM 8.2 02/15/2020    BILITOT <0.2 02/13/2020    ALKPHOS 114 02/13/2020    AST 14 02/13/2020    ALT 14 02/13/2020     Magnesium:    Lab Results   Component Value Date    MG 2.7 01/12/2020     Phosphorus:    Lab Results   Component Value Date    PHOS 3.9 01/07/2020     PT/INR:    Lab Results   Component Value Date    PROTIME 12.5 01/10/2020    INR 1.1 01/10/2020     Troponin:    Lab Results   Component Value Date    TROPONINI <0.01 02/11/2020     U/A:    Lab Results   Component Value Date    COLORU Yellow 02/06/2020    PROTEINU Negative 02/06/2020    PHUR 6.0 02/06/2020    WBCUA 2-5 02/06/2020    RBCUA 0-1 02/06/2020    RBCUA 10-20 11/28/2012    BACTERIA FEW 02/06/2020    CLARITYU Clear 02/06/2020    SPECGRAV 1.020 02/06/2020    LEUKOCYTESUR TRACE 02/06/2020    UROBILINOGEN 0.2 02/06/2020    BILIRUBINUR Negative 02/06/2020    BLOODU Negative 02/06/2020    GLUCOSEU Negative 02/06/2020     ABG:    Lab Results   Component Value Date    PH 7.366 02/11/2020    PCO2 63.6 02/11/2020    PO2 61.8 02/11/2020    HCO3 35.6 02/11/2020    BE 8.6 02/11/2020    O2SAT 91.1 02/11/2020     HgBA1c:    Lab Results   Component Value Date    LABA1C 6.2 03/25/2016     FLP:    Lab Results   Component Value Date    TRIG 66 07/26/2018    HDL 79 07/26/2018    LDLCALC 147 07/26/2018    LABVLDL 13 07/26/2018     TSH:    Lab Results   Component Value Date    TSH 0.531 10/01/2019     IRON:    Lab Results   Component Value Date    IRON 30 01/13/2020     LIPASE:  No results found for: LIPASE    ASSESSMENT AND PLAN:      Patient Active Problem List    Diagnosis Date Noted    Severe hypoxemia 09/02/2018     Priority: High     Class: Chronic    COPD, severe ([address] 07/29/2016     Priority: High     Class: Chronic    Cor pulmonale, chronic (HCC) 07/03/2013     Priority: High     Class: Chronic    Acquired bronchiectasis ([address] 07/29/2016     Priority: Medium     Class: Chronic    Secondary adrenal insufficiency (HCC) 03/11/2016     Priority: Medium     Class: Chronic    Hyperlipidemia      Priority: Medium     Class: Chronic    Multiple thyroid nodules 07/06/2014     Priority: Low     Class: Chronic    Bradycardia, sinus 01/11/2014     Priority: Low     Class: Chronic    Hypertension      Priority: Low    Severe protein-calorie malnutrition ([address] 02/13/2020    Respiratory distress 01/06/2020    Acute on chronic respiratory failure with hypoxia ([address] 12/13/2019    Acute urinary retention 10/28/2019    Atelectasis of right lung 10/28/2019    Community acquired bacterial pneumonia 10/16/2019    Paroxysmal atrial fibrillation ([address] 10/16/2019    Hypokalemia 10/16/2019    Pulmonary fibrosis ([address] 10/16/2019    Essential tremor 10/16/2019    BPH (benign prostatic hyperplasia) 10/16/2019    Constipation 10/16/2019    History of cerebral aneurysm repair 10/05/2019    History of endovascular stent graft for abdominal aortic aneurysm 10/05/2019    Paroxysmal atrial fibrillation with rapid ventricular response ([address] 10/05/2019    Respiratory failure ([address] 09/30/2019    Dependent edema 09/22/2019    Thyroid disease     Hyperlipidemia     COPD (chronic obstructive pulmonary disease) ([address]     Ventricular tachyarrhythmia ([address] 03/28/2019    Chronic respiratory failure with hypoxia and hypercapnia ([facility]) 08/06/2017     Class: Chronic    Status post repair of abdominal

## 2020-02-16 NOTE — FLOWSHEET NOTE
0947 Pt's O2 saturation dropped into low 80's on 6L O2. Pt became lethargic and SOB. Pt maintained mentation and BP. RRT called and at bedside. Pt placed on BiPap. O2 saturation in low 90's. Pt awaiting transfer to ICU. Will continue to monitor pt closely. 56 Pt's wife called and updated on 's condition and pending transfer to ICU.

## 2020-02-16 NOTE — CONSULTS
CRITICAL CARE CONSULT    81year old man with PMH of chronic respiratory failure (6L at home and Tr[facility] at [facility]), COPD, pulmonary fibrosis, hypertension, hyperlipidemia, essential tremor, COPD, thyroid disease, admitted to ICU for management of acute hypoxemic respiratory failure,  pneumothorax and pneumonia. Of note, the patient has had several pneumothoraces, has a chest tube on the right hemithorax. Today I was called to RRT as the patient became unresponsive and hypoxemic, he was started on BPAP 14/6 with improvement of the hypoxemia and mentation. CXR with small pneumothorax in the right base    Recent Labs     02/15/20  0659      K 4.0   CL 99   CO2 33*   BUN 28*   CREATININE 0.9   GLUCOSE 96   CALCIUM 8.2*     Recent Labs     02/16/20  0546   HGB 11.1*   HCT 34.8*     No results for input(s): BC in the last 72 hours. No results for input(s): Catina Ego in the last 72 hours.     24 HR INTAKE/OUTPUT:      Intake/Output Summary (Last 24 hours) at 2/16/2020 1617  Last data filed at 2/16/2020 0826  Gross per 24 hour   Intake 781.67 ml   Output 1707 ml   Net -925.33 ml     MEDICATIONS:   cefepime  1 g Intravenous Q12H    vancomycin  1,250 mg Intravenous Q24H    traMADol  100 mg Oral BID    gabapentin  400 mg Oral BID    docusate sodium  100 mg Oral BID    polyethylene glycol  17 g Oral Daily    vitamin D3  5,000 Units Oral Once per day on Mon Thu    furosemide  80 mg Oral Daily    acyclovir  400 mg Oral TID    montelukast  10 mg Oral Daily    theophylline  300 mg Oral Daily    finasteride  5 mg Oral Daily    pantoprazole  40 mg Oral QAM AC    metoprolol succinate  12.5 mg Oral Daily    ibuprofen  400 mg Oral TID    vitamin C  2,000 mg Oral BID    potassium chloride  20 mEq Oral BID    azithromycin  250 mg Oral Daily    primidone  250 mg Oral BID    brimonidine  1 drop Right Eye BID    latanoprost  1 drop Left Eye Nightly    predniSONE  30 mg Oral Daily    ipratropium-albuterol  1 ampule Inhalation Q4H    budesonide  500 mcg Nebulization BID    Arformoterol Tartrate  15 mcg Nebulization BID      sodium chloride 12.5 mL/hr at 02/16/20 1547    dextrose 5 % and 0.9 % NaCl 50 mL/hr at 02/16/20 1547     oxyCODONE, albuterol, acetaminophen, ondansetron, traMADol    OBJECTIVE:  Vitals:    02/16/20 1300   BP: 125/62   Pulse: 63   Resp: 19   Temp: 98.6 °F (37 °C)   SpO2: 95%     FiO2 : 50 %  O2 Flow Rate (L/min): 8 L/min  O2 Device: Nasal cannula        LABS:  WBC   Date Value Ref Range Status   02/13/2020 7.6 4.5 - 11.5 E9/L Final   02/12/2020 7.3 4.5 - 11.5 E9/L Final   02/11/2020 8.9 4.5 - 11.5 E9/L Final     Hemoglobin   Date Value Ref Range Status   02/16/2020 11.1 (L) 12.5 - 16.5 g/dL Final   02/15/2020 11.1 (L) 12.5 - 16.5 g/dL Final   02/14/2020 8.1 (L) 12.5 - 16.5 g/dL Final     Hematocrit   Date Value Ref Range Status   02/16/2020 34.8 (L) 37.0 - 54.0 % Final   02/15/2020 34.7 (L) 37.0 - 54.0 % Final   02/14/2020 25.8 (L) 37.0 - 54.0 % Final     MCV   Date Value Ref Range Status   02/13/2020 101.6 (H) 80.0 - 99.9 fL Final   02/12/2020 101.7 (H) 80.0 - 99.9 fL Final   02/11/2020 101.8 (H) 80.0 - 99.9 fL Final     Platelets   Date Value Ref Range Status   02/13/2020 178 130 - 450 E9/L Final   02/12/2020 189 130 - 450 E9/L Final   02/11/2020 231 130 - 450 E9/L Final     Sodium   Date Value Ref Range Status   02/15/2020 141 132 - 146 mmol/L Final   02/13/2020 142 132 - 146 mmol/L Final   02/12/2020 148 (H) 132 - 146 mmol/L Final     Potassium   Date Value Ref Range Status   02/15/2020 4.0 3.5 - 5.0 mmol/L Final   02/13/2020 3.9 3.5 - 5.0 mmol/L Final   02/12/2020 4.4 3.5 - 5.0 mmol/L Final     Potassium reflex Magnesium   Date Value Ref Range Status   01/06/2020 3.8 3.5 - 5.0 mmol/L Final   09/30/2019 3.9 3.5 - 5.0 mmol/L Final     Chloride   Date Value Ref Range Status   02/15/2020 99 98 - 107 mmol/L Final   02/13/2020 98 98 - 107 mmol/L Final   02/12/2020 106 98 - 107 mmol/L Final     CO2   Date Value Ref Range Status   02/15/2020 33 (H) 22 - 29 mmol/L Final   02/13/2020 36 (H) 22 - 29 mmol/L Final   02/12/2020 31 (H) 22 - 29 mmol/L Final     BUN   Date Value Ref Range Status   02/15/2020 28 (H) 8 - 23 mg/dL Final   02/13/2020 36 (H) 8 - 23 mg/dL Final   02/12/2020 43 (H) 8 - 23 mg/dL Final     CREATININE   Date Value Ref Range Status   02/15/2020 0.9 0.7 - 1.2 mg/dL Final   02/13/2020 1.1 0.7 - 1.2 mg/dL Final   02/12/2020 1.1 0.7 - 1.2 mg/dL Final     Glucose   Date Value Ref Range Status   02/15/2020 96 74 - 99 mg/dL Final   02/13/2020 88 74 - 99 mg/dL Final   02/12/2020 121 (H) 74 - 99 mg/dL Final   02/15/2012 110 70 - 110 mg/dL Final   08/31/2011 102 70 - 110 mg/dL Final   11/13/2010 110 70 - 110 mg/dL Final     Calcium   Date Value Ref Range Status   02/15/2020 8.2 (L) 8.6 - 10.2 mg/dL Final   02/13/2020 8.4 (L) 8.6 - 10.2 mg/dL Final   02/12/2020 8.2 (L) 8.6 - 10.2 mg/dL Final     Total Protein   Date Value Ref Range Status   02/13/2020 5.3 (L) 6.4 - 8.3 g/dL Final   02/12/2020 5.6 (L) 6.4 - 8.3 g/dL Final   01/07/2020 5.3 (L) 6.4 - 8.3 g/dL Final     Albumin   Date Value Ref Range Status   02/15/2012 4.1 3.2 - 4.8 g/dL Final   08/31/2011 4.1 3.2 - 4.8 g/dL Final   11/13/2010 4.1 3.2 - 4.8 g/dL Final     Alb   Date Value Ref Range Status   02/13/2020 3.2 (L) 3.5 - 5.2 g/dL Final   02/12/2020 3.3 (L) 3.5 - 5.2 g/dL Final   01/07/2020 3.3 (L) 3.5 - 5.2 g/dL Final     Total Bilirubin   Date Value Ref Range Status   02/13/2020 <0.2 0.0 - 1.2 mg/dL Final   02/12/2020 <0.2 0.0 - 1.2 mg/dL Final   01/07/2020 <0.2 0.0 - 1.2 mg/dL Final     Alkaline Phosphatase   Date Value Ref Range Status   02/13/2020 114 40 - 129 U/L Final   02/12/2020 116 40 - 129 U/L Final   01/07/2020 92 40 - 129 U/L Final     AST   Date Value Ref Range Status   02/13/2020 14 0 - 39 U/L Final   02/12/2020 15 0 - 39 U/L Final   01/07/2020 15 0 - 39 U/L Final     ALT   Date Value Ref Range Status 02/13/2020 14 0 - 40 U/L Final   02/12/2020 17 0 - 40 U/L Final   01/07/2020 20 0 - 40 U/L Final     GFR Non-   Date Value Ref Range Status   02/15/2020 >60 >=60 mL/min/1.73 Final     Comment:     Chronic Kidney Disease: less than 60 ml/min/1.73 sq.m. Kidney Failure: less than 15 ml/min/1.73 sq.m. Results valid for patients 18 years and older. 02/13/2020 >60 >=60 mL/min/1.73 Final     Comment:     Chronic Kidney Disease: less than 60 ml/min/1.73 sq.m. Kidney Failure: less than 15 ml/min/1.73 sq.m. Results valid for patients 18 years and older. 02/12/2020 >60 >=60 mL/min/1.73 Final     Comment:     Chronic Kidney Disease: less than 60 ml/min/1.73 sq.m. Kidney Failure: less than 15 ml/min/1.73 sq.m. Results valid for patients 18 years and older. GFR    Date Value Ref Range Status   02/15/2020 >60  Final   02/13/2020 >60  Final   02/12/2020 >60  Final     Magnesium   Date Value Ref Range Status   01/12/2020 2.7 (H) 1.6 - 2.6 mg/dL Final   01/07/2020 2.0 1.6 - 2.6 mg/dL Final   12/15/2019 2.0 1.6 - 2.6 mg/dL Final     Phosphorus   Date Value Ref Range Status   01/07/2020 3.9 2.5 - 4.5 mg/dL Final   12/15/2019 4.1 2.5 - 4.5 mg/dL Final   12/14/2019 3.7 2.5 - 4.5 mg/dL Final     Recent Labs     02/16/20  1427   PH 7.354   PO2 61.4*   PCO2 65.9*   HCO3 35.9*   BE 8.3*   O2SAT 91.8*       RADIOLOGY:  XR CHEST PORTABLE   Final Result   Developing airspace disease with atelectasis and pleural   effusion left lung base. XR CHEST PORTABLE   Final Result   Stable chest. No pneumothorax. XR CHEST PORTABLE   Final Result   1. No pneumothorax seen with chest tube in place. XR CHEST PORTABLE   Final Result   Resolution of the pneumothorax after chest tube placement. XR CHEST PORTABLE   Final Result   1. Large size right pneumothorax. Assessment for mediastinal shift is   limited due to the severe rotation.       There is a

## 2020-02-16 NOTE — PROGRESS NOTES
Department of Internal Medicine        CHIEF COMPLAINT: Shortness of breath    Reason for Admission: Large right pneumothorax    HISTORY OF PRESENT ILLNESS:      The patient is a 81 y.o. male who presents with increased shortness of breath at home. Patient was recently discharged. 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 Patient symptoms started the day of admission on 2/11. He has a nonproductive cough but denies any fever and chills or chest pain. Initially chest x-ray was not read out as pneumothorax but a reread this morning showed large right pneumothorax. Case discussed with the patient's wife and daughter at the bedside. Patient admitted for further evaluation. 2/13/2020  Patient is seen and examined on [facility]. Patient's wife is at the bedside and case discussed. Case also discussed with pulmonary medicine at the bedside. Patient is doing fairly well considering but still having rib chest pain secondary to #2. Hemoglobin is down to 8.2 from admission 10.4 with normal liver enzymes and BMP is essentially unchanged. Patient currently on 6 L nasal cannula with O2 sat 96% at rest.  Vital signs are fairly stable with a blood pressure 100/56.    2/14/2020  Patient seen and examined on [facility]. Patient's wife is at the bedside and case discussed. Patient is still complaining of increasing pain in his left back area all the way from his upper back down to his mid left back. Massaging the area does not make it feel better. Patient states not necessarily worse with respirations. The 5 mg oxycodone is not helping the pain. Case discussed with Dr. Gibson Carter. Blood pressure ranges 98//72. O2 sat 95% on 5 L today. Patient's had good urinary output. 2/15/2020  Patient seen and examined on [facility]. Multiple family members are at the bedside and case discussed. Patient is a lot weaker today than normally.   Patient is alert to himself and place but otherwise extremely weak and more lethargic. His last oxycodone was at 5:47 AM today. It had been discussed with patient's family on admission that if we increased his pain meds he could become more lethargic. His O2 saturation is good at 94% on his 5 L nasal cannula. The patient has been temperature is 98.6 with a heart rate 62 with respiratory rate of 16. Blood pressure ranges from 116//66. BUN/creatinine was 28/0.9 with potassium 4.0.    2/16/202  Patient seen and examined on [address]. Patient's wife and daughter at the bedside and case discussed in detail. Patient became more lethargic again today with O2 saturations dropping into the low 80s on 6 L nasal cannula. Patient was ordered to transfer to the ICU to be monitored closer. Blood pressure heart rates were stable. Patient temperature was increased to 99.6. Rest x-ray today showed developing airspace disease and pleural effusion left base which was not there yesterday.   Patient will be started on IV vancomycin and cefepime and pending pulmonology evaluation today    Past Medical History:    Past Medical History:   Diagnosis Date    COPD (chronic obstructive pulmonary disease) ([address]     stable per pt    History of cardiovascular stress test 4/11/2012    Lexiscan    Hyperlipidemia     Hypertension 9-8-2014    lexiscan stress test    Kidney stone     Pulmonary fibrosis ([address]     Thyroid disease      Past Surgical History:    Past Surgical History:   Procedure Laterality Date    ABDOMINAL AORTIC ANEURYSM REPAIR  09/26/2016    BALLOON ANGIOPLASTY, ARTERY Left     Cerebral artery stenosis with angioplasty and stent    CATARACT REMOVAL WITH IMPLANT      right, left    CYSTOSCOPY  nov 2012    retro ureteroscopy stone manipulation and insertion of j stent    EYE SURGERY      cataracts-bilat    INGUINAL HERNIA REPAIR  1970s bilateral    INGUINAL HERNIA REPAIR  1990s bilateral    INGUINAL HERNIA REPAIR Right sept 2014    laparoscopic       Medications Prior to finasteride (PROSCAR) 5 MG tablet Take 1 tablet by mouth daily 10/11/19  Yes Melissa Mckeon, DO   pantoprazole (PROTONIX) 40 MG tablet Take 1 tablet by mouth every morning (before breakfast) 10/11/19  Yes Melissa Mckeon, DO   metoprolol succinate (TOPROL XL) 25 MG extended release tablet Take 0.5 tablets by mouth daily 10/11/19  Yes Mary Tucker, APRN - CNP   theophylline (THEODUR) 300 MG extended release tablet Take 1 tablet by mouth daily 19  Yes Arielle Adame, DO   valACYclovir (VALTREX) 500 MG tablet Take 500 mg by mouth daily   Yes Historical Provider, MD   montelukast (SINGULAIR) 10 MG tablet Take 10 mg by mouth daily   Yes Historical Provider, MD   furosemide (LASIX) 40 MG tablet Take 80 mg by mouth daily    Yes Historical Provider, MD   Cholecalciferol (VITAMIN D-3) 5000 UNITS TABS Take 1 tablet by mouth Twice a Week  & Wednesday   Yes Historical Provider, MD   NONFORMULARY Trilogy - ventilator at bedtime    Historical Provider, MD       Allergies:  Flomax [tamsulosin hcl]    Social History:   Social History     Socioeconomic History    Marital status:      Spouse name: Not on file    Number of children: Not on file    Years of education: Not on file    Highest education level: Not on file   Occupational History    Occupation: Physician     Comment: Retired   Social Needs    Financial resource strain: Not on file    Food insecurity:     Worry: Not on file     Inability: Not on file   Rico needs:     Medical: Not on file     Non-medical: Not on file   Tobacco Use    Smoking status: Former Smoker     Packs/day: 2.00     Years: 42.00     Pack years: 84.00     Types: Cigarettes     Last attempt to quit: 2003     Years since quittin.0    Smokeless tobacco: Never Used   Substance and Sexual Activity    Alcohol use:  Yes     Alcohol/week: 1.0 standard drinks     Types: 1 Shots of liquor per week     Comment: daily    Drug use: No    Sexual activity: wheezes, rales or rhonchi  Abdomen:  Soft, nontender, nondistended, bowel sounds present  Extremities: Trace lower leg edema, peripheral pulses intact bilaterally  Neuro:  Cranial nerves II-XII grossly intact; motor and sensory function intact with no focal deficits  Skin:  No rashes, lesions or wounds    DATA:  CBC with Differential:    Lab Results   Component Value Date    WBC 7.6 02/13/2020    RBC 2.58 02/13/2020    HGB 11.1 02/16/2020    HCT 34.8 02/16/2020     02/13/2020    .6 02/13/2020    MCH 31.8 02/13/2020    MCHC 31.3 02/13/2020    RDW 14.1 02/13/2020    NRBC 0.9 10/01/2019    SEGSPCT 59 12/30/2013    LYMPHOPCT 19.8 02/13/2020    MONOPCT 12.4 02/13/2020    BASOPCT 0.5 02/13/2020    MONOSABS 0.94 02/13/2020    LYMPHSABS 1.50 02/13/2020    EOSABS 0.68 02/13/2020    BASOSABS 0.04 02/13/2020     CMP:    Lab Results   Component Value Date     02/15/2020    K 4.0 02/15/2020    K 3.8 01/06/2020    CL 99 02/15/2020    CO2 33 02/15/2020    BUN 28 02/15/2020    CREATININE 0.9 02/15/2020    GFRAA >60 02/15/2020    LABGLOM >60 02/15/2020    GLUCOSE 96 02/15/2020    GLUCOSE 110 02/15/2012    PROT 5.3 02/13/2020    LABALBU 3.2 02/13/2020    LABALBU 4.1 02/15/2012    CALCIUM 8.2 02/15/2020    BILITOT <0.2 02/13/2020    ALKPHOS 114 02/13/2020    AST 14 02/13/2020    ALT 14 02/13/2020     Magnesium:    Lab Results   Component Value Date    MG 2.7 01/12/2020     Phosphorus:    Lab Results   Component Value Date    PHOS 3.9 01/07/2020     PT/INR:    Lab Results   Component Value Date    PROTIME 12.5 01/10/2020    INR 1.1 01/10/2020     Troponin:    Lab Results   Component Value Date    TROPONINI <0.01 02/11/2020     U/A:    Lab Results   Component Value Date    COLORU Yellow 02/06/2020    PROTEINU Negative 02/06/2020    PHUR 6.0 02/06/2020    WBCUA 2-5 02/06/2020    RBCUA 0-1 02/06/2020    RBCUA 10-20 11/28/2012    BACTERIA FEW 02/06/2020    CLARITYU Clear 02/06/2020    SPECGRAV 1.020 02/06/2020 LEUKOCYTESUR TRACE 02/06/2020    UROBILINOGEN 0.2 02/06/2020    BILIRUBINUR Negative 02/06/2020    BLOODU Negative 02/06/2020    GLUCOSEU Negative 02/06/2020     ABG:    Lab Results   Component Value Date    PH 7.398 02/16/2020    PCO2 55.3 02/16/2020    PO2 71.5 02/16/2020    HCO3 33.3 02/16/2020    BE 7.1 02/16/2020    O2SAT 94.4 02/16/2020     HgBA1c:    Lab Results   Component Value Date    LABA1C 6.2 03/25/2016     FLP:    Lab Results   Component Value Date    TRIG 66 07/26/2018    HDL 79 07/26/2018    LDLCALC 147 07/26/2018    LABVLDL 13 07/26/2018     TSH:    Lab Results   Component Value Date    TSH 0.531 10/01/2019     IRON:    Lab Results   Component Value Date    IRON 30 01/13/2020     LIPASE:  No results found for: LIPASE    ASSESSMENT AND PLAN:      Patient Active Problem List    Diagnosis Date Noted    Severe hypoxemia 09/02/2018     Priority: High     Class: Chronic    COPD, severe ([address] 07/29/2016     Priority: High     Class: Chronic    Cor pulmonale, chronic (HCC) 07/03/2013     Priority: High     Class: Chronic    Acquired bronchiectasis [facility] 07/29/2016     Priority: Medium     Class: Chronic    Secondary adrenal insufficiency (HCC) 03/11/2016     Priority: Medium     Class: Chronic    Hyperlipidemia      Priority: Medium     Class: Chronic    Multiple thyroid nodules 07/06/2014     Priority: Low     Class: Chronic    Bradycardia, sinus 01/11/2014     Priority: Low     Class: Chronic    Hypertension      Priority: Low    Severe protein-calorie malnutrition ([address] 02/13/2020    Respiratory distress 01/06/2020    Acute on chronic respiratory failure with hypoxia ([address] 12/13/2019    Acute urinary retention 10/28/2019    Atelectasis of right lung 10/28/2019    Community acquired bacterial pneumonia 10/16/2019    Paroxysmal atrial fibrillation ([address] 10/16/2019    Hypokalemia 10/16/2019    Pulmonary fibrosis ([address] 10/16/2019    Essential tremor 10/16/2019    BPH (benign prostatic

## 2020-02-16 NOTE — PROGRESS NOTES
51.4; baseline Scr ~1.0-1.1  · Patient was given Azithromycin and Acyclovir    Plan:  · Vancomycin 1250 mg [id number]  · Level prior to fourth dose   · Follow renal function  · Pharmacist will follow and monitor/adjust dosing as necessary      Thank you for the consult,    Dot Alfaro PharmD Candidate Class of 2021 2/16/2020 12:44 PM

## 2020-02-16 NOTE — PROGRESS NOTES
Patient arrived on unit with transport and imc nurse and placed in icu 11. Currently on nonrebreather mask and 10L HFNC, sating at 100%. Patient is oriented, answering questions and conversing appropriately. Wife and daughter in icu waiting room.

## 2020-02-17 NOTE — PROGRESS NOTES
Pharmacy Consultation Note  (Antibiotic Dosing and Monitoring)    Vancomycin has been discontinued; pharmacy will sign-off. Please reconsult if needed.     Thank you,  Milli John, PharmD, BCPS 2/17/2020 3:31 PM

## 2020-02-17 NOTE — PROGRESS NOTES
Occupational Therapy  Occupational Therapy Re-evaluation      Date:2020  Patient Name: Alyssia Mercedes  MRN: [id number]  : 1935  Room: [place] [address]      Evaluating OT: JEANNE Miller/ Danya Pascual #[id number]    Referring Physician: Isa Sifuentes MD    Recommendation for Placement: Subacte  Recommended Adaptive Equipment: TBD   AM-PAC Daily Activity Raw Score:         Diagnosis:  Respiratory Failure   Pertinent Medical History:    Past Medical History:   Diagnosis Date    COPD (chronic obstructive [facility] disease) [address]     stable per pt    History of cardiovascular stress test 2012    Lexiscan    Hyperlipidemia     Hypertension 2014    lexiscan stress test    Kidney stone     Pulmonary fibrosis ([facility]     Thyroid disease       Precautions:  Falls, o2, vision impaired, skin     Home Living: Home set-up in the initial evaluation    Pain Level: pt c/o pain in R side;nursing aware   Cognition: A&O: /4; Follows 2 step directions   Memory:  good     Sequencing:  fair     Problem solving:  fair     Judgement/safety:  good       Functional Assessment:   Initial Eval Status  Date: 20 Treatment Status  Date: Short Term Goals  Treatment frequency: PRN 1-3x/week   Feeding Moderate Assist   Minimal Assist    Grooming Dependent   Moderate Assist    UB Dressing Moderate Assist   Minimal Assist    LB Dressing Dependent   Moderate Assist    Bathing Dependent  Modified [place]    Toileting Maximal Assist   Modified [place]    Bed Mobility  Supine to sit: Maximal Assist x2  Sit to supine: Maximal Assist x2  Supine to sit: Moderate Assist   Sit to supine:  Moderate Assist    Functional Transfers n/t   Moderate Assist    Functional Mobility n/t   Modified [place]    Balance Sitting:     Static:  Fair-    Dynamic:fair-     Activity Tolerance Poor, lethargic and easily fatigued      Visual/  Perceptual Glasses: no, impaired vision                   Strength ROM Additional Info: RUE  0/5  WFL poor  and impaired FMC/dexterity noted during ADL tasks     LUE 3+/5  WFL good  and wfl FMC/dexterity noted during ADL tasks         Hearing:  WFL   Sensation:   No c/o numbness or tingling   Tone:  WFL  Edema: yes RUE                            Comments: Upon arrival patient supine in bed with family present. Pt educated on the role of Occupational Therapy. Returned to bed At end of session, patient with call light and phone within reach, all lines and tubes intact. Overall patient demonstrated decreased independence and safety during completion of ADL/functional transfer/mobility tasks. Pt would benefit from continued skilled OT to increase safety and independence with completion of ADL/IADL tasks for functional independence and quality of life. Treatment: OT facilitated, Bed Mobility, sitting balance at EOB for 15 minutes with Mod-Min A for static balance, pt required Mod A for completion of BUE ROM ex's, pt and family educated on edema management, pt then positioned using pillows and wedges. Facilitated development of skills for increased participation for completion of ADLs/IADLs.      Eval Complexity: Re-evaluation      Assessment of current deficits   Functional mobility [x]  ADLs [x] Strength [x]  Cognition []  Functional transfers  [x] IADLs [x] Safety Awareness [x]  Endurance [x]  Fine Motor Coordination [] Balance [x] Vision/perception [] Sensation []   Gross Motor Coordination [] ROM [] Delirium []                  Motor Control []    Plan of Care:   ADL retraining [x]   Equipment needs [x]   Neuromuscular re-education [x] Energy Conservation Techniques [x]  Functional Transfer training [x] Patient and/or Family Education [x]  Functional Mobility training [x]  Environmental Modifications [x]  Cognitive re-training []   Compensatory techniques for ADLs [x]  Splinting Needs []   Positioning to improve overall function [x]   Therapeutic Activity [x]  Therapeutic Exercise

## 2020-02-17 NOTE — PROGRESS NOTES
Assessment and Plan  Patient is a 81 y.o. male with the following medical Problems:   #1 acute on chronic hypoxic and hypercapnic respiratory failure  2. Advanced COPD on home oxygen Triligy at night  3. Interstitial lung disease  4. Dyslipidemia  5. Essential tremor  6. Severe protein calorie malnutrition  7. Right-sided pneumothorax  8. RLL nodule  9. Pulmoanry hypertension    Plan of care;  1 keep sat 88 to 92%  2. We will obtain ultrasound of left upper extremity to rule out DVT  3. Discontinue antibiotic since there is no evidence of infection  4. We will clamp chest tube and get a chest x-ray later this afternoon and will consider removal of the chest tube tomorrow a.m.  5.  DVT prophylaxis  6. Bronchodilators and oral prednisone    History of Present Illness:  Patient is an 78-year-old man with above-mentioned medical problems who was admitted on February 15, 2020 with progressive shortness of breath and was found to have right-sided pneumothorax. He currently has right-sided chest tube but without any leak.     Past Medical History:  Past Medical History:   Diagnosis Date    COPD (chronic obstructive pulmonary disease) ([address]     stable per pt    History of cardiovascular stress test 4/11/2012    Lexiscan    Hyperlipidemia     Hypertension 9-8-2014    lexiscan stress test    Kidney stone     Pulmonary fibrosis ([facility]     Thyroid disease         Family History:   Family History   Problem Relation Age of Onset    Heart Disease Father     Kidney Disease Mother        Allergies:         Flomax [tamsulosin hcl]    Social history:  Social History     Socioeconomic History    Marital status:      Spouse name: Not on file    Number of children: Not on file    Years of education: Not on file    Highest education level: Not on file   Occupational History    Occupation: Physician     Comment: Retired   Social Needs    Financial resource strain: Not on file    Food insecurity:     Worry: Not on file     Inability: Not on file    Transportation needs:     Medical: Not on file     Non-medical: Not on file   Tobacco Use    Smoking status: Former Smoker     Packs/day: 2.00     Years: 42.00     Pack years: 84.00     Types: Cigarettes     Last attempt to quit: 2003     Years since quittin.0    Smokeless tobacco: Never Used   Substance and Sexual Activity    Alcohol use:  Yes     Alcohol/week: 1.0 standard drinks     Types: 1 Shots of liquor per week     Comment: daily    Drug use: No    Sexual activity: Not on file   Lifestyle    Physical activity:     Days per week: Not on file     Minutes per session: Not on file    Stress: Not on file   Relationships    Social connections:     Talks on phone: Not on file     Gets together: Not on file     Attends Islam service: Not on file     Active member of club or organization: Not on file     Attends meetings of clubs or organizations: Not on file     Relationship status: Not on file    Intimate partner violence:     Fear of current or ex partner: Not on file     Emotionally abused: Not on file     Physically abused: Not on file     Forced sexual activity: Not on file   Other Topics Concern    Not on file   Social History Narrative    Not on file       Current Medications:     Current Facility-Administered Medications:     oxyCODONE (ROXICODONE) immediate release tablet 2.5 mg, 2.5 mg, Oral, Q4H PRN, Yony Flair, DO, 2.5 mg at 20 0153    traMADol (ULTRAM) tablet 100 mg, 100 mg, Oral, BID, Sagar S Belany, DO, 100 mg at 20 6033    gabapentin (NEURONTIN) capsule 400 mg, 400 mg, Oral, BID, Gregzaheer Moser Belany, DO, 400 mg at 20 1344    docusate sodium (COLACE) capsule 100 mg, 100 mg, Oral, BID, Cam A Sylvester, DO, 100 mg at 20 0902    polyethylene glycol (GLYCOLAX) packet 17 g, 17 g, Oral, Daily, Cam A Sylvester, DO, 17 g at 20 0908    vitamin D3 (CHOLECALCIFEROL) tablet 5,000 Units, 5,000 Units, Oral, Once per day on Mon Thu, Cam A Sylvester, DO, Stopped at 02/17/20 7537    furosemide (LASIX) tablet 80 mg, 80 mg, Oral, Daily, Cash Nap, DO, 80 mg at 02/17/20 0902    acyclovir (ZOVIRAX) tablet 400 mg, 400 mg, Oral, TID, Cash Nap, DO, 400 mg at 02/17/20 5309    montelukast (SINGULAIR) tablet 10 mg, 10 mg, Oral, Daily, Cash Nap, DO, Stopped at 02/17/20 2921    theophylline (FRANCIS-24) extended release capsule 300 mg, 300 mg, Oral, Daily, Cash Nap, DO, Stopped at 02/17/20 1013    finasteride (PROSCAR) tablet 5 mg, 5 mg, Oral, Daily, Cash Nap, DO, 5 mg at 02/17/20 0902    pantoprazole (PROTONIX) tablet 40 mg, 40 mg, Oral, QAM AC, Cam A Sylvester, DO, 40 mg at 02/17/20 0900    metoprolol succinate (TOPROL XL) extended release tablet 12.5 mg, 12.5 mg, Oral, Daily, Cash Nap, DO, 12.5 mg at 02/17/20 0906    albuterol (PROVENTIL) nebulizer solution 2.5 mg, 2.5 mg, Nebulization, Q4H PRN, Cash Nap, DO    ibuprofen (ADVIL;MOTRIN) tablet 400 mg, 400 mg, Oral, TID, Cash Nap, DO, 400 mg at 02/17/20 0054    vitamin C (ASCORBIC ACID) tablet 2,000 mg, 2,000 mg, Oral, BID, Cash Nap, DO, Stopped at 02/17/20 0809    potassium chloride (KLOR-CON M) extended release tablet 20 mEq, 20 mEq, Oral, BID, Cash Nap, DO, 20 mEq at 02/17/20 3634    primidone (MYSOLINE) tablet 250 mg, 250 mg, Oral, BID, Cash Nap, DO, Stopped at 02/17/20 9619    brimonidine (ALPHAGAN) 0.2 % ophthalmic solution 1 drop, 1 drop, Right Eye, BID, Cash Nap, DO, 1 drop at 02/17/20 0909    latanoprost (XALATAN) 0.005 % ophthalmic solution 1 drop, 1 drop, Left Eye, Nightly, Cam Sylvester DO, 1 drop at 02/17/20 0009    predniSONE (DELTASONE) tablet 30 mg, 30 mg, Oral, Daily, Cam Sylvester DO, 30 mg at 02/17/20 0900    acetaminophen (TYLENOL) tablet 500 mg, 500 mg, Oral, Q6H PRN, Cash Fajardo DO    ipratropium-albuterol (DUONEB) nebulizer solution 1 ampule, Information:  Vent Information  Skin Assessment: Clean, dry, & intact  FiO2 : 100 %    General appearance: Frail, not in pain or distress, in no respiratory distress    HEENT: Atraumatic/normocephalic, EOMI, KANWAL, pharynx clear, moist mucosa, redness of the uvula appreciated,   Neck: Supple, no jugular venous distension, lymphadenopathy, thyromegaly or carotid bruits  Chest: Decreased breath sounds, no wheezing, +ve crackles and no tenderness over ribs   Cardiovascular: Normal S1 , S2, regular rate and rhythm, no murmur, rub or gallop  Abdomen: Normal sounds present, soft, lax with no tenderness, no hepatosplenomegaly, and no masses  Extremities: No edema. Pulses are equally present. Skin: intact, no rashes   Neurologic: Alert and oriented x 3, No focal deficit     Investigations:  Labs, radiological imaging and cardiac work up were reviewed        ICU STAFF PHYSICIAN NOTE OF PERSONAL INVOLVEMENT IN CARE  As the attending physician, I certify that I personally reviewed the patient's history and personally examined the patient to confirm the physical findings described above, and that I reviewed the relevant imaging studies and available reports. I also discussed the differential diagnosis and all of the proposed management plans with the patient and individuals accompanying the patient to this visit. They had the opportunity to ask questions about the proposed management plans and to have those questions answered. This patient has a high probability of sudden, clinically significant deterioration, which requires the highest level of physician preparedness to intervene urgently. I managed/supervised life or organ supporting interventions that required frequent physician assessment. I devoted my full attention to the direct care of this patient for the amount of time indicated below.   Time I spent with family or surrogate(s) is included only if the patient was incapable of providing the necessary information

## 2020-02-17 NOTE — PLAN OF CARE
Problem: Falls - Risk of:  Goal: Will remain free from falls  Description  Will remain free from falls  2/17/2020 0549 by Mimi Julio RN  Outcome: Met This Shift     Problem: Falls - Risk of:  Goal: Absence of physical injury  Description  Absence of physical injury  2/17/2020 0549 by Mimi Julio RN  Outcome: Met This Shift     Problem: Risk for Impaired Skin Integrity  Goal: Tissue integrity - skin and mucous membranes  Description  Structural intactness and normal physiological function of skin and  mucous membranes.   2/17/2020 0549 by Mimi Julio RN  Outcome: Met This Shift     Problem: Gas Exchange - Impaired:  Goal: Levels of oxygenation will improve  Description  Levels of oxygenation will improve  2/17/2020 0549 by Mimi Julio RN  Outcome: Met This Shift

## 2020-02-17 NOTE — PROGRESS NOTES
[address] [facility]  Internal Medicine  -Resident Progress Note-    PCP:  Eliza Chambers DO  Admitting Physician:  Eliza Chambers DO  Consultants:  Critical care  Chief Complaint:    Chief Complaint   Patient presents with    COPD     been feeling like it all day, family has had him on 6L all day with 200 ft of O2 line SpO2 at home with nebulizer was mid 80's   EMS nebulizer given and 125 solu medrol now 93%        History of Present Illness  The patient is a 81 y.o. male who presents with increased shortness of breath at home. Patient was recently discharged. 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 Patient symptoms started the day of admission on 2/11. He has a nonproductive cough but denies any fever and chills or chest pain. Initially chest x-ray was not read out as pneumothorax but a reread this morning showed large right pneumothorax. Case discussed with the patient's wife and daughter at the bedside. Patient admitted for further evaluation.     2/13/2020  Patient is seen and examined on [facility]. Patient's wife is at the bedside and case discussed. Case also discussed with pulmonary medicine at the bedside. Patient is doing fairly well considering but still having rib chest pain secondary to #2. Hemoglobin is down to 8.2 from admission 10.4 with normal liver enzymes and BMP is essentially unchanged. Patient currently on 6 L nasal cannula with O2 sat 96% at rest.  Vital signs are fairly stable with a blood pressure 100/56.     2/14/2020  Patient seen and examined on [facility]. Patient's wife is at the bedside and case discussed. Patient is still complaining of increasing pain in his left back area all the way from his upper back down to his mid left back. Massaging the area does not make it feel better. Patient states not necessarily worse with respirations. The 5 mg oxycodone is not helping the pain. Case discussed with Dr. Ammy Mcleod.   Blood pressure ranges 37//15. O2 sat 95% on 5 L today. Patient's had good urinary output.     2/15/2020  Patient seen and examined on [address]. Multiple family members are at the bedside and case discussed. Patient is a lot weaker today than normally. Patient is alert to himself and place but otherwise extremely weak and more lethargic. His last oxycodone was at 5:47 AM today. It had been discussed with patient's family on admission that if we increased his pain meds he could become more lethargic. His O2 saturation is good at 94% on his 5 L nasal cannula. The patient has been temperature is 98.6 with a heart rate 62 with respiratory rate of 16. Blood pressure ranges from 116//66. BUN/creatinine was 28/0.9 with potassium 4.0.     2/16/202  Patient seen and examined on [address]. Patient's wife and daughter at the bedside and case discussed in detail. Patient became more lethargic again today with O2 saturations dropping into the low 80s on 6 L nasal cannula. Patient was ordered to transfer to the ICU to be monitored closer. Blood pressure heart rates were stable. Patient temperature was increased to 99.6. Rest x-ray today showed developing airspace disease and pleural effusion left base which was not there yesterday. Patient will be started on IV vancomycin and cefepime and pending pulmonology evaluation today    2/17/2020  Patient seen and examined in ICU. He remains profoundly weak. He is lethargic. He remains on 8L NC O2. He is complaining of back pain. Thoracic surgery has been consulted due to multiple pneumothoraces    Review of Systems  All bolded are positive; please see HPI  General:  Fever, chills, diaphoresis, fatigue, malaise, night sweats, weight loss  Psychological:  Anxiety, disorientation, hallucinations. ENT:  Epistaxis, headaches, vertigo, visual changes. Cardiovascular:  Chest pain, irregular heartbeats, palpitations, paroxysmal nocturnal dyspnea.   Respiratory:  Shortness of breath, coughing, sputum production, hemoptysis, wheezing, orthopnea.   Gastrointestinal:  Nausea, vomiting, diarrhea, heartburn, constipation, abdominal pain, hematemesis, hematochezia, melena, acholic stools  Genito-Urinary:  Dysuria, urgency, frequency, hematuria  Musculoskeletal:  Joint pain, joint stiffness, joint swelling, muscle pain  Neurology:  Headache, focal neurological deficits, weakness, numbness, paresthesia  Derm:  Rashes, ulcers, excoriations, bruising  Extremities:  Decreased ROM, peripheral edema, mottling    Physical Examination  Vitals:  /63   Pulse 66   Temp 100.5 °F (38.1 °C) (Axillary)   Resp 25   Ht 5' 6\" (1.676 m)   Wt 128 lb 14.4 oz (58.5 kg)   SpO2 95%   BMI 20.81 kg/m²   General Appearance:  awake, alert, weak  HEENT:  PERRL; EOMI; sclera clear; buccal mucosa moist  Neck:  supple; trachea midline; no thyromegaly; no JVD; no bruits  Heart:  rhythm regular; rate controlled; no murmurs  Lungs:  symmetrical; diminished bilaterally; no wheezes; no rhonchi; no rales +L Chest tube  Abdomen:  soft, non-tender, non-distended; bowel sounds positive; no organomegaly or masses; no pain on palpation  Extremities:  peripheral pulses present; no peripheral edema; no ulcers  Neurologic:  alert and oriented x 3; no focal deficit; cranial nerves grossly intact  Skin:  no petechia; no hemorrhage; no wounds    Medications  Scheduled Meds    cefepime  1 g Intravenous Q12H    vancomycin  1,250 mg Intravenous Q24H    traMADol  100 mg Oral BID    gabapentin  400 mg Oral BID    docusate sodium  100 mg Oral BID    polyethylene glycol  17 g Oral Daily    vitamin D3  5,000 Units Oral Once per day on Mon Thu    furosemide  80 mg Oral Daily    acyclovir  400 mg Oral TID    montelukast  10 mg Oral Daily    theophylline  300 mg Oral Daily    finasteride  5 mg Oral Daily    pantoprazole  40 mg Oral QAM AC    metoprolol succinate  12.5 mg Oral Daily    ibuprofen  400 mg Oral TID    vitamin C  2,000 mg Oral BID  potassium chloride  20 mEq Oral BID    azithromycin  250 mg Oral Daily    primidone  250 mg Oral BID    brimonidine  1 drop Right Eye BID    latanoprost  1 drop Left Eye Nightly    predniSONE  30 mg Oral Daily    ipratropium-albuterol  1 ampule Inhalation Q4H    budesonide  500 mcg Nebulization BID    Arformoterol Tartrate  15 mcg Nebulization BID     Infusion Meds    sodium chloride Stopped (02/17/20 0500)    dextrose 5 % and 0.9 % NaCl 50 mL/hr at 02/16/20 1547     PRN Meds oxyCODONE, albuterol, acetaminophen, ondansetron, traMADol    Laboratory Data  Recent Results (from the past 24 hour(s))   POCT Glucose    Collection Time: 02/16/20  9:49 AM   Result Value Ref Range    Meter Glucose 129 (H) 74 - 99 mg/dL   Blood Gas, Arterial    Collection Time: 02/16/20  9:59 AM   Result Value Ref Range    Date Analyzed 20200216     Time Analyzed 0959     Source: Blood Arterial     pH, Blood Gas 7.398 7.350 - 7.450    PCO2 55.3 (H) 35.0 - 45.0 mmHg    PO2 71.5 (L) 75.0 - 100.0 mmHg    HCO3 33.3 (H) 22.0 - 26.0 mmol/L    B.E. 7.1 (H) -3.0 - 3.0 mmol/L    O2 Sat 94.4 92.0 - 98.5 %    O2Hb 93.6 (L) 94.0 - 97.0 %    COHb 0.3 0.0 - 1.5 %    MetHb 0.5 0.0 - 1.5 %    O2 Content 15.6 mL/dL    HHb 5.6 (H) 0.0 - 5.0 %    tHb (est) 11.8 11.5 - 16.5 g/dL    Mode BIPAP 15l     Peep/Cpap 6.0 cmH2O    PS 16 cmH20    Comment additional 15lpm nasal cannula     Date Of Collection      Time Collected      Pt Temp 37.0 C     ID 03     Lab [id number]     Critical(s) Notified .  No Critical Values    EKG 12 Lead    Collection Time: 02/16/20 10:00 AM   Result Value Ref Range    Ventricular Rate 71 BPM    Atrial Rate 71 BPM    P-R Interval 152 ms    QRS Duration 82 ms    Q-T Interval 404 ms    QTc Calculation (Bazett) 439 ms    P Axis 66 degrees    R Axis -13 degrees    T Axis 49 degrees   Blood Gas, Arterial    Collection Time: 02/16/20  2:27 PM   Result Value Ref Range    Date Analyzed [id number]     Time Analyzed 8871     Source: Blood Arterial     pH, Blood Gas 7.354 7.350 - 7.450    PCO2 65.9 (H) 35.0 - 45.0 mmHg    PO2 61.4 (L) 75.0 - 100.0 mmHg    HCO3 35.9 (H) 22.0 - 26.0 mmol/L    B.E. 8.3 (H) -3.0 - 3.0 mmol/L    O2 Sat 91.8 (L) 92.0 - 98.5 %    O2Hb 91.1 (L) 94.0 - 97.0 %    COHb 0.3 0.0 - 1.5 %    MetHb 0.5 0.0 - 1.5 %    O2 Content 15.3 mL/dL    HHb 8.1 (H) 0.0 - 5.0 %    tHb (est) 11.9 11.5 - 16.5 g/dL    Mode HFNC   8L     Date Of Collection      Time Collected      Pt Temp 37.0 C          Lab [id number]     Critical(s) Notified .  No Critical Values    Comprehensive Metabolic Panel    Collection Time: 02/17/20  5:40 AM   Result Value Ref Range    Sodium 142 132 - 146 mmol/L    Potassium 4.3 3.5 - 5.0 mmol/L    Chloride 101 98 - 107 mmol/L    CO2 33 (H) 22 - 29 mmol/L    Anion Gap 8 7 - 16 mmol/L    Glucose 107 (H) 74 - 99 mg/dL    BUN 30 (H) 8 - 23 mg/dL    CREATININE 1.2 0.7 - 1.2 mg/dL    GFR Non-African American 58 >=60 mL/min/1.73    GFR African American >60     Calcium 8.4 (L) 8.6 - 10.2 mg/dL    Total Protein 5.6 (L) 6.4 - 8.3 g/dL    Alb 2.8 (L) 3.5 - 5.2 g/dL    Total Bilirubin 0.4 0.0 - 1.2 mg/dL    Alkaline Phosphatase 106 40 - 129 U/L    ALT 17 0 - 40 U/L    AST 18 0 - 39 U/L   CBC Auto Differential    Collection Time: 02/17/20  5:40 AM   Result Value Ref Range    WBC 11.7 (H) 4.5 - 11.5 E9/L    RBC 3.64 (L) 3.80 - 5.80 E12/L    Hemoglobin 11.5 (L) 12.5 - 16.5 g/dL    Hematocrit 36.8 (L) 37.0 - 54.0 %    .1 (H) 80.0 - 99.9 fL    MCH 31.6 26.0 - 35.0 pg    MCHC 31.3 (L) 32.0 - 34.5 %    RDW 14.7 11.5 - 15.0 fL    Platelets 905 762 - 965 E9/L    MPV 10.4 7.0 - 12.0 fL    Neutrophils % 78.3 43.0 - 80.0 %    Immature Granulocytes % 0.6 0.0 - 5.0 %    Lymphocytes % 6.4 (L) 20.0 - 42.0 %    Monocytes % 9.2 2.0 - 12.0 %    Eosinophils % 5.2 0.0 - 6.0 %    Basophils % 0.3 0.0 - 2.0 %    Neutrophils Absolute 9.16 (H) 1.80 - 7.30 E9/L    Immature Granulocytes # 0.07 E9/L    Lymphocytes Absolute 0.75 (L) 1.50 - 4.00 E9/L    Monocytes Absolute 1.07 (H) 0.10 - 0.95 E9/L    Eosinophils Absolute 0.61 (H) 0.05 - 0.50 E9/L    Basophils Absolute 0.03 0.00 - 0.20 E9/L       Imaging  Xr Lumbar Spine (2-3 Views)    Result Date: 2/6/2020  LOCATION:200 EXAM: XR LUMBAR SPINE (2-3 VIEWS), XR PELVIS (1-2 VIEWS) COMPARISON: None HISTORY:  Low back pain TECHNIQUE:  4  views of the lumbar spine and single view pelvis were obtained. FINDINGS:  There is normal vertebral body height and alignment. Severe degenerative changes with facet arthropathy, marginal osteophytes and disc space narrowing noted greatest at L4-5. The bony pelvis is intact. The hips are well aligned. Osseous structures are otherwise normal without evidence of fracture. Arthritis with no acute findings. Xr Pelvis (1-2 Views)    Result Date: 2/6/2020  LOCATION:200 EXAM: XR LUMBAR SPINE (2-3 VIEWS), XR PELVIS (1-2 VIEWS) COMPARISON: None HISTORY:  Low back pain TECHNIQUE:  4  views of the lumbar spine and single view pelvis were obtained. FINDINGS:  There is normal vertebral body height and alignment. Severe degenerative changes with facet arthropathy, marginal osteophytes and disc space narrowing noted greatest at L4-5. The bony pelvis is intact. The hips are well aligned. Osseous structures are otherwise normal without evidence of fracture. Arthritis with no acute findings. Xr Chest Portable    Result Date: 2/17/2020  Reading location: 200 Indication: Shortness of breath Comparison: Prior chest radiograph from 2/16/2020 Technique: Portable AP upright chest radiograph was obtained. Findings: A right-sided chest tube is unchanged in position. The cardiomediastinal silhouette is stable in size and contours. The previously noted right lung nodule is less conspicuous on the current study. There are small left and trace left pleural effusions with mild patchy left basilar airspace disease. There is no evidence of pneumothorax.      1. Left-sided chest tube unchanged FINDINGS:  SUPPORT DEVICES: None. 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 The left lung is clear. PLEURA: No pneumothorax visualized. LUNG VOLUMES: Satisfactory inspirator effort. MEDIASTINAL STRUCTURES: No lymphadenopathy. Normal aortic contour. HEART SIZE: Normal. BONES AND SOFT TISSUES: No fracture or soft tissue abnormality. No active pulmonary disease. Pet Ct Skull Base To Mid Thigh    Result Date: 1/20/2020  Reading location: 200 Indication: 1/7/2020 Comparison: Lung nodule. Technique: Positron emission tomography with concurrently acquired computed tomography was performed from the skull base to the midthigh. CT scan is limited by low dose technique and respiratory motion. A total of 13.8 mCi 18-FDG was administered intravenously. Patient's fingerstick glucose level at time of injection was 56 mg/dL. FINDINGS: Head and Neck: No abnormal areas of activity Chest: There is uptake involving the posterior margin of the lung nodule showing a maximal SUV of 2.3. The remainder the nodule however does not show significant uptake. Abdomen/Pelvis: No abnormal areas of activity. Musculoskeletal: No abnormal areas of uptake. 1. Focus of low-level uptake seen involving the posterior margin of the right lower lobe lung nodule. This is unlikely to represent malignancy, favoring a hamartoma. Given the patient's age and condition follow-up should be performed rather than tissue sampling. Microbiology  NGTD        Assessment and Plan  Patient is a 81 y.o. male who presented with SOB.   The active problem list is as follows:    · Acute on chronic hypoxic respiratory failure  · Right pneumothorax  · Severe COPD on chronic O2  · Paroxysmal atrial fib on Eliquis  · Chronic back pain but has worsening left upper mid back pain  · Hypertension  · Moderate protein caloric malnutrition  · History of constipation    -IV vancomycin, cefepime, azithromycin  -Transferred to ICU 2/16/20  -Chest tube inserted in the ER  -Pulmonology has been consulted  -Continue nebulized breathing treatments  -Prednisone 30mg daily  -Thoracic surgery consulted for evaluation for possible pleurodesis  -Continue laxatives  -PT/OT  -Swallow eval    · Routine labs in AM.  · DVT prophylaxis. · Please see orders for further management and care. The plan was discussed with Dr. Yamileth Duron    8:40 AM  2/17/2020    The chart was reviewed and the patient was seen and examined with the resident. The case was discussed in detail with the resident and agree with current impressions and plan. Case discussed with patient's wife and daughter at the bedside. Case discussed with pulmonologist.  Consult PT/OT and increase activity. Intensivist recommended stopping all antibiotics.     Gabe Cordova D.O.  89:47 PM  2/17/2020

## 2020-02-17 NOTE — PROGRESS NOTES
Speech Language Pathology  SPEECH/LANGUAGE PATHOLOGY  CLINICAL ASSESSMENT OF SWALLOWING FUNCTION    PATIENT NAME:  Annette Rcio      :  1935      TODAY'S DATE:  2020  ROOM:  [facility]/[place] [address]    SUMMARY OF EVALUATION     DYSPHAGIA DIAGNOSIS:  Functional oropharyngeal swallow       DIET RECOMMENDATIONS:  Regular consistency solids with  thin liquids     FEEDING RECOMMENDATIONS:     Assistance level:  Full assistance is needed during all oral intake. 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 Compensatory strategies recommended: Small bites/sips and Alternate solids and liquids    THERAPY RECOMMENDATIONS:      Dysphagia therapy is recommended 3-5 times per week for LOS or when goals are met. Ongoing meal endurance analysis to provide diet modification and compensatory strategy implementation to minimize risk of aspiration associated with PO intake  Pt will tolerate the least restrictive PO diet to maintain adequate nutrition/ hydration via use of safe swallow/ compensatory strategies with  minimal verbal prompts and no more than 1 overt s/s of pen/asp. Instruction regarding appropriate implementation of compensatory strategies to improve integrity of swallow function during PO intake   Meal endurance analysis 1-2 sessions to provide diet modification and compensatory strategy implementation due to staff report of dysphagia symptoms during meals                   PROCEDURE     Consistencies Administered During the Evaluation   Liquids: thin liquid   Solids:  pureed foods and solid foods      Method of Intake:   cup, straw, spoon  Fed by clinician      Position:   Seated, upright                  RESULTS     Oral Stage: The oral stage of swallowing was within functional limits      Pharyngeal Stage:      No signs of aspiration were noted during this evaluation however, silent aspiration cannot be ruled out at bedside.   If silent aspiration is suspected, a Videofluoroscopic Study of Swallowing

## 2020-02-17 NOTE — PROGRESS NOTES
Nutrition Assessment    Type and Reason for Visit: Reassess    Nutrition Recommendations: Pt refusing Ensure Enlive, will cancel this. Pt prefers chocolate and mixed berry Magic Cups will add flavor preference. Encouraged family to bring in food from home. Nutrition Assessment: Pt continues to be severely malnourished and declining from a nutritional standpoint w/ continued poor intake. Pt transferred to ICU d/t respiratory distress. Pt was on Bipap yesterday. Per family pt is only consuming bites of food a couple times daily. Will modify ONS regimen to pt preferences and encouraged family to bring food from home. Will continue to monitor. Malnutrition Assessment:  · Malnutrition Status: Meets the criteria for severe malnutrition  · Context: Chronic illness  · Findings of the 6 clinical characteristics of malnutrition (Minimum of 2 out of 6 clinical characteristics is required to make the diagnosis of moderate or severe Protein Calorie Malnutrition based on AND/ASPEN Guidelines):  1. Energy Intake-Less than or equal to 75% of estimated energy requirement, Greater than or equal to 3 months    2. Weight Loss-20% loss or greater, (9 months)  3. Fat Loss-Severe subcutaneous fat loss, Orbital, Triceps  4. Muscle Loss-Moderate muscle mass loss, Temples (temporalis muscle), Clavicles (pectoralis and deltoids)  5. Fluid Accumulation-Mild fluid accumulation, Extremities  6.   Strength-Not measured    Nutrition Risk Level: High    Nutrient Needs:  · Estimated Daily Total Kcal: 5478-2832(MSJ REE= 1174 x 1.4 = 1644)  · Estimated Daily Protein (g): 80-95(1.5-1.8 gm/kg admit wt)  · Estimated Daily Total Fluid (ml/day): per critical care    Nutrition Diagnosis:   · Problem: Severe malnutrition, In context of chronic illness  · Etiology: related to Catabolic illness(COPD)     Signs and symptoms:  as evidenced by Diet history of poor intake, Intake 0-25%, Weight loss greater than or equal to 20% in 1 year, Severe loss of subcutaneous fat    Objective Information:  · Nutrition-Focused Physical Findings: abd soft, hypoactive BS, trace BLE edema, -1.1L I/O, generalized weakness, chest tube  · Wound Type: Surgical Wound(CT)  · Current Nutrition Therapies:  · Oral Diet Orders: General   · Oral Diet intake: 1-25%  · Oral Nutrition Supplement (ONS) Orders: Standard High Calorie Oral Supplement, Frozen Oral Supplement  · ONS intake: %(1 ONS recorded)  · Anthropometric Measures:  · Ht: 5' 6\" (167.6 cm)   · Current Body Wt: 128 lb 14.4 oz (58.5 kg)(2/15 bed scale)  · Admission Body Wt: 118 lb (53.5 kg)(2/6/20 actual, per EMR )  · Usual Body Wt: 151 lb (68.5 kg)(5/2019 actual per EMR)  · % Weight Change:  ,  22% wt loss in 9 months, wt gain since admission likely r/t edema  · Ideal Body Wt: 142 lb (64.4 kg), % Ideal Body 83% using admit wt  · BMI Classification: BMI 18.5 - 24.9 Normal Weight    Nutrition Interventions:   Continue current diet, Modify current ONS(Pt refusing Ensure Enlive, will cancel this. Pt prefers chocolate and mixed berry Magic Cups will add flavor preference.  Encouraged family to bring in food from home.)  Continued Inpatient Monitoring    Nutrition Evaluation:   · Evaluation: Progress towards goals declining   · Goals: Pt is to consume >50% of most meals/ONS    · Monitoring: Meal Intake, Supplement Intake, Diet Tolerance, Skin Integrity, I&O, Weight, Pertinent Labs, Monitor Bowel Function      Electronically signed by Mayra Galindo RD, LD on 2/17/20 at 6:31 PM    Contact Number: [phone]

## 2020-02-17 NOTE — CARE COORDINATION
2/17/2020 pt to icu yesterday 2/16/2020 due to increased respiratory needs. Pt is limited code status- chest compressions only. Pt was a recent readmit x 2- 8 days and 6 days last visits. Currently with chest tube and high flow oxygen needs. Palliative care may be helpful. Pt/ot when pt able to participate. Plans still with [facility] at discharge- family requests no therapies right at discharge. CM/SS to follow. K       Readmission screening questions:  1. Before your last discharge, were you feeling improved? Yes   2. When did you start not feeling well? yes  3. Where did you go after being discharged? Home with [facility] and family   4. Were you given instructions on medications and how to care for yourself? n/a  5. If you went home, did you have help? [facility] and family   6. Did you get your medications filled? n/a  7. Did you follow up with your doctor at the office? Saw dr. Ra Forte   8.  Who instructed you to come back to the hospital? (self-referral, physician's office)  Family         Electronically signed by CHRISTINE Mac  on 2/17/2020 at 11:59 AM

## 2020-02-17 NOTE — PROGRESS NOTES
mobility, balance, functional transfers, and functional mobility. Sat edge of bed 10 minutes with Maximal assist to increase dynamic sitting balance and activity tolerance. Therapeutic Exercises:  Cervical rom in all planes with gentle stretching all planes x 10 min; a/a contract relax ; pt positions in forward flexion, side bend left right rotation. Encouraged patient to perform cervical flexion/extension and rotation every 15 min while awake    At end of session, patient in bed with alarm call light and phone within reach,   all lines and tubes intact, nursing notified. Patient would benefit from continued skilled Physical Therapy to improve functional independence and quality of life. Patient's/ family goals   home        Patient and or family understand(s) diagnosis, prognosis, and plan of care. PLAN:    Physical Therapy care will be provided in accordance with the objectives noted above. Exercises and functional mobility practice will be used as well as appropriate assistive devices or modalities to obtain goals. Patient and family education will also be administered as needed. Frequency of treatments: Patient will be seen    daily  for therapeutic exercise, functional retraining, endurance activities, balance exercises, family and patient education. Time in  230  Time out  306    Total Treatment Time  36 minutes    Evaluation time includes thorough review of current medical information, gathering information on past medical history/social history and prior level of function, completion of standardized testing/informal observation of tasks, assessment of data, and development of Plan of care and goals.      CPT codes:  34[address] Rafael Ordoñez ([id number])  Manual therapy (11964)  10 minutes  1 unit(s)    Eben Deras PT

## 2020-02-17 NOTE — PLAN OF CARE
Problem: Falls - Risk of:  Goal: Will remain free from falls  Description  Will remain free from falls  2/16/2020 1904 by Flex Tejada RN  Outcome: Met This Shift  2/16/2020 1144 by Bud Rice  Outcome: Ongoing  Goal: Absence of physical injury  Description  Absence of physical injury  2/16/2020 1904 by Flex Tejada RN  Outcome: Met This Shift  2/16/2020 1144 by Bud Rice  Outcome: Ongoing     Problem: Risk for Impaired Skin Integrity  Goal: Tissue integrity - skin and mucous membranes  Description  Structural intactness and normal physiological function of skin and  mucous membranes.   2/16/2020 1904 by Flex Tejada RN  Outcome: Met This Shift  2/16/2020 1144 by Bud Rice  Outcome: Ongoing     Problem: Pain:  Goal: Pain level will decrease  Description  Pain level will decrease  2/16/2020 1144 by Bud Rice  Outcome: Ongoing  Goal: Control of acute pain  Description  Control of acute pain  2/16/2020 1144 by Bud Rice  Outcome: Ongoing  Goal: Control of chronic pain  Description  Control of chronic pain  2/16/2020 1144 by Bud Rice  Outcome: Ongoing     Problem: Gas Exchange - Impaired:  Goal: Levels of oxygenation will improve  Description  Levels of oxygenation will improve  2/16/2020 1904 by Flex Tejada RN  Outcome: Met This Shift  2/16/2020 1144 by Bud Rice  Outcome: Ongoing

## 2020-02-17 NOTE — SIGNIFICANT EVENT
Problems:    Severe protein-calorie malnutrition (HCC)    Acute on chronic respiratory failure with hypoxia (HCC)  Resolved Problems:    * No resolved hospital problems. *      Plan:  1. ***      Critical care time *** minutes not including procedures. NOTE: This report was transcribed using voice recognition software.  Every effort was made to ensure accuracy; however, inadvertent computerized transcription errors may be present.     Electronically signed by Troy Nazario MD on 2/16/2020 at 9:42 PM

## 2020-02-18 NOTE — PROGRESS NOTES
brisk tongue motion. Oral residue was not observed. There was complete oral clearance. Initiation of the pharyngeal swallow occurred as the bolus head reached the pyriform sinuses. Soft palate elevation resulted in no bolus between the soft palate and the pharyngeal wall. Laryngeal elevation demonstrated partial superior movement of the thyroid cartilage with partial approximation of the arytenoids to the epiglottic petiole. Anterior hyoid excursion demonstrated partial anterior movement. Epiglottic movement resulted in partial inversion. Laryngeal vestibule closure was incomplete, as indicated by a narrow column of air or contrast within the laryngeal vestibule at the height of the swallow. Pharyngeal stripping wave was present but diminished. Pharyngeal contraction could not be determined due to logistical reasons not related to physiologic impairment. Pharyngoesophageal segment opening was completely distended for complete duration with no obstruction of bolus flow. Tongue base retraction allowed a collection of residue between the retracted tongue base and the posterior pharyngeal wall. A collection of residue remained within or on the pharyngeal structures Esophageal clearance in the upright position could not be assessed due to logistical reasons not related to physiologic impairment. Moderate penetration and aspiration with thin via cup and straw sips with weak ineffective latent cough. Cued throat clear not effective to clear laryngeal vestibule     No penetration with nectar or puree.      Current Respiratory Status   nasal canula         COMPENSATORY STRATEGIES       Compensatory strategies that were not beneficial included Throat clear      STRUCTURAL/FUNCTIONAL ANOMALIES       No structural/functional anomalies were noted    CERVICAL ESOPHAGEAL STAGE :        The cervical esophagus appeared adequate                                 The Speech Language Pathologist (SLP) completed education with the patient regarding results of evaluation. Explained that Speech Pathology intervention is warranted  at this time   Prognosis for improvements is fair +     This plan will be re-evaluated and revised in 1 week  if warranted. Patient stated goals: Agreed with above,   Treatment goals discussed with Patient understand(s) the diagnosis, prognosis and plan of care   The Patient understand(s) the diagnosis, prognosis and plan of care    CPT code:  [id number]  dysphagia study    Evaluation time includes  review of current medical information, gathering information on past medical history/social history and prior level of function, completion of standardized testing/informal observation of tasks, assessment of data, and development of POC/Goals. [x]The admitting diagnosis and active problem list, as listed below have been reviewed prior to initiation of this evaluation.      ADMITTING DIAGNOSIS: Acute on chronic respiratory failure with hypoxia (HCC) [J96.21]  Acute on chronic respiratory failure with hypoxia (HCC) [J96.21]     ACTIVE PROBLEM LIST:   Patient Active Problem List   Diagnosis    Hypertension    Hyperlipidemia    Nephrolithiasis    Cor pulmonale, chronic (HCC)    Bradycardia, sinus    Multiple thyroid nodules    Secondary adrenal insufficiency (HCC)    Acquired bronchiectasis (HCC)    COPD, severe (HCC)    Status post repair of abdominal aortic aneurysm (AAA) using straight graft    Chronic respiratory failure with hypoxia and hypercapnia (HCC)    Severe hypoxemia    Ventricular tachyarrhythmia (HCC)    Thyroid disease    Hypertension    Hyperlipidemia    COPD (chronic obstructive pulmonary disease) ([address]    Dependent edema    Respiratory failure (HCC)    Severe protein-calorie malnutrition (HCC)    History of cerebral aneurysm repair    History of endovascular stent graft for abdominal aortic aneurysm    Paroxysmal atrial fibrillation with rapid ventricular response ([address]   

## 2020-02-18 NOTE — PLAN OF CARE
Problem: Falls - Risk of:  Goal: Will remain free from falls  Description  Will remain free from falls  2/18/2020 1049 by Grzegorz Lagos RN  Outcome: Met This Shift  2/18/2020 0754 by Marques Salinas RN  Outcome: Met This Shift  Goal: Absence of physical injury  Description  Absence of physical injury  2/18/2020 1049 by Grzegorz Lagos RN  Outcome: Met This Shift  2/18/2020 0754 by Marques Salinas RN  Outcome: Met This Shift     Problem: Pain:  Goal: Pain level will decrease  Description  Pain level will decrease  Outcome: Met This Shift  Goal: Control of acute pain  Description  Control of acute pain  Outcome: Met This Shift  Goal: Control of chronic pain  Description  Control of chronic pain  2/18/2020 1049 by Grzegorz Lagos RN  Outcome: Met This Shift  2/18/2020 0754 by Marques Salinas RN  Outcome: Met This Shift     Problem: Gas Exchange - Impaired:  Goal: Levels of oxygenation will improve  Description  Levels of oxygenation will improve  Outcome: Met This Shift     Problem: Risk for Impaired Skin Integrity  Goal: Tissue integrity - skin and mucous membranes  Description  Structural intactness and normal physiological function of skin and  mucous membranes.   2/18/2020 1049 by Grzegorz Lagos RN  Outcome: Not Met This Shift  2/18/2020 0754 by Marques Salinas RN  Outcome: Met This Shift

## 2020-02-18 NOTE — PROGRESS NOTES
Speech Language Pathology      NAME:  Pennie Dillon  :  1935  DATE: 2020  ROOM:  [facility]    Patient Active Problem List   Diagnosis    Hypertension    Hyperlipidemia    Nephrolithiasis    Cor pulmonale, chronic (HCC)    Bradycardia, sinus    Multiple thyroid nodules    Secondary adrenal insufficiency (HCC)    Acquired bronchiectasis (HCC)    COPD, severe (HCC)    Status post repair of abdominal aortic aneurysm (AAA) using straight graft    Chronic respiratory failure with hypoxia and hypercapnia (HCC)    Severe hypoxemia    Ventricular tachyarrhythmia (HCC)    Thyroid disease    Hypertension    Hyperlipidemia    COPD (chronic obstructive pulmonary disease) (HCC)    Dependent edema    Respiratory failure (HCC)    Severe protein-calorie malnutrition (HCC)    History of cerebral aneurysm repair    History of endovascular stent graft for abdominal aortic aneurysm    Paroxysmal atrial fibrillation with rapid ventricular response ([address]    Community acquired bacterial pneumonia    Paroxysmal atrial fibrillation (HCC)    Hypokalemia    Pulmonary fibrosis (HCC)    Essential tremor    BPH (benign prostatic hyperplasia)    Constipation    Acute urinary retention    Atelectasis of right lung    Acute on chronic respiratory failure with hypoxia (HCC)    Respiratory distress       Patient seen for swallow therapy 15 minutes. patient and family educated regarding results of MBSS. Reviewed current solid/liquid consistency diet recommendation for   Regular consistency solids with  nectar consistency (mildly thick) liquids, and discussed compensatory strategies to ensure safe PO intake. Reviewed aspiration precautions.  Discussed use of straw and throat clear to decrease risk of aspiration with implementation of strengthening exercises to improve swallow function as the long term goal.  Patient and or family  indicated understanding of all information provided via satisfactory

## 2020-02-18 NOTE — PLAN OF CARE
Problem: Falls - Risk of:  Goal: Will remain free from falls  Description  Will remain free from falls  Outcome: Met This Shift     Problem: Falls - Risk of:  Goal: Absence of physical injury  Description  Absence of physical injury  Outcome: Met This Shift     Problem: Risk for Impaired Skin Integrity  Goal: Tissue integrity - skin and mucous membranes  Description  Structural intactness and normal physiological function of skin and  mucous membranes.   Outcome: Met This Shift     Problem: Pain:  Goal: Control of chronic pain  Description  Control of chronic pain  Outcome: Met This Shift

## 2020-02-18 NOTE — PROGRESS NOTES
Assessment and Plan  Patient is a 81 y.o. male with the following medical Problems:   #1 acute on chronic hypoxic and hypercapnic respiratory failure  2. Advanced COPD on home oxygen Triligy at night  3. Interstitial lung disease  4. Dyslipidemia  5. Essential tremor  6. Severe protein calorie malnutrition  7. Right-sided pneumothorax  8. RLL nodule  9. Pulmoanry hypertension    Plan of care;  1 keep sat 88 to 92%  2. We will obtain ultrasound of left upper extremity to rule out DVT  3. Discontinue antibiotic since there is no evidence of infection  4. Chest tube was removed yesterday  5. DVT prophylaxis  6. Bronchodilators and oral prednisone  7. I Discussed with the primary attending regarding palliative care consult  8. Transfer out of ICU    History of Present Illness:  Patient is an 56-year-old man with above-mentioned medical problems who was admitted on February 15, 2020 with progressive shortness of breath and was found to have right-sided pneumothorax. He currently has right-sided chest tube but without any leak.    02/18/2020  Patient's chest tube was removed yesterday. Repeated chest x-ray showed no recurrence of pneumothorax. Was sleepy this morning but his blood gases showed no worsening of CO2 retention. He had an uneventful night.     Past Medical History:  Past Medical History:   Diagnosis Date    COPD (chronic obstructive pulmonary disease) ([address]     stable per pt    History of cardiovascular stress test 4/11/2012    Lexiscan    Hyperlipidemia     Hypertension 9-8-2014    lexiscan stress test    Kidney stone     Pulmonary fibrosis ([address]     Thyroid disease         Family History:   Family History   Problem Relation Age of Onset    Heart Disease Father     Kidney Disease Mother        Allergies:         Flomax [tamsulosin hcl]    Social history:  Social History     Socioeconomic History    Marital status:      Spouse name: Not on file    Number of children: Not on Oral, BID, Izell Trish, DO, 100 mg at 02/18/20 6639    polyethylene glycol (GLYCOLAX) packet 17 g, 17 g, Oral, Daily, Izell Trish, DO, 17 g at 02/17/20 0908    vitamin D3 (CHOLECALCIFEROL) tablet 5,000 Units, 5,000 Units, Oral, Once per day on Mon Thu, Cam Uribelap, DO, Stopped at 02/17/20 5020    furosemide (LASIX) tablet 80 mg, 80 mg, Oral, Daily, Izell Trish, DO, 80 mg at 02/18/20 0850    acyclovir (ZOVIRAX) tablet 400 mg, 400 mg, Oral, TID, Izell Trish, DO, 400 mg at 02/18/20 0910    montelukast (SINGULAIR) tablet 10 mg, 10 mg, Oral, Daily, Izell Trish, DO, 10 mg at 02/18/20 0907    theophylline (FRANCIS-24) extended release capsule 300 mg, 300 mg, Oral, Daily, Izell Trish, DO, 300 mg at 02/18/20 0909    finasteride (PROSCAR) tablet 5 mg, 5 mg, Oral, Daily, Izell Trish, DO, 5 mg at 02/18/20 0908    pantoprazole (PROTONIX) tablet 40 mg, 40 mg, Oral, QAM AC, Cam A Sylvester, DO, 40 mg at 02/18/20 3956    metoprolol succinate (TOPROL XL) extended release tablet 12.5 mg, 12.5 mg, Oral, Daily, Izell Trish, DO, 12.5 mg at 02/18/20 2908    albuterol (PROVENTIL) nebulizer solution 2.5 mg, 2.5 mg, Nebulization, Q4H PRN, Izell Trish, DO    ibuprofen (ADVIL;MOTRIN) tablet 400 mg, 400 mg, Oral, TID, Izell Trish, DO, Stopped at 02/18/20 7032    vitamin C (ASCORBIC ACID) tablet 2,000 mg, 2,000 mg, Oral, BID, Izell Trish, DO, Stopped at 02/17/20 2352    potassium chloride (KLOR-CON M) extended release tablet 20 mEq, 20 mEq, Oral, BID, Izell Trish, DO, 20 mEq at 02/17/20 2036    primidone (MYSOLINE) tablet 250 mg, 250 mg, Oral, BID, Cam Sylvester DO, 250 mg at 02/18/20 0908    brimonidine (ALPHAGAN) 0.2 % ophthalmic solution 1 drop, 1 drop, Right Eye, BID, Cam Sylvester DO, 1 drop at 02/18/20 0912    latanoprost (XALATAN) 0.005 % ophthalmic solution 1 drop, 1 drop, Left Eye, Nightly, Eliza Chambers DO, 1 drop at 02/17/20 2041    predniSONE (DELTASONE) tablet 30 mg, 30 mg, Oral, Daily, Gray Hansen, DO, 30 mg at 02/18/20 0906    acetaminophen (TYLENOL) tablet 500 mg, 500 mg, Oral, Q6H PRN, Gray Hansen, DO    ipratropium-albuterol (DUONEB) nebulizer solution 1 ampule, 1 ampule, Inhalation, Q4H, Gray Hansen, DO, 1 ampule at 02/18/20 1001    budesonide (PULMICORT) nebulizer suspension 500 mcg, 500 mcg, Nebulization, BID, Gray Hansen, DO, 500 mcg at 02/18/20 0645    ondansetron (ZOFRAN) injection 4 mg, 4 mg, Intravenous, Q6H PRN, Gray Hansen DO    traMADol (ULTRAM) tablet 50 mg, 50 mg, Oral, Q6H PRN, Gray Hansen, DO, 50 mg at 02/14/20 1246    Arformoterol Tartrate (BROVANA) nebulizer solution 15 mcg, 15 mcg, Nebulization, BID, 15 mcg at 02/18/20 0644 **AND** [CANCELED] Nebulizer tx intermittent, , , BID, Sagar Taylor DO    dextrose 5 % and 0.9 % sodium chloride infusion, , Intravenous, Continuous, Noral Marques DO, Last Rate: 50 mL/hr at 02/17/20 1240    Review of Systems:   General: denies weight gain, denies loss of appetite, fever, chills, night sweats. HEENT: denies headaches, dizziness, head trauma, visual changes, eye pain, tinnitus, nosebleeds, hoarseness or throat pain    Respiratory: denies chest pain,+ve dyspnea, cough but no hemoptysis  Cardiovascular: denies orthopnea, paroxysmal nocturnal dyspnea, leg swelling, and previous heart attack. Gastrointestinal: denies pain, nausea vomiting, diarrhea, constipation, melena or bleeding.   Genitourinary: denies hematuria, frequency, urgency or dysuria  Neurology: denies syncope, seizures, paralysis, paraesthesia   Endocrine: denies polyuria, polydipsia, skin or hair changes, and heat or cold intolerance  Musculoskeletal: denies joint pain, swelling, arthritis or myalgia  Hematologic: denies bleeding, adenopathy and easy bruising  Skin: denies rashes and skin discoloration  Psychiatry: denies depression    Physical Exam:   Vital Signs:  BP (!) 97/51   Pulse 52 Temp 98.6 °F (37 °C) (Axillary)   Resp 19   Ht 5' 6\" (1.676 m)   Wt 128 lb 14.4 oz (58.5 kg)   SpO2 100%   BMI 20.81 kg/m²     Input/Output: In: 1295 [I.V.:1295]  Out: 1250     Oxygen requirements: NC    Ventilator Information:  Vent Information  Skin Assessment: Clean, dry, & intact  FiO2 : 100 %    General appearance: Frail, not in pain or distress, in no respiratory distress    HEENT: Atraumatic/normocephalic, EOMI, KANWAL, pharynx clear, moist mucosa, redness of the uvula appreciated,   Neck: Supple, no jugular venous distension, lymphadenopathy, thyromegaly or carotid bruits  Chest: Decreased breath sounds, no wheezing, +ve crackles and no tenderness over ribs   Cardiovascular: Normal S1 , S2, regular rate and rhythm, no murmur, rub or gallop  Abdomen: Normal sounds present, soft, lax with no tenderness, no hepatosplenomegaly, and no masses  Extremities: No edema. Pulses are equally present. Skin: intact, no rashes   Neurologic: Alert and oriented x 3, No focal deficit     Investigations:  Labs, radiological imaging and cardiac work up were reviewed        ICU STAFF PHYSICIAN NOTE OF PERSONAL INVOLVEMENT IN CARE  As the attending physician, I certify that I personally reviewed the patient's history and personally examined the patient to confirm the physical findings described above, and that I reviewed the relevant imaging studies and available reports. I also discussed the differential diagnosis and all of the proposed management plans with the patient and individuals accompanying the patient to this visit. They had the opportunity to ask questions about the proposed management plans and to have those questions answered. This patient has a high probability of sudden, clinically significant deterioration, which requires the highest level of physician preparedness to intervene urgently. I managed/supervised life or organ supporting interventions that required frequent physician assessment.   I

## 2020-02-18 NOTE — PROGRESS NOTES
[facility]  Internal Medicine  -Resident Progress Note-    PCP:  Olivia Zuluaga DO  Admitting Physician:  Olivia Zuluaga DO  Consultants:  Critical care  Chief Complaint:    Chief Complaint   Patient presents with    COPD     been feeling like it all day, family has had him on 6L all day with 200 ft of O2 line SpO2 at home with nebulizer was mid 80's   EMS nebulizer given and 125 solu medrol now 93%        History of Present Illness  The patient is a 81 y.o. male who presents with increased shortness of breath at home. Patient was recently discharged. 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 Patient symptoms started the day of admission on 2/11. He has a nonproductive cough but denies any fever and chills or chest pain. Initially chest x-ray was not read out as pneumothorax but a reread this morning showed large right pneumothorax. Case discussed with the patient's wife and daughter at the bedside. Patient admitted for further evaluation.     2/13/2020  Patient is seen and examined on [facility]. Patient's wife is at the bedside and case discussed. Case also discussed with pulmonary medicine at the bedside. Patient is doing fairly well considering but still having rib chest pain secondary to #2. Hemoglobin is down to 8.2 from admission 10.4 with normal liver enzymes and BMP is essentially unchanged. Patient currently on 6 L nasal cannula with O2 sat 96% at rest.  Vital signs are fairly stable with a blood pressure 100/56.     2/14/2020  Patient seen and examined on [facility]. Patient's wife is at the bedside and case discussed. Patient is still complaining of increasing pain in his left back area all the way from his upper back down to his mid left back. Massaging the area does not make it feel better. Patient states not necessarily worse with respirations. The 5 mg oxycodone is not helping the pain. Case discussed with Dr. Aliza Ford.   Blood pressure ranges 33//59. O2 sat 95% on 5 L today. Patient's had good urinary output.     2/15/2020  Patient seen and examined on [facility]. Multiple family members are at the bedside and case discussed. Patient is a lot weaker today than normally. Patient is alert to himself and place but otherwise extremely weak and more lethargic. His last oxycodone was at 5:47 AM today. It had been discussed with patient's family on admission that if we increased his pain meds he could become more lethargic. His O2 saturation is good at 94% on his 5 L nasal cannula. The patient has been temperature is 98.6 with a heart rate 62 with respiratory rate of 16. Blood pressure ranges from 116//66. BUN/creatinine was 28/0.9 with potassium 4.0.     2/16/202  Patient seen and examined on [facility]. Patient's wife and daughter at the bedside and case discussed in detail. Patient became more lethargic again today with O2 saturations dropping into the low 80s on 6 L nasal cannula. Patient was ordered to transfer to the ICU to be monitored closer. Blood pressure heart rates were stable. Patient temperature was increased to 99.6. Rest x-ray today showed developing airspace disease and pleural effusion left base which was not there yesterday. Patient will be started on IV vancomycin and cefepime and pending pulmonology evaluation today    2/17/2020  Patient seen and examined in ICU. He remains profoundly weak. He is lethargic. He remains on 8L NC O2. He is complaining of back pain. Thoracic surgery has been consulted due to multiple pneumothoraces    2/18/2020  Patient seen and examined in ICU. He remains profoundly weak. He remains is now on 4L NC O2. He is complaining of back pain. Awaiting thoracic surgery consult.  Chest tube removed yesterday    Review of Systems  All bolded are positive; please see HPI  General:  Fever, chills, diaphoresis, fatigue, malaise, night sweats, weight loss  Psychological:  Anxiety, disorientation, mg Oral QAM AC    metoprolol succinate  12.5 mg Oral Daily    ibuprofen  400 mg Oral TID    vitamin C  2,000 mg Oral BID    potassium chloride  20 mEq Oral BID    primidone  250 mg Oral BID    brimonidine  1 drop Right Eye BID    latanoprost  1 drop Left Eye Nightly    predniSONE  30 mg Oral Daily    ipratropium-albuterol  1 ampule Inhalation Q4H    budesonide  500 mcg Nebulization BID    Arformoterol Tartrate  15 mcg Nebulization BID     Infusion Meds    dextrose 5 % and 0.9 % NaCl 50 mL/hr at 02/17/20 1240     PRN Meds oxyCODONE, albuterol, acetaminophen, ondansetron, traMADol    Laboratory Data  No results found for this or any previous visit (from the past 24 hour(s)). Imaging  Xr Lumbar Spine (2-3 Views)    Result Date: 2/6/2020  LOCATION:200 EXAM: XR LUMBAR SPINE (2-3 VIEWS), XR PELVIS (1-2 VIEWS) COMPARISON: None HISTORY:  Low back pain TECHNIQUE:  4  views of the lumbar spine and single view pelvis were obtained. FINDINGS:  There is normal vertebral body height and alignment. Severe degenerative changes with facet arthropathy, marginal osteophytes and disc space narrowing noted greatest at L4-5. The bony pelvis is intact. The hips are well aligned. Osseous structures are otherwise normal without evidence of fracture. Arthritis with no acute findings. Xr Pelvis (1-2 Views)    Result Date: 2/6/2020  LOCATION:200 EXAM: XR LUMBAR SPINE (2-3 VIEWS), XR PELVIS (1-2 VIEWS) COMPARISON: None HISTORY:  Low back pain TECHNIQUE:  4  views of the lumbar spine and single view pelvis were obtained. FINDINGS:  There is normal vertebral body height and alignment. Severe degenerative changes with facet arthropathy, marginal osteophytes and disc space narrowing noted greatest at L4-5. The bony pelvis is intact. The hips are well aligned. Osseous structures are otherwise normal without evidence of fracture. Arthritis with no acute findings.     Xr Chest Portable    Result Date: 2/17/2020  Reading CHEST PORTABLE COMPARISON: 12/12/2020 HISTORY: Chest tube placement TECHNIQUE: Single frontal view of the chest was obtained. FINDINGS:  SUPPORT DEVICES: Large bore chest tube projects at the right lung apex. LUNGS: Elevated right hemidiaphragm with pleural effusion again noted. PLEURA: No pneumothorax visualized. LUNG VOLUMES: Hyperlucent lungs with increased lung volumes, likely COPD. MEDIASTINAL STRUCTURES: No lymphadenopathy. Normal aortic contour. HEART SIZE: Normal. BONES AND SOFT TISSUES: No fracture or soft tissue abnormality. 1. No pneumothorax seen with chest tube in place. Xr Chest Portable    Result Date: 2/12/2020  LOCATION: 200 EXAM: XR CHEST PORTABLE COMPARISON: 2/11/2020 HISTORY: Shortness of breath TECHNIQUE: Single frontal view of the chest was obtained. FINDINGS:  SUPPORT DEVICES: Large bore chest tube is seen with the tip in the lung apex. LUNGS: Patchy opacities are seen at the right lung base with right perihilar opacities favoring atelectasis. PLEURA: Pneumothorax has resolved. LUNG VOLUMES: Satisfactory inspirator effort. MEDIASTINAL STRUCTURES: No lymphadenopathy. Normal aortic contour. HEART SIZE: Normal. BONES AND SOFT TISSUES: No fracture or soft tissue abnormality. Resolution of the pneumothorax after chest tube placement. Xr Chest Portable    Result Date: 2/12/2020  LOCATION: 200 EXAM: XR CHEST PORTABLE COMPARISON: 2/6/2020 HISTORY: Shortness of breath TECHNIQUE: Single frontal view of the chest was obtained. FINDINGS:  SUPPORT DEVICES: None. LUNGS: Clear with no areas of consolidation. PLEURA: There is a large size right pneumothorax, likely 50%. No definite mediastinal shift noted, although the radiograph is rotated. LUNG VOLUMES: Satisfactory inspirator effort. MEDIASTINAL STRUCTURES: No lymphadenopathy. Normal aortic contour. HEART SIZE: Normal. BONES AND SOFT TISSUES: No fracture or soft tissue abnormality. 1. Large size right pneumothorax.  Assessment for mediastinal shift is limited due to the severe rotation. There is a discrepancy with the preliminary report from the ER physician. A discrepancy report was filled out. Critical results: These findings were discussed with  on the date of the exam at 07 100 by Josh Apgar, DO. They confirmed that they understood the results communicated to them. Xr Chest Portable    Result Date: 2020  LOCATION: 200 EXAM: XR CHEST PORTABLE COMPARISON: 2020 HISTORY: Shortness of breath TECHNIQUE: Single frontal view of the chest was obtained. FINDINGS:  SUPPORT DEVICES: None. 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 The left lung is clear. PLEURA: No pneumothorax visualized. LUNG VOLUMES: Satisfactory inspirator effort. MEDIASTINAL STRUCTURES: No lymphadenopathy. Normal aortic contour. HEART SIZE: Normal. BONES AND SOFT TISSUES: No fracture or soft tissue abnormality. No active pulmonary disease. Pet Ct Skull Base To Mid Thigh    Result Date: 2020  Reading location: 200 Indication: 2020 Comparison: Lung nodule. Technique: Positron emission tomography with concurrently acquired computed tomography was performed from the skull base to the midthigh. CT scan is limited by low dose technique and respiratory motion. A total of 13.8 mCi 18-FDG was administered intravenously. Patient's fingerstick glucose level at time of injection was 56 mg/dL. FINDINGS: Head and Neck: No abnormal areas of activity Chest: There is uptake involving the posterior margin of the lung nodule showing a maximal SUV of 2.3. The remainder the nodule however does not show significant uptake. Abdomen/Pelvis: No abnormal areas of activity. Musculoskeletal: No abnormal areas of uptake. 1. Focus of low-level uptake seen involving the posterior margin of the right lower lobe lung nodule. This is unlikely to represent malignancy, favoring a hamartoma.  Given the patient's age and condition follow-up should be performed rather than tissue sampling. Microbiology  NGTD        Assessment and Plan  Patient is a 81 y.o. male who presented with SOB. The active problem list is as follows:    · Acute on chronic hypoxic respiratory failure  · Right pneumothorax  · Severe COPD on chronic O2  · Paroxysmal atrial fib on Eliquis  · Chronic back pain but has worsening left upper mid back pain  · Hypertension  · Moderate protein caloric malnutrition  · History of constipation    -Transferred to ICU 2/16/20  -Chest tube inserted in the ER  -Pulmonology has been consulted  -Continue nebulized breathing treatments  -Prednisone 30mg daily  -Thoracic surgery consulted for evaluation for possible pleurodesis  -Continue laxatives  -US LUE showing no DVT  -PT/OT  -Swallow eval    · Routine labs in AM.  · DVT prophylaxis. · Please see orders for further management and care. The plan was discussed with Dr. Alwin Dancer    7:23 AM  2/18/2020    The chart was reviewed and the patient was seen and examined with the resident. The case was discussed in detail with the resident and agree with current impressions and plan.     Melissa Mckeon D.O.  11:29 AM  2/18/2020

## 2020-02-19 NOTE — PROGRESS NOTES
Assessment and Plan  Patient is a 81 y.o. male with the following medical Problems:   #1 acute on chronic hypoxic and hypercapnic respiratory failure  2. Advanced COPD on home oxygen Triligy at night  3. Interstitial lung disease  4. Dyslipidemia  5. Essential tremor  6. Severe protein calorie malnutrition  7. Right-sided pneumothorax  8. RLL nodule  9. Pulmoanry hypertension    Plan of care;  1 keep sat 88 to 92%  2.  US ruled out DVT  3. Discontinue antibiotic since there is no evidence of infection  4. Chest tube was removed yesterday  5. DVT prophylaxis  6. Bronchodilators and oral prednisone  7. I Discussed with the primary attending regarding palliative care consult  8. Transfer out of ICU    History of Present Illness:  Patient is an 60-year-old man with above-mentioned medical problems who was admitted on February 15, 2020 with progressive shortness of breath and was found to have right-sided pneumothorax. He currently has right-sided chest tube but without any leak.    02/18/2020  Patient's chest tube was removed yesterday. Repeated chest x-ray showed no recurrence of pneumothorax. Was sleepy this morning but his blood gases showed no worsening of CO2 retention. He had an uneventful night.    02/19/2020  Patient remained stable in the ICU. He had an uneventful night. He is more awake and communicating today and requesting to go home. He has no fever, chills, or rigors.       Past Medical History:  Past Medical History:   Diagnosis Date    COPD (chronic obstructive pulmonary disease) ([facility] [address]     stable per pt    History of cardiovascular stress test 4/11/2012    Lexiscan    Hyperlipidemia     Hypertension 9-8-2014    lexiscan stress test    Kidney stone     Pulmonary fibrosis ([facility] [address]     Thyroid disease         Family History:   Family History   Problem Relation Age of Onset    Heart Disease Father     Kidney Disease Mother        Allergies:         Flomax [tamsulosin hcl]    Social history:  Social History     Socioeconomic History    Marital status:      Spouse name: Not on file    Number of children: Not on file    Years of education: Not on file    Highest education level: Not on file   Occupational History    Occupation: Physician     Comment: Retired   Social Needs    Financial resource strain: Not on file    Food insecurity:     Worry: Not on file     Inability: Not on file   [employer] needs:     Medical: Not on file     Non-medical: Not on file   Tobacco Use    Smoking status: Former Smoker     Packs/day: 2.00     Years: 42.00     Pack years: 84.00     Types: Cigarettes     Last attempt to quit: 2003     Years since quittin.0    Smokeless tobacco: Never Used   Substance and Sexual Activity    Alcohol use:  Yes     Alcohol/week: 1.0 standard drinks     Types: 1 Shots of liquor per week     Comment: daily    Drug use: No    Sexual activity: Not on file   Lifestyle    Physical activity:     Days per week: Not on file     Minutes per session: Not on file    Stress: Not on file   Relationships    Social connections:     Talks on phone: Not on file     Gets together: Not on file     Attends Jewish service: Not on file     Active member of club or organization: Not on file     Attends meetings of clubs or organizations: Not on file     Relationship status: Not on file    Intimate partner violence:     Fear of current or ex partner: Not on file     Emotionally abused: Not on file     Physically abused: Not on file     Forced sexual activity: Not on file   Other Topics Concern    Not on file   Social History Narrative    Not on file       Current Medications:     Current Facility-Administered Medications:     oxyCODONE (ROXICODONE) immediate release tablet 2.5 mg, 2.5 mg, Oral, Q4H PRN, Eliza Chambers DO, 2.5 mg at 20 1145    traMADol (ULTRAM) tablet 100 mg, 100 mg, Oral, BID, Sagar Taylor DO, 50 mg at 20 1026    gabapentin Pulse 108   Temp 98.6 °F (37 °C) (Oral)   Resp 20   Ht 5' 6\" (1.676 m)   Wt 121 lb 14.4 oz (55.3 kg)   SpO2 99%   BMI 19.68 kg/m²     Input/Output: In: 616 [P.O.:320; I.V.:296]  Out: 700     Oxygen requirements: NC    Ventilator Information:  Vent Information  Skin Assessment: Clean, dry, & intact  FiO2 : 100 %    General appearance: Frail, not in pain or distress, in no respiratory distress    HEENT: Atraumatic/normocephalic, EOMI, KANWAL, pharynx clear, moist mucosa, redness of the uvula appreciated,   Neck: Supple, no jugular venous distension, lymphadenopathy, thyromegaly or carotid bruits  Chest: Decreased breath sounds, no wheezing, +ve crackles and no tenderness over ribs   Cardiovascular: Normal S1 , S2, regular rate and rhythm, no murmur, rub or gallop  Abdomen: Normal sounds present, soft, lax with no tenderness, no hepatosplenomegaly, and no masses  Extremities: No edema. Pulses are equally present. Skin: intact, no rashes   Neurologic: Alert and oriented x 3, No focal deficit     Investigations:  Labs, radiological imaging and cardiac work up were reviewed        ICU STAFF PHYSICIAN NOTE OF PERSONAL INVOLVEMENT IN CARE  As the attending physician, I certify that I personally reviewed the patient's history and personally examined the patient to confirm the physical findings described above, and that I reviewed the relevant imaging studies and available reports. I also discussed the differential diagnosis and all of the proposed management plans with the patient and individuals accompanying the patient to this visit. They had the opportunity to ask questions about the proposed management plans and to have those questions answered. This patient has a high probability of sudden, clinically significant deterioration, which requires the highest level of physician preparedness to intervene urgently.   I managed/supervised life or organ supporting interventions that required frequent physician

## 2020-02-19 NOTE — DISCHARGE INSTR - COC
Influenza Virus Vaccine 10/11/2014, 11/02/2015, 11/22/2016    Influenza Whole 10/11/2014    Influenza, High Dose (Fluzone 65 yrs and older) 10/19/2015, 11/22/2016, 11/01/2017, 10/02/2018    Influenza, Triv, inactivated, subunit, adjuvanted, IM (Fluad 65 yrs and older) 10/18/2018, 10/02/2019    Pneumococcal Conjugate 13-valent (Lenward Tian) 10/11/2014, 09/21/2015    Pneumococcal Polysaccharide ([id number]) 08/29/2010, 01/02/2019    Zoster Live (Zostavax) 03/22/2000    Zoster Recombinant (Shingrix) 09/13/2018, 11/02/2018, 01/25/2019       Active Problems:  Patient Active Problem List   Diagnosis Code    Hypertension I10    Hyperlipidemia E78.5    Nephrolithiasis N20.0    Cor pulmonale, chronic (HCC) I27.81    Bradycardia, sinus R00.1    Multiple thyroid nodules E04.2    Secondary adrenal insufficiency (HCC) E27.49    Acquired bronchiectasis (HCC) J47.9    COPD, severe (HCC) J44.9    Status post repair of abdominal aortic aneurysm (AAA) using straight graft Z98.890, Z86.79    Chronic respiratory failure with hypoxia and hypercapnia ([facility]) J96.11, J96.12    Severe hypoxemia R09.02    Ventricular tachyarrhythmia ([facility]) I47.2    Thyroid disease E07.9    Hypertension I10    Hyperlipidemia E78.5    COPD (chronic obstructive pulmonary disease) (HCC) J44.9    Dependent edema R60.9    Respiratory failure ([facility]) J96.90    Severe protein-calorie malnutrition ([facility] [address] E43    History of cerebral aneurysm repair Z98.890, Z86.79    History of endovascular stent graft for abdominal aortic aneurysm Z95.828    Paroxysmal atrial fibrillation with rapid ventricular response (HCC) I48.0    Community acquired bacterial pneumonia J15.9    Paroxysmal atrial fibrillation (HCC) I48.0    Hypokalemia E87.6    Pulmonary fibrosis (HCC) J84.10    Essential tremor G25.0    BPH (benign prostatic hyperplasia) N40.0    Constipation K59.00    Acute urinary retention R33.8    Atelectasis of right lung J98.11    Acute on

## 2020-02-19 NOTE — PROGRESS NOTES
Physical Therapy    Physical Therapy Treatment Note    Room #:  IC11/IC11-01  Patient Name: Kahlil Anthony  YOB: 1935  MRN: [id number]    Referring Provider: Doreen Black MD    Date of Service: 2/19/2020    Evaluating Physical Therapist:  Sean Kayser, PT  Lic.  # [id number]      Diagnosis:   Acute on chronic respiratory failure with hypoxia (HCC) [J96.21]  Acute on chronic respiratory failure with hypoxia (HCC) [J96.21]      RRT and trf'd to icu with low o2 sats on 2/16  New orders rec'd today    Patient Active Problem List   Diagnosis    Hypertension    Hyperlipidemia    Nephrolithiasis    Cor pulmonale, chronic (HCC)    Bradycardia, sinus    Multiple thyroid nodules    Secondary adrenal insufficiency (HCC)    Acquired bronchiectasis (HCC)    COPD, severe (HCC)    Status post repair of abdominal aortic aneurysm (AAA) using straight graft    Chronic respiratory failure with hypoxia and hypercapnia (HCC)    Severe hypoxemia    Ventricular tachyarrhythmia (HCC)    Thyroid disease    Hypertension    Hyperlipidemia    COPD (chronic obstructive pulmonary disease) (HCC)    Dependent edema    Respiratory failure (HCC)    Severe protein-calorie malnutrition (HCC)    History of cerebral aneurysm repair    History of endovascular stent graft for abdominal aortic aneurysm    Paroxysmal atrial fibrillation with rapid ventricular response ([address]    Community acquired bacterial pneumonia    Paroxysmal atrial fibrillation (HCC)    Hypokalemia    Pulmonary fibrosis (HCC)    Essential tremor    BPH (benign prostatic hyperplasia)    Constipation    Acute urinary retention    Atelectasis of right lung    Acute on chronic respiratory failure with hypoxia (HCC)    Respiratory distress        Tentative placement recommendation: Long Term Acute Care  Vs subacute  Equipment recommendation: Patient has needed equipment       Prior Level of Function: Patient ambulated with wheeled walker prior to this admission  Rehab Potential: fair  for baseline    Past medical history:   Past Medical History:   Diagnosis Date    COPD (chronic obstructive pulmonary disease) ([facility] [address]     stable per pt    History of cardiovascular stress test 4/11/2012    Lexiscan    Hyperlipidemia     Hypertension 9-8-2014    lexiscan stress test    Kidney stone     Pulmonary fibrosis ([facility] [address]     Thyroid disease      Past Surgical History:   Procedure Laterality Date    ABDOMINAL AORTIC ANEURYSM REPAIR  09/26/2016    BALLOON ANGIOPLASTY, ARTERY Left     Cerebral artery stenosis with angioplasty and stent    CATARACT REMOVAL WITH IMPLANT      right, left    CYSTOSCOPY  nov 2012    retro ureteroscopy stone manipulation and insertion of j stent    EYE SURGERY      cataracts-bilat    INGUINAL HERNIA REPAIR  1970s bilateral    INGUINAL HERNIA REPAIR  1990s bilateral    INGUINAL HERNIA REPAIR Right sept 2014    laparoscopic       Precautions: falls, alarm, O2, low air loss bed, skin and impaired vision ,  Uses 6 liters at home    SUBJECTIVE:    Social history: Patient lives with spouse      AM-PAC Basic Mobility        AM-PAC Mobility Inpatient   How much difficulty turning over in bed?: A Lot  How much difficulty sitting down on / standing up from a chair with arms?: Unable  How much difficulty moving from lying on back to sitting on side of bed?: A Lot  How much help from another person moving to and from a bed to a chair?: Total  How much help from another person needed to walk in hospital room?: Total  How much help from another person for climbing 3-5 steps with a railing?: Total  AM-PAC Inpatient Mobility Raw Score : 8  AM-PAC Inpatient T-Scale Score : 28.52  Mobility Inpatient CMS 0-100% Score: 86.62  Mobility Inpatient CMS G-Code Modifier : CM    Nursing cleared patient for PT evaluation. The admitting diagnosis and active problem list as listed above have been reviewed prior to the initiation of this evaluation. OBJECTIVE:   RE Evaluation  Date: 2/17/2020 Treatment  2/19/2020   Short Term/ Long Term   Goals   Was pt agreeable to Eval/treatment? Yes yes    Does pt have pain? Yes 5/10  right scapular area and cervical area post stretching Yes with his left side of his neck    Bed Mobility    Rolling: Maximal assist of  2    Supine to sit: Maximal assist of  2    Sit to supine: Maximal assist of  2    Scooting: Dependent of  2   Rolling: Not assessed    Supine to sit: Not assessed    Sit to supine: Not assessed    Scooting: Not assessed     Rolling: Moderate assist    Supine to sit: Moderate assist    Sit to supine: Moderate assist    Scooting: Maximal assist     Transfers Sit to stand: Not assessed    Sit to stand: Not assessed    Stand pivot: Not assessed     Sit to stand: Moderate assist     Ambulation    not assessed   not assessed      Stair negotiation: ascended and descended   Not assessed    Not assessed    ROM Within functional limits with exception of cervical all planes and bilateral shoulders    Increase range of motion 10% of affected joints    Strength BUE: 2+/5  RLE:  2+/5  LLE:  2+/5   Increase strength in affected mm groups by 1/3 grade   Balance Sitting EOB:  poor    Dynamic Standing:  not assessed    Sitting EOB:  fair    Dynamic Standing: fair       Nursing cleared patient for PT evaluation and patient agreeable. The admitting diagnosis and active problem list as listed above have been reviewed prior to the initiation of this evaluation.     Patient is Alert & Oriented x person and place  and follows one step directions    Sensation:  Pt denies numbness and tingling to extremities  Edema:  yes left upper extremity      Patient education  Patient educated on role of Physical Therapy, risks of immobility and plan of care and ankle pumps, quad set and glut set for edema control, blood clot prevention and cervical ROM/stretching exercises    Patient response to education:   Pt verbalized understanding Pt demonstrated skill Pt requires further education in this area    Yes Partial Yes        ASSESSMENT:    Comments:  Pt able to perform LE & UE exercises with AAROM. Pt limited with cervical flexion/extension and rotation especially turning to the left side. I put 2 catheter straps together to act as a bungee cord and a pillow on his L side to hold his head in a mid-line position. Nursing present and was aware of it and if it got to be to much or uncomfortable for him, she would take it off. Treatment:  Patient practiced and was instructed in the following treatment:  Exercises for LE & UE, cervical stretching to mid-line and exercises. Therapeutic Exercises: ankle pumps, quad sets, heel slide, hip abduction/adduction and straight leg raise , shoulder, elbow, wrist, flex/ext, grasp/relax, cervical flex/ext, rotation, stretching to maintain mid-line position x 10 -15 reps. Verbal/tactile cues for technique. At end of session, patient in bed with  call light and phone within reach,  all lines and tubes intact, nursing notified. Patient would benefit from continued skilled Physical Therapy to improve functional independence and quality of life. PLAN:    Patient is making limited progress towards established goals. Will continue with current Plan of care. Time in  8:44  Time out  9:07    Total Treatment Time  23 minutes    CPT codes:  Therapeutic exercises (85939)   23 minutes  2 unit(s)    Jaci Juan  [facility]  LIC # [id number]

## 2020-02-19 NOTE — PROGRESS NOTES
[facility]  Internal Medicine  -Resident Progress Note-    PCP:  Cash Fajardo DO  Admitting Physician:  Cash Fajardo DO  Consultants:  Critical care  Chief Complaint:    Chief Complaint   Patient presents with    COPD     been feeling like it all day, family has had him on 6L all day with 200 ft of O2 line SpO2 at home with nebulizer was mid 80's   EMS nebulizer given and 125 solu medrol now 93%        History of Present Illness  The patient is a 81 y.o. male who presents with increased shortness of breath at home. Patient was recently discharged. 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 Patient symptoms started the day of admission on 2/11. He has a nonproductive cough but denies any fever and chills or chest pain. Initially chest x-ray was not read out as pneumothorax but a reread this morning showed large right pneumothorax. Case discussed with the patient's wife and daughter at the bedside. Patient admitted for further evaluation.     2/13/2020  Patient is seen and examined on [facility]. Patient's wife is at the bedside and case discussed. Case also discussed with pulmonary medicine at the bedside. Patient is doing fairly well considering but still having rib chest pain secondary to #2. Hemoglobin is down to 8.2 from admission 10.4 with normal liver enzymes and BMP is essentially unchanged. Patient currently on 6 L nasal cannula with O2 sat 96% at rest.  Vital signs are fairly stable with a blood pressure 100/56.     2/14/2020  Patient seen and examined on [facility]. Patient's wife is at the bedside and case discussed. Patient is still complaining of increasing pain in his left back area all the way from his upper back down to his mid left back. Massaging the area does not make it feel better. Patient states not necessarily worse with respirations. The 5 mg oxycodone is not helping the pain. Case discussed with Dr. Mihai Cunningham.   Blood pressure ranges 90//59. O2 sat 95% on 5 L today. Patient's had good urinary output.     2/15/2020  Patient seen and examined on [address]. Multiple family members are at the bedside and case discussed. Patient is a lot weaker today than normally. Patient is alert to himself and place but otherwise extremely weak and more lethargic. His last oxycodone was at 5:47 AM today. It had been discussed with patient's family on admission that if we increased his pain meds he could become more lethargic. His O2 saturation is good at 94% on his 5 L nasal cannula. The patient has been temperature is 98.6 with a heart rate 62 with respiratory rate of 16. Blood pressure ranges from 116//66. BUN/creatinine was 28/0.9 with potassium 4.0.     2/16/202  Patient seen and examined on [address]. Patient's wife and daughter at the bedside and case discussed in detail. Patient became more lethargic again today with O2 saturations dropping into the low 80s on 6 L nasal cannula. Patient was ordered to transfer to the ICU to be monitored closer. Blood pressure heart rates were stable. Patient temperature was increased to 99.6. Rest x-ray today showed developing airspace disease and pleural effusion left base which was not there yesterday. Patient will be started on IV vancomycin and cefepime and pending pulmonology evaluation today    2/17/2020  Patient seen and examined in ICU. He remains profoundly weak. He is lethargic. He remains on 8L NC O2. He is complaining of back pain. Thoracic surgery has been consulted due to multiple pneumothoraces    2/18/2020  Patient seen and examined in ICU. He remains profoundly weak. He remains is now on 4L NC O2. He is complaining of back pain. Awaiting thoracic surgery consult. Chest tube removed yesterday    2/19/20  Patient seen and examined in ICU. He is profoundly weak. He is on 2L NC. He underwent swallow study yesterday which showed aspiration. Slept poorly overnight.      Review of Systems  All bolded are positive; please see HPI  General:  Fever, chills, diaphoresis, fatigue, malaise, night sweats, weight loss  Psychological:  Anxiety, disorientation, hallucinations. ENT:  Epistaxis, headaches, vertigo, visual changes. Cardiovascular:  Chest pain, irregular heartbeats, palpitations, paroxysmal nocturnal dyspnea. Respiratory:  Shortness of breath, coughing, sputum production, hemoptysis, wheezing, orthopnea.   Gastrointestinal:  Nausea, vomiting, diarrhea, heartburn, constipation, abdominal pain, hematemesis, hematochezia, melena, acholic stools  Genito-Urinary:  Dysuria, urgency, frequency, hematuria  Musculoskeletal:  Joint pain, joint stiffness, joint swelling, muscle pain  Neurology:  Headache, focal neurological deficits, weakness, numbness, paresthesia  Derm:  Rashes, ulcers, excoriations, bruising  Extremities:  Decreased ROM, peripheral edema, mottling    Physical Examination  Vitals:  BP (!) 145/72   Pulse 72   Temp 97.9 °F (36.6 °C) (Oral)   Resp 30   Ht 5' 6\" (1.676 m)   Wt 121 lb 14.4 oz (55.3 kg)   SpO2 97%   BMI 19.68 kg/m²   General Appearance:  awake, alert, weak 6LNC  HEENT:  PERRL; EOMI; sclera clear; buccal mucosa moist  Neck:  supple; trachea midline; no thyromegaly; no JVD; no bruits  Heart:  rhythm regular; rate controlled; no murmurs  Lungs:  symmetrical; diminished bilaterally; no wheezes; no rhonchi; no rales   Abdomen:  soft, non-tender, non-distended; bowel sounds positive; no organomegaly or masses; no pain on palpation  Extremities:  peripheral pulses present; no peripheral edema; no ulcers  Neurologic:  alert and oriented x 3; no focal deficit; cranial nerves grossly intact  Skin:  no petechia; no hemorrhage; no wounds    Medications  Scheduled Meds    traMADol  100 mg Oral BID    gabapentin  400 mg Oral BID    docusate sodium  100 mg Oral BID    polyethylene glycol  17 g Oral Daily    vitamin D3  5,000 Units Oral Once per day on Mon Thu    furosemide  80 mg Oral Daily    acyclovir  400 mg Oral TID    montelukast  10 mg Oral Daily    theophylline  300 mg Oral Daily    finasteride  5 mg Oral Daily    pantoprazole  40 mg Oral QAM AC    metoprolol succinate  12.5 mg Oral Daily    ibuprofen  400 mg Oral TID    vitamin C  2,000 mg Oral BID    potassium chloride  20 mEq Oral BID    primidone  250 mg Oral BID    brimonidine  1 drop Right Eye BID    latanoprost  1 drop Left Eye Nightly    predniSONE  30 mg Oral Daily    ipratropium-albuterol  1 ampule Inhalation Q4H    budesonide  500 mcg Nebulization BID    Arformoterol Tartrate  15 mcg Nebulization BID     Infusion Meds    dextrose 5 % and 0.9 % NaCl 50 mL/hr at 02/19/20 0331     PRN Meds oxyCODONE, albuterol, acetaminophen, ondansetron, traMADol    Laboratory Data  Recent Results (from the past 24 hour(s))   CBC    Collection Time: 02/18/20  8:15 AM   Result Value Ref Range    WBC 9.2 4.5 - 11.5 E9/L    RBC 2.87 (L) 3.80 - 5.80 E12/L    Hemoglobin 9.1 (L) 12.5 - 16.5 g/dL    Hematocrit 28.7 (L) 37.0 - 54.0 %    .0 (H) 80.0 - 99.9 fL    MCH 31.7 26.0 - 35.0 pg    MCHC 31.7 (L) 32.0 - 34.5 %    RDW 14.5 11.5 - 15.0 fL    Platelets 372 399 - 558 E9/L    MPV 10.5 7.0 - 12.0 fL   Comprehensive Metabolic Panel    Collection Time: 02/18/20  8:15 AM   Result Value Ref Range    Sodium 140 132 - 146 mmol/L    Potassium 4.0 3.5 - 5.0 mmol/L    Chloride 102 98 - 107 mmol/L    CO2 29 22 - 29 mmol/L    Anion Gap 9 7 - 16 mmol/L    Glucose 112 (H) 74 - 99 mg/dL    BUN 33 (H) 8 - 23 mg/dL    CREATININE 1.0 0.7 - 1.2 mg/dL    GFR Non-African American >60 >=60 mL/min/1.73    GFR African American >60     Calcium 8.2 (L) 8.6 - 10.2 mg/dL    Total Protein 4.9 (L) 6.4 - 8.3 g/dL    Alb 2.4 (L) 3.5 - 5.2 g/dL    Total Bilirubin <0.2 0.0 - 1.2 mg/dL    Alkaline Phosphatase 81 40 - 129 U/L    ALT 14 0 - 40 U/L    AST 14 0 - 39 U/L   Blood Gas, Arterial    Collection Time: 02/18/20 12:11 PM   Result Value Ref Range    Date Analyzed [id number]     Time Analyzed 1211     Source: Blood Arterial     pH, Blood Gas 7.368 7.350 - 7.450    PCO2 51.1 (H) 35.0 - 45.0 mmHg    PO2 61.0 (L) 75.0 - 100.0 mmHg    HCO3 28.7 (H) 22.0 - 26.0 mmol/L    B.E. 2.7 -3.0 - 3.0 mmol/L    O2 Sat 90.5 (L) 92.0 - 98.5 %    O2Hb 90.0 (L) 94.0 - 97.0 %    COHb 0.3 0.0 - 1.5 %    MetHb 0.3 0.0 - 1.5 %    O2 Content 14.1 mL/dL    HHb 9.4 (H) 0.0 - 5.0 %    tHb (est) 11.1 (L) 11.5 - 16.5 g/dL    Mode NC- 2 L/M     Date Of Collection      Time Collected      Pt Temp 37.0 C     ID [id number]     Lab [id number]     Critical(s) Notified . No Critical Values        Imaging  Xr Lumbar Spine (2-3 Views)    Result Date: 2/6/2020  LOCATION:200 EXAM: XR LUMBAR SPINE (2-3 VIEWS), XR PELVIS (1-2 VIEWS) COMPARISON: None HISTORY:  Low back pain TECHNIQUE:  4  views of the lumbar spine and single view pelvis were obtained. FINDINGS:  There is normal vertebral body height and alignment. Severe degenerative changes with facet arthropathy, marginal osteophytes and disc space narrowing noted greatest at L4-5. The bony pelvis is intact. The hips are well aligned. Osseous structures are otherwise normal without evidence of fracture. Arthritis with no acute findings. Xr Pelvis (1-2 Views)    Result Date: 2/6/2020  LOCATION:200 EXAM: XR LUMBAR SPINE (2-3 VIEWS), XR PELVIS (1-2 VIEWS) COMPARISON: None HISTORY:  Low back pain TECHNIQUE:  4  views of the lumbar spine and single view pelvis were obtained. FINDINGS:  There is normal vertebral body height and alignment. Severe degenerative changes with facet arthropathy, marginal osteophytes and disc space narrowing noted greatest at L4-5. The bony pelvis is intact. The hips are well aligned. Osseous structures are otherwise normal without evidence of fracture. Arthritis with no acute findings.     Xr Chest Portable    Result Date: 2/17/2020  Reading location: 200 Indication: Shortness of breath Comparison: Prior chest radiograph from 2/16/2020 Technique: Portable AP upright chest radiograph was obtained. Findings: A right-sided chest tube is unchanged in position. The cardiomediastinal silhouette is stable in size and contours. The previously noted right lung nodule is less conspicuous on the current study. There are small left and trace left pleural effusions with mild patchy left basilar airspace disease. There is no evidence of pneumothorax. 1. Left-sided chest tube unchanged in position. No definite pneumothorax. 2. Small left and trace right pleural effusions with mild patchy left basilar airspace disease. 3. Previously noted right lung nodule is less conspicuous on the current study. Xr Chest Portable    Result Date: 2/16/2020  LOCATION: 100 EXAM: XR CHEST PORTABLE COMPARISON: 2/15/2020. HISTORY: Hypoxia. TECHNIQUE: Single frontal view of the chest was obtained. FINDINGS:  SUPPORT DEVICES: None. LUNGS: There has been development of a left pleural effusion and atelectasis new since prior examination. Super imposed pneumonia cannot be excluded. Right-sided chest tube position is unchanged. Linear defect right lung base lateral costophrenic sulcus probably a skinfold although a small anterior pneumothorax cannot be excluded. 2 cm lung nodule right lung base identified, precise location difficult to assess on this single frontal portable chest.     Developing airspace disease with atelectasis and pleural effusion left lung base. Xr Chest Portable    Result Date: 2/15/2020  Location:100 Exam: XR CHEST PORTABLE Comparison: February 13, 2020 History:   Follow-up right pneumothorax Findings: A single frontal portable view of the chest shows right chest tube in place no pneumothorax. Elevated right diaphragm. No infiltrates. Heart is enlarged. Stable chest. No pneumothorax.     Xr Chest Portable    Result Date: 2/13/2020  LOCATION: 200 EXAM: XR CHEST PORTABLE COMPARISON: 12/12/2020 HISTORY: Chest tube placement TECHNIQUE: Single frontal view of the chest was obtained. FINDINGS:  SUPPORT DEVICES: Large bore chest tube projects at the right lung apex. LUNGS: Elevated right hemidiaphragm with pleural effusion again noted. PLEURA: No pneumothorax visualized. LUNG VOLUMES: Hyperlucent lungs with increased lung volumes, likely COPD. MEDIASTINAL STRUCTURES: No lymphadenopathy. Normal aortic contour. HEART SIZE: Normal. BONES AND SOFT TISSUES: No fracture or soft tissue abnormality. 1. No pneumothorax seen with chest tube in place. Xr Chest Portable    Result Date: 2/12/2020  LOCATION: 200 EXAM: XR CHEST PORTABLE COMPARISON: 2/11/2020 HISTORY: Shortness of breath TECHNIQUE: Single frontal view of the chest was obtained. FINDINGS:  SUPPORT DEVICES: Large bore chest tube is seen with the tip in the lung apex. LUNGS: Patchy opacities are seen at the right lung base with right perihilar opacities favoring atelectasis. PLEURA: Pneumothorax has resolved. LUNG VOLUMES: Satisfactory inspirator effort. MEDIASTINAL STRUCTURES: No lymphadenopathy. Normal aortic contour. HEART SIZE: Normal. BONES AND SOFT TISSUES: No fracture or soft tissue abnormality. Resolution of the pneumothorax after chest tube placement. Xr Chest Portable    Result Date: 2/12/2020  LOCATION: 200 EXAM: XR CHEST PORTABLE COMPARISON: 2/6/2020 HISTORY: Shortness of breath TECHNIQUE: Single frontal view of the chest was obtained. FINDINGS:  SUPPORT DEVICES: None. LUNGS: Clear with no areas of consolidation. PLEURA: There is a large size right pneumothorax, likely 50%. No definite mediastinal shift noted, although the radiograph is rotated. LUNG VOLUMES: Satisfactory inspirator effort. MEDIASTINAL STRUCTURES: No lymphadenopathy. Normal aortic contour. HEART SIZE: Normal. BONES AND SOFT TISSUES: No fracture or soft tissue abnormality. 1. Large size right pneumothorax. Assessment for mediastinal shift is limited due to the severe rotation.  There is a discrepancy with the preliminary report from the ER physician. A discrepancy report was filled out. Critical results: These findings were discussed with  on the date of the exam at 07 100 by Josh Apgar, DO. They confirmed that they understood the results communicated to them. Xr Chest Portable    Result Date: 2020  LOCATION: 200 EXAM: XR CHEST PORTABLE COMPARISON: 2020 HISTORY: Shortness of breath TECHNIQUE: Single frontal view of the chest was obtained. FINDINGS:  SUPPORT DEVICES: None. 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 The left lung is clear. PLEURA: No pneumothorax visualized. LUNG VOLUMES: Satisfactory inspirator effort. MEDIASTINAL STRUCTURES: No lymphadenopathy. Normal aortic contour. HEART SIZE: Normal. BONES AND SOFT TISSUES: No fracture or soft tissue abnormality. No active pulmonary disease. Pet Ct Skull Base To Mid Thigh    Result Date: 2020  Reading location: 200 Indication: 2020 Comparison: Lung nodule. Technique: Positron emission tomography with concurrently acquired computed tomography was performed from the skull base to the midthigh. CT scan is limited by low dose technique and respiratory motion. A total of 13.8 mCi 18-FDG was administered intravenously. Patient's fingerstick glucose level at time of injection was 56 mg/dL. FINDINGS: Head and Neck: No abnormal areas of activity Chest: There is uptake involving the posterior margin of the lung nodule showing a maximal SUV of 2.3. The remainder the nodule however does not show significant uptake. Abdomen/Pelvis: No abnormal areas of activity. Musculoskeletal: No abnormal areas of uptake. 1. Focus of low-level uptake seen involving the posterior margin of the right lower lobe lung nodule. This is unlikely to represent malignancy, favoring a hamartoma. Given the patient's age and condition follow-up should be performed rather than tissue sampling.        Microbiology  NGTD        Assessment and Plan  Patient is a 81 y.o. male who presented with SOB. The active problem list is as follows:    · Acute on chronic hypoxic respiratory failure  · Right pneumothorax  · Severe COPD on chronic O2  · Paroxysmal atrial fib on Eliquis  · Chronic back pain but has worsening left upper mid back pain  · Hypertension  · Moderate protein caloric malnutrition  · History of constipation    -Transferred to ICU 2/16/20  -Chest tube inserted in the ER, has been pulled now  -Pulmonology has been consulted  -Continue nebulized breathing treatments  -Prednisone 30mg daily  -Thoracic surgery consulted for evaluation for possible pleurodesis  -Continue laxatives  -PT/OT  -Swallow eval showing aspiration with thin liquids. Diet changed to dental soft and nectar thick   -For transfer out of ICU    · Routine labs in AM.  · DVT prophylaxis. · Please see orders for further management and care. The plan was discussed with Dr. Alexis Reyes, PGYIII    7:59 AM  2/19/2020    The chart was reviewed and the patient was seen and examined with the resident. The case was discussed in detail with the resident and agree with current impressions and plan.     Valentin Powers D.O.  11:07 AM  2/19/2020

## 2020-02-19 NOTE — CARE COORDINATION
2/19/2020  transition of care:  Pt is an imcu transfer. Spoke to pts family about pcp and family recommendations for SNF at discharge. Wife- Zoe Flaherty is friends with the  at [facility] and has requested [facility] as first choice and [facility] (h) as alternate choice. Call received from [employer] with [facility] - she will review the case. PRECERT required, HENS and TOBIN needed per SS prior to discharge. LLK      The Plan for Transition of Care is related to the following treatment goals: SNF    The Patient and/or patient representative wife Zoe Flaherty and daughter was provided with a choice of provider and agrees   with the discharge plan. [x] Yes [] No    Freedom of choice list was provided with basic dialogue that supports the patient's individualized plan of care/goals, treatment preferences and shares the quality data associated with the providers.  [x] Yes [] No      Electronically signed by Rubin Borjas RN-BC on 2/19/2020 at 1:47 PM

## 2020-02-20 NOTE — PROGRESS NOTES
[address] [facility]  Internal Medicine  -Resident Progress Note-    PCP:  José Antonio Kelly DO  Admitting Physician:  José Antonio Kelly DO  Consultants:  Critical care  Chief Complaint:    Chief Complaint   Patient presents with    COPD     been feeling like it all day, family has had him on 6L all day with 200 ft of O2 line SpO2 at home with nebulizer was mid 80's   EMS nebulizer given and 125 solu medrol now 93%        History of Present Illness  The patient is a 81 y.o. male who presents with increased shortness of breath at home. Patient was recently discharged. 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 Patient symptoms started the day of admission on 2/11. He has a nonproductive cough but denies any fever and chills or chest pain. Initially chest x-ray was not read out as pneumothorax but a reread this morning showed large right pneumothorax. Case discussed with the patient's wife and daughter at the bedside. Patient admitted for further evaluation.     2/13/2020  Patient is seen and examined on [facility]. Patient's wife is at the bedside and case discussed. Case also discussed with pulmonary medicine at the bedside. Patient is doing fairly well considering but still having rib chest pain secondary to #2. Hemoglobin is down to 8.2 from admission 10.4 with normal liver enzymes and BMP is essentially unchanged. Patient currently on 6 L nasal cannula with O2 sat 96% at rest.  Vital signs are fairly stable with a blood pressure 100/56.     2/14/2020  Patient seen and examined on [facility]. Patient's wife is at the bedside and case discussed. Patient is still complaining of increasing pain in his left back area all the way from his upper back down to his mid left back. Massaging the area does not make it feel better. Patient states not necessarily worse with respirations. The 5 mg oxycodone is not helping the pain. Case discussed with Dr. Gasper Gr.   Blood pressure ranges 21//80. O2 sat 95% on 5 L today. Patient's had good urinary output.     2/15/2020  Patient seen and examined on [facility]. Multiple family members are at the bedside and case discussed. Patient is a lot weaker today than normally. Patient is alert to himself and place but otherwise extremely weak and more lethargic. His last oxycodone was at 5:47 AM today. It had been discussed with patient's family on admission that if we increased his pain meds he could become more lethargic. His O2 saturation is good at 94% on his 5 L nasal cannula. The patient has been temperature is 98.6 with a heart rate 62 with respiratory rate of 16. Blood pressure ranges from 116//66. BUN/creatinine was 28/0.9 with potassium 4.0.     2/16/202  Patient seen and examined on [facility]. Patient's wife and daughter at the bedside and case discussed in detail. Patient became more lethargic again today with O2 saturations dropping into the low 80s on 6 L nasal cannula. Patient was ordered to transfer to the ICU to be monitored closer. Blood pressure heart rates were stable. Patient temperature was increased to 99.6. Rest x-ray today showed developing airspace disease and pleural effusion left base which was not there yesterday. Patient will be started on IV vancomycin and cefepime and pending pulmonology evaluation today    2/17/2020  Patient seen and examined in ICU. He remains profoundly weak. He is lethargic. He remains on 8L NC O2. He is complaining of back pain. Thoracic surgery has been consulted due to multiple pneumothoraces    2/18/2020  Patient seen and examined in ICU. He remains profoundly weak. He remains is now on 4L NC O2. He is complaining of back pain. Awaiting thoracic surgery consult. Chest tube removed yesterday    2/19/20  Patient seen and examined in ICU. He is profoundly weak. He is on 2L NC. He underwent swallow study yesterday which showed aspiration.  Slept poorly overnight.     2/20/20  Patient seen COMPARISON: None HISTORY:  Low back pain TECHNIQUE:  4  views of the lumbar spine and single view pelvis were obtained. FINDINGS:  There is normal vertebral body height and alignment. Severe degenerative changes with facet arthropathy, marginal osteophytes and disc space narrowing noted greatest at L4-5. The bony pelvis is intact. The hips are well aligned. Osseous structures are otherwise normal without evidence of fracture. Arthritis with no acute findings. Xr Pelvis (1-2 Views)    Result Date: 2/6/2020  LOCATION:200 EXAM: XR LUMBAR SPINE (2-3 VIEWS), XR PELVIS (1-2 VIEWS) COMPARISON: None HISTORY:  Low back pain TECHNIQUE:  4  views of the lumbar spine and single view pelvis were obtained. FINDINGS:  There is normal vertebral body height and alignment. Severe degenerative changes with facet arthropathy, marginal osteophytes and disc space narrowing noted greatest at L4-5. The bony pelvis is intact. The hips are well aligned. Osseous structures are otherwise normal without evidence of fracture. Arthritis with no acute findings. Xr Chest Portable    Result Date: 2/17/2020  Reading location: 200 Indication: Shortness of breath Comparison: Prior chest radiograph from 2/16/2020 Technique: Portable AP upright chest radiograph was obtained. Findings: A right-sided chest tube is unchanged in position. The cardiomediastinal silhouette is stable in size and contours. The previously noted right lung nodule is less conspicuous on the current study. There are small left and trace left pleural effusions with mild patchy left basilar airspace disease. There is no evidence of pneumothorax. 1. Left-sided chest tube unchanged in position. No definite pneumothorax. 2. Small left and trace right pleural effusions with mild patchy left basilar airspace disease. 3. Previously noted right lung nodule is less conspicuous on the current study.      Xr Chest Portable    Result Date: 2/16/2020  LOCATION: 100 EXAM: XR CHEST PORTABLE COMPARISON: 2/15/2020. HISTORY: Hypoxia. TECHNIQUE: Single frontal view of the chest was obtained. FINDINGS:  SUPPORT DEVICES: None. LUNGS: There has been development of a left pleural effusion and atelectasis new since prior examination. Super imposed pneumonia cannot be excluded. Right-sided chest tube position is unchanged. Linear defect right lung base lateral costophrenic sulcus probably a skinfold although a small anterior pneumothorax cannot be excluded. 2 cm lung nodule right lung base identified, precise location difficult to assess on this single frontal portable chest.     Developing airspace disease with atelectasis and pleural effusion left lung base. Xr Chest Portable    Result Date: 2/15/2020  Location:100 Exam: XR CHEST PORTABLE Comparison: February 13, 2020 History:   Follow-up right pneumothorax Findings: A single frontal portable view of the chest shows right chest tube in place no pneumothorax. Elevated right diaphragm. No infiltrates. Heart is enlarged. Stable chest. No pneumothorax. Xr Chest Portable    Result Date: 2/13/2020  LOCATION: 200 EXAM: XR CHEST PORTABLE COMPARISON: 12/12/2020 HISTORY: Chest tube placement TECHNIQUE: Single frontal view of the chest was obtained. FINDINGS:  SUPPORT DEVICES: Large bore chest tube projects at the right lung apex. LUNGS: Elevated right hemidiaphragm with pleural effusion again noted. PLEURA: No pneumothorax visualized. LUNG VOLUMES: Hyperlucent lungs with increased lung volumes, likely COPD. MEDIASTINAL STRUCTURES: No lymphadenopathy. Normal aortic contour. HEART SIZE: Normal. BONES AND SOFT TISSUES: No fracture or soft tissue abnormality. 1. No pneumothorax seen with chest tube in place. Xr Chest Portable    Result Date: 2/12/2020  LOCATION: 200 EXAM: XR CHEST PORTABLE COMPARISON: 2/11/2020 HISTORY: Shortness of breath TECHNIQUE: Single frontal view of the chest was obtained.  FINDINGS:  SUPPORT DEVICES: Large bore chest tube thick   -For transfer out of ICU    · Routine labs in AM.  · DVT prophylaxis. · Please see orders for further management and care. The plan was discussed with Dr. Noe Mireles    9:13 AM  2/20/2020    The chart was reviewed and the patient was seen and examined with the resident. The case was discussed in detail with the resident and agree with current impressions and plan.     Monique Doan D.O.  11:14 AM  2/20/2020

## 2020-02-20 NOTE — CARE COORDINATION
SS Note: HENS Exemption form completed.   Electronically signed by SIN Nuñez on 2/20/2020 at 4:50 PM

## 2020-02-20 NOTE — PROGRESS NOTES
Speech Language Pathology      NAME:  Felecia Mills  :  1935  DATE: 2020  ROOM:  [place] [id number]-    Patient seen for swallow therapy 30 minutes. Wife present during session. Patient with difficulty keeping head midline but with passive stretching was able to improve position. Patient able to complete TBR exercises with min verbal prompts for accuracy of completion. Patient had difficulty with Lizbeth. Patient tolerated ice chips with no cough. Patient up to chair with PT assist at end of session. Wife will continue to encourage and assist with exercises.     Acute on chronic respiratory failure with hypoxia ([facility]) [J96.21]  Acute on chronic respiratory failure with hypoxia [facility]) [J96.21]    [id number]  dysphagia tx    Codey Daniels MSCCC/SLP  Speech Language Pathologist  [id number]

## 2020-02-20 NOTE — PROGRESS NOTES
Physical Therapy    Physical Therapy Treatment Note    Room #:  IC11/IC11-01  Patient Name: Spenser Rush  YOB: 1935  MRN: [id number]    Referring Provider: Rip Dior MD    Date of Service: 2/20/2020    Evaluating Physical Therapist:  Eneida Harden PT  Lic.  # [id number]      Diagnosis:   Acute on chronic respiratory failure with hypoxia (HCC) [J96.21]  Acute on chronic respiratory failure with hypoxia (HCC) [J96.21]      RRT and trf'd to icu with low o2 sats on 2/16  New orders rec'd today    Patient Active Problem List   Diagnosis    Hypertension    Hyperlipidemia    Nephrolithiasis    Cor pulmonale, chronic (HCC)    Bradycardia, sinus    Multiple thyroid nodules    Secondary adrenal insufficiency (HCC)    Acquired bronchiectasis (HCC)    COPD, severe (HCC)    Status post repair of abdominal aortic aneurysm (AAA) using straight graft    Chronic respiratory failure with hypoxia and hypercapnia (HCC)    Severe hypoxemia    Ventricular tachyarrhythmia (HCC)    Thyroid disease    Hypertension    Hyperlipidemia    COPD (chronic obstructive pulmonary disease) (HCC)    Dependent edema    Respiratory failure (HCC)    Severe protein-calorie malnutrition (HCC)    History of cerebral aneurysm repair    History of endovascular stent graft for abdominal aortic aneurysm    Paroxysmal atrial fibrillation with rapid ventricular response ([address]    Community acquired bacterial pneumonia    Paroxysmal atrial fibrillation (HCC)    Hypokalemia    Pulmonary fibrosis (HCC)    Essential tremor    BPH (benign prostatic hyperplasia)    Constipation    Acute urinary retention    Atelectasis of right lung    Acute on chronic respiratory failure with hypoxia (HCC)    Respiratory distress        Tentative placement recommendation: Long Term Acute Care  Vs subacute  Equipment recommendation: Patient has needed equipment       Prior Level of Function: Patient ambulated with wheeled walker prior to this admission  Rehab Potential: fair  for baseline    Past medical history:   Past Medical History:   Diagnosis Date    COPD (chronic obstructive pulmonary disease) ([facility]     stable per pt    History of cardiovascular stress test 4/11/2012    Lexiscan    Hyperlipidemia     Hypertension 9-8-2014    lexiscan stress test    Kidney stone     Pulmonary fibrosis ([facility]     Thyroid disease      Past Surgical History:   Procedure Laterality Date    ABDOMINAL AORTIC ANEURYSM REPAIR  09/26/2016    BALLOON ANGIOPLASTY, ARTERY Left     Cerebral artery stenosis with angioplasty and stent    CATARACT REMOVAL WITH IMPLANT      right, left    CYSTOSCOPY  nov 2012    retro ureteroscopy stone manipulation and insertion of j stent    EYE SURGERY      cataracts-bilat    INGUINAL HERNIA REPAIR  1970s bilateral    INGUINAL HERNIA REPAIR  1990s bilateral    INGUINAL HERNIA REPAIR Right sept 2014    laparoscopic       Precautions: falls, alarm, O2, low air loss bed, skin and impaired vision ,  Uses 6 liters at home    SUBJECTIVE:    Social history: Patient lives with spouse      AM-PAC Basic Mobility        AM-PAC Mobility Inpatient   How much difficulty turning over in bed?: A Lot  How much difficulty sitting down on / standing up from a chair with arms?: A Lot  How much difficulty moving from lying on back to sitting on side of bed?: A Lot  How much help from another person moving to and from a bed to a chair?: A Lot  How much help from another person needed to walk in hospital room?: A Lot  How much help from another person for climbing 3-5 steps with a railing?: Total  AM-PAC Inpatient Mobility Raw Score : 11  AM-PAC Inpatient T-Scale Score : 33.86  Mobility Inpatient CMS 0-100% Score: 72.57  Mobility Inpatient CMS G-Code Modifier : CL    Nursing cleared patient for PT evaluation. The admitting diagnosis and active problem list as listed above have been reviewed prior to the initiation of this evaluation.

## 2020-02-20 NOTE — CARE COORDINATION
2/20/2020 1007 CM note: Per [facility], pt has been accepted and will have available bed today after 5pm. SNF has bipap available for pt as needed. NO PRECERT required, will need HENS and signed/completed TOBIN. N-N  ([place] unit). Kisha TABARES

## 2020-02-21 NOTE — DISCHARGE SUMMARY
Gabrielle Jaimee  Internal Medicine  -Discharge Note-    PCP:  Valentin Powers DO  Admitting Physician:  Valentin Powers DO  Consultants:  Critical care  Chief Complaint:    Chief Complaint   Patient presents with    COPD     been feeling like it all day, family has had him on 6L all day with 200 ft of O2 line SpO2 at home with nebulizer was mid 80's   EMS nebulizer given and 125 solu medrol now 93%        History of Present Illness  The patient is a 81 y.o. male who presents with increased shortness of breath at home. Patient was recently discharged. 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 Patient symptoms started the day of admission on 2/11. He has a nonproductive cough but denies any fever and chills or chest pain. Initially chest x-ray was not read out as pneumothorax but a reread this morning showed large right pneumothorax. Case discussed with the patient's wife and daughter at the bedside. Patient admitted for further evaluation.     2/13/2020  Patient is seen and examined on [facility]. Patient's wife is at the bedside and case discussed. Case also discussed with pulmonary medicine at the bedside. Patient is doing fairly well considering but still having rib chest pain secondary to #2. Hemoglobin is down to 8.2 from admission 10.4 with normal liver enzymes and BMP is essentially unchanged. Patient currently on 6 L nasal cannula with O2 sat 96% at rest.  Vital signs are fairly stable with a blood pressure 100/56.     2/14/2020  Patient seen and examined on [facility]. Patient's wife is at the bedside and case discussed. Patient is still complaining of increasing pain in his left back area all the way from his upper back down to his mid left back. Massaging the area does not make it feel better. Patient states not necessarily worse with respirations. The 5 mg oxycodone is not helping the pain. Case discussed with Dr. Elias. Blood pressure ranges 98//72. O2 sat 95% on 5 L today. Patient's had good urinary output.     2/15/2020  Patient seen and examined on [facility]. Multiple family members are at the bedside and case discussed. Patient is a lot weaker today than normally. Patient is alert to himself and place but otherwise extremely weak and more lethargic. His last oxycodone was at 5:47 AM today. It had been discussed with patient's family on admission that if we increased his pain meds he could become more lethargic. His O2 saturation is good at 94% on his 5 L nasal cannula. The patient has been temperature is 98.6 with a heart rate 62 with respiratory rate of 16. Blood pressure ranges from 116//66. BUN/creatinine was 28/0.9 with potassium 4.0.     2/16/202  Patient seen and examined on [facility]. Patient's wife and daughter at the bedside and case discussed in detail. Patient became more lethargic again today with O2 saturations dropping into the low 80s on 6 L nasal cannula. Patient was ordered to transfer to the ICU to be monitored closer. Blood pressure heart rates were stable. Patient temperature was increased to 99.6. Rest x-ray today showed developing airspace disease and pleural effusion left base which was not there yesterday. Patient will be started on IV vancomycin and cefepime and pending pulmonology evaluation today    2/17/2020  Patient seen and examined in ICU. He remains profoundly weak. He is lethargic. He remains on 8L NC O2. He is complaining of back pain. Thoracic surgery has been consulted due to multiple pneumothoraces    2/18/2020  Patient seen and examined in ICU. He remains profoundly weak. He remains is now on 4L NC O2. He is complaining of back pain. Awaiting thoracic surgery consult. Chest tube removed yesterday    2/19/20  Patient seen and examined in ICU. He is profoundly weak. He is on 2L NC. He underwent swallow study yesterday which showed aspiration.  Slept poorly overnight.     2/20/20  Patient seen and examined in ICU. Due to constipation KUB ordered yesterday which showed large colonic stool load. Fleet enema given this am with no results yet.     2/21/2020  Patient continues to do little bit better. Case discussed with intensivist today. The patient will be transferred to [address] [place] [address] today. Patient though is still extremely weak. Patient is stable for discharge. Review of Systems  All bolded are positive; please see HPI  General:  Fever, chills, diaphoresis, fatigue, malaise, night sweats, weight loss  Psychological:  Anxiety, disorientation, hallucinations. ENT:  Epistaxis, headaches, vertigo, visual changes. Cardiovascular:  Chest pain, irregular heartbeats, palpitations, paroxysmal nocturnal dyspnea. Respiratory:  Shortness of breath, coughing, sputum production, hemoptysis, wheezing, orthopnea.   Gastrointestinal:  Nausea, vomiting, diarrhea, heartburn, constipation, abdominal pain, hematemesis, hematochezia, melena, acholic stools  Genito-Urinary:  Dysuria, urgency, frequency, hematuria  Musculoskeletal:  Joint pain, joint stiffness, joint swelling, muscle pain  Neurology:  Headache, focal neurological deficits, weakness, numbness, paresthesia  Derm:  Rashes, ulcers, excoriations, bruising  Extremities:  Decreased ROM, peripheral edema, mottling    Physical Examination  Vitals:  BP (!) 115/59   Pulse 65   Temp 98.6 °F (37 °C) (Oral)   Resp (!) 32   Ht 5' 6\" (1.676 m)   Wt 121 lb (54.9 kg)   SpO2 97%   BMI 19.53 kg/m²   General Appearance:  awake, alert, weak 4LNC  HEENT:  PERRL; EOMI; sclera clear; buccal mucosa moist  Neck:  supple; trachea midline; no thyromegaly; no JVD; no bruits  Heart:  rhythm regular; rate controlled; no murmurs  Lungs:  symmetrical; diminished bilaterally; no wheezes; no rhonchi; no rales   Abdomen:  soft, non-tender, mildly distended; bowel sounds positive; no organomegaly or masses; no pain on palpation  Extremities:  peripheral pulses present; no peripheral edema; no ulcers  Neurologic:  alert and oriented x 3; no focal deficit; cranial nerves grossly intact  Skin:  no petechia; no hemorrhage; no wounds    Medications  Scheduled Meds    traMADol  100 mg Oral BID    gabapentin  400 mg Oral BID    docusate sodium  100 mg Oral BID    polyethylene glycol  17 g Oral Daily    vitamin D3  5,000 Units Oral Once per day on Mon Thu    furosemide  80 mg Oral Daily    acyclovir  400 mg Oral TID    montelukast  10 mg Oral Daily    theophylline  300 mg Oral Daily    finasteride  5 mg Oral Daily    pantoprazole  40 mg Oral QAM AC    metoprolol succinate  12.5 mg Oral Daily    ibuprofen  400 mg Oral TID    vitamin C  2,000 mg Oral BID    potassium chloride  20 mEq Oral BID    primidone  250 mg Oral BID    brimonidine  1 drop Right Eye BID    latanoprost  1 drop Left Eye Nightly    predniSONE  30 mg Oral Daily    ipratropium-albuterol  1 ampule Inhalation Q4H    budesonide  500 mcg Nebulization BID    Arformoterol Tartrate  15 mcg Nebulization BID     Infusion Meds     PRN Meds oxyCODONE, albuterol, acetaminophen, ondansetron, traMADol    Laboratory Data  No results found for this or any previous visit (from the past 24 hour(s)). Imaging  Xr Lumbar Spine (2-3 Views)    Result Date: 2/6/2020  LOCATION:200 EXAM: XR LUMBAR SPINE (2-3 VIEWS), XR PELVIS (1-2 VIEWS) COMPARISON: None HISTORY:  Low back pain TECHNIQUE:  4  views of the lumbar spine and single view pelvis were obtained. FINDINGS:  There is normal vertebral body height and alignment. Severe degenerative changes with facet arthropathy, marginal osteophytes and disc space narrowing noted greatest at L4-5. The bony pelvis is intact. The hips are well aligned. Osseous structures are otherwise normal without evidence of fracture. Arthritis with no acute findings.     Xr Pelvis (1-2 Views)    Result Date: 2/6/2020  LOCATION:200 EXAM: XR LUMBAR SPINE (2-3 VIEWS), XR PELVIS (1-2 VIEWS) COMPARISON: None HISTORY:  Low back pain TECHNIQUE:  4  views of the lumbar spine and single view pelvis were obtained. FINDINGS:  There is normal vertebral body height and alignment. Severe degenerative changes with facet arthropathy, marginal osteophytes and disc space narrowing noted greatest at L4-5. The bony pelvis is intact. The hips are well aligned. Osseous structures are otherwise normal without evidence of fracture. Arthritis with no acute findings. Xr Chest Portable    Result Date: 2/17/2020  Reading location: 200 Indication: Shortness of breath Comparison: Prior chest radiograph from 2/16/2020 Technique: Portable AP upright chest radiograph was obtained. Findings: A right-sided chest tube is unchanged in position. The cardiomediastinal silhouette is stable in size and contours. The previously noted right lung nodule is less conspicuous on the current study. There are small left and trace left pleural effusions with mild patchy left basilar airspace disease. There is no evidence of pneumothorax. 1. Left-sided chest tube unchanged in position. No definite pneumothorax. 2. Small left and trace right pleural effusions with mild patchy left basilar airspace disease. 3. Previously noted right lung nodule is less conspicuous on the current study. Xr Chest Portable    Result Date: 2/16/2020  LOCATION: 100 EXAM: XR CHEST PORTABLE COMPARISON: 2/15/2020. HISTORY: Hypoxia. TECHNIQUE: Single frontal view of the chest was obtained. FINDINGS:  SUPPORT DEVICES: None. LUNGS: There has been development of a left pleural effusion and atelectasis new since prior examination. Super imposed pneumonia cannot be excluded. Right-sided chest tube position is unchanged. Linear defect right lung base lateral costophrenic sulcus probably a skinfold although a small anterior pneumothorax cannot be excluded.  2 cm lung nodule right lung base identified, precise location difficult to assess on this single frontal portable chest. activity Chest: There is uptake involving the posterior margin of the lung nodule showing a maximal SUV of 2.3. The remainder the nodule however does not show significant uptake. Abdomen/Pelvis: No abnormal areas of activity. Musculoskeletal: No abnormal areas of uptake. 1. Focus of low-level uptake seen involving the posterior margin of the right lower lobe lung nodule. This is unlikely to represent malignancy, favoring a hamartoma. Given the patient's age and condition follow-up should be performed rather than tissue sampling. Microbiology  NGTD        Assessment and Plan  Patient is a 81 y.o. male who presented with SOB.   The active problem list is as follows:    · Acute on chronic hypoxic respiratory failure  · Right pneumothorax  · Severe COPD on chronic O2  · Paroxysmal atrial fib on Eliquis  · Chronic back pain but has worsening left upper mid back pain  · Hypertension  · Moderate protein caloric malnutrition  · History of constipation    Plan:  Transfer to extended care facility today for rehab  Prognosis is guarded  Case discussed with patient's wife at the bedside  Continue current meds and aerosols  Patient to follow-up with Dr. Bruno Louise D.O.  12:53 PM  2/21/2020

## 2020-02-21 NOTE — CARE COORDINATION
Ss note:2/21/2020.1:42 PM discharge order noted. Arranged Physician ambulance transfer to [facility] today at 4:00 pm. Facility requests that pts bipap mask be sent with pt. Nursing, pt wife and facility aware of discharge destination and time. HENS completed.  SIN Lam

## 2020-02-21 NOTE — DISCHARGE SUMMARY
AREDS 2 PO     Saw Palmetto 450 MG Caps     theophylline 300 MG extended release tablet  Commonly known as:  THEODUR  Take 1 tablet by mouth daily     valACYclovir 500 MG tablet  Commonly known as:  VALTREX     vitamin C 500 MG tablet  Commonly known as:  ASCORBIC ACID     vitamin D-3 125 MCG (5000 UT) Tabs  Commonly known as:  cholecalciferol        STOP taking these medications    Bevespi Aerosphere 9-4.8 MCG/ACT Aero  Generic drug:  glycopyrrolate-formoterol        ASK your doctor about these medications    influenza virus trivalent vaccine injection  Commonly known as:  FLUZONE           Where to Get Your Medications      You can get these medications from any pharmacy    Bring a paper prescription for each of these medications  · Full Kit Nebulizer Set Misc  · traMADol 50 MG tablet  You don't need a prescription for these medications  · docusate 100 MG Caps     Information about where to get these medications is not yet available    Ask your nurse or doctor about these medications  · budesonide 0.5 MG/2ML nebulizer suspension  · gabapentin 400 MG capsule  · ipratropium-albuterol 0.5-2.5 (3) MG/3ML Soln nebulizer solution  · polyethylene glycol packet         Activity: activity as tolerated    Diet: Dental soft, nectar thick +frozen oral supplements    Wound Care: none needed    Follow-up:    · This patient is instructed to follow-up with his primary care physician. · Patient is instructed to follow-up with the consults listed above as directed by them. · They are instructed to resume home medications and take new medications as indicated in the list above. · If the patient has a recurrence of symptoms, they are instructed to go to the ED. Preparing for this patient's discharge, including paperwork, orders, instructions, and meeting with patient did require > 30 minutes.     Amber Parr DO PGYIII  1:12 PM  2/21/2020

## 2020-02-21 NOTE — PROGRESS NOTES
walker prior to this admission  Rehab Potential: fair  for baseline    Past medical history:   Past Medical History:   Diagnosis Date    COPD (chronic obstructive pulmonary disease) ([facility] [address]     stable per pt    History of cardiovascular stress test 4/11/2012    Lexiscan    Hyperlipidemia     Hypertension 9-8-2014    lexiscan stress test    Kidney stone     Pulmonary fibrosis ([facility] [address]     Thyroid disease      Past Surgical History:   Procedure Laterality Date    ABDOMINAL AORTIC ANEURYSM REPAIR  09/26/2016    BALLOON ANGIOPLASTY, ARTERY Left     Cerebral artery stenosis with angioplasty and stent    CATARACT REMOVAL WITH IMPLANT      right, left    CYSTOSCOPY  nov 2012    retro ureteroscopy stone manipulation and insertion of j stent    EYE SURGERY      cataracts-bilat    INGUINAL HERNIA REPAIR  1970s bilateral    INGUINAL HERNIA REPAIR  1990s bilateral    INGUINAL HERNIA REPAIR Right sept 2014    laparoscopic       Precautions: falls, alarm, O2, low air loss bed, skin and impaired vision ,  Uses 6 liters at home    SUBJECTIVE:    Social history: Patient lives with spouse      AM-PAC Basic Mobility        AM-PAC Mobility Inpatient   How much difficulty turning over in bed?: A Lot  How much difficulty sitting down on / standing up from a chair with arms?: A Lot  How much difficulty moving from lying on back to sitting on side of bed?: A Lot  How much help from another person moving to and from a bed to a chair?: A Lot  How much help from another person needed to walk in hospital room?: A Lot  How much help from another person for climbing 3-5 steps with a railing?: Total  AM-PAC Inpatient Mobility Raw Score : 11  AM-PAC Inpatient T-Scale Score : 33.86  Mobility Inpatient CMS 0-100% Score: 72.57  Mobility Inpatient CMS G-Code Modifier : CL    Nursing cleared patient for PT evaluation. The admitting diagnosis and active problem list as listed above have been reviewed prior to the initiation of this evaluation.

## 2020-02-21 NOTE — PROGRESS NOTES
Patient discharged to [facility] nursing home/rehab. Patient transported via stretcher/ambulance on 4L NC. All belongings given to family members at the bedside.    Electronically signed by Annika Bacon RN on 2/21/2020 at 4:48 PM

## 2020-02-21 NOTE — PROGRESS NOTES
Minimal Assist    Grooming Dependent   NT Moderate Assist    UB Dressing Moderate Assist  Maximal Assist  Don/doff gown 2 times. Requiring assist for lifting and threading arms and tying back of gown.    Minimal Assist    LB Dressing Dependent  Maximal Assist  Loel Curio depends while seated EOB, don/doff socks while seated in chair. Pt able to lift heels off ground one at a time.  Moderate Assist    Bathing Dependent  NT Modified [place]    Toileting Maximal Assist   Maximal Assist  Toileting hygiene and clothing management completed while standing EOB. Put incontinent of bowels, nursing aware. Modified [place]    Bed Mobility  Supine to sit: Maximal Assist x2  Sit to supine: Maximal Assist x2  Supine to sit: Maximal Assist x2  Sit to supine: Maximal Assist x2 Supine to sit: Moderate Assist   Sit to supine: Moderate Assist    Functional Transfers n/t   Maximal Assist of 2  Sit<>stand, stand-pivot; bed, chair Moderate Assist    Functional Mobility n/t   Maximal Assist of 2  One Side step to chair, bringing chair closer to patient to sit Modified [place]    Balance Sitting:     Static:  Fair-    Dynamic:fair- Sitting:     Static:  Fair-    Dynamic:fair-  Leaning to L, requiring max cuing (tactile) to correct, however, pt unable to maintain posture. Standing: Poor      Activity Tolerance Poor, lethargic and easily fatigued  Poor  Pt cooperative and participating in therapy, however pt lethargic throughout session.     Visual/  Perceptual Glasses: no, impaired vision                           Comments:   Nursing approved therapy session. Upon arrival, patient supine in bed and agreeable to OT session with PT collaboration, visitors present (wife). Therapist educated pt on role of OT. At end of session, patient sitting in chair with call light and phone within reach, all lines and tubes intact; nursing aware. Pt required rest breaks and tactile cuing throughout session.  Pt demonstrated fair understanding of education/techniques and decreased independence and safety during completion of ADL/functional transfer/mobility tasks. Pt would benefit from continued skilled OT to increase safety and independence with completion of ADL/IADL tasks for functional independence and quality of life. Pt has made fair progress towards set goals. Continue with current plan of care. Treatment:  Skilled occupational therapy services provided include instruction/training on safety and adapted techniques for completion of therapeutic activities, ADLs/IADLs. Therapist facilitated bed mobility, functional transfers, graded functional activities (static/dynamic sitting, static/dynamic standing, light functional reaching), and functional ambulation - providing max cuing (verbal, visual, tactile) on hand placement, body mechanics, posture, breathing techniques, energy conservation, compensatory strategies, and safety. Therapist facilitated self-care retraining: UB dressing, LB dressing, toileting, feeding tasks - providing mod cuing (verbal, visual, tactile) on body mechanics, posture, breathing techniques, energy conservation, compensatory strategies, and safety. Therapist educated pt on compensatory strategies/energy conservation techniques to safetly complete ADLs/IADLs. Skilled monitoring of pt response throughout treatment.       Treatment Time In:1152         Treatment Time Out: 7870               Treatment Charges: Mins Units   ADL/Home Mgt     76815 15 1   Thera Activities     61341 11 1   Ther Ex                 25449     Manual Therapy    24767     Neuro Re-ed         45312     Orthotic manage/training                               26779     Non Billable Time     Total Timed Treatment [address] [place], [place] #[id number]

## 2020-02-22 PROBLEM — J18.9 PNEUMONIA: Status: ACTIVE | Noted: 2020-01-01

## 2020-02-22 NOTE — H&P
[address] [facility], [facility]–1983  Internal Medicine  -Resident History & Physical-    PCP:  Jaime Jean DO  Admitting Physician:  Jaime Jean DO  Consultants:  None at this time  Chief Complaint:    Chief Complaint   Patient presents with    Shortness of Breath     normally on 4L NC, on 6 now        History of Present Illness  Vidal Amador is a 81 y.o. male who presents to [facility] ER complaining of SOB. Vidal Amador has a past medical history that includes severe COPD on chronic O2, paroxysmal atrial fibrillation, chronic back pain, hypertension. Patient presents to ER from [facility] with complaint of shortness of breath and being hypoxic. He was just discharged hours earlier for previous admission for pneumothorax. He had to undergo chest tube placement during previous hospital admission. He was discharged yesterday afternoon to [facility]. Patient became hypoxic with pulse ox in the low 80s. In the ER, he is 94% on 5 L of nasal cannula. No pneumothorax on CT scan. CT scan showing possible pneumonia and small pleural effusions. Temp 100    ER Course  Upon presentation to the ER, routine labwork was performed which revealed sodium of 148, proBNP 999, hemoglobin 9.4. Imaging results are as outlined below in the Imaging section of this note. EKG revealed NSR with T wave inversions V4 V5. Upon arrival to the ER, patient was 127/68. The patient received tylenol, rocephin, vancomycin  in the emergency room and was admitted to ICU under the care of Dr. Maxine Green. Last Hospital Admission - 2/11/20  · Acute on chronic hypoxic respiratory failure  · Right pneumothorax  · Severe COPD on chronic O2  · Paroxysmal atrial fib previously on Eliquis  · Chronic back pain but has worsening left upper mid back pain  · Hypertension  · Moderate protein caloric malnutrition  · Constipation    Last Echocardiogram - 9/30/19   Left ventricle grossly normal in size.    Normal left ventricular wall thickness. Estimated left ventricular ejection fraction is 48±5%. <50% criteria for diastolic dysfunction. Mild mid-distal septal dyskinesis. Mild apical dyskinesis. The LAESV Index is <34ml/m2. Normal right ventricular size and function. Physiologic and/or trace tricuspid regurgitation. RVSP is 29 mmHg. Technically fair quality study. No comparison study available. Suggest clinical correlation. ED TRIAGE VITALS  BP: (!) 94/50, Temp: 98.8 °F (37.1 °C), Pulse: 67, Resp: 30, SpO2: 91 %    Vitals:    02/22/20 0322 02/22/20 0411 02/22/20 0555 02/22/20 0635   BP:  114/66 107/62 (!) 94/50   Pulse:  75 74 67   Resp:  22 24 30   Temp:  100 °F (37.8 °C) 99.3 °F (37.4 °C) 98.8 °F (37.1 °C)   TempSrc:  Oral Oral Axillary   SpO2:  94% 93% 91%   Weight: 123 lb (55.8 kg)   127 lb (57.6 kg)   Height: 5' 6\" (1.676 m)   5' 6\" (1.676 m)         Histories  Past Medical History:   Diagnosis Date    COPD (chronic obstructive pulmonary disease) (HCC)     stable per pt    History of cardiovascular stress test 4/11/2012    Lexiscan    Hyperlipidemia     Hypertension 9-8-2014    lexiscan stress test    Kidney stone     Pulmonary fibrosis ([address]     Thyroid disease      Past Surgical History:   Procedure Laterality Date    ABDOMINAL AORTIC ANEURYSM REPAIR  09/26/2016    BALLOON ANGIOPLASTY, ARTERY Left     Cerebral artery stenosis with angioplasty and stent    CATARACT REMOVAL WITH IMPLANT      right, left    CYSTOSCOPY  nov 2012    retro ureteroscopy stone manipulation and insertion of j stent    EYE SURGERY      cataracts-bilat    INGUINAL HERNIA REPAIR  1970s bilateral    INGUINAL HERNIA REPAIR  1990s bilateral    INGUINAL HERNIA REPAIR Right sept 2014    laparoscopic     Family History   Problem Relation Age of Onset    Heart Disease Father     Kidney Disease Mother        Home Medications  Prior to Admission medications    Medication Sig Start Date End Date Taking?  Authorizing Provider budesonide (PULMICORT) 0.5 MG/2ML nebulizer suspension Take 2 mLs by nebulization 2 times daily 2/21/20   Michelle Spring, DO   ipratropium-albuterol (DUONEB) 0.5-2.5 (3) MG/3ML SOLN nebulizer solution Inhale 3 mLs into the lungs every 4 hours 2/21/20   Michelle Spring, DO   gabapentin (NEURONTIN) 400 MG capsule Take 1 capsule by mouth 2 times daily for 8 days. 2/21/20 2/29/20  Michelle Spring, DO   docusate sodium (COLACE, DULCOLAX) 100 MG CAPS Take 100 mg by mouth 2 times daily 2/21/20   Michelle Spring, DO   polyethylene glycol [facility]) packet Take 17 g by mouth 2 times daily 2/21/20 3/22/20  Michelle Spring, DO   Respiratory Therapy Supplies (FULL KIT NEBULIZER SET) MISC Use as directed with nebulized medication. 2/21/20   Michelle Spring, DO   traMADol (ULTRAM) 50 MG tablet Take 2 tablets by mouth 2 times daily for 3 days.  2/21/20 2/24/20  Amber Thorpe, DO   predniSONE (DELTASONE) 10 MG tablet Take 3 tablets by mouth daily In the morning with breakfast 1/14/20   Michelle Spring, DO   brimonidine (ALPHAGAN) 0.2 % ophthalmic solution Place 1 drop into the right eye every 12 hours    Historical Provider, MD   OXYGEN Inhale 6 L into the lungs continuous     Historical Provider, MD   Bisacodyl (DULCOLAX PO) Take 1 tablet by mouth 2 times daily as needed (Upset Stomach)    Historical Provider, MD   latanoprost (XALATAN) 0.005 % ophthalmic solution Place 1 drop into the left eye nightly     Historical Provider, MD   influenza virus trivalent vaccine (FLUZONE) injection Inject 0.5 mLs into the muscle once Given 10/2019 [facility]    Historical Provider, MD   primidone (MYSOLINE) 50 MG tablet Take 5 tablets by mouth 2 times daily 12/19/19   Michelle Spring, DO   albuterol (PROVENTIL) (2.5 MG/3ML) 0.083% nebulizer solution Take 2.5 mg by nebulization every 4 hours as needed for Wheezing or Shortness of Breath    Historical Provider, MD   ibuprofen (ADVIL;MOTRIN) 200 MG tablet Take 400 mg by mouth 3 times Worry: Not on file     Inability: Not on file    Transportation needs:     Medical: Not on file     Non-medical: Not on file   Tobacco Use    Smoking status: Former Smoker     Packs/day: 2.00     Years: 42.00     Pack years: 84.00     Types: Cigarettes     Last attempt to quit: 2003     Years since quittin.0    Smokeless tobacco: Never Used   Substance and Sexual Activity    Alcohol use: Yes     Alcohol/week: 1.0 standard drinks     Types: 1 Shots of liquor per week     Comment: daily    Drug use: No    Sexual activity: Not on file   Lifestyle    Physical activity:     Days per week: Not on file     Minutes per session: Not on file    Stress: Not on file   Relationships    Social connections:     Talks on phone: Not on file     Gets together: Not on file     Attends Mosque service: Not on file     Active member of club or organization: Not on file     Attends meetings of clubs or organizations: Not on file     Relationship status: Not on file    Intimate partner violence:     Fear of current or ex partner: Not on file     Emotionally abused: Not on file     Physically abused: Not on file     Forced sexual activity: Not on file   Other Topics Concern    Not on file   Social History Narrative    Not on file       Review of Systems  All bolded are positive; please see HPI  General:  Fever, chills, diaphoresis, fatigue, malaise, night sweats, weight loss  Psychological:  Anxiety, disorientation, hallucinations. ENT:  Epistaxis, headaches, vertigo, visual changes. Cardiovascular:  Chest pain, irregular heartbeats, palpitations, paroxysmal nocturnal dyspnea. Respiratory:  Shortness of breath, coughing, sputum production, hemoptysis, wheezing, orthopnea.   Gastrointestinal:  Nausea, vomiting, diarrhea, heartburn, constipation, abdominal pain, hematemesis, hematochezia, melena, acholic stools  Genito-Urinary:  Dysuria, urgency, frequency, hematuria  Musculoskeletal:  Joint pain, joint Lung bases are clear. Large colonic stool load. Ct Chest W Contrast    Result Date: 2/22/2020  Location: 100 Indication: Shortness of breath Comparison: CT chest from 1/7/2020. Technique: Multidetector CT imaging of the chest was preformed after administration of intravenous contrast (80 mL [id number]-[id number]). Coronal and sagittal reformatted images were obtained. Automated dose exposure control was used for this exam. FINDINGS: There is elevation of the right hemidiaphragm. The central airways are patent. There is no bronchiectasis. There are trace right and small left pleural effusions, which are new compared to the previous CT from 1/7/2020. There is questionable loculation within the right pleural effusion. There is dependent consolidation within bilateral lower lobes. A previously biopsied right lower lobe nodule is not appreciated, likely related to right lower lobe consolidation. There are stable emphysematous changes. There is no pneumothorax. The heart is stable in size. There is no large pericardial effusion. The thoracic aorta is normal in caliber and enhancement. There is mild scattered atherosclerotic plaque. The great vessel origins are grossly patent. The main pulmonary artery is normal in caliber. There is no mediastinal or hilar lymphadenopathy. There is no axillary lymphadenopathy. Visualized portion of the upper abdomen demonstrates partial visualization of an abdominal aorta stent. There is no suspicious lytic or blastic osseous lesion. 1. Trace right and small left pleural effusions with bilateral lower lobe dependent consolidations, likely compressive atelectasis versus pneumonia. There is questionable loculation of the right pleural effusion. 2. Previously biopsied right lower lobe nodule is not appreciated on the current study. There is no pneumothorax. 3. Stable emphysematous changes.     Xr Chest Portable    Result Date: 2/22/2020  Reading location: 100 Indication: Shortness of breath Comparison: Prior chest radiograph from 2/18/2020 Technique: Portable AP upright chest radiograph was obtained. Findings: The cardiomediastinal silhouette is stable in size and contours. There are trace bilateral pleural effusions with bibasilar airspace disease. There is no evidence of pneumothorax. Trace bilateral pleural effusions with bibasilar airspace disease. Xr Chest Portable    Result Date: 2/18/2020  Location:100 Exam: XR CHEST PORTABLE Comparison: February 17, 2020 History:   Chest tube removal Findings: A single frontal portable view of the chest demonstrates no evidence of pneumothorax. Patchy infiltrates in the left lung. Heart mediastinum are normal.     1. No pneumothorax in the right. 2. Stable infiltrates. Xr Chest Portable    Result Date: 2/17/2020  LOCATION: 200 EXAM: XR CHEST PORTABLE COMPARISON: 2/17/2020 HISTORY: Chest tube removal TECHNIQUE: Single frontal view of the chest was obtained. FINDINGS:  SUPPORT DEVICES: Chest tube has been removed. LUNGS: Left basilar pleural and parenchymal disease is unchanged. PLEURA: No pneumothorax visualized. LUNG VOLUMES: Satisfactory inspirator effort. MEDIASTINAL STRUCTURES: No lymphadenopathy. Normal aortic contour. HEART SIZE: Normal. BONES AND SOFT TISSUES: No fracture or soft tissue abnormality. 1. No pneumothorax after chest tube removal.     Xr Chest Portable    Result Date: 2/17/2020  LOCATION: 200 EXAM: XR CHEST PORTABLE COMPARISON: 2/17/2020 HISTORY: Shortness of breath TECHNIQUE: Single frontal view of the chest was obtained. FINDINGS:  SUPPORT DEVICES: Support devices are unchanged with right sided chest tube in place. LUNGS: Effusions seen bilaterally, unchanged. PLEURA: No pneumothorax visualized. LUNG VOLUMES: Satisfactory inspirator effort. MEDIASTINAL STRUCTURES: No lymphadenopathy. Normal aortic contour. HEART SIZE: Stable. BONES AND SOFT TISSUES: No fracture or soft tissue abnormality.      1. Stable chest x-ray with no pneumothorax. Xr Chest Portable    Result Date: 2/17/2020  Reading location: 200 Indication: Shortness of breath Comparison: Prior chest radiograph from 2/16/2020 Technique: Portable AP upright chest radiograph was obtained. Findings: A right-sided chest tube is unchanged in position. The cardiomediastinal silhouette is stable in size and contours. The previously noted right lung nodule is less conspicuous on the current study. There are small left and trace left pleural effusions with mild patchy left basilar airspace disease. There is no evidence of pneumothorax. 1. Left-sided chest tube unchanged in position. No definite pneumothorax. 2. Small left and trace right pleural effusions with mild patchy left basilar airspace disease. 3. Previously noted right lung nodule is less conspicuous on the current study. Xr Chest Portable    Result Date: 2/16/2020  LOCATION: 100 EXAM: XR CHEST PORTABLE COMPARISON: 2/15/2020. HISTORY: Hypoxia. TECHNIQUE: Single frontal view of the chest was obtained. FINDINGS:  SUPPORT DEVICES: None. LUNGS: There has been development of a left pleural effusion and atelectasis new since prior examination. Super imposed pneumonia cannot be excluded. Right-sided chest tube position is unchanged. Linear defect right lung base lateral costophrenic sulcus probably a skinfold although a small anterior pneumothorax cannot be excluded. 2 cm lung nodule right lung base identified, precise location difficult to assess on this single frontal portable chest.     Developing airspace disease with atelectasis and pleural effusion left lung base. Xr Chest Portable    Result Date: 2/15/2020  Location:100 Exam: XR CHEST PORTABLE Comparison: February 13, 2020 History:   Follow-up right pneumothorax Findings: A single frontal portable view of the chest shows right chest tube in place no pneumothorax. Elevated right diaphragm. No infiltrates. Heart is enlarged.      Stable chest. No soft tissue abnormality. 1. Large size right pneumothorax. Assessment for mediastinal shift is limited due to the severe rotation. There is a discrepancy with the preliminary report from the ER physician. A discrepancy report was filled out. Critical results: These findings were discussed with  on the date of the exam at 07 100 by Josh Apgar, DO. They confirmed that they understood the results communicated to them. Xr Chest Portable    Result Date: 2020  LOCATION: 200 EXAM: XR CHEST PORTABLE COMPARISON: 2020 HISTORY: Shortness of breath TECHNIQUE: Single frontal view of the chest was obtained. FINDINGS:  SUPPORT DEVICES: None. 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 The left lung is clear. PLEURA: No pneumothorax visualized. LUNG VOLUMES: Satisfactory inspirator effort. MEDIASTINAL STRUCTURES: No lymphadenopathy. Normal aortic contour. HEART SIZE: Normal. BONES AND SOFT TISSUES: No fracture or soft tissue abnormality. No active pulmonary disease. Us Dup Upper Extremity Left Venous    Result Date: 2020  LOCATION: 200 EXAM: US DUP UPPER EXTREMITY LEFT VENOUS COMPARISON: None HISTORY: Pain and swelling Technique:  Multiple Realtime, grayscale and color-flow spectral waveform analysis Doppler ultrasound images of the left upper extremity was performed. Evaluation was obtained from the forearm to the neck. Compression technique as well as doppler and color flow images were obtained. Findings: Normal flow and color flow patterns were identified with normal changes to  venous augmentation. Complete compression of the venous system was also noted signifying no evidence of acute vein thrombosis. No soft tissue mass or cyst is identified. 1. No evidence of acute venous thrombosis left upper extremity. Fl Modified Barium Swallow W Video    Result Date: 2020  Reading Location: 200 Indication: Choking on water Comparison: None available.  Technique

## 2020-02-22 NOTE — PROGRESS NOTES
Page out to Dr. Sony Mclaughlin per the pt's request. He would like his Ultram which is being \"Held\" on the [facility]    1850 - p/c from Dr. Sony Mclaughlin. See new order.     Andrew Spear  2/22/2020

## 2020-02-22 NOTE — PROGRESS NOTES
P/c through [address] [place] [address] to Dr. Poon p - [phone]. No answer. 4807 - p/c  to Dr. Prakash Singh - l/m to call the ICU.    0926 - p/c from Dr. Prakash Singh - he will not see the pt while the pt is housed in the ICU. He will see the pt when the pt is on IMCU.     Jose Luis Quezada  2/22/2020

## 2020-02-22 NOTE — ED PROVIDER NOTES
in acute distress. Appearance: Normal appearance. He is well-developed. He is not ill-appearing, toxic-appearing or diaphoretic. HENT:      Head: Normocephalic and atraumatic. Right Ear: External ear normal.      Left Ear: External ear normal.      Nose: Nose normal. No congestion or rhinorrhea. Mouth/Throat:      Mouth: Mucous membranes are moist.      Pharynx: No oropharyngeal exudate or posterior oropharyngeal erythema. Eyes:      General: No scleral icterus. Right eye: No discharge. Left eye: No discharge. Extraocular Movements: Extraocular movements intact. Pupils: Pupils are equal, round, and reactive to light. Neck:      Musculoskeletal: Normal range of motion and neck supple. Muscular tenderness present. No neck rigidity. Thyroid: No thyromegaly. Vascular: No carotid bruit. Comments: Paraspinal tenderness on palpation. Cardiovascular:      Rate and Rhythm: Regular rhythm. Heart sounds: No murmur. No friction rub. No gallop. Pulmonary:      Effort: No respiratory distress. Breath sounds: No stridor. No wheezing, rhonchi or rales. Comments: Decreased breath sounds right lower lobe. No wheezes or rhonchi on auscultation. Nasal cannula is in place. Chest:      Chest wall: No tenderness. Abdominal:      General: Abdomen is flat. There is no distension. Palpations: Abdomen is soft. There is no mass. Tenderness: There is no abdominal tenderness. There is no right CVA tenderness, left CVA tenderness, guarding or rebound. Hernia: No hernia is present. Musculoskeletal: Normal range of motion. General: No swelling, tenderness, deformity or signs of injury. Right lower leg: No edema. Left lower leg: No edema. Lymphadenopathy:      Cervical: No cervical adenopathy. Skin:     General: Skin is warm and dry. Capillary Refill: Capillary refill takes 2 to 3 seconds.       Findings: No erythema or -------------------------------------------------    LABS:  Results for orders placed or performed during the hospital encounter of 02/22/20   CBC Auto Differential   Result Value Ref Range    WBC 9.5 4.5 - 11.5 E9/L    RBC 2.98 (L) 3.80 - 5.80 E12/L    Hemoglobin 9.4 (L) 12.5 - 16.5 g/dL    Hematocrit 30.0 (L) 37.0 - 54.0 %    .7 (H) 80.0 - 99.9 fL    MCH 31.5 26.0 - 35.0 pg    MCHC 31.3 (L) 32.0 - 34.5 %    RDW 14.3 11.5 - 15.0 fL    Platelets 605 027 - 116 E9/L    MPV 10.3 7.0 - 12.0 fL    Neutrophils % 69.7 43.0 - 80.0 %    Immature Granulocytes % 0.5 0.0 - 5.0 %    Lymphocytes % 8.6 (L) 20.0 - 42.0 %    Monocytes % 13.0 (H) 2.0 - 12.0 %    Eosinophils % 7.6 (H) 0.0 - 6.0 %    Basophils % 0.6 0.0 - 2.0 %    Neutrophils Absolute 6.62 1.80 - 7.30 E9/L    Immature Granulocytes # 0.05 E9/L    Lymphocytes Absolute 0.82 (L) 1.50 - 4.00 E9/L    Monocytes Absolute 1.24 (H) 0.10 - 0.95 E9/L    Eosinophils Absolute 0.72 (H) 0.05 - 0.50 E9/L    Basophils Absolute 0.06 0.00 - 0.20 E9/L   Comprehensive Metabolic Panel   Result Value Ref Range    Sodium 148 (H) 132 - 146 mmol/L    Potassium 4.2 3.5 - 5.0 mmol/L    Chloride 104 98 - 107 mmol/L    CO2 34 (H) 22 - 29 mmol/L    Anion Gap 10 7 - 16 mmol/L    Glucose 99 74 - 99 mg/dL    BUN 36 (H) 8 - 23 mg/dL    CREATININE 1.0 0.7 - 1.2 mg/dL    GFR Non-African American >60 >=60 mL/min/1.73    GFR African American >60     Calcium 8.9 8.6 - 10.2 mg/dL    Total Protein 5.5 (L) 6.4 - 8.3 g/dL    Alb 2.8 (L) 3.5 - 5.2 g/dL    Total Bilirubin <0.2 0.0 - 1.2 mg/dL    Alkaline Phosphatase 84 40 - 129 U/L    ALT 14 0 - 40 U/L    AST 13 0 - 39 U/L   Urinalysis   Result Value Ref Range    Color, UA Yellow Straw/Yellow    Clarity, UA Clear Clear    Glucose, Ur Negative Negative mg/dL    Bilirubin Urine Negative Negative    Ketones, Urine Negative Negative mg/dL    Specific Gravity, UA 1.025 1.005 - 1.030    Blood, Urine Negative Negative    pH, UA 5.5 5.0 - 9.0    Protein, UA Negative Negative mg/dL    Urobilinogen, Urine 0.2 <2.0 E.U./dL    Nitrite, Urine Negative Negative    Leukocyte Esterase, Urine Negative Negative   Lactic Acid, Plasma   Result Value Ref Range    Lactic Acid 1.1 0.5 - 2.2 mmol/L   Troponin   Result Value Ref Range    Troponin 0.02 0.00 - 0.03 ng/mL   Brain Natriuretic Peptide   Result Value Ref Range    Pro- (H) 0 - 450 pg/mL   Magnesium   Result Value Ref Range    Magnesium 2.0 1.6 - 2.6 mg/dL   EKG 12 Lead   Result Value Ref Range    Ventricular Rate 77 BPM    Atrial Rate 77 BPM    P-R Interval 144 ms    QRS Duration 94 ms    Q-T Interval 366 ms    QTc Calculation (Bazett) 414 ms    P Axis 50 degrees    R Axis -21 degrees    T Axis 53 degrees       RADIOLOGY:  XR CHEST PORTABLE    (Results Pending)   CT CHEST W CONTRAST    (Results Pending)     CT impression:  #1 there is no evidence of pneumothorax. #2 there are bibasilar pleural effusions, right greater than left with simple density. There is questionable mild loculation of the right sided pleural effusion. There is no definite evidence of hemothorax. #3 there are bibasilar consolidations which could be due to infection or atelectasis. #4 there is elevation of the right hemidiaphragm. This could be due to phrenic nerve injury or eventration. #5 the pulmonary arteries are enlarged. Correlate with pulmonary arterial hypertension. #6 cardiomegaly  #7 coronary artery disease    ------------------------- NURSING NOTES AND VITALS REVIEWED ---------------------------  Date / Time Roomed:  2/22/2020  1:09 AM  ED Bed Assignment:  07/07    The nursing notes within the ED encounter and vital signs as below have been reviewed.      Patient Vitals for the past 24 hrs:   BP Temp Temp src Pulse Resp SpO2 Height Weight   02/22/20 0322 -- -- -- -- -- -- 5' 6\" (1.676 m) 123 lb (55.8 kg)   02/22/20 0255 122/68 -- -- 80 30 94 % -- --   02/22/20 0213 -- -- -- 73 22 95 % -- --   02/22/20 0110 127/68 99.8 °F (37.7 °C) Axillary procedures and agree with all pertinent clinical information. I have also reviewed and agree with the past medical, family and social history unless otherwise noted. I have discussed this patient in detail with the resident, and provided the instruction and education regarding acute on chronic respiratory failure, COPD, pneumothorax and pneumonia. My findings/Plan: Heart RRR. Lungs diminished bilaterally. Abdomen soft, nontender. Bowel sounds normal. Supportive treatment. Admit for further evaluation and treatment.            Ernesto Viveros,   02/22/20 6747

## 2020-02-22 NOTE — ED NOTES
Nurse to nurse report called and given to [facility] for room ICU [address], RN  02/22/20 7878

## 2020-02-22 NOTE — PROGRESS NOTES
Assessment and Plan  Patient is a 81 y.o. male with the following medical Problems:   #1 acute on chronic hypoxic and hypercapnic respiratory failure  2. Advanced COPD on home oxygen Triligy at night  3. Interstitial lung disease  4. Dyslipidemia  5. Essential tremor  6. Severe protein calorie malnutrition  7. Right-sided pneumothorax  8. RLL nodule  9. Pulmoanry hypertension    Plan of care;  1 keep sat 88 to 92%  2.  US ruled out DVT  3. DVT prophylaxis  4. Bronchodilators and oral prednisone    History of Present Illness:  Patient is an 25-year-old man with above-mentioned medical problems who was admitted on February 21, 2020 with increasing oxygen requirements. Patient was recently discharged from the hospital on February 20, 2020. He complains of back pain but denies fever, chills, shortness of breath, dyspnea, and cough.       Past Medical History:  Past Medical History:   Diagnosis Date    COPD (chronic obstructive pulmonary disease) ([facility] [address]     stable per pt    History of cardiovascular stress test 4/11/2012    Lexiscan    Hyperlipidemia     Hypertension 9-8-2014    lexiscan stress test    Kidney stone     Pulmonary fibrosis ([facility] [address]     Thyroid disease         Family History:   Family History   Problem Relation Age of Onset    Heart Disease Father     Kidney Disease Mother        Allergies:         Flomax [tamsulosin hcl]    Social history:  Social History     Socioeconomic History    Marital status:      Spouse name: Not on file    Number of children: Not on file    Years of education: Not on file    Highest education level: Not on file   Occupational History    Occupation: Physician     Comment: Retired   Social Needs    Financial resource strain: Not on file    Food insecurity:     Worry: Not on file     Inability: Not on file   inDegree needs:     Medical: Not on file     Non-medical: Not on file   Tobacco Use    Smoking status: Former Smoker     Packs/day: 2.00 Years: 42.00     Pack years: 84.00     Types: Cigarettes     Last attempt to quit: 2003     Years since quittin.0    Smokeless tobacco: Never Used   Substance and Sexual Activity    Alcohol use:  Yes     Alcohol/week: 1.0 standard drinks     Types: 1 Shots of liquor per week     Comment: daily    Drug use: No    Sexual activity: Not on file   Lifestyle    Physical activity:     Days per week: Not on file     Minutes per session: Not on file    Stress: Not on file   Relationships    Social connections:     Talks on phone: Not on file     Gets together: Not on file     Attends Yarsani service: Not on file     Active member of club or organization: Not on file     Attends meetings of clubs or organizations: Not on file     Relationship status: Not on file    Intimate partner violence:     Fear of current or ex partner: Not on file     Emotionally abused: Not on file     Physically abused: Not on file     Forced sexual activity: Not on file   Other Topics Concern    Not on file   Social History Narrative    Not on file       Current Medications:     Current Facility-Administered Medications:     brimonidine (ALPHAGAN) 0.2 % ophthalmic solution 1 drop, 1 drop, Right Eye, Q12H, Christy Thorpe DO, 1 drop at 20    budesonide (PULMICORT) nebulizer suspension 500 mcg, 500 mcg, Nebulization, BID, [place] HUGO Thorpe DO, 500 mcg at 20 3578    docusate sodium (COLACE) capsule 100 mg, 100 mg, Oral, BID, [place] HUGO Thorpe DO    finasteride (PROSCAR) tablet 5 mg, 5 mg, Oral, Daily, [place] HUGO Thorpe DO    ipratropium-albuterol (DUONEB) nebulizer solution 3 mL, 3 mL, Inhalation, Q4H, [place] HUGO Thorpe DO, 3 mL at 20 1259    latanoprost (XALATAN) 0.005 % ophthalmic solution 1 drop, 1 drop, Left Eye, Nightly, Gerson Thorpe DO    albuterol (PROVENTIL) nebulizer solution 2.5 mg, 2.5 mg, Nebulization, Q4H PRN, Gerson Thorpe DO    pantoprazole (PROTONIX) tablet 40 mg, 40 mg, Oral, QAM AC, Gerson Thorpe DO    polyethylene glycol (GLYCOLAX) packet 17 g, 17 g, Oral, BID, Gerson Thorpe DO    [Held by provider] traMADol Berenda Dario) tablet 100 mg, 100 mg, Oral, BID, Christy Thorpe DO    vitamin C (ASCORBIC ACID) tablet 2,000 mg, 2,000 mg, Oral, BID, Gerson Thorpe DO    montelukast (SINGULAIR) tablet 10 mg, 10 mg, Oral, Nightly, Gerson Thorpe DO    potassium chloride (KLOR-CON M) extended release tablet 20 mEq, 20 mEq, Oral, BID, Christy Thorpe DO    sodium chloride flush 0.9 % injection 10 mL, 10 mL, Intravenous, 2 times per day, Gerson Thorpe DO, 10 mL at 02/22/20 0945    sodium chloride flush 0.9 % injection 10 mL, 10 mL, Intravenous, PRN, Rohith Gunn DO    acetaminophen (TYLENOL) tablet 650 mg, 650 mg, Oral, Q6H PRN, Rohith Gunn DO    acetaminophen (TYLENOL) suppository 650 mg, 650 mg, Rectal, Q6H PRN, Rohith Gunn DO    ondansetron [facility]) injection 4 mg, 4 mg, Intravenous, Q6H PRN, Gerson Thorpe DO    enoxaparin (LOVENOX) injection 40 mg, 40 mg, Subcutaneous, Daily, Christy Thorpe DO, 40 mg at 02/22/20 8744    cefepime (MAXIPIME) 2 g IVPB extended (mini-bag), 2 g, Intravenous, Q12H, Rohith Gunn DO, Last Rate: 12.5 mL/hr at 02/22/20 0955, 2 g at 02/22/20 0955    predniSONE (DELTASONE) tablet 10 mg, 10 mg, Oral, Daily, Gerson Thorpe DO    [START ON 2/23/2020] vancomycin 1000 mg IVPB in 250 mL D5W addavial, 1,000 mg, Intravenous, Q24H, Gerson Thorpe DO    0.45 % sodium chloride infusion, , Intravenous, Continuous, Christy Thorpe DO, Last Rate: 75 mL/hr at 02/22/20 9511    Review of Systems:   General: denies weight gain, denies loss of appetite, fever, chills, night sweats.   HEENT: denies headaches, dizziness, head trauma, visual changes, eye pain, tinnitus, nosebleeds, hoarseness or throat pain    Respiratory: denies chest pain,+ve dyspnea, cough but no hemoptysis  Cardiovascular: denies studies and available reports. I also discussed the differential diagnosis and all of the proposed management plans with the patient and individuals accompanying the patient to this visit. They had the opportunity to ask questions about the proposed management plans and to have those questions answered. This patient has a high probability of sudden, clinically significant deterioration, which requires the highest level of physician preparedness to intervene urgently. I managed/supervised life or organ supporting interventions that required frequent physician assessment. I devoted my full attention to the direct care of this patient for the amount of time indicated below. Time I spent with family or surrogate(s) is included only if the patient was incapable of providing the necessary information or participating in medical decision-making. Time devoted to teaching and to any procedures I billed separately is not included.   Critical Care Time: 35 minutes      Electronically signed by Gurpreet Alexander MD on 2/22/2020 at 3:09 PM

## 2020-02-22 NOTE — ED NOTES
Pt to CT via cart      [address] [facility], [facility]  02/22/20 9570

## 2020-02-23 NOTE — PROGRESS NOTES
Pt's spouse brought in pt's personal electric shaver and toiletries. They are in the pt's closet.     Toño Adjutant  2/23/2020

## 2020-02-23 NOTE — PROGRESS NOTES
Physical Therapy      IC04/IC04-01    Patient unavailable for physical therapy treatment due to recently falling asleep; did not sleep well last night and rn recommends to rest for tomorrow PT.

## 2020-02-23 NOTE — PROGRESS NOTES
[address] [facility]  Internal Medicine  -Progress note-    PCP:  Ana Dorsey DO  Admitting Physician:  Ana Dorsey DO  Consultants:  None at this time  Chief Complaint:    Chief Complaint   Patient presents with    Shortness of Breath     normally on 4L NC, on 6 now        History of Present Illness  Lizandro Urrutia is a 81 y.o. male who presents to [facility] ER complaining of SOB. Lizandro Urrutia has a past medical history that includes severe COPD on chronic O2, paroxysmal atrial fibrillation, chronic back pain, hypertension. Patient presents to ER from [facility] with complaint of shortness of breath and being hypoxic. He was just discharged hours earlier for previous admission for pneumothorax. He had to undergo chest tube placement during previous hospital admission. He was discharged yesterday afternoon to [facility]. Patient became hypoxic with pulse ox in the low 80s. In the ER, he is 94% on 5 L of nasal cannula. No pneumothorax on CT scan. CT scan showing possible pneumonia and small pleural effusions. Temp 100    ER Course  Upon presentation to the ER, routine labwork was performed which revealed sodium of 148, proBNP 999, hemoglobin 9.4. Imaging results are as outlined below in the Imaging section of this note. EKG revealed NSR with T wave inversions V4 V5. Upon arrival to the ER, patient was 127/68. The patient received tylenol, rocephin, vancomycin  in the emergency room and was admitted to ICU under the care of Dr. Shashank Solis. 2/23/2020  Patient is seen and examined in ICU. Patient's wife is at the bedside and case discussed. Patient is alert in oriented x3. He complains of only back pain but shoulder pain neck pain. Appetite is still poor. Patient's pulmonary status seems to be improving and he denies any chest pain or productive cough but complains of shortness of breath when he found out he was only on 2 L O2 nasal cannula and his O2 sat is 96%. Hemoglobin is 8.4 and was 10.33 days ago. With a WBC 7.4. Procalcitonin 0.15 and was 0.15 back on 2/17. Last Hospital Admission - 2/11/20  · Acute on chronic hypoxic respiratory failure  · Right pneumothorax  · Severe COPD on chronic O2  · Paroxysmal atrial fib previously on Eliquis  · Chronic back pain but has worsening left upper mid back pain  · Hypertension  · Moderate protein caloric malnutrition  · Constipation    Last Echocardiogram - 9/30/19   Left ventricle grossly normal in size. Normal left ventricular wall thickness. Estimated left ventricular ejection fraction is 48±5%. <50% criteria for diastolic dysfunction. Mild mid-distal septal dyskinesis. Mild apical dyskinesis. The LAESV Index is <34ml/m2. Normal right ventricular size and function. Physiologic and/or trace tricuspid regurgitation. RVSP is 29 mmHg. Technically fair quality study. No comparison study available. Suggest clinical correlation.      ED TRIAGE VITALS  BP: 122/67, Temp: 97.5 °F (36.4 °C), Pulse: 68, Resp: (!) 31, SpO2: 92 %    Vitals:    02/23/20 0800 02/23/20 0902 02/23/20 1000 02/23/20 1100   BP: 125/73 119/64 128/70 122/67   Pulse: 56 62 66 68   Resp: (!) 34 23 20 (!) 31   Temp: 97.5 °F (36.4 °C)      TempSrc: Oral      SpO2: (!) 89%  98% 92%   Weight:       Height:             Histories  Past Medical History:   Diagnosis Date    COPD (chronic obstructive pulmonary disease) (HCC)     stable per pt    History of cardiovascular stress test 4/11/2012    Lexiscan    Hyperlipidemia     Hypertension 9-8-2014    lexiscan stress test    Kidney stone     Pulmonary fibrosis ([facility] [address]     Thyroid disease      Past Surgical History:   Procedure Laterality Date    ABDOMINAL AORTIC ANEURYSM REPAIR  09/26/2016    BALLOON ANGIOPLASTY, ARTERY Left     Cerebral artery stenosis with angioplasty and stent    CATARACT REMOVAL WITH IMPLANT      right, left    CYSTOSCOPY  nov 2012    retro ureteroscopy stone manipulation 0.5 mLs into the muscle once Given 10/2019 [facility]    Historical Provider, MD   primidone (MYSOLINE) 50 MG tablet Take 5 tablets by mouth 2 times daily 12/19/19   Jaime Jean DO   albuterol (PROVENTIL) (2.5 MG/3ML) 0.083% nebulizer solution Take 2.5 mg by nebulization every 4 hours as needed for Wheezing or Shortness of Breath    Historical Provider, MD   ibuprofen (ADVIL;MOTRIN) 200 MG tablet Take 400 mg by mouth 3 times daily     Historical Provider, MD Freire Bryan 450 MG CAPS Take 450 mg by mouth 2 times daily     Historical Provider, MD   vitamin C (ASCORBIC ACID) 500 MG tablet Take 2,000 mg by mouth 2 times daily     Historical Provider, MD   potassium chloride (KLOR-CON M) 20 MEQ TBCR extended release tablet Take 20 mEq by mouth 2 times daily    Historical Provider, MD   azithromycin (ZITHROMAX) 250 MG tablet Take 250 mg by mouth daily    Historical Provider, MD   Multiple Vitamins-Minerals (PRESERVISION AREDS 2 PO) Take 1 tablet by mouth 2 times daily     Historical Provider, MD   NONFORMULARY Trilogy - ventilator at bedtime    Historical Provider, MD   finasteride (PROSCAR) 5 MG tablet Take 1 tablet by mouth daily 10/11/19   Jaime Jean,    pantoprazole (PROTONIX) 40 MG tablet Take 1 tablet by mouth every morning (before breakfast) 10/11/19   Jaime Jean DO   metoprolol succinate (TOPROL XL) 25 MG extended release tablet Take 0.5 tablets by mouth daily 10/11/19   Krissy Tucker, APRN - CNP   theophylline (THEODUR) 300 MG extended release tablet Take 1 tablet by mouth daily 8/16/19   Abigail Galvan DO   valACYclovir (VALTREX) 500 MG tablet Take 500 mg by mouth daily    Historical Provider, MD   montelukast (SINGULAIR) 10 MG tablet Take 10 mg by mouth daily    Historical Provider, MD   furosemide (LASIX) 40 MG tablet Take 80 mg by mouth daily     Historical Provider, MD   Cholecalciferol (VITAMIN D-3) 5000 UNITS TABS Take 1 tablet by mouth Twice a Week Sunday & Wednesday changes. Cardiovascular:  Chest pain, irregular heartbeats, palpitations, paroxysmal nocturnal dyspnea. Respiratory:  Shortness of breath, coughing, sputum production, hemoptysis, wheezing, orthopnea. Gastrointestinal:  Nausea, vomiting, diarrhea, heartburn, constipation, abdominal pain, hematemesis, hematochezia, melena, acholic stools  Genito-Urinary:  Dysuria, urgency, frequency, hematuria  Musculoskeletal:  Joint pain, joint stiffness, joint swelling, muscle pain back pain  Neurology:  Headache, focal neurological deficits, weakness, numbness, paresthesia  Derm:  Rashes, ulcers, excoriations, bruising  Extremities:  Decreased ROM, peripheral edema, mottling    Physical Examination  Vitals:  /67   Pulse 68   Temp 97.5 °F (36.4 °C) (Oral)   Resp (!) 31   Ht 5' 6\" (1.676 m)   Wt 126 lb 4.8 oz (57.3 kg)   SpO2 92%   BMI 20.39 kg/m²   General Appearance:  awake, alert, and oriented to person, place, time, and purpose; appears stated age and cooperative; no apparent distress no labored breathing 10L NC cachectic  HEENT:  NCAT; PERRL; conjunctiva pink, sclera clear  Neck:  no adenopathy, bruit, JVD, tenderness, masses, or nodules; supple, symmetrical, trachea midline, thyroid not enlarged  Lung: diminsihed bilaterally; no use of accessory muscles; no rhonchi, rales, or crackles  Heart:  regular rate and regular rhythm without murmur, rub, or gallop  Abdomen:  soft, nontender, nondistended; normoactive bowel sounds; no organomegaly  Extremities:  extremities normal, atraumatic, no cyanosis or edema  Musculokeletal:  no joint swelling, no muscle tenderness. ROM normal in all joints of extremities.    Neurologic:  mental status A&Ox3, thought content appropriate; CN II-XII grossly intact; sensation intact, motor strength 5/5 globally; no slurred speech    Laboratory Data  Recent Results (from the past 24 hour(s))   Magnesium    Collection Time: 02/23/20  5:30 AM   Result Value Ref Range    Magnesium 1.9 1.6 - 2.6 mg/dL   Phosphorus    Collection Time: 02/23/20  5:30 AM   Result Value Ref Range    Phosphorus 3.2 2.5 - 4.5 mg/dL   Comprehensive Metabolic Panel    Collection Time: 02/23/20  5:30 AM   Result Value Ref Range    Sodium 143 132 - 146 mmol/L    Potassium 4.0 3.5 - 5.0 mmol/L    Chloride 105 98 - 107 mmol/L    CO2 29 22 - 29 mmol/L    Anion Gap 9 7 - 16 mmol/L    Glucose 104 (H) 74 - 99 mg/dL    BUN 33 (H) 8 - 23 mg/dL    CREATININE 0.9 0.7 - 1.2 mg/dL    GFR Non-African American >60 >=60 mL/min/1.73    GFR African American >60     Calcium 8.1 (L) 8.6 - 10.2 mg/dL    Total Protein 4.9 (L) 6.4 - 8.3 g/dL    Alb 2.3 (L) 3.5 - 5.2 g/dL    Total Bilirubin <0.2 0.0 - 1.2 mg/dL    Alkaline Phosphatase 69 40 - 129 U/L    ALT 11 0 - 40 U/L    AST 12 0 - 39 U/L   CBC    Collection Time: 02/23/20  5:30 AM   Result Value Ref Range    WBC 7.4 4.5 - 11.5 E9/L    RBC 2.66 (L) 3.80 - 5.80 E12/L    Hemoglobin 8.4 (L) 12.5 - 16.5 g/dL    Hematocrit 26.6 (L) 37.0 - 54.0 %    .0 (H) 80.0 - 99.9 fL    MCH 31.6 26.0 - 35.0 pg    MCHC 31.6 (L) 32.0 - 34.5 %    RDW 14.0 11.5 - 15.0 fL    Platelets 880 810 - 699 E9/L    MPV 10.4 7.0 - 12.0 fL   Procalcitonin    Collection Time: 02/23/20  5:30 AM   Result Value Ref Range    Procalcitonin 0.15 (H) 0.00 - 0.08 ng/mL       Imaging  Xr Lumbar Spine (2-3 Views)    Result Date: 2/6/2020  LOCATION:200 EXAM: XR LUMBAR SPINE (2-3 VIEWS), XR PELVIS (1-2 VIEWS) COMPARISON: None HISTORY:  Low back pain TECHNIQUE:  4  views of the lumbar spine and single view pelvis were obtained. FINDINGS:  There is normal vertebral body height and alignment. Severe degenerative changes with facet arthropathy, marginal osteophytes and disc space narrowing noted greatest at L4-5. The bony pelvis is intact. The hips are well aligned. Osseous structures are otherwise normal without evidence of fracture. Arthritis with no acute findings.     Xr Pelvis (1-2 Views)    Result Date: 2/6/2020  LOCATION:200 EXAM: pericardial effusion. The thoracic aorta is normal in caliber and enhancement. There is mild scattered atherosclerotic plaque. The great vessel origins are grossly patent. The main pulmonary artery is normal in caliber. There is no mediastinal or hilar lymphadenopathy. There is no axillary lymphadenopathy. Visualized portion of the upper abdomen demonstrates partial visualization of an abdominal aorta stent. There is no suspicious lytic or blastic osseous lesion. 1. Trace right and small left pleural effusions with bilateral lower lobe dependent consolidations, likely compressive atelectasis versus pneumonia. There is questionable loculation of the right pleural effusion. 2. Previously biopsied right lower lobe nodule is not appreciated on the current study. There is no pneumothorax. 3. Stable emphysematous changes. Xr Chest Portable    Result Date: 2/22/2020  Reading location: 100 Indication: Shortness of breath Comparison: Prior chest radiograph from 2/18/2020 Technique: Portable AP upright chest radiograph was obtained. Findings: The cardiomediastinal silhouette is stable in size and contours. There are trace bilateral pleural effusions with bibasilar airspace disease. There is no evidence of pneumothorax. Trace bilateral pleural effusions with bibasilar airspace disease. Xr Chest Portable    Result Date: 2/18/2020  Location:100 Exam: XR CHEST PORTABLE Comparison: February 17, 2020 History:   Chest tube removal Findings: A single frontal portable view of the chest demonstrates no evidence of pneumothorax. Patchy infiltrates in the left lung. Heart mediastinum are normal.     1. No pneumothorax in the right. 2. Stable infiltrates. Xr Chest Portable    Result Date: 2/17/2020  LOCATION: 200 EXAM: XR CHEST PORTABLE COMPARISON: 2/17/2020 HISTORY: Chest tube removal TECHNIQUE: Single frontal view of the chest was obtained. FINDINGS:  SUPPORT DEVICES: Chest tube has been removed.  LUNGS: Left basilar pleural and parenchymal disease is unchanged. PLEURA: No pneumothorax visualized. LUNG VOLUMES: Satisfactory inspirator effort. MEDIASTINAL STRUCTURES: No lymphadenopathy. Normal aortic contour. HEART SIZE: Normal. BONES AND SOFT TISSUES: No fracture or soft tissue abnormality. 1. No pneumothorax after chest tube removal.     Xr Chest Portable    Result Date: 2/17/2020  LOCATION: 200 EXAM: XR CHEST PORTABLE COMPARISON: 2/17/2020 HISTORY: Shortness of breath TECHNIQUE: Single frontal view of the chest was obtained. FINDINGS:  SUPPORT DEVICES: Support devices are unchanged with right sided chest tube in place. LUNGS: Effusions seen bilaterally, unchanged. PLEURA: No pneumothorax visualized. LUNG VOLUMES: Satisfactory inspirator effort. MEDIASTINAL STRUCTURES: No lymphadenopathy. Normal aortic contour. HEART SIZE: Stable. BONES AND SOFT TISSUES: No fracture or soft tissue abnormality. 1. Stable chest x-ray with no pneumothorax. Xr Chest Portable    Result Date: 2/17/2020  Reading location: 200 Indication: Shortness of breath Comparison: Prior chest radiograph from 2/16/2020 Technique: Portable AP upright chest radiograph was obtained. Findings: A right-sided chest tube is unchanged in position. The cardiomediastinal silhouette is stable in size and contours. The previously noted right lung nodule is less conspicuous on the current study. There are small left and trace left pleural effusions with mild patchy left basilar airspace disease. There is no evidence of pneumothorax. 1. Left-sided chest tube unchanged in position. No definite pneumothorax. 2. Small left and trace right pleural effusions with mild patchy left basilar airspace disease. 3. Previously noted right lung nodule is less conspicuous on the current study. Xr Chest Portable    Result Date: 2/16/2020  LOCATION: 100 EXAM: XR CHEST PORTABLE COMPARISON: 2/15/2020. HISTORY: Hypoxia. TECHNIQUE: Single frontal view of the chest was obtained. caloric malnutrition  · Constipation    -Blood cultures sent from ED  -Sputum culture  -Respiratory DFA panel  -Nasal cannula oxygen prn  -Duonebs q4, pulmicort  -Tylenol prn for fever  -Antibiotics: Vancomycin/cefepime  -Prednisone 10mg daily  -Pulmonology consult  -1/2NS at 60cc for now patient looks clinically dry  -BP meds held due to hypotension  -BiPAP ordered PRN      · Routine labs in the morning. · DVT prophylaxis. lovenox  · Please see orders for further management and care.       Ana Dorsey D.O.  62:00 PM  2/23/2020

## 2020-02-23 NOTE — PROGRESS NOTES
Assessment and Plan  Patient is a 81 y.o. male with the following medical Problems:   #1 acute on chronic hypoxic and hypercapnic respiratory failure  2. Advanced COPD on home oxygen Triligy at night  3. Interstitial lung disease  4. Dyslipidemia  5. Essential tremor  6. Severe protein calorie malnutrition  7. Hx Right-sided pneumothorax ( resolved)  8. RLL nodule  9. Pulmoanry hypertension  10. Maria Teresa gastric CA    Plan of care;  1 keep sat 88 to 92%  2.  US ruled out DVT  3. DVT prophylaxis  4. Bronchodilators and oral prednisone  5. Lidoderm patch for back pain    History of Present Illness:  Patient is an 72-year-old man with above-mentioned medical problems who was admitted on February 21, 2020 with increasing oxygen requirements. Patient was recently discharged from the hospital on February 20, 2020. He complains of back pain but denies fever, chills, shortness of breath, dyspnea, and cough. 02/23/2020  Patient had an uneventful night. He continues to have back pain. She denies fever, chills, rigors. He is maintaining saturation of 99% on 6 L of oxygen which is his home dose of oxygen. Bedside nurse was informed to decrease his oxygen to maintain a sat of 88 to 94%.       Past Medical History:  Past Medical History:   Diagnosis Date    COPD (chronic obstructive pulmonary disease) ([address]     stable per pt    History of cardiovascular stress test 4/11/2012    Lexiscan    Hyperlipidemia     Hypertension 9-8-2014    lexiscan stress test    Kidney stone     Pulmonary fibrosis [facility]     Thyroid disease         Family History:   Family History   Problem Relation Age of Onset    Heart Disease Father     Kidney Disease Mother        Allergies:         Flomax [tamsulosin hcl]    Social history:  Social History     Socioeconomic History    Marital status:      Spouse name: Not on file    Number of children: Not on file    Years of education: Not on file    Highest education level: Not on file   Occupational History    Occupation: Physician     Comment: Retired   Social Needs    Financial resource strain: Not on file    Food insecurity:     Worry: Not on file     Inability: Not on file   Basic6 needs:     Medical: Not on file     Non-medical: Not on file   Tobacco Use    Smoking status: Former Smoker     Packs/day: 2.00     Years: 42.00     Pack years: 84.00     Types: Cigarettes     Last attempt to quit: 2003     Years since quittin.0    Smokeless tobacco: Never Used   Substance and Sexual Activity    Alcohol use:  Yes     Alcohol/week: 1.0 standard drinks     Types: 1 Shots of liquor per week     Comment: daily    Drug use: No    Sexual activity: Not on file   Lifestyle    Physical activity:     Days per week: Not on file     Minutes per session: Not on file    Stress: Not on file   Relationships    Social connections:     Talks on phone: Not on file     Gets together: Not on file     Attends Baptism service: Not on file     Active member of club or organization: Not on file     Attends meetings of clubs or organizations: Not on file     Relationship status: Not on file    Intimate partner violence:     Fear of current or ex partner: Not on file     Emotionally abused: Not on file     Physically abused: Not on file     Forced sexual activity: Not on file   Other Topics Concern    Not on file   Social History Narrative    Not on file       Current Medications:     Current Facility-Administered Medications:     ibuprofen (ADVIL;MOTRIN) tablet 400 mg, 400 mg, Oral, TID, Monique Doan DO, 400 mg at 20 0823    brimonidine (ALPHAGAN) 0.2 % ophthalmic solution 1 drop, 1 drop, Right Eye, Q12H, Christy Thorpe DO, 1 drop at 20 8671    budesonide (PULMICORT) nebulizer suspension 500 mcg, 500 mcg, Nebulization, BID, Gerson Thorpe DO, 500 mcg at 20 0426    docusate sodium (COLACE) capsule 100 mg, 100 mg, Oral, BID, Pamela Gore DO, 100 mg at 02/23/20 9862    finasteride (PROSCAR) tablet 5 mg, 5 mg, Oral, Daily, Christy Thorpe, DO, 5 mg at 02/23/20 6869    ipratropium-albuterol (DUONEB) nebulizer solution 3 mL, 3 mL, Inhalation, Q4H, Christy Thorpe, DO, 3 mL at 02/23/20 0426    latanoprost (XALATAN) 0.005 % ophthalmic solution 1 drop, 1 drop, Left Eye, Nightly, Amber Thorpe, DO, 1 drop at 02/22/20 2031    albuterol (PROVENTIL) nebulizer solution 2.5 mg, 2.5 mg, Nebulization, Q4H PRN, Christy Thorpe DO    pantoprazole (PROTONIX) tablet 40 mg, 40 mg, Oral, QAM AC, Christy Thorpe, DO, 40 mg at 02/23/20 4042    polyethylene glycol (GLYCOLAX) packet 17 g, 17 g, Oral, BID, Christy Thorpe, DO, 17 g at 02/23/20 0159    vitamin C (ASCORBIC ACID) tablet 2,000 mg, 2,000 mg, Oral, BID, Christy Thorpe, DO, 2,000 mg at 02/23/20 0830    montelukast (SINGULAIR) tablet 10 mg, 10 mg, Oral, Nightly, Christy Thorpe, DO, 10 mg at 02/22/20 2031    potassium chloride (KLOR-CON M) extended release tablet 20 mEq, 20 mEq, Oral, BID, Christy Thorpe, DO, 20 mEq at 02/23/20 0825    sodium chloride flush 0.9 % injection 10 mL, 10 mL, Intravenous, 2 times per day, [facility] Thorpe, DO, 10 mL at 02/23/20 2693    sodium chloride flush 0.9 % injection 10 mL, 10 mL, Intravenous, PRN, Catherine Bleak, DO    acetaminophen (TYLENOL) tablet 650 mg, 650 mg, Oral, Q6H PRN, Catherine Bleak, DO, 650 mg at 02/22/20 2338    acetaminophen (TYLENOL) suppository 650 mg, 650 mg, Rectal, Q6H PRN, Catherine Bleak, DO    [facility]ansetron [facility]) injection 4 mg, 4 mg, Intravenous, Q6H PRN, Gerson Thorpe DO    enoxaparin (LOVENOX) injection 40 mg, 40 mg, Subcutaneous, Daily, Christy Thorpe DO, 40 mg at 02/23/20 3838    cefepime (MAXIPIME) 2 g IVPB extended (mini-bag), 2 g, Intravenous, Q12H, Catherine Centeno DO, Stopped at 02/23/20 0050    predniSONE (DELTASONE) tablet 10 mg, 10 mg, Oral, Daily, Gerson Thorpe DO, 10 mg at

## 2020-02-24 NOTE — PROGRESS NOTES
CRITICAL CARE PROGRESS NOTE    The patient's case was discussed in multidisciplinary rounds including critical care specialist, nursing, RT and pharmacy. His evaluation is as follows:       81year old man with PMH of COPD, ILD, HTN, hyperlipidemia, hypothyroidism, recent pneumothorax, in ICU for management of acute on chronic hypoxemic respiratory failure due to pneumonia, on continuous BPAP. Today with worsening restlessness, agitated, severely hypoxemic when BPAP is off (pt wanting to remove BPAP). I met with the patient's wife, explained her the clinical situation, it seems that his condition is terminal at this moment (he has been in and out of the hospital for multiple occasions, very hypoxemic without the BPAP and he wants to have BPAP off) discussed the option for hospice, she agrees with it, I entered the order for hospice.      Recent Labs     02/24/20  0530      K 4.3      CO2 28   BUN 27*   CREATININE 0.9   GLUCOSE 101*   CALCIUM 8.5*       Recent Labs     02/23/20  0530 02/24/20  0530   WBC 7.4  --    RBC 2.66*  --    HGB 8.4* 9.0*   HCT 26.6* 27.9*   .0*  --    MCH 31.6  --    MCHC 31.6*  --    RDW 14.0  --      --    MPV 10.4  --        Recent Labs     02/22/20  0140   BC 24 Hours- no growth       Recent Labs     02/22/20  0130   BLOODCULT2 24 Hours- no growth       24 HR INTAKE/OUTPUT:      Intake/Output Summary (Last 24 hours) at 2/24/2020 1208  Last data filed at 2/24/2020 0846  Gross per 24 hour   Intake 1933 ml   Output 1100 ml   Net 833 ml   BP (!) 146/94   Pulse 79   Temp 97.5 °F (36.4 °C) (Axillary)   Resp 29   Ht 5' 6\" (1.676 m)   Wt 124 lb 9.6 oz (56.5 kg)   SpO2 98%   BMI 20.11 kg/m²   General: Awake, restless, with extremity tremor, initially oriented but becoming more encephalopathic  HEENT: No head lesions, PERRL, EOMI, mouth without lesions, no nasal lesions, no cervical adenopathy palpated  Respiratory: Lungs with very diminished breath sounds bilaterally and with crackles in bases  CV: Regular rate, no murmurs, no JVD, no leg edema  Abdomen: Soft, non tender, + bowel sounds, no lesions  Skin: Hydrated, adequate turgor, no rash, capillary refill <2 seconds  Extremities: Muscular strength 3/5 in 4 limbs, moves 4 limbs spontaneously, distal pulses present  Neurology: Moves 4 limbs spontaneously, neck is supple, no meningitic signs present.     A/P:  1) Acute on chronic hypoxemic an hypercapnic respiratory failure secondary to pneumonia in a patient with severe COPD/ILD  --Transition to comfort care with support from hospice  --BPAP 14/6 with oxygen supplementation to maintain sats > 89%, transition to nasal cannula  --Antibiotics discontinued  --Continue bronchodilators and steroids  --Analgesia: Hydromorphone 1 mg q 15 mins PRN for pain and dyspnea  --Anxiety/agitation management with haloperidol 1 mg q 6 h PRN and can use midazolam if prior not helping  --Secretion management with glycopyrrolate.      DVT prophylaxis --> Stopped lovenox   Nutrition: Pleasure feeds if able to take PO  Goals of care: Comfort care      Annika Andino MD  Pulmonary and Critical Care Medicine

## 2020-02-24 NOTE — PROGRESS NOTES
Physical Therapy      IC04/IC04-01    Patient unavailable for physical therapy treatment due to potential hospice; pt requires bipap to maintain sats and pt removing bipap.  Will check tomorrow for more definitive plan

## 2020-02-24 NOTE — PROGRESS NOTES
Liaison Informational Visit Note      Referral received from Neema Britton     Call back number      Patient Name: Daniela Ruffin   :  1935  MRN:  [id number]    Admit date:  2020    Admitted from: [facility] Admitting Physician:  Dusty Orlando DO   PCP:  Dusty Orlando DO      Primary Insurance: Payor: MEDICARE /  /  /    Secondary Insurance:  unknown    Emergency Contact:      Contact/Relation:   /         Phone:       Contact/Relation:   /     Phone:     Advance Directive  Advance directives received No  Patient has completed an advance directive   Discussed with: Family member  DPOA-HC Name-Relation:    Phone:       Terminal Diagnosis Respoiratory failure due to COPD as confirmed by Dr. Mauricio Osman Problem List:   Patient Active Problem List   Diagnosis Code    Hypertension I10    Hyperlipidemia E78.5    Nephrolithiasis N20.0    Cor pulmonale, chronic (HCC) I27.81    Bradycardia, sinus R00.1    Multiple thyroid nodules E04.2    Secondary adrenal insufficiency (HCC) E27.49    Acquired bronchiectasis ([address] J47.9    COPD, severe ([address] J44.9    Status post repair of abdominal aortic aneurysm (AAA) using straight graft Z98.890, Z86.79    Chronic respiratory failure with hypoxia and hypercapnia (HCC) J96.11, J96.12    Severe hypoxemia R09.02    Ventricular tachyarrhythmia (HCC) I47.2    Thyroid disease E07.9    Hypertension I10    Hyperlipidemia E78.5    COPD (chronic obstructive pulmonary disease) ([address] J44.9    Dependent edema R60.9    Respiratory failure ([address] J96.90    Severe protein-calorie malnutrition ([address] E43    History of cerebral aneurysm repair Z98.890, Z86.79    History of endovascular stent graft for abdominal aortic aneurysm Z95.828    Paroxysmal atrial fibrillation with rapid ventricular response (HCC) I48.0    Community acquired bacterial pneumonia J15.9    Paroxysmal atrial fibrillation (HCC) I48.0    Hypokalemia E87.6    Pulmonary fibrosis House for his restlessness, agitation and shortness of breath. I explained that according to medicare guidelines once symptoms managed, he also would need a discharge plan from [facility]. At that time I explained the Transition program where a family private pays to remain at the [place] [address] for a period up to 8 weeks at $300 a day. The family is unable to make a decision and wish to talk to their son about the decision prior to making a decision. I explained that I was available until 430 pm today if they had any other questions or a decision was made. I offered a follow up visit tomorrow and they are in agreement. HOTV plan  1. The patient is currently not under the care of [facility] at this time  2. Will follow up with family tomorrow after they speak with other family members. 3. Will be available to answer any further questions as needed.      Discharge Plan: Unclear  Discharge Disposition; unsure  Facility Name:     Electronically signed by Mariaa Corea RN on 2/24/2020 at 2:05 PM

## 2020-02-24 NOTE — PLAN OF CARE
Problem: Risk for Impaired Skin Integrity  Goal: Tissue integrity - skin and mucous membranes  Description  Structural intactness and normal physiological function of skin and  mucous membranes.   Outcome: Met This Shift     Problem: Falls - Risk of:  Goal: Will remain free from falls  Description  Will remain free from falls  Outcome: Met This Shift     Problem: Falls - Risk of:  Goal: Absence of physical injury  Description  Absence of physical injury  Outcome: Met This Shift     Problem: Pain:  Goal: Pain level will decrease  Description  Pain level will decrease  Outcome: Met This Shift     Problem: Pain:  Goal: Control of acute pain  Description  Control of acute pain  Outcome: Met This Shift     Problem: Pain:  Goal: Control of chronic pain  Description  Control of chronic pain  Outcome: Met This Shift

## 2020-02-24 NOTE — PLAN OF CARE
Problem: Risk for Impaired Skin Integrity  Goal: Tissue integrity - skin and mucous membranes  Description  Structural intactness and normal physiological function of skin and  mucous membranes.   2/24/2020 1352 by Lata Guthrie RN  Outcome: Met This Shift     Problem: Falls - Risk of:  Goal: Will remain free from falls  Description  Will remain free from falls  2/24/2020 0605 by Lata Guthrie RN  Outcome: Met This Shift     Problem: Falls - Risk of:  Goal: Absence of physical injury  Description  Absence of physical injury  2/24/2020 0605 by Lata Guthrie RN  Outcome: Met This Shift     Problem: Pain:  Goal: Pain level will decrease  Description  Pain level will decrease  2/24/2020 0603 by Lata Guthrie RN  Outcome: Met This Shift

## 2020-02-24 NOTE — PROGRESS NOTES
Patient was eating dinner and asked that his O2 be increased from 2L to 4L. He was feeling SOB. Pt is sat'ing 90 to 93% on 2L. O2 increased to 4L.    1800 - pt stated that he still felt SOB and asked that O2 be increased to 6L until he caught his breath. He is sat'ing at 90-94%. O2 increased to 6L.     [facility]  2/23/2020

## 2020-02-24 NOTE — PROGRESS NOTES
Date:2020  Patient Name: Alyssia Mercedes  MRN: [id number]  : 1935  ROOM #: IC04/IC04-01    Occupational Therapy order received, chart reviewed and evaluation attempted this date. Patient is unavailable for OT evaluation due to possible hospice consult. Will attempt OT evaluation at a later time. Thank you.      Rosa Madison OTR/L [id number]

## 2020-02-24 NOTE — PROGRESS NOTES
[facility]  Internal Medicine  -Progress note-    PCP:  Michelle Syed DO  Admitting Physician:  Michelle Syed DO  Consultants:  None at this time  Chief Complaint:    Chief Complaint   Patient presents with    Shortness of Breath     normally on 4L NC, on 6 now        History of Present Illness  Ruslan Brooks is a 81 y.o. male who presents to [address] [place] [address] ER complaining of SOB. Ruslan Brooks has a past medical history that includes severe COPD on chronic O2, paroxysmal atrial fibrillation, chronic back pain, hypertension. Patient presents to ER from [facility] with complaint of shortness of breath and being hypoxic. He was just discharged hours earlier for previous admission for pneumothorax. He had to undergo chest tube placement during previous hospital admission. He was discharged yesterday afternoon to [facility]. Patient became hypoxic with pulse ox in the low 80s. In the ER, he is 94% on 5 L of nasal cannula. No pneumothorax on CT scan. CT scan showing possible pneumonia and small pleural effusions. Temp 100    2/23/2020  Patient is seen and examined in ICU. Patient's wife is at the bedside and case discussed. Patient is alert in oriented x3. He complains of only back pain but shoulder pain neck pain. Appetite is still poor. Patient's pulmonary status seems to be improving and he denies any chest pain or productive cough but complains of shortness of breath when he found out he was only on 2 L O2 nasal cannula and his O2 sat is 96%. Hemoglobin is 8.4 and was 10.33 days ago. With a WBC 7.4. Procalcitonin 0.15 and was 0.15 back on 2/17.    2/24/20  Patient seen and examined in ICU. Patient remains profoundly weak, deconditioned, and complaining of pain. He is alternating between ~4L NC and BiPAP. Family reportedly considering hospice care.     Last Hospital Admission - 2/11/20  · Acute on chronic hypoxic respiratory failure  · Right pneumothorax  · Severe History   Problem Relation Age of Onset    Heart Disease Father     Kidney Disease Mother        Home Medications  Prior to Admission medications    Medication Sig Start Date End Date Taking? Authorizing Provider   budesonide (PULMICORT) 0.5 MG/2ML nebulizer suspension Take 2 mLs by nebulization 2 times daily 2/21/20   Christopher Schultz DO   ipratropium-albuterol (DUONEB) 0.5-2.5 (3) MG/3ML SOLN nebulizer solution Inhale 3 mLs into the lungs every 4 hours 2/21/20   Christopher Schultz DO   gabapentin (NEURONTIN) 400 MG capsule Take 1 capsule by mouth 2 times daily for 8 days. 2/21/20 2/29/20  Christopher Schultz, DO   docusate sodium (COLACE, DULCOLAX) 100 MG CAPS Take 100 mg by mouth 2 times daily 2/21/20   Christopher Schultz, DO   polyethylene glycol [facility]) packet Take 17 g by mouth 2 times daily 2/21/20 3/22/20  Christopher Schultz, DO   Respiratory Therapy Supplies (FULL KIT NEBULIZER SET) MISC Use as directed with nebulized medication. 2/21/20   Christopher Schultz, DO   traMADol (ULTRAM) 50 MG tablet Take 2 tablets by mouth 2 times daily for 3 days.  2/21/20 2/24/20  Lonnie Thorpe, DO   predniSONE (DELTASONE) 10 MG tablet Take 3 tablets by mouth daily In the morning with breakfast 1/14/20   Christopher Schultz DO   brimonidine (ALPHAGAN) 0.2 % ophthalmic solution Place 1 drop into the right eye every 12 hours    Historical Provider, MD   OXYGEN Inhale 6 L into the lungs continuous     Historical Provider, MD   Bisacodyl (DULCOLAX PO) Take 1 tablet by mouth 2 times daily as needed (Upset Stomach)    Historical Provider, MD   latanoprost (XALATAN) 0.005 % ophthalmic solution Place 1 drop into the left eye nightly     Historical Provider, MD   influenza virus trivalent vaccine (FLUZONE) injection Inject 0.5 mLs into the muscle once Given 10/2019 [facility]    Historical Provider, MD   primidone (MYSOLINE) 50 MG tablet Take 5 tablets by mouth 2 times daily 12/19/19   Christopher Schultz, DO   albuterol (PROVENTIL) (2.5 MG/3ML) 0.083% nebulizer solution Take 2.5 mg by nebulization every 4 hours as needed for Wheezing or Shortness of Breath    Historical Provider, MD   ibuprofen (ADVIL;MOTRIN) 200 MG tablet Take 400 mg by mouth 3 times daily     Historical Provider, MD [place] 450 MG CAPS Take 450 mg by mouth 2 times daily     Historical Provider, MD   vitamin C (ASCORBIC ACID) 500 MG tablet Take 2,000 mg by mouth 2 times daily     Historical Provider, MD   potassium chloride (KLOR-CON M) 20 MEQ TBCR extended release tablet Take 20 mEq by mouth 2 times daily    Historical Provider, MD   azithromycin (ZITHROMAX) 250 MG tablet Take 250 mg by mouth daily    Historical Provider, MD   Multiple Vitamins-Minerals (PRESERVISION AREDS 2 PO) Take 1 tablet by mouth 2 times daily     Historical Provider, MD   NONFORMULARY Trilogy - ventilator at bedtime    Historical Provider, MD   finasteride (PROSCAR) 5 MG tablet Take 1 tablet by mouth daily 10/11/19   Ana Dorsey, DO   pantoprazole (PROTONIX) 40 MG tablet Take 1 tablet by mouth every morning (before breakfast) 10/11/19   Ana Dorsey, DO   metoprolol succinate (TOPROL XL) 25 MG extended release tablet Take 0.5 tablets by mouth daily 10/11/19   Felix Tucker APRN - CNP   theophylline (THEODUR) 300 MG extended release tablet Take 1 tablet by mouth daily 8/16/19   Jason Tejada,    valACYclovir (VALTREX) 500 MG tablet Take 500 mg by mouth daily    Historical Provider, MD   montelukast (SINGULAIR) 10 MG tablet Take 10 mg by mouth daily    Historical Provider, MD   furosemide (LASIX) 40 MG tablet Take 80 mg by mouth daily     Historical Provider, MD   Cholecalciferol (VITAMIN D-3) 5000 UNITS TABS Take 1 tablet by mouth Twice a Week Sunday & Wednesday    Historical Provider, MD       Allergies  Flomax [tamsulosin hcl]    Social Hx  Social History     Socioeconomic History    Marital status:      Spouse name: Not on file    Number of children: Not on file    Years of education: Not on file    Highest education level: Not on file   Occupational History    Occupation: Physician     Comment: Retired   Social Needs    Financial resource strain: Not on file    Food insecurity:     Worry: Not on file     Inability: Not on file   Just around Us needs:     Medical: Not on file     Non-medical: Not on file   Tobacco Use    Smoking status: Former Smoker     Packs/day: 2.00     Years: 42.00     Pack years: 84.00     Types: Cigarettes     Last attempt to quit: 2003     Years since quittin.0    Smokeless tobacco: Never Used   Substance and Sexual Activity    Alcohol use: Yes     Alcohol/week: 1.0 standard drinks     Types: 1 Shots of liquor per week     Comment: daily    Drug use: No    Sexual activity: Not on file   Lifestyle    Physical activity:     Days per week: Not on file     Minutes per session: Not on file    Stress: Not on file   Relationships    Social connections:     Talks on phone: Not on file     Gets together: Not on file     Attends Jainism service: Not on file     Active member of club or organization: Not on file     Attends meetings of clubs or organizations: Not on file     Relationship status: Not on file    Intimate partner violence:     Fear of current or ex partner: Not on file     Emotionally abused: Not on file     Physically abused: Not on file     Forced sexual activity: Not on file   Other Topics Concern    Not on file   Social History Narrative    Not on file       Review of Systems  All bolded are positive; please see HPI  General:  Fever, chills, diaphoresis, fatigue, malaise, night sweats, weight loss  Psychological:  Anxiety, disorientation, hallucinations. ENT:  Epistaxis, headaches, vertigo, visual changes. Cardiovascular:  Chest pain, irregular heartbeats, palpitations, paroxysmal nocturnal dyspnea. Respiratory:  Shortness of breath, coughing, sputum production, hemoptysis, wheezing, orthopnea.   Gastrointestinal: mmol/L    Chloride 104 98 - 107 mmol/L    CO2 28 22 - 29 mmol/L    Anion Gap 10 7 - 16 mmol/L    Glucose 101 (H) 74 - 99 mg/dL    BUN 27 (H) 8 - 23 mg/dL    CREATININE 0.9 0.7 - 1.2 mg/dL    GFR Non-African American >60 >=60 mL/min/1.73    GFR African American >60     Calcium 8.5 (L) 8.6 - 10.2 mg/dL       Imaging  Xr Lumbar Spine (2-3 Views)    Result Date: 2/6/2020  LOCATION:200 EXAM: XR LUMBAR SPINE (2-3 VIEWS), XR PELVIS (1-2 VIEWS) COMPARISON: None HISTORY:  Low back pain TECHNIQUE:  4  views of the lumbar spine and single view pelvis were obtained. FINDINGS:  There is normal vertebral body height and alignment. Severe degenerative changes with facet arthropathy, marginal osteophytes and disc space narrowing noted greatest at L4-5. The bony pelvis is intact. The hips are well aligned. Osseous structures are otherwise normal without evidence of fracture. Arthritis with no acute findings. Xr Pelvis (1-2 Views)    Result Date: 2/6/2020  LOCATION:200 EXAM: XR LUMBAR SPINE (2-3 VIEWS), XR PELVIS (1-2 VIEWS) COMPARISON: None HISTORY:  Low back pain TECHNIQUE:  4  views of the lumbar spine and single view pelvis were obtained. FINDINGS:  There is normal vertebral body height and alignment. Severe degenerative changes with facet arthropathy, marginal osteophytes and disc space narrowing noted greatest at L4-5. The bony pelvis is intact. The hips are well aligned. Osseous structures are otherwise normal without evidence of fracture. Arthritis with no acute findings. Xr Abdomen (kub) (single Ap View)    Result Date: 2/19/2020  LOCATION: 200 EXAM: XR ABDOMEN (KUB) (SINGLE AP VIEW) COMPARISON: None HISTORY: Nausea vomiting TECHNIQUE: Supine view  of the abdomen. FINDINGS: A large amount of stool seen retained within the colon. There is contrast seen in the colon likely from a previous barium study. No free air seen on supine view. No abdominal calcifications present. Lung bases are clear.      Large colonic Date: 2/13/2020  LOCATION: 200 EXAM: XR CHEST PORTABLE COMPARISON: 12/12/2020 HISTORY: Chest tube placement TECHNIQUE: Single frontal view of the chest was obtained. FINDINGS:  SUPPORT DEVICES: Large bore chest tube projects at the right lung apex. LUNGS: Elevated right hemidiaphragm with pleural effusion again noted. PLEURA: No pneumothorax visualized. LUNG VOLUMES: Hyperlucent lungs with increased lung volumes, likely COPD. MEDIASTINAL STRUCTURES: No lymphadenopathy. Normal aortic contour. HEART SIZE: Normal. BONES AND SOFT TISSUES: No fracture or soft tissue abnormality. 1. No pneumothorax seen with chest tube in place. Xr Chest Portable    Result Date: 2/12/2020  LOCATION: 200 EXAM: XR CHEST PORTABLE COMPARISON: 2/11/2020 HISTORY: Shortness of breath TECHNIQUE: Single frontal view of the chest was obtained. FINDINGS:  SUPPORT DEVICES: Large bore chest tube is seen with the tip in the lung apex. LUNGS: Patchy opacities are seen at the right lung base with right perihilar opacities favoring atelectasis. PLEURA: Pneumothorax has resolved. LUNG VOLUMES: Satisfactory inspirator effort. MEDIASTINAL STRUCTURES: No lymphadenopathy. Normal aortic contour. HEART SIZE: Normal. BONES AND SOFT TISSUES: No fracture or soft tissue abnormality. Resolution of the pneumothorax after chest tube placement. Xr Chest Portable    Result Date: 2/12/2020  LOCATION: 200 EXAM: XR CHEST PORTABLE COMPARISON: 2/6/2020 HISTORY: Shortness of breath TECHNIQUE: Single frontal view of the chest was obtained. FINDINGS:  SUPPORT DEVICES: None. LUNGS: Clear with no areas of consolidation. PLEURA: There is a large size right pneumothorax, likely 50%. No definite mediastinal shift noted, although the radiograph is rotated. LUNG VOLUMES: Satisfactory inspirator effort. MEDIASTINAL STRUCTURES: No lymphadenopathy. Normal aortic contour. HEART SIZE: Normal. BONES AND SOFT TISSUES: No fracture or soft tissue abnormality.      1. Large size

## 2020-02-24 NOTE — PLAN OF CARE
Problem: Risk for Impaired Skin Integrity  Goal: Tissue integrity - skin and mucous membranes  Description  Structural intactness and normal physiological function of skin and  mucous membranes.   2/24/2020 0603 by Marely Hayes RN  Outcome: Met This Shift     Problem: Falls - Risk of:  Goal: Will remain free from falls  Description  Will remain free from falls  2/24/2020 0603 by Marely Hayes RN  Outcome: Met This Shift     Problem: Falls - Risk of:  Goal: Absence of physical injury  Description  Absence of physical injury  2/24/2020 0603 by Marely Hayes RN  Outcome: Met This Shift     Problem: Pain:  Goal: Pain level will decrease  Description  Pain level will decrease  2/24/2020 0603 by Marely Hayes RN  Outcome: Met This Shift

## 2020-02-24 NOTE — PROGRESS NOTES
Speech Language Pathology      NAME:  Prachi Bai  :  1935  DATE: 2020  ROOM:  IC/IC04-01    Per nursing swallow eval not indicated at this time family considering hospice with pleasure feeds if he wants something. Will sign off please reorder swallow eval if wanted.       Pneumonia [J18.9]        Madeleine Corley MSCCC/SLP  Speech Language Pathologist  [phone]

## 2020-02-24 NOTE — CARE COORDINATION
2/24/2020  Readmission screening questions: related readmission noted  1. Before your last discharge, were you feeling improved? yes  2. When did you start not feeling well? Within 2 days noted  3. Where did you go after being discharged? [address] [place] [address]  4. Were you given instructions on medications and how to care for yourself? n/a  5. If you went home, did you have help?n/a   6. Did you get your medications filled?n/a  7. Did you follow up with your doctor at the office?n/a  8.  Who instructed you to come back to the hospital? (self-referral, physician's office) staff at [facility]      Electronically signed by CHRISTINE Alves on 2/24/2020 at 9:33 AM

## 2020-02-24 NOTE — PROGRESS NOTES
Pharmacy Consultation Note  (Antibiotic Dosing and Monitoring)    Initial consult date: 2/22/20  Consulting physician: Dr. Shannon Rose  Drug(s): Vancomycin  Indication: Pneumonia    Ht Readings from Last 1 Encounters:   02/22/20 5' 6\" (1.676 m)     Wt Readings from Last 1 Encounters:   02/24/20 124 lb 9.6 oz (56.5 kg)     Age/  Gender IBW DW  Allergy Information   81 y.o.   male -- 57.6 kg  Flomax [tamsulosin hcl]          Date  Tmax WBC BUN/CR UOP  (mL/kg/hr) Drug/Dose Time   Given Level(s)   (Time) Comments   2/22  (#1) 100 9.5 36/1 450 mL Vancomycin 1000 mg IV x 1 0417     2/23  (#2) 98.6 7.4 33/0.9 -- Vancomycin 1000 mg IV q24hr 0425 2/24  (#3) afebrile -- 27/0.9 0.8 Vancomycin 1000 mg IV q24hr 0427     2/25  (#4)       Trough @ <0330>      (#5)             (#6)             (#7)             Estimated Creatinine Clearance: 50 mL/min (based on SCr of 0.9 mg/dL). UOP over the past 24 hours:     Intake/Output Summary (Last 24 hours) at 2/24/2020 1001  Last data filed at 2/24/2020 0846  Gross per 24 hour   Intake 1933 ml   Output 1100 ml   Net 833 ml     Other anti-infectives: Anti-infective Dose Date Initiated Date Stopped   Ceftriaxone 2g IV x 1 2/22 2/22   Cefepime 2g IV q12hr 2/22      Cultures:  available culture and sensitivity results were reviewed in EPIC  Cultures sent and are pending. Culture Date Result    Resp panel   2/22 Neg     Assessment:  · Consulted by Dr. Shannon Rose to dose/monitor vancomycin  · Goal trough level:  15-20 mcg/mL  · Pt is a 81 yoM who was just discharged on 2/21 after a 10 day admission for spontaneous pneumothorax. Pt readmitted 2/22 with hypoxia and pneumonia. · CrCl ~ 46 mL/min; baseline Scr ~ 0.9 mg/dL  · 2/23: Scr back to baseline, patient afebrile    Plan:  · Cont vancomycin 1000 mg IV q24hr  · Trough tomorrow @ 0330.    · Follow renal function  · Pharmacist will follow and monitor/adjust dosing as necessary      Thank you for the consult,    Lissa Martinez, PharmD, BCPS 2/24/2020 10:01 AM    Pager: [phone]  Ext: [phone]

## 2020-02-25 NOTE — PROGRESS NOTES
Contacted Dr. Carie Sarabia to notify the absence of a pulse, blood pressure, respirations, and presence of dilated pupils which were unresponsive to light. Christie Hernandez RN verified the findings. Dr. Carie Sarabia pronounced that the patient  at [phone] on 2020. The death certificate will also be signed by Dr. Carie Sarabia.

## 2020-02-25 NOTE — PROGRESS NOTES
Patient transferred to 3rd floor. Chart handed off to two nurses at the nurses station and relayed that this was patient for room 322. Got patient in room, Hooked in to oxygen. Unload belongings on beside table. Wife and son made it to bedside. Made sure they were happy with how patient looked, if there was anything else I could do. No other nurse at bedside at this time, but family is not wanting or needing anything more at this time.

## 2020-02-26 NOTE — PROGRESS NOTES
Patient's ICU bed malfunctioned. The mattress would not inflate and the foot of the bed screen stated a malfunction. An ICU bed that was not being used from the back mejía was obtained and pt was transferred to that bed. Patient tolerated transfer well. The malfunctioned bed was taken to the back mejía & plugged in with a yellow \"broken\" sheet attached to the head board.     Jewel Ambriz  2/26/2020

## 2020-02-27 LAB
BLOOD CULTURE, ROUTINE: NORMAL
CULTURE, BLOOD 2: NORMAL

## 2020-03-13 NOTE — DISCHARGE SUMMARY
[address] [facility]  Internal Medicine  -Progress note-    PCP:  Leo Iniguez DO  Admitting Physician:  Leo Iniguez DO  Consultants:  None at this time  Chief Complaint:    Chief Complaint   Patient presents with    Shortness of Breath     normally on 4L NC, on 6 now        History of Present Illness  Ortiz Rider is a 81 y.o. male who presents to [facility] ER complaining of SOB. Ortiz Rider has a past medical history that includes severe COPD on chronic O2, paroxysmal atrial fibrillation, chronic back pain, hypertension. Patient presents to ER from [facility] with complaint of shortness of breath and being hypoxic. He was just discharged hours earlier for previous admission for pneumothorax. He had to undergo chest tube placement during previous hospital admission. He was discharged yesterday afternoon to [facility]. Patient became hypoxic with pulse ox in the low 80s. In the ER, he is 94% on 5 L of nasal cannula. No pneumothorax on CT scan. CT scan showing possible pneumonia and small pleural effusions. Temp 100    2/23/2020  Patient is seen and examined in ICU. Patient's wife is at the bedside and case discussed. Patient is alert in oriented x3. He complains of only back pain but shoulder pain neck pain. Appetite is still poor. Patient's pulmonary status seems to be improving and he denies any chest pain or productive cough but complains of shortness of breath when he found out he was only on 2 L O2 nasal cannula and his O2 sat is 96%. Hemoglobin is 8.4 and was 10.33 days ago. With a WBC 7.4. Procalcitonin 0.15 and was 0.15 back on 2/17.    2/24/20  Patient seen and examined in ICU. Patient remains profoundly weak, deconditioned, and complaining of pain. He is alternating between ~4L NC and BiPAP. Family reportedly considering hospice care.     Last Hospital Admission - 2/11/20  · Acute on chronic hypoxic respiratory failure  · Right pneumothorax  · Severe Age of Onset    Heart Disease Father     Kidney Disease Mother        Home Medications  Prior to Admission medications    Medication Sig Start Date End Date Taking? Authorizing Provider   budesonide (PULMICORT) 0.5 MG/2ML nebulizer suspension Take 2 mLs by nebulization 2 times daily 2/21/20   Coni Rosedale, DO   ipratropium-albuterol (DUONEB) 0.5-2.5 (3) MG/3ML SOLN nebulizer solution Inhale 3 mLs into the lungs every 4 hours 2/21/20   Zearing Rosedale, DO   gabapentin (NEURONTIN) 400 MG capsule Take 1 capsule by mouth 2 times daily for 8 days. 2/21/20 2/29/20  Coni Rosedale, DO   docusate sodium (COLACE, DULCOLAX) 100 MG CAPS Take 100 mg by mouth 2 times daily 2/21/20   Zearing Rosedale, DO   polyethylene glycol [facility]) packet Take 17 g by mouth 2 times daily 2/21/20 3/22/20  Coni Rosedale, DO   Respiratory Therapy Supplies (FULL KIT NEBULIZER SET) MISC Use as directed with nebulized medication.  2/21/20   Zearing Rosedale, DO   predniSONE (DELTASONE) 10 MG tablet Take 3 tablets by mouth daily In the morning with breakfast 1/14/20   Zearing Rosedale, DO   brimonidine (ALPHAGAN) 0.2 % ophthalmic solution Place 1 drop into the right eye every 12 hours    Historical Provider, MD   OXYGEN Inhale 6 L into the lungs continuous     Historical Provider, MD   Bisacodyl (DULCOLAX PO) Take 1 tablet by mouth 2 times daily as needed (Upset Stomach)    Historical Provider, MD   latanoprost (XALATAN) 0.005 % ophthalmic solution Place 1 drop into the left eye nightly     Historical Provider, MD   influenza virus trivalent vaccine (FLUZONE) injection Inject 0.5 mLs into the muscle once Given 10/2019 [facility]    Historical Provider, MD   primidone (MYSOLINE) 50 MG tablet Take 5 tablets by mouth 2 times daily 12/19/19   Coni Rosedale, DO   albuterol (PROVENTIL) (2.5 MG/3ML) 0.083% nebulizer solution Take 2.5 mg by nebulization every 4 hours as needed for Wheezing or Shortness of Breath    Historical Provider, MD ibuprofen (ADVIL;MOTRIN) 200 MG tablet Take 400 mg by mouth 3 times daily     Historical Provider, MD [place] 450 MG CAPS Take 450 mg by mouth 2 times daily     Historical Provider, MD   vitamin C (ASCORBIC ACID) 500 MG tablet Take 2,000 mg by mouth 2 times daily     Historical Provider, MD   potassium chloride (KLOR-CON M) 20 MEQ TBCR extended release tablet Take 20 mEq by mouth 2 times daily    Historical Provider, MD   azithromycin (ZITHROMAX) 250 MG tablet Take 250 mg by mouth daily    Historical Provider, MD   Multiple Vitamins-Minerals (PRESERVISION AREDS 2 PO) Take 1 tablet by mouth 2 times daily     Historical Provider, MD   NONFORMULARY Trilogy - ventilator at bedtime    Historical Provider, MD   finasteride (PROSCAR) 5 MG tablet Take 1 tablet by mouth daily 10/11/19   Hansa Holder, DO   pantoprazole (PROTONIX) 40 MG tablet Take 1 tablet by mouth every morning (before breakfast) 10/11/19   Hansa Holder, DO   metoprolol succinate (TOPROL XL) 25 MG extended release tablet Take 0.5 tablets by mouth daily 10/11/19   Mariano Tucker, APRN - CNP   theophylline (THEODUR) 300 MG extended release tablet Take 1 tablet by mouth daily 8/16/19   Melissa Vasquez, DO   valACYclovir (VALTREX) 500 MG tablet Take 500 mg by mouth daily    Historical Provider, MD   montelukast (SINGULAIR) 10 MG tablet Take 10 mg by mouth daily    Historical Provider, MD   furosemide (LASIX) 40 MG tablet Take 80 mg by mouth daily     Historical Provider, MD   Cholecalciferol (VITAMIN D-3) 5000 UNITS TABS Take 1 tablet by mouth Twice a Week Sunday & Wednesday    Historical Provider, MD       Allergies  Flomax [tamsulosin hcl]    Social Hx  Social History     Socioeconomic History    Marital status:      Spouse name: Not on file    Number of children: Not on file    Years of education: Not on file    Highest education level: Not on file   Occupational History    Occupation: Physician     Comment: Retired   Social frequency, hematuria  Musculoskeletal:  Joint pain, joint stiffness, joint swelling, muscle pain back pain  Neurology:  Headache, focal neurological deficits, weakness, numbness, paresthesia  Derm:  Rashes, ulcers, excoriations, bruising  Extremities:  Decreased ROM, peripheral edema, mottling    Physical Examination  Vitals:  /80   Pulse 88   Temp 97.6 °F (36.4 °C) (Axillary)   Resp 13   Ht 5' 6\" (1.676 m)   Wt 124 lb 9.6 oz (56.5 kg)   SpO2 92%   BMI 20.11 kg/m²   General Appearance:  awake, alert, and oriented to person, place, time, and purpose; appears stated age and cooperative; no apparent distress no labored breathing 4L NC cachectic  HEENT:  NCAT; PERRL; conjunctiva pink, sclera clear  Neck:  no adenopathy, bruit, JVD, tenderness, masses, or nodules; supple, symmetrical, trachea midline, thyroid not enlarged  Lung: diminsihed bilaterally; no use of accessory muscles; no rhonchi, rales, or crackles  Heart:  regular rate and regular rhythm without murmur, rub, or gallop  Abdomen:  soft, nontender, nondistended; normoactive bowel sounds; no organomegaly  Extremities:  extremities normal, atraumatic, no cyanosis or edema  Musculokeletal:  no joint swelling, no muscle tenderness. ROM normal in all joints of extremities. Neurologic:  mental status A&Ox3, thought content appropriate; CN II-XII grossly intact; sensation intact, motor strength 5/5 globally; no slurred speech    Laboratory Data  No results found for this or any previous visit (from the past 24 hour(s)). Imaging  Xr Lumbar Spine (2-3 Views)    Result Date: 2/6/2020  LOCATION:200 EXAM: XR LUMBAR SPINE (2-3 VIEWS), XR PELVIS (1-2 VIEWS) COMPARISON: None HISTORY:  Low back pain TECHNIQUE:  4  views of the lumbar spine and single view pelvis were obtained. FINDINGS:  There is normal vertebral body height and alignment.  Severe degenerative changes with facet arthropathy, marginal osteophytes and disc space narrowing noted greatest at L4-5. The bony pelvis is intact. The hips are well aligned. Osseous structures are otherwise normal without evidence of fracture. Arthritis with no acute findings. Xr Pelvis (1-2 Views)    Result Date: 2/6/2020  LOCATION:200 EXAM: XR LUMBAR SPINE (2-3 VIEWS), XR PELVIS (1-2 VIEWS) COMPARISON: None HISTORY:  Low back pain TECHNIQUE:  4  views of the lumbar spine and single view pelvis were obtained. FINDINGS:  There is normal vertebral body height and alignment. Severe degenerative changes with facet arthropathy, marginal osteophytes and disc space narrowing noted greatest at L4-5. The bony pelvis is intact. The hips are well aligned. Osseous structures are otherwise normal without evidence of fracture. Arthritis with no acute findings. Xr Abdomen (kub) (single Ap View)    Result Date: 2/19/2020  LOCATION: 200 EXAM: XR ABDOMEN (KUB) (SINGLE AP VIEW) COMPARISON: None HISTORY: Nausea vomiting TECHNIQUE: Supine view  of the abdomen. FINDINGS: A large amount of stool seen retained within the colon. There is contrast seen in the colon likely from a previous barium study. No free air seen on supine view. No abdominal calcifications present. Lung bases are clear. Large colonic stool load. Ct Chest W Contrast    Result Date: 2/22/2020  Location: 100 Indication: Shortness of breath Comparison: CT chest from 1/7/2020. Technique: Multidetector CT imaging of the chest was preformed after administration of intravenous contrast (80 mL [id number]-[id number]). Coronal and sagittal reformatted images were obtained. Automated dose exposure control was used for this exam. FINDINGS: There is elevation of the right hemidiaphragm. The central airways are patent. There is no bronchiectasis. There are trace right and small left pleural effusions, which are new compared to the previous CT from 1/7/2020. There is questionable loculation within the right pleural effusion.  There is dependent consolidation within bilateral lower lobes. A previously biopsied right lower lobe nodule is not appreciated, likely related to right lower lobe consolidation. There are stable emphysematous changes. There is no pneumothorax. The heart is stable in size. There is no large pericardial effusion. The thoracic aorta is normal in caliber and enhancement. There is mild scattered atherosclerotic plaque. The great vessel origins are grossly patent. The main pulmonary artery is normal in caliber. There is no mediastinal or hilar lymphadenopathy. There is no axillary lymphadenopathy. Visualized portion of the upper abdomen demonstrates partial visualization of an abdominal aorta stent. There is no suspicious lytic or blastic osseous lesion. 1. Trace right and small left pleural effusions with bilateral lower lobe dependent consolidations, likely compressive atelectasis versus pneumonia. There is questionable loculation of the right pleural effusion. 2. Previously biopsied right lower lobe nodule is not appreciated on the current study. There is no pneumothorax. 3. Stable emphysematous changes. Xr Chest Portable    Result Date: 2/22/2020  Reading location: 100 Indication: Shortness of breath Comparison: Prior chest radiograph from 2/18/2020 Technique: Portable AP upright chest radiograph was obtained. Findings: The cardiomediastinal silhouette is stable in size and contours. There are trace bilateral pleural effusions with bibasilar airspace disease. There is no evidence of pneumothorax. Trace bilateral pleural effusions with bibasilar airspace disease. Xr Chest Portable    Result Date: 2/18/2020  Location:100 Exam: XR CHEST PORTABLE Comparison: February 17, 2020 History:   Chest tube removal Findings: A single frontal portable view of the chest demonstrates no evidence of pneumothorax. Patchy infiltrates in the left lung. Heart mediastinum are normal.     1. No pneumothorax in the right. 2. Stable infiltrates.     Xr Chest Portable    Result Date: Shortness of breath TECHNIQUE: Single frontal view of the chest was obtained. FINDINGS:  SUPPORT DEVICES: Large bore chest tube is seen with the tip in the lung apex. LUNGS: Patchy opacities are seen at the right lung base with right perihilar opacities favoring atelectasis. PLEURA: Pneumothorax has resolved. LUNG VOLUMES: Satisfactory inspirator effort. MEDIASTINAL STRUCTURES: No lymphadenopathy. Normal aortic contour. HEART SIZE: Normal. BONES AND SOFT TISSUES: No fracture or soft tissue abnormality. Resolution of the pneumothorax after chest tube placement. Xr Chest Portable    Result Date: 2020  LOCATION: 200 EXAM: XR CHEST PORTABLE COMPARISON: 2020 HISTORY: Shortness of breath TECHNIQUE: Single frontal view of the chest was obtained. FINDINGS:  SUPPORT DEVICES: None. LUNGS: Clear with no areas of consolidation. PLEURA: There is a large size right pneumothorax, likely 50%. No definite mediastinal shift noted, although the radiograph is rotated. LUNG VOLUMES: Satisfactory inspirator effort. MEDIASTINAL STRUCTURES: No lymphadenopathy. Normal aortic contour. HEART SIZE: Normal. BONES AND SOFT TISSUES: No fracture or soft tissue abnormality. 1. Large size right pneumothorax. Assessment for mediastinal shift is limited due to the severe rotation. There is a discrepancy with the preliminary report from the ER physician. A discrepancy report was filled out. Critical results: These findings were discussed with  on the date of the exam at 07 100 by Josh Apgar, DO. They confirmed that they understood the results communicated to them. Xr Chest Portable    Result Date: 2020  LOCATION: 200 EXAM: XR CHEST PORTABLE COMPARISON: 2020 HISTORY: Shortness of breath TECHNIQUE: Single frontal view of the chest was obtained. FINDINGS:  SUPPORT DEVICES: None. 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 The left lung is clear. PLEURA: No pneumothorax visualized.  LUNG VOLUMES: Satisfactory inspirator effort. MEDIASTINAL STRUCTURES: No lymphadenopathy. Normal aortic contour. HEART SIZE: Normal. BONES AND SOFT TISSUES: No fracture or soft tissue abnormality. No active pulmonary disease. Us Dup Upper Extremity Left Venous    Result Date: 2/17/2020  LOCATION: 200 EXAM: US DUP UPPER EXTREMITY LEFT VENOUS COMPARISON: None HISTORY: Pain and swelling Technique:  Multiple Realtime, grayscale and color-flow spectral waveform analysis Doppler ultrasound images of the left upper extremity was performed. Evaluation was obtained from the forearm to the neck. Compression technique as well as doppler and color flow images were obtained. Findings: Normal flow and color flow patterns were identified with normal changes to  venous augmentation. Complete compression of the venous system was also noted signifying no evidence of acute vein thrombosis. No soft tissue mass or cyst is identified. 1. No evidence of acute venous thrombosis left upper extremity. Fl Modified Barium Swallow W Video    Result Date: 2/18/2020  Reading Location: 200 Indication: Choking on water Comparison: None available. Technique Videofluoroscopic swallowing examination was performed in conjunction with the speech pathology department. Various consistencies were used to assess swallowing. Total fluoroscopy time was 1.9 minutes (DAP of 0.715 mGy). One fluoroscopic spot image was obtained. Findings: There is normal oral transit. There is aspiration with thin consistency. There is a delayed ineffective cough. There is retention within the vallecula. There is pharyngeal delay. Laryngeal elevation is reduced. Aspiration with thin consistency. Refer to speech pathologist note for additional findings and recommendations. Assessment and Plan  Patient is a 81 y.o. male who presented with SOB. The active problem list is as follows:    · Acute on chronic hypoxic respiratory failure due to ? HCAP  · Right pleural effusion possibly loculated  · Recent right pneumothorax  · Severe COPD on chronic O2  · Paroxysmal atrial fib previously on Eliquis  · Chronic back pain but has worsening left upper mid back pain  · Hypertension  · RLL nodule  · Severe protein caloric malnutrition  · Constipation      -Patient is a DNR CC and was found without spontaneous respiration, blood pressure or pulse at 4:15 AM.        Madison Beckman D.O.  8:00 PM  2/25/2020

## 2021-01-01 NOTE — PROGRESS NOTES
[facility] Quality Flow/Interdisciplinary Rounds Progress Note        Quality Flow Rounds held on January 7, 2020    Disciplines Attending:  Bedside Nurse, ,  and Nursing Unit Leadership    Hawk Rothman was admitted on 1/6/2020 12:00 PM    Anticipated Discharge Date:  Expected Discharge Date: 01/10/20    Disposition:    Sage Score:  Sage Scale Score: 17    Readmission Risk              Risk of Unplanned Readmission:        22           Discussed patient goal for the day, patient clinical progression, and barriers to discharge.   The following Goal(s) of the Day/Commitment(s) have been identified:  Activity progression, 6L shahab HERNANDEZ, From       Elva Gordon  January 7, 2020 immune

## 2021-10-13 NOTE — PROGRESS NOTES
Nutrition Assessment    Type and Reason for Visit: Initial, Positive Nutrition Screen    Nutrition Recommendations: Continue current diet, Start ONS(Ensure Enlive TID (chocolate only))    Nutrition Assessment: Pt meets criteria for moderate malnutrition AEB fat/muscle wasting, pt reprot of poor intake for months, and wt loss. Pt is at further nutritional risk d/t increased nutritional needs r/t COPD and sepsis. Will start Ensure Enlive TID to supplement intake. Malnutrition Assessment:  · Malnutrition Status: Meets the criteria for moderate malnutrition  · Context: Chronic illness  · Findings of the 6 clinical characteristics of malnutrition (Minimum of 2 out of 6 clinical characteristics is required to make the diagnosis of moderate or severe Protein Calorie Malnutrition based on AND/ASPEN Guidelines):  1. Energy Intake-Less than or equal to 75% of estimated energy requirement, Greater than or equal to 3 months    2. Weight Loss-(18%, likely compounded by fluid loss), (7 months)  3. Fat Loss-Moderate subcutaneous fat loss, Orbital, Triceps  4. Muscle Loss-Moderate muscle mass loss, Temples (temporalis muscle), Clavicles (pectoralis and deltoids)  5. Fluid Accumulation-No significant fluid accumulation,    6.  Strength-Not measured    Nutrition Risk Level: Moderate    Nutrient Needs:  · Estimated Daily Total Kcal: 0683-8627(MSJ REE= 1200 x 1.3 = 1560)  · Estimated Daily Protein (g): 75-85(1.3-1.5 gm/kg admit wt)  · Estimated Daily Total Fluid (ml/day): 6362-4771(1 ml/kcal)    Nutrition Diagnosis:   · Problem:  Moderate malnutrition, In context of chronic illness  · Etiology: related to Early satiety     Signs and symptoms:  as evidenced by Patient report of, Diet history of poor intake, Moderate muscle loss, Moderate loss of subcutaneous fat    Objective Information:  · Nutrition-Focused Physical Findings: pt blind, abd WDL, +BS, +1 BLE edema, -2.2L I/O, CT clamped, BLE weakness  · Wound Type: None  · Current Nutrition Therapies:  · Oral Diet Orders: General   · Oral Diet intake: 1-25%  · Oral Nutrition Supplement (ONS) Orders: None  · Anthropometric Measures:  · Ht: 5' 6\" (167.6 cm)   · Current Body Wt: 127 lb (57.6 kg)(12/16 bed scale)  · Admission Body Wt: 123 lb 11.2 oz (56.1 kg)(12/13 bed scale, first actual wt)  · Usual Body Wt: 151 lb (68.5 kg)(5/22/19 trace BLE edema per EMR)  · % Weight Change:  ,  18% wt loss in 7 months, however possibly compounded by fluid loss  · Ideal Body Wt: 142 lb (64.4 kg), % Ideal Body 87%  · BMI Classification: BMI 18.5 - 24.9 Normal Weight    Nutrition Interventions:   Continue current diet, Start ONS(Ensure Enlive TID (chocolate only))  Continued Inpatient Monitoring, Education Initiated(Discussed high calorie/protein foods/beverages with pt and family.  Also discussed small frequent meals)    Nutrition Evaluation:   · Evaluation: Goals set   · Goals: pt is to consume >50% of most meals/ONS    · Monitoring: Meal Intake, Supplement Intake, Diet Tolerance, Skin Integrity, I&O, Weight, Pertinent Labs, Monitor Bowel Function      Electronically signed by Graciela Aguiar RD, LD on 12/16/19 at 3:46 PM    Contact Number: [phone] [de-identified] : \par - MRI lumbar spine VZ: lumbar spondylosis, postop changes L5/S1, foraminal stenosis L4/5. \par - MRI cervical spine VZ: post op changes C3/4 C4/5, adjacent spondylosis, no cord compression. \par Incidental prominent lymph nodes, nonspecific per report. Will refer to patients PCP for evaluation.

## 2022-04-19 NOTE — PLAN OF CARE
Problem: Malnutrition  (NI-5.2)  Goal: Food and/or Nutrient Delivery  Description  Individualized approach for food/nutrient provision.   Outcome: Met This Shift 4 = No assist / stand by assistance